# Patient Record
Sex: MALE | Race: WHITE | Employment: OTHER | URBAN - METROPOLITAN AREA
[De-identification: names, ages, dates, MRNs, and addresses within clinical notes are randomized per-mention and may not be internally consistent; named-entity substitution may affect disease eponyms.]

---

## 2019-09-20 ENCOUNTER — ED HISTORICAL/CONVERTED ENCOUNTER (OUTPATIENT)
Dept: OTHER | Age: 54
End: 2019-09-20

## 2019-10-18 ENCOUNTER — HOSPITAL ENCOUNTER (EMERGENCY)
Age: 54
Discharge: HOME | End: 2019-10-18
Attending: EMERGENCY MEDICINE | Admitting: EMERGENCY MEDICINE
Payer: SUBSIDIZED

## 2019-10-18 ENCOUNTER — APPOINTMENT (OUTPATIENT)
Dept: GENERAL RADIOLOGY | Age: 54
End: 2019-10-18
Attending: EMERGENCY MEDICINE
Payer: SUBSIDIZED

## 2019-10-18 VITALS
RESPIRATION RATE: 16 BRPM | SYSTOLIC BLOOD PRESSURE: 177 MMHG | DIASTOLIC BLOOD PRESSURE: 119 MMHG | HEART RATE: 86 BPM | OXYGEN SATURATION: 96 % | TEMPERATURE: 97.8 F

## 2019-10-18 DIAGNOSIS — I10 HYPERTENSION, UNSPECIFIED TYPE: ICD-10-CM

## 2019-10-18 DIAGNOSIS — R07.89 ATYPICAL CHEST PAIN: Primary | ICD-10-CM

## 2019-10-18 LAB
ALBUMIN SERPL-MCNC: 3.8 G/DL (ref 3.5–5)
ALBUMIN/GLOB SERPL: 1.2 {RATIO} (ref 1.1–2.2)
ALP SERPL-CCNC: 79 U/L (ref 45–117)
ALT SERPL-CCNC: 20 U/L (ref 12–78)
ANION GAP SERPL CALC-SCNC: 8 MMOL/L (ref 5–15)
AST SERPL-CCNC: 13 U/L (ref 15–37)
ATRIAL RATE: 85 BPM
BASOPHILS # BLD: 0 K/UL (ref 0–0.1)
BASOPHILS NFR BLD: 0 % (ref 0–1)
BILIRUB SERPL-MCNC: 0.4 MG/DL (ref 0.2–1)
BUN SERPL-MCNC: 18 MG/DL (ref 6–20)
BUN/CREAT SERPL: 13 (ref 12–20)
CALCIUM SERPL-MCNC: 8.6 MG/DL (ref 8.5–10.1)
CALCULATED P AXIS, ECG09: 72 DEGREES
CALCULATED R AXIS, ECG10: 24 DEGREES
CALCULATED T AXIS, ECG11: 63 DEGREES
CHLORIDE SERPL-SCNC: 110 MMOL/L (ref 97–108)
CO2 SERPL-SCNC: 24 MMOL/L (ref 21–32)
CREAT SERPL-MCNC: 1.41 MG/DL (ref 0.7–1.3)
DIAGNOSIS, 93000: NORMAL
DIFFERENTIAL METHOD BLD: ABNORMAL
EOSINOPHIL # BLD: 0.5 K/UL (ref 0–0.4)
EOSINOPHIL NFR BLD: 5 % (ref 0–7)
ERYTHROCYTE [DISTWIDTH] IN BLOOD BY AUTOMATED COUNT: 12.8 % (ref 11.5–14.5)
GLOBULIN SER CALC-MCNC: 3.3 G/DL (ref 2–4)
GLUCOSE SERPL-MCNC: 77 MG/DL (ref 65–100)
HCT VFR BLD AUTO: 44.5 % (ref 36.6–50.3)
HGB BLD-MCNC: 14.8 G/DL (ref 12.1–17)
IMM GRANULOCYTES # BLD AUTO: 0.1 K/UL (ref 0–0.04)
IMM GRANULOCYTES NFR BLD AUTO: 1 % (ref 0–0.5)
LYMPHOCYTES # BLD: 1.5 K/UL (ref 0.8–3.5)
LYMPHOCYTES NFR BLD: 15 % (ref 12–49)
MCH RBC QN AUTO: 31.9 PG (ref 26–34)
MCHC RBC AUTO-ENTMCNC: 33.3 G/DL (ref 30–36.5)
MCV RBC AUTO: 95.9 FL (ref 80–99)
MONOCYTES # BLD: 0.8 K/UL (ref 0–1)
MONOCYTES NFR BLD: 8 % (ref 5–13)
NEUTS SEG # BLD: 7.3 K/UL (ref 1.8–8)
NEUTS SEG NFR BLD: 71 % (ref 32–75)
NRBC # BLD: 0 K/UL (ref 0–0.01)
NRBC BLD-RTO: 0 PER 100 WBC
P-R INTERVAL, ECG05: 150 MS
PLATELET # BLD AUTO: 256 K/UL (ref 150–400)
PMV BLD AUTO: 10.2 FL (ref 8.9–12.9)
POTASSIUM SERPL-SCNC: 3.4 MMOL/L (ref 3.5–5.1)
PROT SERPL-MCNC: 7.1 G/DL (ref 6.4–8.2)
Q-T INTERVAL, ECG07: 402 MS
QRS DURATION, ECG06: 94 MS
QTC CALCULATION (BEZET), ECG08: 478 MS
RBC # BLD AUTO: 4.64 M/UL (ref 4.1–5.7)
SODIUM SERPL-SCNC: 142 MMOL/L (ref 136–145)
TROPONIN I SERPL-MCNC: <0.05 NG/ML
VENTRICULAR RATE, ECG03: 85 BPM
WBC # BLD AUTO: 10.2 K/UL (ref 4.1–11.1)

## 2019-10-18 PROCEDURE — 36415 COLL VENOUS BLD VENIPUNCTURE: CPT

## 2019-10-18 PROCEDURE — 74011250636 HC RX REV CODE- 250/636: Performed by: EMERGENCY MEDICINE

## 2019-10-18 PROCEDURE — 93005 ELECTROCARDIOGRAM TRACING: CPT

## 2019-10-18 PROCEDURE — 84484 ASSAY OF TROPONIN QUANT: CPT

## 2019-10-18 PROCEDURE — 80053 COMPREHEN METABOLIC PANEL: CPT

## 2019-10-18 PROCEDURE — 85025 COMPLETE CBC W/AUTO DIFF WBC: CPT

## 2019-10-18 PROCEDURE — 99285 EMERGENCY DEPT VISIT HI MDM: CPT

## 2019-10-18 PROCEDURE — 71046 X-RAY EXAM CHEST 2 VIEWS: CPT

## 2019-10-18 RX ORDER — HYDRALAZINE HYDROCHLORIDE 20 MG/ML
10 INJECTION INTRAMUSCULAR; INTRAVENOUS
Status: COMPLETED | OUTPATIENT
Start: 2019-10-18 | End: 2019-10-18

## 2019-10-18 RX ADMIN — HYDRALAZINE HYDROCHLORIDE 10 MG: 20 INJECTION INTRAMUSCULAR; INTRAVENOUS at 15:02

## 2019-10-18 NOTE — ED NOTES
Pt. Presents to ED today for complaints of an episode of intense CP that occurred while lifting heavy shingles at [address] this afternoon. Pt. Also reports that he is not taking his BP medications at home. PT. Alert and oriented x4. PT. Placed in position of comfort with call bell in reach.

## 2019-10-18 NOTE — ED PROVIDER NOTES
EMERGENCY DEPARTMENT HISTORY AND PHYSICAL EXAM          Date: 10/18/2019  Patient Name: Chandu Doll    History of Presenting Illness     Chief Complaint   Patient presents with    Chest Pain       History Provided By: Patient    HPI: Chandu Doll is a 48 y.o. male, pmhx hypertension and COPD, who presents via EMS to the ED c/o left lower chest and flank pain    Pt notes he was lifting shingles into a cart at [facility] when developed stabbing pain left lower chest which radiated into left flank followed by SOB. He notes he lifted up quite a few and there approximately 60 to 65 pounds each. Then as he was bending over to put a bundle of shingles onto the cart is when the pain started. He notes that he is been doing this all day and it finished half the roof before this started. The company called 911. The crew gave him aspirin and now he states the pain is almost all the way gone. He does note some moderate dizziness during the event which has almost gone away completely. Patient notes that he has not been taking his blood pressure medicine because it makes him dizzy. He has not followed up with his primary care physician for recheck appointment. Patient specifically denies any recent fevers, chills, coughing, nausea, vomiting, diarrhea, abd pain, CP, SOB, urinary sxs, changes in BM, or headache. PCP: No primary care provider on file. Allergies: None known  Social Hx: +tobacco, +EtOH, + (marijuana, no cocaine) illicit Drugs    There are no other complaints, changes, or physical findings at this time. Past History     Past Medical History:  Past Medical History:   Diagnosis Date    Chronic obstructive pulmonary disease ([address]     Hypertension        Past Surgical History:  History reviewed. No pertinent surgical history. Family History:  History reviewed. No pertinent family history.     Social History:  Social History     Tobacco Use    Smoking status: Current Every Day Smoker   Substance Use Topics    Alcohol use: Yes    Drug use: Yes     Types: Marijuana       Allergies:  No Known Allergies      Review of Systems   Review of Systems   Constitutional: Negative for activity change, appetite change, chills, fever and unexpected weight change. HENT: Negative for congestion. Eyes: Negative for pain and visual disturbance. Respiratory: Negative for cough and shortness of breath. Cardiovascular: Positive for chest pain (Now resolved). Gastrointestinal: Negative for abdominal pain, diarrhea, nausea and vomiting. Genitourinary: Negative for dysuria. Musculoskeletal: Positive for back pain (Now gone). Skin: Negative for rash. Neurological: Positive for dizziness. Negative for headaches. Physical Exam   Physical Exam   Constitutional: He is oriented to person, place, and time. He appears well-developed and well-nourished. Well appearing elderly male currently in minimal distress   HENT:   Head: Normocephalic and atraumatic. Mouth/Throat: Oropharynx is clear and moist.   Eyes: Pupils are equal, round, and reactive to light. Conjunctivae and EOM are normal. Right eye exhibits no discharge. Left eye exhibits no discharge. Neck: Normal range of motion. Neck supple. Cardiovascular: Normal rate, regular rhythm and normal heart sounds. No murmur heard. Pulmonary/Chest: Effort normal and breath sounds normal. No respiratory distress. He has no wheezes. He has no rales. He exhibits no tenderness. Abdominal: Soft. Bowel sounds are normal. He exhibits no distension. There is no tenderness. Musculoskeletal: Normal range of motion. He exhibits no edema. Neurological: He is alert and oriented to person, place, and time. No cranial nerve deficit. He exhibits normal muscle tone. Skin: Skin is warm and dry. No rash noted. He is not diaphoretic. Nursing note and vitals reviewed.     Diagnostic Study Results     Labs -     Recent Results (from the past 12 hour(s))   EKG, 12 LEAD, INITIAL    Collection Time: 10/18/19  2:17 PM   Result Value Ref Range    Ventricular Rate 85 BPM    Atrial Rate 85 BPM    P-R Interval 150 ms    QRS Duration 94 ms    Q-T Interval 402 ms    QTC Calculation (Bezet) 478 ms    Calculated P Axis 72 degrees    Calculated R Axis 24 degrees    Calculated T Axis 63 degrees    Diagnosis       Normal sinus rhythm  Normal ECG  No previous ECGs available     CBC WITH AUTOMATED DIFF    Collection Time: 10/18/19  2:24 PM   Result Value Ref Range    WBC 10.2 4.1 - 11.1 K/uL    RBC 4.64 4.10 - 5.70 M/uL    HGB 14.8 12.1 - 17.0 g/dL    HCT 44.5 36.6 - 50.3 %    MCV 95.9 80.0 - 99.0 FL    MCH 31.9 26.0 - 34.0 PG    MCHC 33.3 30.0 - 36.5 g/dL    RDW 12.8 11.5 - 14.5 %    PLATELET 853 162 - 274 K/uL    MPV 10.2 8.9 - 12.9 FL    NRBC 0.0 0  WBC    ABSOLUTE NRBC 0.00 0.00 - 0.01 K/uL    NEUTROPHILS 71 32 - 75 %    LYMPHOCYTES 15 12 - 49 %    MONOCYTES 8 5 - 13 %    EOSINOPHILS 5 0 - 7 %    BASOPHILS 0 0 - 1 %    IMMATURE GRANULOCYTES 1 (H) 0.0 - 0.5 %    ABS. NEUTROPHILS 7.3 1.8 - 8.0 K/UL    ABS. LYMPHOCYTES 1.5 0.8 - 3.5 K/UL    ABS. MONOCYTES 0.8 0.0 - 1.0 K/UL    ABS. EOSINOPHILS 0.5 (H) 0.0 - 0.4 K/UL    ABS. BASOPHILS 0.0 0.0 - 0.1 K/UL    ABS. IMM. GRANS. 0.1 (H) 0.00 - 0.04 K/UL    DF AUTOMATED         Radiologic Studies -   No orders to display     CT Results  (Last 48 hours)    None        CXR Results  (Last 48 hours)    None            Medical Decision Making   I am the first provider for this patient. I reviewed the vital signs, available nursing notes, past medical history, past surgical history, family history and social history. Vital Signs-Reviewed the patient's vital signs.   Patient Vitals for the past 12 hrs:   Temp Pulse Resp BP SpO2   10/18/19 1419 97.8 °F (36.6 °C) 82 13 (!) 178/130 97 %       Pulse Oximetry Analysis - 98% on RA    Cardiac Monitor:   Rate: 82bpm  Rhythm: Normal Sinus Rhythm      Records Reviewed: Nursing Notes    Provider Notes (Medical Decision Making):   MDM: Middle-aged male smoker with a history of hypertension and unknown cholesterol levels presenting with acute onset left-sided pain likely musculoskeletal.  However given risk factors will send cardiac enzymes and monitor his pressure and rhythm strips. ED Course:   Initial assessment performed. The patients presenting problems have been discussed, and they are in agreement with the care plan formulated and outlined with them. I have encouraged them to ask questions as they arise throughout their visit. EKG interpretation: (Preliminary)  Rhythm: Normal sinus rhythm with a regular rate at 85; normal axis; normal TX and QRS intervals; normal ST-T segments. 2:16 PM   Spent 3-5 minutes discussing the risks of smoking and the benefits of smoking cessation as well as the long term sequelae of smoking with the pt who verbalized his understanding. Reviewed strategies for success, including gradually decreasing the number of cigarettes smoked a day. PROGRESS NOTE:  3:50 PM  Pt states he is doing better and has not had recurrent episodes of symptoms. Discussed recommendations for smoking cessation again as well as follow-up with cardiology for stress testing. On score care card information is been given to patient and his spouse. Discharge note:  Pt re-evaluated and noted to be feeling better  ready for discharge. Updated pt and family on all final lab findings. Will follow up as instructed. All questions have been answered, pt voiced understanding and agreement with plan. Specific return precautions provided as well as instructions to return to the ED should sx worsen at any time. Vital signs stable for discharge. Critical Care Time:   0      Diagnosis     Clinical Impression:   1. Atypical chest pain    2. Hypertension, unspecified type        PLAN:  1. There are no discharge medications for this patient.     2.   Follow-up Information     Follow up With Specialties Details Why [facility]  Schedule an appointment as soon as possible for a visit [phone] for stress testing [address]  [place] [address]        Return to ED if worse     Disposition:  home      Please note, this dictation was completed with [employer], the computer voice recognition software. Quite often unanticipated grammatical, syntax, homophones, and other interpretive errors are inadvertently transcribed by the computer software. Please disregard these errors. Please excuse any errors that have escaped final proof reading.

## 2019-10-18 NOTE — ED NOTES
Patient discharged by Dr. Vicente Beltrán. Patient provided with discharge instructions Rx and instructions on follow up care. Patient out of ED ambulatory accompanied by family.

## 2019-10-18 NOTE — DISCHARGE INSTRUCTIONS
Please take your medications as prescribed. If your blood pressure medicine makes you too dizzy, you can cut it in half. Patient Education        Musculoskeletal Chest Pain: Care Instructions  Your Care Instructions    Chest pain is not always a sign that something is wrong with your heart or that you have another serious problem. The doctor thinks your chest pain is caused by strained muscles or ligaments, inflamed chest cartilage, or another problem in your chest, rather than by your heart. You may need more tests to find the cause of your chest pain. Follow-up care is a key part of your treatment and safety. Be sure to make and go to all appointments, and call your doctor if you are having problems. It's also a good idea to know your test results and keep a list of the medicines you take. How can you care for yourself at home? · Take pain medicines exactly as directed. ? If the doctor gave you a prescription medicine for pain, take it as prescribed. ? If you are not taking a prescription pain medicine, ask your doctor if you can take an over-the-counter medicine. · Rest and protect the sore area. · Stop, change, or take a break from any activity that may be causing your pain or soreness. · Put ice or a cold pack on the sore area for 10 to 20 minutes at a time. Try to do this every 1 to 2 hours for the next 3 days (when you are awake) or until the swelling goes down. Put a thin cloth between the ice and your skin. · After 2 or 3 days, apply a heating pad set on low or a warm cloth to the area that hurts. Some doctors suggest that you go back and forth between hot and cold. · Do not wrap or tape your ribs for support. This may cause you to take smaller breaths, which could increase your risk of lung problems. · Mentholated creams such as Bengay or Icy Hot may soothe sore muscles. Follow the instructions on the package. · Follow your doctor's instructions for exercising.   · Gentle stretching and massage may help you get better faster. Stretch slowly to the point just before pain begins, and hold the stretch for at least 15 to 30 seconds. Do this 3 or 4 times a day. Stretch just after you have applied heat. · As your pain gets better, slowly return to your normal activities. Any increased pain may be a sign that you need to rest a while longer. When should you call for help? Call 911 anytime you think you may need emergency care. For example, call if:    · You have chest pain or pressure. This may occur with:  ? Sweating. ? Shortness of breath. ? Nausea or vomiting. ? Pain that spreads from the chest to the neck, jaw, or one or both shoulders or arms. ? Dizziness or lightheadedness. ? A fast or uneven pulse. After calling 911, chew 1 adult-strength aspirin. Wait for an ambulance. Do not try to drive yourself.     · You have sudden chest pain and shortness of breath, or you cough up blood.    Call your doctor now or seek immediate medical care if:    · You have any trouble breathing.     · Your chest pain gets worse.     · Your chest pain occurs consistently with exercise and is relieved by rest.    Watch closely for changes in your health, and be sure to contact your doctor if:    · Your chest pain does not get better after 1 week. Where can you learn more? Go to http://dang-fidelia.info/. Enter V293 in the search box to learn more about \"Musculoskeletal Chest Pain: Care Instructions. \"  Current as of: June 26, 2019  Content Version: 12.2  © 9324-2122 [email or web address]. Care instructions adapted under license by [employer] (which disclaims liability or warranty for this information). If you have questions about a medical condition or this instruction, always ask your healthcare professional. [place] [address] any warranty or liability for your use of this information.        Patient Education        Learning About Benefits From Quitting Smoking  How does quitting smoking make you healthier? If you're thinking about quitting smoking, you may have a few reasons to be smoke-free. Your health may be one of them. · When you quit smoking, you lower your risks for cancer, lung disease, heart attack, stroke, blood vessel disease, and blindness from macular degeneration. · When you're smoke-free, you get sick less often, and you heal faster. You are less likely to get colds, flu, bronchitis, and pneumonia. · As a nonsmoker, you may find that your mood is better and you are less stressed. When and how will you feel healthier? Quitting has real health benefits that start from day 1 of being smoke-free. And the longer you stay smoke-free, the healthier you get and the better you feel. The first hours  · After just 20 minutes, your blood pressure and heart rate go down. That means there's less stress on your heart and blood vessels. · Within 12 hours, the level of carbon monoxide in your blood drops back to normal. That makes room for more oxygen. With more oxygen in your body, you may notice that you have more energy than when you smoked. After 2 weeks  · Your lungs start to work better. · Your risk of heart attack starts to drop. After 1 month  · When your lungs are clear, you cough less and breathe deeper, so it's easier to be active. · Your sense of taste and smell return. That means you can enjoy food more than you have since you started smoking. Over the years  · After 1 year, your risk of heart disease is half what it would be if you kept smoking. · After 5 years, your risk of stroke starts to shrink. Within a few years after that, it's about the same as if you'd never smoked. · After 10 years, your risk of dying from lung cancer is cut by about half. And your risk for many other types of cancer is lower too. How would quitting help others in your life?   When you quit smoking, you improve the health of everyone who now breathes in your smoke.  · Their heart, lung, and cancer risks drop, much like yours. · They are sick less. For babies and small children, living smoke-free means they're less likely to have ear infections, pneumonia, and bronchitis. · If you're a woman who is or will be pregnant someday, quitting smoking means a healthier . · Children who are close to you are less likely to become adult smokers. Where can you learn more? Go to http://dang-fidelia.info/. Enter 052 [id number] 72 11 in the search box to learn more about \"Learning About Benefits From Quitting Smoking. \"  Current as of: 2018  Content Version: 12.2  © 2768-3393 [employer]. Care instructions adapted under license by [employer] (which disclaims liability or warranty for this information). If you have questions about a medical condition or this instruction, always ask your healthcare professional. [place] [address] any warranty or liability for your use of this information. Patient Education        Elevated Blood Pressure: Care Instructions  Your Care Instructions  Blood pressure is a measure of how hard the blood pushes against the walls of your arteries. It's normal for blood pressure to go up and down throughout the day. But if it stays up over time, you have high blood pressure. Two numbers tell you your blood pressure. The first number is the systolic pressure. It shows how hard the blood pushes when your heart is pumping. The second number is the diastolic pressure. It shows how hard the blood pushes between heartbeats, when your heart is relaxed and filling with blood. An ideal blood pressure in adults is less than 120/80 (say \"120 over 80\"). High blood pressure is 140/90 or higher. You have high blood pressure if your top number is 140 or higher or your bottom number is 90 or higher, or both. The main test for high blood pressure is simple, fast, and painless.  To diagnose high blood pressure, your doctor will test your blood pressure at different times. After testing your blood pressure, your doctor may ask you to test it again when you are home. If you are diagnosed with high blood pressure, you can work with your doctor to make a long-term plan to manage it. Follow-up care is a key part of your treatment and safety. Be sure to make and go to all appointments, and call your doctor if you are having problems. It's also a good idea to know your test results and keep a list of the medicines you take. How can you care for yourself at home? · Do not smoke. Smoking increases your risk for heart attack and stroke. If you need help quitting, talk to your doctor about stop-smoking programs and medicines. These can increase your chances of quitting for good. · Stay at a healthy weight. · Try to limit how much sodium you eat to less than 2,300 milligrams (mg) a day. Your doctor may ask you to try to eat less than 1,500 mg a day. · Be physically active. Get at least 30 minutes of exercise on most days of the week. Walking is a good choice. You also may want to do other activities, such as running, swimming, cycling, or playing tennis or team sports. · Avoid or limit alcohol. Talk to your doctor about whether you can drink any alcohol. · Eat plenty of fruits, vegetables, and low-fat dairy products. Eat less saturated and total fats. · Learn how to check your blood pressure at home. When should you call for help? Call your doctor now or seek immediate medical care if:  ? · Your blood pressure is much higher than normal (such as 180/110 or higher). ? · You think high blood pressure is causing symptoms such as:  ¨ Severe headache. ¨ Blurry vision. ? Watch closely for changes in your health, and be sure to contact your doctor if:  ? · You do not get better as expected. Where can you learn more? Go to http://dang-fidelia.info/.   Enter B520 in the search box to learn more about \"Elevated Blood Pressure: Care Instructions. \"  Current as of: September 21, 2016  Content Version: 11.4  © 5863-1380 [employer]. Care instructions adapted under license by [employer] (which disclaims liability or warranty for this information). If you have questions about a medical condition or this instruction, always ask your healthcare professional. [address] any warranty or liability for your use of this information.

## 2022-10-05 ENCOUNTER — APPOINTMENT (OUTPATIENT)
Dept: GENERAL RADIOLOGY | Age: 57
End: 2022-10-05
Attending: EMERGENCY MEDICINE
Payer: MEDICAID

## 2022-10-05 ENCOUNTER — APPOINTMENT (OUTPATIENT)
Dept: CT IMAGING | Age: 57
End: 2022-10-05
Attending: EMERGENCY MEDICINE
Payer: MEDICAID

## 2022-10-05 ENCOUNTER — HOSPITAL ENCOUNTER (EMERGENCY)
Age: 57
Discharge: HOME | End: 2022-10-05
Attending: EMERGENCY MEDICINE
Payer: MEDICAID

## 2022-10-05 VITALS
HEART RATE: 91 BPM | DIASTOLIC BLOOD PRESSURE: 126 MMHG | RESPIRATION RATE: 18 BRPM | HEIGHT: 70 IN | WEIGHT: 150 LBS | OXYGEN SATURATION: 97 % | BODY MASS INDEX: 21.47 KG/M2 | SYSTOLIC BLOOD PRESSURE: 178 MMHG | TEMPERATURE: 97.8 F

## 2022-10-05 DIAGNOSIS — N17.9 AKI (ACUTE KIDNEY INJURY) (HCC): ICD-10-CM

## 2022-10-05 DIAGNOSIS — J18.9 PNEUMONIA OF RIGHT LUNG DUE TO INFECTIOUS ORGANISM, UNSPECIFIED PART OF LUNG: ICD-10-CM

## 2022-10-05 DIAGNOSIS — I16.1 HYPERTENSIVE EMERGENCY: Primary | ICD-10-CM

## 2022-10-05 DIAGNOSIS — R06.02 SOB (SHORTNESS OF BREATH): ICD-10-CM

## 2022-10-05 LAB
ALBUMIN SERPL-MCNC: 4.3 G/DL (ref 3.5–5.2)
ALBUMIN/GLOB SERPL: 1.7 {RATIO} (ref 1.1–2.2)
ALP SERPL-CCNC: 93 U/L (ref 40–129)
ALT SERPL-CCNC: 13 U/L (ref 10–50)
ANION GAP SERPL CALC-SCNC: 11 MMOL/L (ref 5–15)
AST SERPL-CCNC: 13 U/L (ref 10–50)
BASOPHILS # BLD: 0 K/UL (ref 0–0.1)
BASOPHILS NFR BLD: 0 % (ref 0–1)
BILIRUB SERPL-MCNC: 0.6 MG/DL (ref 0.2–1)
BUN SERPL-MCNC: 21 MG/DL (ref 6–20)
BUN/CREAT SERPL: 9 (ref 12–20)
CALCIUM SERPL-MCNC: 9.5 MG/DL (ref 8.6–10)
CHLORIDE SERPL-SCNC: 104 MMOL/L (ref 98–107)
CO2 SERPL-SCNC: 27 MMOL/L (ref 22–29)
COMMENT, HOLDF: NORMAL
CREAT SERPL-MCNC: 2.27 MG/DL (ref 0.7–1.2)
DIFFERENTIAL METHOD BLD: ABNORMAL
EOSINOPHIL # BLD: 0.4 K/UL (ref 0–0.4)
EOSINOPHIL NFR BLD: 4 % (ref 0–7)
ERYTHROCYTE [DISTWIDTH] IN BLOOD BY AUTOMATED COUNT: 13.2 % (ref 11.5–14.5)
GLOBULIN SER CALC-MCNC: 2.6 G/DL (ref 2–4)
GLUCOSE SERPL-MCNC: 99 MG/DL (ref 65–100)
HCT VFR BLD AUTO: 43.8 % (ref 36.6–50.3)
HGB BLD-MCNC: 14.3 G/DL (ref 12.1–17)
IMM GRANULOCYTES # BLD AUTO: 0 K/UL (ref 0–0.04)
IMM GRANULOCYTES NFR BLD AUTO: 0 % (ref 0–0.5)
LACTATE BLD-SCNC: 1.53 MMOL/L (ref 0.4–2)
LYMPHOCYTES # BLD: 0.9 K/UL (ref 0.8–3.5)
LYMPHOCYTES NFR BLD: 9 % (ref 12–49)
MCH RBC QN AUTO: 31.1 PG (ref 26–34)
MCHC RBC AUTO-ENTMCNC: 32.6 G/DL (ref 30–36.5)
MCV RBC AUTO: 95.2 FL (ref 80–99)
MONOCYTES # BLD: 0.7 K/UL (ref 0–1)
MONOCYTES NFR BLD: 6 % (ref 5–13)
NEUTS SEG # BLD: 8.4 K/UL (ref 1.8–8)
NEUTS SEG NFR BLD: 80 % (ref 32–75)
NRBC # BLD: 0 K/UL (ref 0–0.01)
NRBC BLD-RTO: 0 PER 100 WBC
PLATELET # BLD AUTO: 192 K/UL (ref 150–400)
PMV BLD AUTO: 10.7 FL (ref 8.9–12.9)
POTASSIUM SERPL-SCNC: 4.2 MMOL/L (ref 3.5–5.1)
PROT SERPL-MCNC: 6.9 G/DL (ref 6.4–8.3)
RBC # BLD AUTO: 4.6 M/UL (ref 4.1–5.7)
SAMPLES BEING HELD,HOLD: NORMAL
SODIUM SERPL-SCNC: 142 MMOL/L (ref 136–145)
TROPONIN I BLD-MCNC: 0.04 NG/ML (ref 0–0.08)
WBC # BLD AUTO: 10.5 K/UL (ref 4.1–11.1)

## 2022-10-05 PROCEDURE — 99285 EMERGENCY DEPT VISIT HI MDM: CPT

## 2022-10-05 PROCEDURE — 87040 BLOOD CULTURE FOR BACTERIA: CPT

## 2022-10-05 PROCEDURE — 74011000636 HC RX REV CODE- 636: Performed by: EMERGENCY MEDICINE

## 2022-10-05 PROCEDURE — 74011000250 HC RX REV CODE- 250: Performed by: EMERGENCY MEDICINE

## 2022-10-05 PROCEDURE — 71045 X-RAY EXAM CHEST 1 VIEW: CPT

## 2022-10-05 PROCEDURE — 80053 COMPREHEN METABOLIC PANEL: CPT

## 2022-10-05 PROCEDURE — 70450 CT HEAD/BRAIN W/O DYE: CPT

## 2022-10-05 PROCEDURE — 93005 ELECTROCARDIOGRAM TRACING: CPT

## 2022-10-05 PROCEDURE — 71275 CT ANGIOGRAPHY CHEST: CPT

## 2022-10-05 PROCEDURE — 96366 THER/PROPH/DIAG IV INF ADDON: CPT

## 2022-10-05 PROCEDURE — 36415 COLL VENOUS BLD VENIPUNCTURE: CPT

## 2022-10-05 PROCEDURE — 74011250637 HC RX REV CODE- 250/637: Performed by: EMERGENCY MEDICINE

## 2022-10-05 PROCEDURE — 96375 TX/PRO/DX INJ NEW DRUG ADDON: CPT

## 2022-10-05 PROCEDURE — 85025 COMPLETE CBC W/AUTO DIFF WBC: CPT

## 2022-10-05 PROCEDURE — 96365 THER/PROPH/DIAG IV INF INIT: CPT

## 2022-10-05 PROCEDURE — 74011250636 HC RX REV CODE- 250/636: Performed by: EMERGENCY MEDICINE

## 2022-10-05 RX ORDER — HYDRALAZINE HYDROCHLORIDE 25 MG/1
25 TABLET, FILM COATED ORAL ONCE
Status: COMPLETED | OUTPATIENT
Start: 2022-10-05 | End: 2022-10-05

## 2022-10-05 RX ORDER — ALBUTEROL SULFATE 90 UG/1
2 AEROSOL, METERED RESPIRATORY (INHALATION)
Qty: 18 G | Refills: 0 | Status: SHIPPED | OUTPATIENT
Start: 2022-10-05

## 2022-10-05 RX ORDER — DOXYCYCLINE 100 MG/1
100 TABLET ORAL 2 TIMES DAILY
Qty: 14 TABLET | Refills: 0 | Status: SHIPPED | OUTPATIENT
Start: 2022-10-05 | End: 2022-10-12

## 2022-10-05 RX ORDER — HYDRALAZINE HYDROCHLORIDE 25 MG/1
25 TABLET, FILM COATED ORAL 3 TIMES DAILY
Qty: 30 TABLET | Refills: 0 | Status: SHIPPED | OUTPATIENT
Start: 2022-10-05 | End: 2022-10-28

## 2022-10-05 RX ORDER — ALBUTEROL SULFATE 90 UG/1
2 AEROSOL, METERED RESPIRATORY (INHALATION)
COMMUNITY

## 2022-10-05 RX ADMIN — CEFTRIAXONE 2 G: 2 INJECTION, POWDER, FOR SOLUTION INTRAMUSCULAR; INTRAVENOUS at 11:40

## 2022-10-05 RX ADMIN — IOPAMIDOL 100 ML: 755 INJECTION, SOLUTION INTRAVENOUS at 11:32

## 2022-10-05 RX ADMIN — NICARDIPINE HYDROCHLORIDE 5 MG/HR: 2.5 INJECTION, SOLUTION INTRAVENOUS at 10:07

## 2022-10-05 RX ADMIN — AZITHROMYCIN MONOHYDRATE 500 MG: 500 INJECTION, POWDER, LYOPHILIZED, FOR SOLUTION INTRAVENOUS at 11:40

## 2022-10-05 RX ADMIN — HYDRALAZINE HYDROCHLORIDE 25 MG: 25 TABLET, FILM COATED ORAL at 12:55

## 2022-10-05 NOTE — ED PROVIDER NOTES
72-year-old male with history of COPD, hypertension, stroke presents to the emergency department with a chief complaint of 3 days of worsening shortness of breath. He is a current smoker. He does not use baseline controller medicines but uses albuterol as needed for COPD. Denies cough or fever. No chest pain. Denies history of cardiovascular disease. Reports previous stroke where he was seen at [facility] with left-sided deficits. He endorses headache along with a feeling of off balance for the past 3 days. Does not currently take medicines for blood pressure. The history is provided by the patient and medical records. Shortness of Breath  This is a new problem. The problem occurs continuously. The current episode started more than 2 days ago. The problem has been gradually worsening. Associated symptoms include headaches. Pertinent negatives include no fever, no sore throat, no cough, no chest pain, no vomiting, no abdominal pain, no rash and no leg swelling. Associated medical issues include COPD. Hypertension   Associated symptoms include headaches and shortness of breath. Pertinent negatives include no chest pain, no nausea and no vomiting. Headache   Associated symptoms include shortness of breath. Pertinent negatives include no fever, no weakness, no nausea and no vomiting. Past Medical History:   Diagnosis Date    Chronic obstructive pulmonary disease ([facility] [address]     Hypertension     Stroke ([facility] [address] 2022       History reviewed. No pertinent surgical history. History reviewed. No pertinent family history. Social History     Socioeconomic History    Marital status:      Spouse name: Not on file    Number of children: Not on file    Years of education: Not on file    Highest education level: Not on file   Occupational History    Not on file   Tobacco Use    Smoking status: Every Day    Smokeless tobacco: Never   Substance and Sexual Activity    Alcohol use: Yes    Drug use:  Yes Types: Marijuana    Sexual activity: Not on file   Other Topics Concern    Not on file   Social History Narrative    Not on file     Social Determinants of Health     Financial Resource Strain: Not on file   Food Insecurity: Not on file   Transportation Needs: Not on file   Physical Activity: Not on file   Stress: Not on file   Social Connections: Not on file   Intimate Partner Violence: Not on file   Housing Stability: Not on file         ALLERGIES: Patient has no known allergies. Review of Systems   Constitutional:  Positive for fatigue. Negative for fever. HENT:  Negative for sneezing and sore throat. Respiratory:  Positive for shortness of breath. Negative for cough. Cardiovascular:  Negative for chest pain and leg swelling. Gastrointestinal:  Negative for abdominal pain, diarrhea, nausea and vomiting. Genitourinary:  Negative for difficulty urinating and dysuria. Musculoskeletal:  Negative for arthralgias and myalgias. Skin:  Negative for color change and rash. Neurological:  Positive for headaches. Negative for weakness. Psychiatric/Behavioral:  Negative for agitation and behavioral problems. There were no vitals filed for this visit. Physical Exam  Vitals and nursing note reviewed. Constitutional:       General: He is not in acute distress. Appearance: Normal appearance. He is well-developed. He is not ill-appearing, toxic-appearing or diaphoretic. HENT:      Head: Normocephalic and atraumatic. Nose: Nose normal.      Mouth/Throat:      Mouth: Mucous membranes are moist.      Pharynx: Oropharynx is clear. Eyes:      Extraocular Movements: Extraocular movements intact. Conjunctiva/sclera: Conjunctivae normal.      Pupils: Pupils are equal, round, and reactive to light. Cardiovascular:      Rate and Rhythm: Normal rate and regular rhythm. Pulses: Normal pulses. Heart sounds: No murmur heard.   Pulmonary:      Effort: Pulmonary effort is normal. No respiratory distress. Breath sounds: Normal breath sounds. No wheezing. Chest:      Chest wall: No mass or tenderness. Abdominal:      General: There is no distension. Palpations: Abdomen is soft. Tenderness: There is no abdominal tenderness. There is no guarding or rebound. Musculoskeletal:         General: No swelling, tenderness, deformity or signs of injury. Normal range of motion. Cervical back: Normal range of motion and neck supple. No rigidity. No muscular tenderness. Right lower leg: No tenderness. No edema. Left lower leg: No tenderness. No edema. Skin:     General: Skin is warm and dry. Capillary Refill: Capillary refill takes less than 2 seconds. Neurological:      Mental Status: He is alert and oriented to person, place, and time. Coordination: Coordination abnormal (Slight difficulty with bilateral finger-to-nose). Psychiatric:         Mood and Affect: Mood normal.         Behavior: Behavior normal.        MDM  Number of Diagnoses or Management Options  ANCELMO (acute kidney injury) ([facility] [address]  Hypertensive emergency  Pneumonia of right lung due to infectious organism, unspecified part of lung  SOB (shortness of breath)  Diagnosis management comments: 28-year-old male presents as above with shortness of breath. He has evidence of pneumonia with hypertensive urgency with systolics approaching 613, ANCELMO. He declines recommendation for admission to the hospital and would like to leave AMA. We will transition to p.o. antibiotics, oral antihypertensive. Recommend that he follows up with soon as possible at the hospital for admission. He tells me he will go to the hospital in [place] in a couple of days.        Amount and/or Complexity of Data Reviewed  Clinical lab tests: reviewed  Tests in the radiology section of CPT®: reviewed  Decide to obtain previous medical records or to obtain history from someone other than the patient: yes      ED Course as of 10/06/22 1355   Wed Oct 05, 2022   0949   ED EKG interpretation:  Rhythm: normal sinus rhythm. Rate (approx.): 99. Axis: normal.  ST segment: There ST elevations V2 through V4 which appear to be related to LVH, depression in V6. . This EKG was interpreted by Yuliet Ramirez MD,ED Provider. [JM]   7789 Discussed with the patient my concerns regarding hypertension, kidney injury, pneumonia with recommendation for admission which he declines. At this point we will discontinue Cardene drip and transition to p.o.   Will provide prescription for p.o. hypertensive, antibiotics, with recommendation to return to the hospital soon as possible for anticipated admission [JM]      ED Course User Index  [JM] Narciso Martel MD       Procedures

## 2022-10-05 NOTE — ED TRIAGE NOTES
Patient arrives ambulatory to triage with c/o SOB x 3 days, and right sided headache and dizziness x 3 days. States intermittently feels sensation of \"about to fall over. \"    Patient's BP: 142/160. Patient has hx of stroke 6 months ago, which has resulted in left sided deficit, and left sided numbness on trunk. Patient is A&O x 4. Denies chest pain, cough, fever, N/V/D, vision change, change, difficulty with ambulation. Hx of COPD.

## 2022-10-05 NOTE — DISCHARGE INSTRUCTIONS
[address] Gu[address], [address] [place] [address]    Thank you for allowing us to provide you with medical care today. We realize that you have many choices for your emergency care needs. We thank you for choosing [facility]. Please choose us in the future for any continued health care needs. We hope we addressed all of your medical concerns. We strive to provide excellent quality care in the Emergency Department. Anything less than excellent does not meet our expectations. The exam and treatment you received in the Emergency Department were for an emergent problem and are not intended as complete care. It is important that you follow up with a doctor, nurse practitioner, or physician's assistant for ongoing care. If your symptoms worsen or you do not improve as expected and you are unable to reach your usual health care provider, you should return to the Emergency Department. We are available 24 hours a day. Take this sheet with you when you go to your follow-up visit. If you have any problem arranging the follow-up visit, contact the Emergency Department immediately. Make an appointment your family doctor for follow up of this visit. Return to the ER if you are unable to be seen in a timely manner.

## 2022-10-06 LAB
ATRIAL RATE: 99 BPM
CALCULATED P AXIS, ECG09: 63 DEGREES
CALCULATED R AXIS, ECG10: -20 DEGREES
CALCULATED T AXIS, ECG11: 108 DEGREES
DIAGNOSIS, 93000: NORMAL
P-R INTERVAL, ECG05: 166 MS
Q-T INTERVAL, ECG07: 368 MS
QRS DURATION, ECG06: 98 MS
QTC CALCULATION (BEZET), ECG08: 472 MS
VENTRICULAR RATE, ECG03: 99 BPM

## 2022-10-10 LAB
BACTERIA SPEC CULT: NORMAL
SERVICE CMNT-IMP: NORMAL

## 2022-10-18 ENCOUNTER — APPOINTMENT (OUTPATIENT)
Dept: CT IMAGING | Age: 57
DRG: 199 | End: 2022-10-18
Attending: STUDENT IN AN ORGANIZED HEALTH CARE EDUCATION/TRAINING PROGRAM
Payer: MEDICAID

## 2022-10-18 ENCOUNTER — APPOINTMENT (OUTPATIENT)
Dept: ULTRASOUND IMAGING | Age: 57
End: 2022-10-18
Attending: EMERGENCY MEDICINE
Payer: MEDICAID

## 2022-10-18 ENCOUNTER — HOSPITAL ENCOUNTER (EMERGENCY)
Age: 57
Discharge: ANOTHER HOSPITAL | End: 2022-10-18
Attending: EMERGENCY MEDICINE
Payer: MEDICAID

## 2022-10-18 ENCOUNTER — HOSPITAL ENCOUNTER (INPATIENT)
Age: 57
LOS: 1 days | Discharge: LEFT BEFORE DISCHARGE | DRG: 199 | End: 2022-10-20
Attending: STUDENT IN AN ORGANIZED HEALTH CARE EDUCATION/TRAINING PROGRAM | Admitting: INTERNAL MEDICINE
Payer: MEDICAID

## 2022-10-18 VITALS
SYSTOLIC BLOOD PRESSURE: 166 MMHG | WEIGHT: 150 LBS | DIASTOLIC BLOOD PRESSURE: 114 MMHG | BODY MASS INDEX: 21.47 KG/M2 | OXYGEN SATURATION: 97 % | TEMPERATURE: 97.9 F | HEIGHT: 70 IN | RESPIRATION RATE: 16 BRPM | HEART RATE: 81 BPM

## 2022-10-18 DIAGNOSIS — I16.9 HYPERTENSIVE CRISIS: Primary | ICD-10-CM

## 2022-10-18 DIAGNOSIS — D64.9 ACUTE ANEMIA: ICD-10-CM

## 2022-10-18 DIAGNOSIS — I10 HYPERTENSION, UNSPECIFIED TYPE: Primary | ICD-10-CM

## 2022-10-18 DIAGNOSIS — R10.11 ABDOMINAL PAIN, RIGHT UPPER QUADRANT: ICD-10-CM

## 2022-10-18 DIAGNOSIS — K92.2 GASTROINTESTINAL HEMORRHAGE, UNSPECIFIED GASTROINTESTINAL HEMORRHAGE TYPE: ICD-10-CM

## 2022-10-18 DIAGNOSIS — N18.9 ACUTE RENAL FAILURE SUPERIMPOSED ON CHRONIC KIDNEY DISEASE, UNSPECIFIED CKD STAGE, UNSPECIFIED ACUTE RENAL FAILURE TYPE (HCC): ICD-10-CM

## 2022-10-18 DIAGNOSIS — N17.9 ACUTE RENAL FAILURE SUPERIMPOSED ON CHRONIC KIDNEY DISEASE, UNSPECIFIED CKD STAGE, UNSPECIFIED ACUTE RENAL FAILURE TYPE (HCC): ICD-10-CM

## 2022-10-18 DIAGNOSIS — N17.9 AKI (ACUTE KIDNEY INJURY) (HCC): ICD-10-CM

## 2022-10-18 LAB
ALBUMIN SERPL-MCNC: 3.9 G/DL (ref 3.5–5.2)
ALBUMIN/GLOB SERPL: 1.6 {RATIO} (ref 1.1–2.2)
ALP SERPL-CCNC: 92 U/L (ref 40–129)
ALT SERPL-CCNC: 24 U/L (ref 10–50)
ANION GAP SERPL CALC-SCNC: 14 MMOL/L (ref 5–15)
AST SERPL-CCNC: 26 U/L (ref 10–50)
BASOPHILS # BLD: 0.1 K/UL (ref 0–0.1)
BASOPHILS NFR BLD: 1 % (ref 0–1)
BILIRUB SERPL-MCNC: 0.7 MG/DL (ref 0.2–1)
BUN SERPL-MCNC: 27 MG/DL (ref 6–20)
BUN/CREAT SERPL: 11 (ref 12–20)
CALCIUM SERPL-MCNC: 9 MG/DL (ref 8.6–10)
CHLORIDE SERPL-SCNC: 103 MMOL/L (ref 98–107)
CO2 SERPL-SCNC: 26 MMOL/L (ref 22–29)
COLLECT DATE STL: NORMAL
COMMENT, HOLDF: NORMAL
CREAT SERPL-MCNC: 2.52 MG/DL (ref 0.7–1.2)
DIFFERENTIAL METHOD BLD: ABNORMAL
EOSINOPHIL # BLD: 0.6 K/UL (ref 0–0.4)
EOSINOPHIL NFR BLD: 5 % (ref 0–7)
ERYTHROCYTE [DISTWIDTH] IN BLOOD BY AUTOMATED COUNT: 13.2 % (ref 11.5–14.5)
GLOBULIN SER CALC-MCNC: 2.5 G/DL (ref 2–4)
GLUCOSE SERPL-MCNC: 109 MG/DL (ref 65–100)
HCT VFR BLD AUTO: 35.6 % (ref 36.6–50.3)
HEMOCCULT SP1 STL QL: NEGATIVE
HGB BLD-MCNC: 11.8 G/DL (ref 12.1–17)
IMM GRANULOCYTES # BLD AUTO: 0 K/UL (ref 0–0.04)
IMM GRANULOCYTES NFR BLD AUTO: 0 % (ref 0–0.5)
LIPASE SERPL-CCNC: 41 U/L (ref 13–60)
LYMPHOCYTES # BLD: 1.4 K/UL (ref 0.8–3.5)
LYMPHOCYTES NFR BLD: 14 % (ref 12–49)
MCH RBC QN AUTO: 31.1 PG (ref 26–34)
MCHC RBC AUTO-ENTMCNC: 33.1 G/DL (ref 30–36.5)
MCV RBC AUTO: 93.7 FL (ref 80–99)
MONOCYTES # BLD: 0.9 K/UL (ref 0–1)
MONOCYTES NFR BLD: 8 % (ref 5–13)
NEUTS SEG # BLD: 7.7 K/UL (ref 1.8–8)
NEUTS SEG NFR BLD: 72 % (ref 32–75)
NRBC # BLD: 0 K/UL (ref 0–0.01)
NRBC BLD-RTO: 0 PER 100 WBC
PLATELET # BLD AUTO: 226 K/UL (ref 150–400)
PMV BLD AUTO: 10.9 FL (ref 8.9–12.9)
POTASSIUM SERPL-SCNC: 3.4 MMOL/L (ref 3.5–5.1)
PROT SERPL-MCNC: 6.4 G/DL (ref 6.4–8.3)
RBC # BLD AUTO: 3.8 M/UL (ref 4.1–5.7)
SAMPLES BEING HELD,HOLD: NORMAL
SODIUM SERPL-SCNC: 143 MMOL/L (ref 136–145)
WBC # BLD AUTO: 10.7 K/UL (ref 4.1–11.1)

## 2022-10-18 PROCEDURE — 96374 THER/PROPH/DIAG INJ IV PUSH: CPT

## 2022-10-18 PROCEDURE — 96376 TX/PRO/DX INJ SAME DRUG ADON: CPT

## 2022-10-18 PROCEDURE — 85025 COMPLETE CBC W/AUTO DIFF WBC: CPT

## 2022-10-18 PROCEDURE — 36415 COLL VENOUS BLD VENIPUNCTURE: CPT

## 2022-10-18 PROCEDURE — 96365 THER/PROPH/DIAG IV INF INIT: CPT

## 2022-10-18 PROCEDURE — 96375 TX/PRO/DX INJ NEW DRUG ADDON: CPT

## 2022-10-18 PROCEDURE — 93005 ELECTROCARDIOGRAM TRACING: CPT

## 2022-10-18 PROCEDURE — 80053 COMPREHEN METABOLIC PANEL: CPT

## 2022-10-18 PROCEDURE — 74176 CT ABD & PELVIS W/O CONTRAST: CPT

## 2022-10-18 PROCEDURE — 99285 EMERGENCY DEPT VISIT HI MDM: CPT

## 2022-10-18 PROCEDURE — 76705 ECHO EXAM OF ABDOMEN: CPT

## 2022-10-18 PROCEDURE — 83690 ASSAY OF LIPASE: CPT

## 2022-10-18 PROCEDURE — 82272 OCCULT BLD FECES 1-3 TESTS: CPT

## 2022-10-18 PROCEDURE — 74011250636 HC RX REV CODE- 250/636: Performed by: STUDENT IN AN ORGANIZED HEALTH CARE EDUCATION/TRAINING PROGRAM

## 2022-10-18 PROCEDURE — C9113 INJ PANTOPRAZOLE SODIUM, VIA: HCPCS | Performed by: STUDENT IN AN ORGANIZED HEALTH CARE EDUCATION/TRAINING PROGRAM

## 2022-10-18 PROCEDURE — 74011000250 HC RX REV CODE- 250: Performed by: STUDENT IN AN ORGANIZED HEALTH CARE EDUCATION/TRAINING PROGRAM

## 2022-10-18 PROCEDURE — 74011000250 HC RX REV CODE- 250: Performed by: EMERGENCY MEDICINE

## 2022-10-18 RX ORDER — METOPROLOL TARTRATE 5 MG/5ML
5 INJECTION INTRAVENOUS
Status: COMPLETED | OUTPATIENT
Start: 2022-10-18 | End: 2022-10-18

## 2022-10-18 RX ORDER — HYDRALAZINE HYDROCHLORIDE 20 MG/ML
20 INJECTION INTRAMUSCULAR; INTRAVENOUS ONCE
Status: COMPLETED | OUTPATIENT
Start: 2022-10-18 | End: 2022-10-18

## 2022-10-18 RX ADMIN — METOPROLOL TARTRATE 5 MG: 5 INJECTION, SOLUTION INTRAVENOUS at 18:41

## 2022-10-18 RX ADMIN — METOPROLOL TARTRATE 5 MG: 5 INJECTION, SOLUTION INTRAVENOUS at 18:34

## 2022-10-18 RX ADMIN — HYDRALAZINE HYDROCHLORIDE 20 MG: 20 INJECTION INTRAMUSCULAR; INTRAVENOUS at 19:34

## 2022-10-18 RX ADMIN — SODIUM CHLORIDE 80 MG: 9 INJECTION INTRAMUSCULAR; INTRAVENOUS; SUBCUTANEOUS at 19:33

## 2022-10-18 RX ADMIN — METOPROLOL TARTRATE 5 MG: 5 INJECTION, SOLUTION INTRAVENOUS at 18:48

## 2022-10-18 NOTE — Clinical Note
Patient Class[de-identified] OBSERVATION [104]   Type of Bed: Telemetry [19]   Cardiac Monitoring Required?: Yes   Reason for Observation: ancelmo   Admitting Diagnosis: ANCELMO (acute kidney injury) [facility]) [[id number]]   Admitting Physician: [facility] [[id number]]   Attending Physician: [facility] [[id number]]

## 2022-10-18 NOTE — ED NOTES
7:03 PM  Change of shift. Care of patient taken over from Dr. Marc Villagomez; H&P reviewed, handoff complete. Awaiting labs/imaging/consultant. ED Course as of 10/18/22 2350   Tue Oct 18, 2022   1749 EKG 1742: Rate 94, NSR, LVH with secondary repolarization abnormality and persistent lateral TWI noted on previous study from 10/5/22. [DK]   6452 Hx of hypertension. Right sided abdominal pain. Kidney function worsening. 3 IV doses of metop. Offering admit. [AL]   1913 Admit to [facility] for emergency, GI bleed. [AL]      ED Course User Index  [AL] Genaro Burdick MD  [DK] So Ferrer MD       MEDICATIONS GIVEN:  Medications   metoprolol (LOPRESSOR) injection 5 mg (5 mg IntraVENous Given 10/18/22 1848)   pantoprazole (PROTONIX) 80 mg in 0.9% sodium chloride 20 mL injection (80 mg IntraVENous Given 10/18/22 1933)   hydrALAZINE (APRESOLINE) 20 mg/mL injection 20 mg (20 mg IntraVENous Given 10/18/22 1934)       MDM: [facility] contacted, accepted patient for ED transfer. They have GI there and will treat his hypertensive crisis. Patient comfortable transfer at this time. Blood pressure improving, Protonix given as well as hydralazine. Lower blood pressure anymore as goals have been met here in the ED. IMPRESSION:  1. Hypertensive crisis    2. Acute renal failure superimposed on chronic kidney disease, unspecified CKD stage, unspecified acute renal failure type ([facility] [address]    3. Acute anemia    4. Abdominal pain, right upper quadrant    5. Gastrointestinal hemorrhage, unspecified gastrointestinal hemorrhage type        DISPOSITION: Transferred to Another [address] [place] [address]  Evelyn Richards MD

## 2022-10-18 NOTE — ED TRIAGE NOTES
Arrives to the ED ambulatory and stable with c/o of abdominal pain. Pt has hx of hernia. Pt reports pain in midsection above hernia. Per pt when he pushed hernia back in that's when pain starts. Pt also states feeling dizzy. Denies any new numbness or tingling. Pt has hx of stroke 8 months ago     Pt extremely hypertensive upon triage. Pt took blood pressure medication during triage because he had prescription with him and did not take his blood pressure medication today.

## 2022-10-18 NOTE — ED NOTES
Bedside and Verbal shift change report given to [employer] (oncoming nurse) by Basia Sifuentes RN (offgoing nurse). Report included the following information SBAR, ED Summary, and MAR.

## 2022-10-18 NOTE — ED PROVIDER NOTES
The history is provided by the patient. Abdominal Pain   This is a new problem. The current episode started 6 to 12 hours ago. The problem occurs constantly. The problem has not changed since onset. The pain is associated with an unknown factor. The pain is located in the RUQ and epigastric region. The quality of the pain is aching. Associated symptoms include hematochezia. Pertinent negatives include no fever, no dysuria, no frequency, no hematuria and no back pain. Exacerbated by: lifting sheet rock for work, hernia pushing out. The pain is relieved by Nothing. The patient's surgical history non-contributory. Past Medical History:   Diagnosis Date    Asthma     Chronic obstructive pulmonary disease ([address]     Emphysema lung ([facility] [address]     Hypertension     Stroke ([facility] [address] 2022       Past Surgical History:   Procedure Laterality Date    HX APPENDECTOMY      HX ORTHOPAEDIC      collar bone (right)         History reviewed. No pertinent family history. Social History     Socioeconomic History    Marital status:      Spouse name: Not on file    Number of children: Not on file    Years of education: Not on file    Highest education level: Not on file   Occupational History    Not on file   Tobacco Use    Smoking status: Every Day    Smokeless tobacco: Never   Substance and Sexual Activity    Alcohol use: Yes    Drug use: Yes     Types: Marijuana    Sexual activity: Not on file   Other Topics Concern    Not on file   Social History Narrative    Not on file     Social Determinants of Health     Financial Resource Strain: Not on file   Food Insecurity: Not on file   Transportation Needs: Not on file   Physical Activity: Not on file   Stress: Not on file   Social Connections: Not on file   Intimate Partner Violence: Not on file   Housing Stability: Not on file         ALLERGIES: Ace inhibitors    Review of Systems   Constitutional:  Negative for fever. Gastrointestinal:  Positive for abdominal pain and hematochezia. Genitourinary:  Negative for dysuria, frequency and hematuria. Musculoskeletal:  Negative for back pain. All other systems reviewed and are negative. Vitals:    10/18/22 1840 10/18/22 1844 10/18/22 1845 10/18/22 1856   BP: (!) 213/163  (!) 209/161 (!) 207/158   Pulse: (!) 101 74 75 73   Resp: 20 18 14 19   Temp:       SpO2: 98% 98%  99%   Weight:       Height:                Physical Exam  Vitals and nursing note reviewed. Constitutional:       General: He is not in acute distress. Appearance: He is well-developed. HENT:      Head: Normocephalic and atraumatic. Eyes:      Conjunctiva/sclera: Conjunctivae normal.   Neck:      Trachea: No tracheal deviation. Cardiovascular:      Rate and Rhythm: Normal rate and regular rhythm. Pulmonary:      Effort: Pulmonary effort is normal. No respiratory distress. Abdominal:      General: There is no distension. Tenderness: There is abdominal tenderness in the right upper quadrant. There is no guarding or rebound. Negative signs include Vazquez's sign. Hernia: A hernia is present. Hernia is present in the right inguinal area (soft, reducible). Musculoskeletal:         General: No deformity. Normal range of motion. Cervical back: Neck supple. Skin:     General: Skin is warm and dry. Neurological:      Mental Status: He is alert. Cranial Nerves: No cranial nerve deficit. Psychiatric:         Behavior: Behavior normal.        MDM     Amount and/or Complexity of Data Reviewed  Decide to obtain previous medical records or to obtain history from someone other than the patient: yes      ED Course as of 10/18/22 2318   Tue Oct 18, 2022   1749 EKG 1742: Rate 94, NSR, LVH with secondary repolarization abnormality and persistent lateral TWI noted on previous study from 10/5/22. [DK]   7875 Hx of hypertension. Right sided abdominal pain. Kidney function worsening. 3 IV doses of metop. Offering admit.   [AL]   [facility] to [facility] for emergency, GI bleed. [AL]      ED Course User Index  [AL] Sangeeta Iraheta MD  [DK] Mick Gilbert MD     56 y.o. male presents with abdominal pain starting earlier today. He previously refused admission for HTN and ANCELMO. This appears to be worsening. Will attempt control here but Hb also falling and Pt complaining of rectal bleeding. Discussed admission again and this time he is receptive, will transfer to [address] [place] [address] for definitive management.      Procedures

## 2022-10-18 NOTE — ED NOTES
Per verbal order from MD Lawson, administer 3 separate administrations of metoprolol 5mg if pts heart  rate tolerates it. Goal is to get pts systolic below 215'B into 190's. Per verbal order if heart rate drops into 50's, stop administration.

## 2022-10-18 NOTE — ED NOTES
Report from [employer]. The pt is awake and alert sitting upright in bed. The pt reports that he had abd pain and is slightly upset that he will have to be admitted to the hospital for his HTN.  The pt is reassured that this is in his best interest.

## 2022-10-19 ENCOUNTER — APPOINTMENT (OUTPATIENT)
Dept: GENERAL RADIOLOGY | Age: 57
DRG: 199 | End: 2022-10-19
Attending: INTERNAL MEDICINE
Payer: MEDICAID

## 2022-10-19 PROBLEM — N17.9 AKI (ACUTE KIDNEY INJURY) (HCC): Status: ACTIVE | Noted: 2022-10-19

## 2022-10-19 PROBLEM — I16.1 HYPERTENSIVE EMERGENCY: Status: ACTIVE | Noted: 2022-10-19

## 2022-10-19 LAB
ALBUMIN SERPL-MCNC: 3 G/DL (ref 3.5–5)
ANION GAP SERPL CALC-SCNC: 7 MMOL/L (ref 5–15)
APPEARANCE UR: CLEAR
ATRIAL RATE: 70 BPM
BACTERIA URNS QL MICRO: NEGATIVE /HPF
BILIRUB UR QL: NEGATIVE
BUN SERPL-MCNC: 25 MG/DL (ref 6–20)
BUN/CREAT SERPL: 11 (ref 12–20)
CA-I BLD-MCNC: 8.4 MG/DL (ref 8.5–10.1)
CALCULATED P AXIS, ECG09: 61 DEGREES
CALCULATED R AXIS, ECG10: -23 DEGREES
CALCULATED T AXIS, ECG11: -145 DEGREES
CHLORIDE SERPL-SCNC: 109 MMOL/L (ref 97–108)
CO2 SERPL-SCNC: 25 MMOL/L (ref 21–32)
COLOR UR: ABNORMAL
CREAT SERPL-MCNC: 2.38 MG/DL (ref 0.7–1.3)
DIAGNOSIS, 93000: NORMAL
GLUCOSE SERPL-MCNC: 109 MG/DL (ref 65–100)
GLUCOSE UR STRIP.AUTO-MCNC: NEGATIVE MG/DL
HGB UR QL STRIP: NEGATIVE
KETONES UR QL STRIP.AUTO: NEGATIVE MG/DL
LEUKOCYTE ESTERASE UR QL STRIP.AUTO: NEGATIVE
MAGNESIUM SERPL-MCNC: 2.4 MG/DL (ref 1.6–2.4)
MUCOUS THREADS URNS QL MICRO: ABNORMAL /LPF
NITRITE UR QL STRIP.AUTO: NEGATIVE
P-R INTERVAL, ECG05: 162 MS
PH UR STRIP: 6 [PH] (ref 5–8)
PHOSPHATE SERPL-MCNC: 3.2 MG/DL (ref 2.6–4.7)
POTASSIUM SERPL-SCNC: 3.2 MMOL/L (ref 3.5–5.1)
PROCALCITONIN SERPL-MCNC: 0.06 NG/ML
PROT UR STRIP-MCNC: 100 MG/DL
Q-T INTERVAL, ECG07: 530 MS
QRS DURATION, ECG06: 102 MS
QTC CALCULATION (BEZET), ECG08: 572 MS
RBC #/AREA URNS HPF: ABNORMAL /HPF (ref 0–5)
SODIUM SERPL-SCNC: 141 MMOL/L (ref 136–145)
SP GR UR REFRACTOMETRY: 1.01 (ref 1–1.03)
TROPONIN-HIGH SENSITIVITY: 100 NG/L (ref 0–76)
TROPONIN-HIGH SENSITIVITY: 107 NG/L (ref 0–76)
UROBILINOGEN UR QL STRIP.AUTO: 0.1 EU/DL (ref 0.1–1)
VENTRICULAR RATE, ECG03: 70 BPM
WBC URNS QL MICRO: ABNORMAL /HPF (ref 0–4)

## 2022-10-19 PROCEDURE — 83735 ASSAY OF MAGNESIUM: CPT

## 2022-10-19 PROCEDURE — 82043 UR ALBUMIN QUANTITATIVE: CPT

## 2022-10-19 PROCEDURE — 81001 URINALYSIS AUTO W/SCOPE: CPT

## 2022-10-19 PROCEDURE — 82040 ASSAY OF SERUM ALBUMIN: CPT

## 2022-10-19 PROCEDURE — 74011250637 HC RX REV CODE- 250/637: Performed by: INTERNAL MEDICINE

## 2022-10-19 PROCEDURE — 84145 PROCALCITONIN (PCT): CPT

## 2022-10-19 PROCEDURE — 65270000029 HC RM PRIVATE

## 2022-10-19 PROCEDURE — 84484 ASSAY OF TROPONIN QUANT: CPT

## 2022-10-19 PROCEDURE — 74011000258 HC RX REV CODE- 258: Performed by: INTERNAL MEDICINE

## 2022-10-19 PROCEDURE — 74011250636 HC RX REV CODE- 250/636: Performed by: INTERNAL MEDICINE

## 2022-10-19 PROCEDURE — 82164 ANGIOTENSIN I ENZYME TEST: CPT

## 2022-10-19 PROCEDURE — 71046 X-RAY EXAM CHEST 2 VIEWS: CPT

## 2022-10-19 PROCEDURE — 94640 AIRWAY INHALATION TREATMENT: CPT

## 2022-10-19 PROCEDURE — 74011000250 HC RX REV CODE- 250: Performed by: INTERNAL MEDICINE

## 2022-10-19 PROCEDURE — 80048 BASIC METABOLIC PNL TOTAL CA: CPT

## 2022-10-19 PROCEDURE — 93005 ELECTROCARDIOGRAM TRACING: CPT

## 2022-10-19 PROCEDURE — 74011636637 HC RX REV CODE- 636/637: Performed by: INTERNAL MEDICINE

## 2022-10-19 PROCEDURE — G0378 HOSPITAL OBSERVATION PER HR: HCPCS

## 2022-10-19 PROCEDURE — 84100 ASSAY OF PHOSPHORUS: CPT

## 2022-10-19 RX ORDER — SODIUM CHLORIDE 0.9 % (FLUSH) 0.9 %
5-40 SYRINGE (ML) INJECTION AS NEEDED
Status: DISCONTINUED | OUTPATIENT
Start: 2022-10-19 | End: 2022-10-20 | Stop reason: HOSPADM

## 2022-10-19 RX ORDER — HYDRALAZINE HYDROCHLORIDE 20 MG/ML
20 INJECTION INTRAMUSCULAR; INTRAVENOUS
Status: DISCONTINUED | OUTPATIENT
Start: 2022-10-19 | End: 2022-10-20 | Stop reason: HOSPADM

## 2022-10-19 RX ORDER — POTASSIUM CHLORIDE 750 MG/1
40 TABLET, FILM COATED, EXTENDED RELEASE ORAL
Status: COMPLETED | OUTPATIENT
Start: 2022-10-19 | End: 2022-10-19

## 2022-10-19 RX ORDER — BUDESONIDE AND FORMOTEROL FUMARATE DIHYDRATE 160; 4.5 UG/1; UG/1
2 AEROSOL RESPIRATORY (INHALATION)
Status: DISCONTINUED | OUTPATIENT
Start: 2022-10-19 | End: 2022-10-20 | Stop reason: HOSPADM

## 2022-10-19 RX ORDER — ALBUTEROL SULFATE 90 UG/1
2 AEROSOL, METERED RESPIRATORY (INHALATION)
Status: DISCONTINUED | OUTPATIENT
Start: 2022-10-19 | End: 2022-10-20 | Stop reason: HOSPADM

## 2022-10-19 RX ORDER — HYDRALAZINE HYDROCHLORIDE 25 MG/1
25 TABLET, FILM COATED ORAL 3 TIMES DAILY
Status: DISCONTINUED | OUTPATIENT
Start: 2022-10-19 | End: 2022-10-20 | Stop reason: HOSPADM

## 2022-10-19 RX ORDER — IBUPROFEN 200 MG
1 TABLET ORAL DAILY
Status: DISCONTINUED | OUTPATIENT
Start: 2022-10-19 | End: 2022-10-20 | Stop reason: HOSPADM

## 2022-10-19 RX ORDER — FLUDROCORTISONE ACETATE 0.1 MG/1
0.1 TABLET ORAL DAILY
Status: DISCONTINUED | OUTPATIENT
Start: 2022-10-19 | End: 2022-10-19

## 2022-10-19 RX ORDER — ACETAMINOPHEN 650 MG/1
650 SUPPOSITORY RECTAL
Status: DISCONTINUED | OUTPATIENT
Start: 2022-10-19 | End: 2022-10-20 | Stop reason: HOSPADM

## 2022-10-19 RX ORDER — BUTALBITAL, ACETAMINOPHEN AND CAFFEINE 50; 325; 40 MG/1; MG/1; MG/1
1 TABLET ORAL ONCE
Status: COMPLETED | OUTPATIENT
Start: 2022-10-19 | End: 2022-10-19

## 2022-10-19 RX ORDER — ALBUTEROL SULFATE 90 UG/1
2 AEROSOL, METERED RESPIRATORY (INHALATION)
Status: DISCONTINUED | OUTPATIENT
Start: 2022-10-19 | End: 2022-10-19 | Stop reason: SDUPTHER

## 2022-10-19 RX ORDER — ACETAMINOPHEN 325 MG/1
650 TABLET ORAL
Status: DISCONTINUED | OUTPATIENT
Start: 2022-10-19 | End: 2022-10-20 | Stop reason: HOSPADM

## 2022-10-19 RX ORDER — ONDANSETRON 2 MG/ML
4 INJECTION INTRAMUSCULAR; INTRAVENOUS
Status: DISCONTINUED | OUTPATIENT
Start: 2022-10-19 | End: 2022-10-20 | Stop reason: HOSPADM

## 2022-10-19 RX ORDER — SODIUM CHLORIDE 0.9 % (FLUSH) 0.9 %
5-40 SYRINGE (ML) INJECTION EVERY 8 HOURS
Status: DISCONTINUED | OUTPATIENT
Start: 2022-10-19 | End: 2022-10-20 | Stop reason: HOSPADM

## 2022-10-19 RX ORDER — HYDRALAZINE HYDROCHLORIDE 20 MG/ML
20 INJECTION INTRAMUSCULAR; INTRAVENOUS
Status: DISCONTINUED | OUTPATIENT
Start: 2022-10-19 | End: 2022-10-19

## 2022-10-19 RX ORDER — PREDNISONE 20 MG/1
20 TABLET ORAL 2 TIMES DAILY WITH MEALS
Status: DISCONTINUED | OUTPATIENT
Start: 2022-10-19 | End: 2022-10-20 | Stop reason: HOSPADM

## 2022-10-19 RX ORDER — DILTIAZEM HYDROCHLORIDE 60 MG/1
120 CAPSULE, EXTENDED RELEASE ORAL EVERY 12 HOURS
Status: DISCONTINUED | OUTPATIENT
Start: 2022-10-19 | End: 2022-10-19

## 2022-10-19 RX ORDER — POLYETHYLENE GLYCOL 3350 17 G/17G
17 POWDER, FOR SOLUTION ORAL DAILY PRN
Status: DISCONTINUED | OUTPATIENT
Start: 2022-10-19 | End: 2022-10-20 | Stop reason: HOSPADM

## 2022-10-19 RX ORDER — ONDANSETRON 4 MG/1
4 TABLET, ORALLY DISINTEGRATING ORAL
Status: DISCONTINUED | OUTPATIENT
Start: 2022-10-19 | End: 2022-10-20 | Stop reason: HOSPADM

## 2022-10-19 RX ORDER — LABETALOL 100 MG/1
100 TABLET, FILM COATED ORAL EVERY 12 HOURS
Status: DISCONTINUED | OUTPATIENT
Start: 2022-10-19 | End: 2022-10-20 | Stop reason: HOSPADM

## 2022-10-19 RX ADMIN — SODIUM CHLORIDE 5 MG/HR: 9 INJECTION, SOLUTION INTRAVENOUS at 03:50

## 2022-10-19 RX ADMIN — HYDRALAZINE HYDROCHLORIDE 25 MG: 25 TABLET, FILM COATED ORAL at 21:40

## 2022-10-19 RX ADMIN — SODIUM CHLORIDE, PRESERVATIVE FREE 10 ML: 5 INJECTION INTRAVENOUS at 02:24

## 2022-10-19 RX ADMIN — SODIUM CHLORIDE, PRESERVATIVE FREE 10 ML: 5 INJECTION INTRAVENOUS at 21:41

## 2022-10-19 RX ADMIN — SODIUM CHLORIDE, PRESERVATIVE FREE 10 ML: 5 INJECTION INTRAVENOUS at 06:00

## 2022-10-19 RX ADMIN — HYDRALAZINE HYDROCHLORIDE 20 MG: 20 INJECTION, SOLUTION INTRAMUSCULAR; INTRAVENOUS at 18:18

## 2022-10-19 RX ADMIN — HYDRALAZINE HYDROCHLORIDE 20 MG: 20 INJECTION, SOLUTION INTRAMUSCULAR; INTRAVENOUS at 01:18

## 2022-10-19 RX ADMIN — LABETALOL HYDROCHLORIDE 100 MG: 100 TABLET, FILM COATED ORAL at 11:51

## 2022-10-19 RX ADMIN — HYDRALAZINE HYDROCHLORIDE 25 MG: 25 TABLET, FILM COATED ORAL at 08:00

## 2022-10-19 RX ADMIN — ACETAMINOPHEN 650 MG: 325 TABLET, FILM COATED ORAL at 04:33

## 2022-10-19 RX ADMIN — ACETAMINOPHEN 650 MG: 325 TABLET, FILM COATED ORAL at 21:48

## 2022-10-19 RX ADMIN — SODIUM CHLORIDE, PRESERVATIVE FREE 10 ML: 5 INJECTION INTRAVENOUS at 15:23

## 2022-10-19 RX ADMIN — BUDESONIDE AND FORMOTEROL FUMARATE DIHYDRATE 2 PUFF: 160; 4.5 AEROSOL RESPIRATORY (INHALATION) at 19:53

## 2022-10-19 RX ADMIN — PREDNISONE 20 MG: 20 TABLET ORAL at 15:24

## 2022-10-19 RX ADMIN — LABETALOL HYDROCHLORIDE 100 MG: 100 TABLET, FILM COATED ORAL at 21:41

## 2022-10-19 RX ADMIN — HYDRALAZINE HYDROCHLORIDE 25 MG: 25 TABLET, FILM COATED ORAL at 15:23

## 2022-10-19 RX ADMIN — BUTALBITAL, ACETAMINOPHEN, AND CAFFEINE 1 TABLET: 50; 325; 40 TABLET ORAL at 01:19

## 2022-10-19 RX ADMIN — DILTIAZEM HYDROCHLORIDE 5 MG/HR: 5 INJECTION INTRAVENOUS at 02:22

## 2022-10-19 RX ADMIN — POTASSIUM CHLORIDE 40 MEQ: 750 TABLET, FILM COATED, EXTENDED RELEASE ORAL at 15:23

## 2022-10-19 RX ADMIN — BUDESONIDE AND FORMOTEROL FUMARATE DIHYDRATE 2 PUFF: 160; 4.5 AEROSOL RESPIRATORY (INHALATION) at 13:06

## 2022-10-19 RX ADMIN — DILTIAZEM HYDROCHLORIDE 5 MG/HR: 5 INJECTION INTRAVENOUS at 02:11

## 2022-10-19 RX ADMIN — DILTIAZEM HYDROCHLORIDE 120 MG: 60 CAPSULE, EXTENDED RELEASE ORAL at 05:49

## 2022-10-19 NOTE — CONSULTS
IMPRESSION:   COPD/asthma  Accelerated HTN  Bilateral hilar adenopathy rule out sarcoidosis  Acute on chronic kidney disease  Abdominal pain  Additional workup outlined below  Pt is at high risk of sudden decline and decompensation with life threatening consequenses and continued end organ dysfunction and failure  Pt is critically ill. Time spent with pt and staff actively rendering care, managing pt and coordinating care as stated below;  55 minutes, exclusive of any procedures      RECOMMENDATIONS/PLAN:   ICU monitoring  56year-old male history of smoking COPD asthma he had a CAT scan of the chest done which showed bilateral hilar adenopathy most likely sarcoidosis  Started him on high doses of steroids  Nebulized blood pressure  COPD on inhalers at home we will start patient on rescue inhaler and Symbicort  Transfuse prn to maintain Hgb > 7  Labs to follow electrolytes, renal function and and blood counts  Bronchial hygiene with respiratory therapy techniques, bronchodilators  Pt needs IV fluids with additives and Drug therapy requiring intensive monitoring for toxicity  Prescription drug management with home med reconciliation reviewed  DVT, SUP prophylaxis  Will be available to assist in medical management while in the CCU pending disposition     [x] High complexity decision making was performed  [x] See my orders for details  HPI    PMH:  has a past medical history of Asthma, Chronic obstructive pulmonary disease ([address] Emphysema lung ([address] Hypertension, and Stroke ([address] (2022). PSH:   has a past surgical history that includes hx appendectomy and hx orthopaedic. FHX: family history is not on file. SHX:  reports that he has been smoking. He has never used smokeless tobacco. He reports current alcohol use. He reports current drug use. Drug: Marijuana.     ALL:   Allergies   Allergen Reactions    Ace Inhibitors Swelling        MEDS:   [x] Reviewed - As Below   [] Not reviewed    Current Facility-Administered Medications   Medication    albuterol (PROVENTIL HFA, VENTOLIN HFA, PROAIR HFA) inhaler 2 Puff    hydrALAZINE (APRESOLINE) tablet 25 mg    sodium chloride (NS) flush 5-40 mL    sodium chloride (NS) flush 5-40 mL    acetaminophen (TYLENOL) tablet 650 mg    Or    acetaminophen (TYLENOL) suppository 650 mg    polyethylene glycol (MIRALAX) packet 17 g    ondansetron (ZOFRAN ODT) tablet 4 mg    Or    ondansetron (ZOFRAN) injection 4 mg    hydrALAZINE (APRESOLINE) 20 mg/mL injection 20 mg    niCARdipine (CARDENE) 25 mg in 0.9% sodium chloride 250 mL ([id number])    labetaloL (NORMODYNE) tablet 100 mg    nicotine (NICODERM CQ) 21 mg/24 hr patch 1 Patch      MAR reviewed and pertinent medications noted or modified as needed   Current Facility-Administered Medications   Medication    albuterol (PROVENTIL HFA, VENTOLIN HFA, PROAIR HFA) inhaler 2 Puff    hydrALAZINE (APRESOLINE) tablet 25 mg    sodium chloride (NS) flush 5-40 mL    sodium chloride (NS) flush 5-40 mL    acetaminophen (TYLENOL) tablet 650 mg    Or    acetaminophen (TYLENOL) suppository 650 mg    polyethylene glycol (MIRALAX) packet 17 g    ondansetron (ZOFRAN ODT) tablet 4 mg    Or    ondansetron (ZOFRAN) injection 4 mg    hydrALAZINE (APRESOLINE) 20 mg/mL injection 20 mg    niCARdipine (CARDENE) 25 mg in 0.9% sodium chloride 250 mL ([id number])    labetaloL (NORMODYNE) tablet 100 mg    nicotine (NICODERM CQ) 21 mg/24 hr patch 1 Patch      PMH:  has a past medical history of Asthma, Chronic obstructive pulmonary disease ([address] Emphysema lung ([address] Hypertension, and Stroke ([facility] (2022). PSH:   has a past surgical history that includes hx appendectomy and hx orthopaedic. FHX: family history is not on file. SHX:  reports that he has been smoking. He has never used smokeless tobacco. He reports current alcohol use. He reports current drug use. Drug: Marijuana.      ROS:A comprehensive review of systems was negative except for that written in the HPI. Hemodynamics:    CO:    CI:    CVP:    SVR:   PAP Systolic:    PAP Diastolic:    PVR:    QR58:        Ventilator Settings:      Mode Rate TV Press PEEP FiO2 PIP Min. Vent                              Vital Signs: Telemetry:    normal sinus rhythm Intake/Output:   Visit Vitals  BP (!) 129/94   Pulse 68   Temp 97.7 °F (36.5 °C)   Resp 12   Ht 5' 10\" (1.778 m)   Wt 68 kg (150 lb)   SpO2 99%   BMI 21.52 kg/m²       Temp (24hrs), Av.9 °F (36.6 °C), Min:97.6 °F (36.4 °C), Max:98.4 °F (36.9 °C)        O2 Device: None         Wt Readings from Last 4 Encounters:   10/18/22 68 kg (150 lb)   10/18/22 68 kg (150 lb)   10/05/22 68 kg (150 lb)          Intake/Output Summary (Last 24 hours) at 10/19/2022 1112  Last data filed at 10/19/2022 0940  Gross per 24 hour   Intake 461.33 ml   Output 550 ml   Net -88.67 ml       Last shift:      10/19 0701 - 10/19 1900  In: 450 [P.O.:450]  Out: 550 [Urine:550]  Last 3 shifts: 10/17 1901 - 10/19 0700  In: 11.3 [I.V.:11.3]  Out: -        Physical Exam:     General: Alert awake abdominal discomfort  HEENT: NCAT, poor dentition, lips and mucosa dry  Eyes: anicteric; conjunctiva clear  Neck: no nodes, wayne midline; no accessory MM use. Chest: no deformity,   Cardiac: R regular; no murmur;   Lungs: distant breath sounds; wheezes  Abd: soft, NT, hypoactive BS  Ext: no edema; no joint swelling;  No clubbing  : NO barfield, clear urine  Neuro: No focal deficit  Psych- no agitation, oriented to person;   Skin: warm, dry, no cyanosis;   Pulses: 1-2+ Bilateral pedal, radial  Capillary: brisk; pale      DATA:    MAR reviewed and pertinent medications noted or modified as needed  MEDS:   Current Facility-Administered Medications   Medication    albuterol (PROVENTIL HFA, VENTOLIN HFA, PROAIR HFA) inhaler 2 Puff    hydrALAZINE (APRESOLINE) tablet 25 mg    sodium chloride (NS) flush 5-40 mL    sodium chloride (NS) flush 5-40 mL    acetaminophen (TYLENOL) tablet 650 mg    Or    acetaminophen (TYLENOL) suppository 650 mg    polyethylene glycol (MIRALAX) packet 17 g    ondansetron (ZOFRAN ODT) tablet 4 mg    Or    ondansetron (ZOFRAN) injection 4 mg    hydrALAZINE (APRESOLINE) 20 mg/mL injection 20 mg    niCARdipine (CARDENE) 25 mg in 0.9% sodium chloride 250 mL ([id number])    labetaloL (NORMODYNE) tablet 100 mg    nicotine (NICODERM CQ) 21 mg/24 hr patch 1 Patch        Labs:    Recent Labs     10/18/22  1746   WBC 10.7   HGB 11.8*        Recent Labs     10/18/22  1746      K 3.4*      CO2 26   *   BUN 27*   CREA 2.52*   CA 9.0   ALB 3.9   ALT 24   LPSE 41     No results for input(s): PH, PCO2, PO2, HCO3, FIO2 in the last 72 hours. No results for input(s): CPK, CKNDX, TROIQ in the last 72 hours. No lab exists for component: CPKMB  No results found for: BNPP, BNP   Lab Results   Component Value Date/Time    Culture result: NO GROWTH 5 DAYS 10/05/2022 11:27 AM     No results found for: TSH, TSHEXT     Imaging:    Results from Hospital Encounter encounter on 10/05/22    XR CHEST PORT    Narrative  INDICATION: sob    EXAM:  AP CHEST RADIOGRAPH    COMPARISON: October 18, 2019    FINDINGS:    AP portable view of the chest demonstrates a normal cardiomediastinal  silhouette. Right basilar airspace disease. No pleural effusion or pneumothorax. The osseous structures are unremarkable. Impression  Right basilar airspace disease. Follow-up to complete resolution in 4-6 weeks. Results from [place] encounter on 10/18/22    CT ABD PELV WO CONT    Narrative  EXAM: CT ABD PELV WO CONT    INDICATION: RUQ PAin    COMPARISON: None    IV CONTRAST: None. ORAL CONTRAST: None    TECHNIQUE:  Thin axial images were obtained through the abdomen and pelvis. Coronal and  sagittal reformats were generated. CT dose reduction was achieved through use of  a standardized protocol tailored for this examination and automatic exposure  control for dose modulation.     The absence of intravenous contrast material reduces the sensitivity for  evaluation of the vasculature and solid organs. FINDINGS:  LOWER THORAX: Bilateral lower lobe interstitial infiltrates and trace right  pleural effusion. LIVER: No mass. BILIARY TREE: Gallbladder is within normal limits. CBD is not dilated. SPLEEN: within normal limits. PANCREAS: No focal abnormality. ADRENALS: Unremarkable. KIDNEYS/URETERS: No calculus or hydronephrosis. STOMACH: Unremarkable. SMALL BOWEL: No dilatation or wall thickening. COLON: No dilatation or wall thickening. APPENDIX: Surgically absent. PERITONEUM: No ascites or pneumoperitoneum. RETROPERITONEUM: No lymphadenopathy or aortic aneurysm. REPRODUCTIVE ORGANS: Unremarkable. URINARY BLADDER: No mass or calculus. BONES: No destructive bone lesion. ABDOMINAL WALL: Right inguinal hernia containing small bowel without evidence of  obstruction. ADDITIONAL COMMENTS: N/A    Impression  Right inguinal hernia containing small bowel without evidence of obstruction. No  renal or ureteral stone or evidence of hydronephrosis. This care involved high complexity decision making which includes independently reviewing the patient's past medical records, current laboratory results, medication profiles that were immediately available to me and actual Xray images at the bedside in order to assess, support vital system function, and to treat this degree of vital organ system failure, and to prevent further life threatening deterioration of the patients condition. I was in direct communication with the nursing staff throughout this time.     Medical Decision Making Today  Reviewed the flowsheet and previous days notes  Reviewed and summarized records or history from previous days note or discussions with staff, family  Parenteral controlled substances - Reviewed/ Adjusted / Ronald Rave / Started  High Risk Drug therapy requiring intensive monitoring for toxicity: eg steroids, pressors, antibiotics  Review and order of Clinical lab tests  Review and Order of Radiology tests  Review and Order of Medicine tests  Independent visualization of radiologic Images  Reviewed Ventilator / NiPPV  I have personally reviewed the patients ECG / Telemetry  Diagnostic endoscopies with identified risk factors      I have provided total of 55  minutes of critical care time rendering care exclusive of any procedures. During this entire length of time the patient's condition was unstable, unpredictable and critically ill in the CCU/ ICU. I was immediately available to the patient whose care required several interactions with nursing, multidisciplinary team members leading to multiple interventions with fluid resuscitation and medication adjustments to optimize respiratory support, hemodynamic treatment, medication changes based on repeat labs results, reviews, exams and assessments. The reason for providing this level of medical care was due to a critical illness that impaired one or more vital organ systems, such that there was a high probability of sudden or life threatening deterioration in the patient's condition.

## 2022-10-19 NOTE — CONSULTS
CONSULTATION    REASON FOR CONSULT:  Cardiac evaluation/ HTN emergency    REQUESTING PROVIDER:  Dr. Kain Watson:    Chief Complaint   Patient presents with    Abdominal Pain         HISTORY OF PRESENT ILLNESS:  Willy Slade is a 56y.o. year-old male with past medical history significant for hypertension ( non-compliant to meds), chronic obstructive pulmonary disease/emphysema,  stroke (2022), and polysubstance use(1/2 ppd smoker and daily beer drinker) who was transferred to the hospital from the freestanding ED [place] for hypertensive urgency. Upon arrival blood pressure as in the 200s, patient was started on IV labetalol transitioned to Cardene gtt. Blood pressure has since improved to 120/90s and Cardene gtt has since been discontinued. Patient was seen and examined in the ICU. He is awake and alert, in no acute distress, and answering questions appropriately. Patient had a hemorrhagic stroke and aneurysm approximately 8 months ago with left sided residual per patient family. Prior to admission patient has never had an ischemic evaluation and did not follow-up with a healthcare provider and issues with medication compliance. Significant labs include: HS troponin 106, EKG: NSR; HR: 70, LVH, cxr with improvement in airspace disease compared to previous images. CT chest with hilar adenopathy. Records from hospital admission course thus far reviewed. Telemetry Review: No acute events noted since admission. PAST MEDICAL HISTORY:    Past Medical History:   Diagnosis Date    Asthma     Chronic obstructive pulmonary disease ([address]     Emphysema lung ([address]     Hypertension     Stroke ([address] 2022       PAST SURGICAL HISTORY:   Past Surgical History:   Procedure Laterality Date    HX APPENDECTOMY      HX ORTHOPAEDIC      collar bone (right)       ALLERGIES:    Allergies   Allergen Reactions    Ace Inhibitors Swelling       FAMILY HISTORY:  No family history on file.     SOCIAL HISTORY:    Tobacco: 1/2 ppd daily  Drugs: not reviewed  ETOH: 12 pack beer daily, last drink Sunday    HOME MEDICATIONS:    Prior to Admission Medications   Prescriptions Last Dose Informant Patient Reported? Taking? albuterol (PROVENTIL HFA, VENTOLIN HFA, PROAIR HFA) 90 mcg/actuation inhaler 10/18/2022  Yes Yes   Sig: Take 2 Puffs by inhalation. Reports taking 6 puffs as needed   albuterol (PROVENTIL HFA, VENTOLIN HFA, PROAIR HFA) 90 mcg/actuation inhaler 10/18/2022  No Yes   Sig: Take 2 Puffs by inhalation every four (4) hours as needed for Wheezing or Cough. hydrALAZINE (APRESOLINE) 25 mg tablet 10/18/2022  No Yes   Sig: Take 1 Tablet by mouth three (3) times daily. inhalational spacing device   No No   Sig: For use with inhaler      Facility-Administered Medications: None       REVIEW OF SYSTEMS:  Complete review of systems performed, pertinents noted above, all other systems are negative.     Patient Vitals for the past 24 hrs:   Temp Pulse Resp BP SpO2   10/19/22 1400 -- 60 15 (!) 132/95 98 %   10/19/22 1330 -- 61 13 132/76 96 %   10/19/22 1307 -- -- -- -- 98 %   10/19/22 1300 -- 61 14 (!) 136/90 99 %   10/19/22 1230 -- 60 15 133/60 99 %   10/19/22 1200 -- 70 24 (!) 135/95 99 %   10/19/22 1150 -- 66 -- -- --   10/19/22 1130 -- 63 24 (!) 135/99 98 %   10/19/22 1100 97.7 °F (36.5 °C) 60 17 (!) 137/94 98 %   10/19/22 1000 -- 68 12 (!) 129/94 99 %   10/19/22 0945 -- -- -- (!) 145/95 --   10/19/22 0900 -- 69 14 (!) 149/101 98 %   10/19/22 0800 -- 69 10 (!) 144/100 98 %   10/19/22 0759 -- 70 -- -- --   10/19/22 0725 -- -- -- 117/81 --   10/19/22 0700 97.6 °F (36.4 °C) 71 11 (!) 134/101 95 %   10/19/22 0549 -- 81 -- (!) 145/93 --   10/19/22 0400 -- 75 15 -- 98 %   10/19/22 0358 -- 70 15 (!) 164/135 97 %   10/19/22 0353 -- 74 16 (!) 179/119 98 %   10/19/22 0350 -- 72 -- (!) 179/119 --   10/19/22 0343 -- 70 12 (!) 177/118 97 %   10/19/22 0333 -- 76 16 (!) 163/110 97 %   10/19/22 0322 -- 78 16 (!) 176/123 98 % 10/19/22 0313 -- -- -- (!) 174/125 --   10/19/22 0300 98.4 °F (36.9 °C) 82 22 (!) 176/116 --   10/19/22 0219 -- 80 -- (!) 173/125 --   10/19/22 0200 -- 79 16 (!) 189/136 97 %   10/19/22 0158 -- 81 15 (!) 188/132 98 %   10/19/22 0153 -- 79 14 (!) 195/130 99 %   10/19/22 0134 -- 79 20 (!) 180/138 98 %   10/19/22 0125 -- 80 20 (!) 198/137 96 %   10/19/22 0118 -- 75 -- (!) 199/139 --   10/19/22 0100 -- 82 -- (!) 199/139 --   10/19/22 0034 -- 81 19 (!) 179/128 98 %   10/19/22 0004 -- 77 16 (!) 174/123 --   10/19/22 0000 -- 81 21 -- --   10/18/22 2334 -- -- -- (!) 162/106 --   10/18/22 2319 -- 80 16 -- 98 %   10/18/22 2304 -- 82 24 -- 99 %   10/18/22 2249 -- 80 16 -- 98 %   10/18/22 2234 -- 79 19 (!) 177/128 98 %   10/18/22 2157 97.8 °F (36.6 °C) 80 13 (!) 179/119 98 %       PHYSICAL EXAMINATION:    General: Appears older than stated age, NAD, A&O  HEENT: Normocephalic, PERRL, no drainage  Neck: Supple, Trachea midline, No JVD  RESP: coarse breath sounds. O2 per protocol  PVS: No rubor, cyanosis, no edema  ABD: soft, NT, Normoactive BS  Derm: Warm/Dry/Intact with no lesions  Neuro: A&O PPTS. No focal deficits  PSYCH: No anxiety or agitation    Recent labs results and imaging reviewed. Recent Results (from the past 24 hour(s))   SAMPLES BEING HELD    Collection Time: 10/18/22  5:46 PM   Result Value Ref Range    SAMPLES BEING HELD 1LAV 1BLUE 1PST     COMMENT        Add-on orders for these samples will be processed based on acceptable specimen integrity and analyte stability, which may vary by analyte.    CBC WITH AUTOMATED DIFF    Collection Time: 10/18/22  5:46 PM   Result Value Ref Range    WBC 10.7 4.1 - 11.1 K/uL    RBC 3.80 (L) 4.10 - 5.70 M/uL    HGB 11.8 (L) 12.1 - 17.0 g/dL    HCT 35.6 (L) 36.6 - 50.3 %    MCV 93.7 80.0 - 99.0 FL    MCH 31.1 26.0 - 34.0 PG    MCHC 33.1 30.0 - 36.5 g/dL    RDW 13.2 11.5 - 14.5 %    PLATELET 531 571 - 814 K/uL    MPV 10.9 8.9 - 12.9 FL    NRBC 0.0 0  WBC    ABSOLUTE NRBC 0. 00 0.00 - 0.01 K/uL    NEUTROPHILS 72 32 - 75 %    LYMPHOCYTES 14 12 - 49 %    MONOCYTES 8 5 - 13 %    EOSINOPHILS 5 0 - 7 %    BASOPHILS 1 0 - 1 %    IMMATURE GRANULOCYTES 0 0.0 - 0.5 %    ABS. NEUTROPHILS 7.7 1.8 - 8.0 K/UL    ABS. LYMPHOCYTES 1.4 0.8 - 3.5 K/UL    ABS. MONOCYTES 0.9 0.0 - 1.0 K/UL    ABS. EOSINOPHILS 0.6 (H) 0.0 - 0.4 K/UL    ABS. BASOPHILS 0.1 0.0 - 0.1 K/UL    ABS. IMM. GRANS. 0.0 0.00 - 0.04 K/UL    DF AUTOMATED     METABOLIC PANEL, COMPREHENSIVE    Collection Time: 10/18/22  5:46 PM   Result Value Ref Range    Sodium 143 136 - 145 mmol/L    Potassium 3.4 (L) 3.5 - 5.1 mmol/L    Chloride 103 98 - 107 mmol/L    CO2 26 22 - 29 mmol/L    Anion gap 14 5 - 15 mmol/L    Glucose 109 (H) 65 - 100 mg/dL    BUN 27 (H) 6 - 20 MG/DL    Creatinine 2.52 (H) 0.70 - 1.20 MG/DL    BUN/Creatinine ratio 11 (L) 12 - 20      eGFR 29 (L) >60 ml/min/1.73m2    Calcium 9.0 8.6 - 10.0 MG/DL    Bilirubin, total 0.7 0.2 - 1.0 MG/DL    ALT (SGPT) 24 10 - 50 U/L    AST (SGOT) 26 10 - 50 U/L    Alk.  phosphatase 92 40 - 129 U/L    Protein, total 6.4 6.4 - 8.3 g/dL    Albumin 3.9 3.5 - 5.2 g/dL    Globulin 2.5 2.0 - 4.0 g/dL    A-G Ratio 1.6 1.1 - 2.2     LIPASE    Collection Time: 10/18/22  5:46 PM   Result Value Ref Range    Lipase 41 13 - 60 U/L   OCCULT BLOOD, STOOL    Collection Time: 10/18/22 11:37 PM   Result Value Ref Range    Occult Blood,day 1 Negative Negative      Day 1 date: 30,012,887     EKG, 12 LEAD, INITIAL    Collection Time: 10/19/22  8:39 AM   Result Value Ref Range    Ventricular Rate 70 BPM    Atrial Rate 70 BPM    P-R Interval 162 ms    QRS Duration 102 ms    Q-T Interval 530 ms    QTC Calculation (Bezet) 572 ms    Calculated P Axis 61 degrees    Calculated R Axis -23 degrees    Calculated T Axis -145 degrees    Diagnosis       Normal sinus rhythm  Possible Left atrial enlargement  Left ventricular hypertrophy with repolarization abnormality  Prolonged QT  Abnormal ECG  No previous ECGs available  Confirmed by EMILY MICHEL, [address] () on 10/19/2022 0:57:54 AM     METABOLIC PANEL, BASIC    Collection Time: 10/19/22 10:40 AM   Result Value Ref Range    Sodium 141 136 - 145 mmol/L    Potassium 3.2 (L) 3.5 - 5.1 mmol/L    Chloride 109 (H) 97 - 108 mmol/L    CO2 25 21 - 32 mmol/L    Anion gap 7 5 - 15 mmol/L    Glucose 109 (H) 65 - 100 mg/dL    BUN 25 (H) 6 - 20 mg/dL    Creatinine 2.38 (H) 0.70 - 1.30 mg/dL    BUN/Creatinine ratio 11 (L) 12 - 20      eGFR 31 (L) >60 ml/min/1.73m2    Calcium 8.4 (L) 8.5 - 10.1 mg/dL   MAGNESIUM    Collection Time: 10/19/22 10:40 AM   Result Value Ref Range    Magnesium 2.4 1.6 - 2.4 mg/dL   PROCALCITONIN    Collection Time: 10/19/22 10:40 AM   Result Value Ref Range    Procalcitonin 0.06 (H) 0 ng/mL   ALBUMIN    Collection Time: 10/19/22 10:40 AM   Result Value Ref Range    Albumin 3.0 (L) 3.5 - 5.0 g/dL   PHOSPHORUS    Collection Time: 10/19/22 10:40 AM   Result Value Ref Range    Phosphorus 3.2 2.6 - 4.7 mg/dL   TROPONIN-HIGH SENSITIVITY    Collection Time: 10/19/22 10:40 AM   Result Value Ref Range    Troponin-High Sensitivity 107 (H) 0 - 76 ng/L       XR Results (maximum last 3): Results from [place] encounter on 10/18/22    XR CHEST PA LAT    Impression  Previously seen right lower lobe airspace disease has almost completely  resolved. Mild bibasilar atelectasis. Results from [place] encounter on 10/05/22    XR CHEST PORT    Impression  Right basilar airspace disease. Follow-up to complete resolution in 4-6 weeks. Results from [place] encounter on 10/18/19    XR CHEST PA LAT    Impression  IMPRESSION:  No acute findings. CT Results (maximum last 3): Results from [place] encounter on 10/18/22    CT ABD PELV WO CONT    Impression  Right inguinal hernia containing small bowel without evidence of obstruction. No  renal or ureteral stone or evidence of hydronephrosis.       Results from [facility] encounter on 10/05/22    CTA CHEST W OR W WO CONT    Impression  1. No evidence of pulmonary embolus. 2.  Small bilateral pleural effusions. Possible small pulmonary edema. 3.  Mildly enlarged mediastinal and hilar lymph nodes. These may be reactive. Other possibilities include sarcoidosis. Correlate clinically. CT HEAD WO CONT    Impression  No acute intracranial hemorrhage, mass or infarct. US Results (maximum last 3): Results from [place] encounter on 10/18/22    US ABD LTD    Impression  1. Increased echogenicity of the right kidney suggesting medical renal  parenchymal disease. Correlate with renal function parameters. 2. Otherwise unremarkable right upper quadrant ultrasound for age. .      Current Facility-Administered Medications:     albuterol (PROVENTIL HFA, VENTOLIN HFA, PROAIR HFA) inhaler 2 Puff, 2 Puff, Inhalation, Q4H PRN, Abigail Carrillo MD    hydrALAZINE (APRESOLINE) tablet 25 mg, 25 mg, Oral, TID, Abigail Carrillo MD, 25 mg at 10/19/22 0800    sodium chloride (NS) flush 5-40 mL, 5-40 mL, IntraVENous, Q8H, Arnaldo Carrillo MD, 10 mL at 10/19/22 0600    sodium chloride (NS) flush 5-40 mL, 5-40 mL, IntraVENous, PRN, Fernanda Dalton MD    acetaminophen (TYLENOL) tablet 650 mg, 650 mg, Oral, Q6H PRN, 650 mg at 10/19/22 0433 **OR** acetaminophen (TYLENOL) suppository 650 mg, 650 mg, Rectal, Q6H PRN, Abigail Carrillo MD    polyethylene glycol (MIRALAX) packet 17 g, 17 g, Oral, DAILY PRN, Abigail Carrillo MD    ondansetron (ZOFRAN ODT) tablet 4 mg, 4 mg, Oral, Q8H PRN **OR** ondansetron (ZOFRAN) injection 4 mg, 4 mg, IntraVENous, Q6H PRN, Abigail Carrillo MD    hydrALAZINE (APRESOLINE) 20 mg/mL injection 20 mg, 20 mg, IntraVENous, Q4H PRN, Abigail Carrillo MD, 20 mg at 10/19/22 0118    niCARdipine (CARDENE) 25 mg in 0.9% sodium chloride 250 mL ([id number]), 5-15 mg/hr, IntraVENous, TITRATE, LorenaAbigail MD, Stopped at 10/19/22 0725    labetaloL (NORMODYNE) tablet 100 mg, 100 mg, Oral, Q12H, Naeem Shin MD, 100 mg at 10/19/22 1151    nicotine (NICODERM CQ) 21 mg/24 hr patch 1 Patch, 1 Patch, TransDERmal, DAILY, Edi Mensah MD, 1 Patch at 10/19/22 1151    predniSONE (DELTASONE) tablet 20 mg, 20 mg, Oral, BID WITH MEALS, Shahla Mcintyre MD    budesonide-formoteroL [facility]) 160-4.5 mcg/actuation HFA inhaler 2 Puff, 2 Puff, Inhalation, BID RT, Nikhil Mcintyre MD, 2 Puff at 10/19/22 1306    potassium chloride SR (KLOR-CON 10) tablet 40 mEq, 40 mEq, Oral, NOW, Edi Mensah MD    Case discussed with collaborating physician Dr. Jaswinder Spain and our impression and recommendations are as follows:     Hypertensive emergency:   - c/o headache and abdominal pain, history of recent stroke ? Hemorrhagic  - EKG non-ischemic, HS troponin 106, will repeat and trend. - initial pressure above 220, given IV labetalol, IV hydralazine without improvement. Started on Cardene gtt. Since discontinued. - continue PO meds at this time. - echo pending    2. Elevated troponin:   - no active CP  - HS troponin elevated likely related to demand ischemia in the setting of uncontrolled BP, Will continue to trend. - not currently on asa, will need neuro clearance prior to ischemic evaluation    3. COPD/Emphysema/Asthma:   - managed per pulmonary  - CT chest showing lymphadenopathy ? Sarcoidosis    4. ANCELMO:   - Cr 1.41 > 2.27 > 2.52 > 2.38  - nephrology on the case, appreciate recommendations  - avoid ACE   - avoid BC powder, patient reports heavy use at home    5. CVA/brain aneurysm:   - per family, no documentation  - recent stroke, ? Hemorrhagic with left-sided residual  - recommend neuro consult for clearance for possible ischemic workup      Thank you for involving us in the care of this patient. Please do not hesitate to call if additional questions arise. If after hours please call [phone]. Dr. Mello Martienz Cardiology  [address] [facility]    [address] [facility], [facility]  [phone]

## 2022-10-19 NOTE — ED NOTES
TRANSFER - OUT REPORT:    Verbal report given to Kami Schreiber RN(name) on [place]  being transferred to [facility](unit) for routine progression of care       Report consisted of patients Situation, Background, Assessment and   Recommendations(SBAR). Information from the following report(s) SBAR, Kardex, ED Summary, and Recent Results was reviewed with the receiving nurse. Lines:   Peripheral IV 10/18/22 Right Antecubital (Active)        Opportunity for questions and clarification was provided.       Patient transported with:   Monitor  Patient's medications from home

## 2022-10-19 NOTE — PROGRESS NOTES
PROGRESS NOTE FOLLOW-UP    FOLLOW-UP from earlier admission by Dr. Gretel Watson; see H&P below:    Patient feeling better. No active CP. Off of Cardene. Reviewed records. Discussed with and consulted Cardiology, Nephrology and Pulmonology. 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 He also was seen in [facility] earlier in the month and found to have chest/hilar lymphadenopathy and infiltrate for which he was prescribed abx but he never completed. He has not been compliant. + Tobacco use. Per discussion with Cardiology; prefer Neuro eval prior to cardiac w/up - patient will need stress test or other procedures given elevated troponin (most likely d/t hypertensive emergency)    Labs reviewed. Medications adjusted. Transfer to floor from ICU  Echo ordered  Neuro consulted also.  ------------------------------      Patient: French Heimlich MRN: [id number]  SSN: [id number]    YOB: 1965  Age: 56 y.o. Sex: male      Subjective: French Heimlich is a 56 y.o. male with PMH of COPD, hypertension, and stroke. Patient transferred from the freestanding ED [place] for hypertensive urgency/emergency. Patient went there in which she was found to have systolic blood pressure above 220 which prompted the ED provider to give him intravenous labetalol. Patient also report that has been having intermittent abdominal pain in the right abdomen, throbbing in nature, rated 7/10, nonradiating, nothing specific makes it better or worse, without nausea or vomiting, fever or chills. Reports having headache, however denies chest pain, shortness of breath, changes in vision. In the ED , also noted to have ANCELMO. CT abdomen showed no acute findings. BP remained elevated >170 despite multiple bouts of IV hydralazine, will start IV CCB.      Past Medical History:   Diagnosis Date    Asthma     Chronic obstructive pulmonary disease (HCC)     Emphysema lung ([address]     Hypertension     Stroke [facility]) 2022     Past Surgical History:   Procedure Laterality Date    HX APPENDECTOMY      HX ORTHOPAEDIC      collar bone (right)      No family history on file. Social History     Tobacco Use    Smoking status: Every Day    Smokeless tobacco: Never   Substance Use Topics    Alcohol use: Yes      Prior to Admission medications    Medication Sig Start Date End Date Taking? Authorizing Provider   albuterol (PROVENTIL HFA, VENTOLIN HFA, PROAIR HFA) 90 mcg/actuation inhaler Take 2 Puffs by inhalation. Reports taking 6 puffs as needed   Yes Other, MD Eugenio   albuterol (PROVENTIL HFA, VENTOLIN HFA, PROAIR HFA) 90 mcg/actuation inhaler Take 2 Puffs by inhalation every four (4) hours as needed for Wheezing or Cough. 10/5/22  Yes Nara Delgado MD   hydrALAZINE (APRESOLINE) 25 mg tablet Take 1 Tablet by mouth three (3) times daily.  10/5/22  Yes Nara Delgado MD   inhalational spacing device For use with inhaler 10/5/22   Nara Delgado MD        Allergies   Allergen Reactions    Ace Inhibitors Swelling       Review of Systems:   Constitutional: No fevers, No chills, No fatigue, No weakness  Eyes: No visual disturbance  Ears, Nose, Mouth, Throat, and Face: No nasal congestion, No sore throat  Respiratory: No cough, No sputum, No wheezing, No SOB  Cardiovascular: No chest pain, No lower extremity edema, No Palpitations   Gastrointestinal: See HPI  Genitourinary: No frequency, No dysuria, No hematuria  Integument/Breast: No rash, No skin lesion(s), No dryness  Musculoskeletal: No arthralgias, No neck pain, No back pain  Neurological: No headaches, No dizziness, No confusion,  No seizures  Behavioral/Psychiatric: No anxiety, No depression      Objective:     Vitals:    10/19/22 1330 10/19/22 1400 10/19/22 1500 10/19/22 1558   BP: 132/76 (!) 132/95 (!) 132/100    Pulse: 61 60 60 64   Resp: 13 15 14    Temp:   97.8 °F (36.6 °C)    SpO2: 96% 98% 97%    Weight:       Height: Physical Exam:   General: alert, cooperative, no distress  Eye: conjunctivae/corneas clear. PERRL, EOM's intact. Throat and Neck: normal and no erythema or exudates noted. No mass   Lung: clear to auscultation bilaterally  Heart: regular rate and rhythm,   Abdomen: soft, RUQ-tender. Bowel sounds normal. No masses,  Extremities:  able to move all extremities normal, atraumatic  Skin: Normal.  Neurologic: AOx3. Motor function and sensation grossly intact. Psychiatric: non focal    Recent Results (from the past 24 hour(s))   SAMPLES BEING HELD    Collection Time: 10/18/22  5:46 PM   Result Value Ref Range    SAMPLES BEING HELD 1LAV 1BLUE 1PST     COMMENT        Add-on orders for these samples will be processed based on acceptable specimen integrity and analyte stability, which may vary by analyte. CBC WITH AUTOMATED DIFF    Collection Time: 10/18/22  5:46 PM   Result Value Ref Range    WBC 10.7 4.1 - 11.1 K/uL    RBC 3.80 (L) 4.10 - 5.70 M/uL    HGB 11.8 (L) 12.1 - 17.0 g/dL    HCT 35.6 (L) 36.6 - 50.3 %    MCV 93.7 80.0 - 99.0 FL    MCH 31.1 26.0 - 34.0 PG    MCHC 33.1 30.0 - 36.5 g/dL    RDW 13.2 11.5 - 14.5 %    PLATELET 381 151 - 328 K/uL    MPV 10.9 8.9 - 12.9 FL    NRBC 0.0 0  WBC    ABSOLUTE NRBC 0.00 0.00 - 0.01 K/uL    NEUTROPHILS 72 32 - 75 %    LYMPHOCYTES 14 12 - 49 %    MONOCYTES 8 5 - 13 %    EOSINOPHILS 5 0 - 7 %    BASOPHILS 1 0 - 1 %    IMMATURE GRANULOCYTES 0 0.0 - 0.5 %    ABS. NEUTROPHILS 7.7 1.8 - 8.0 K/UL    ABS. LYMPHOCYTES 1.4 0.8 - 3.5 K/UL    ABS. MONOCYTES 0.9 0.0 - 1.0 K/UL    ABS. EOSINOPHILS 0.6 (H) 0.0 - 0.4 K/UL    ABS. BASOPHILS 0.1 0.0 - 0.1 K/UL    ABS. IMM.  GRANS. 0.0 0.00 - 0.04 K/UL    DF AUTOMATED     METABOLIC PANEL, COMPREHENSIVE    Collection Time: 10/18/22  5:46 PM   Result Value Ref Range    Sodium 143 136 - 145 mmol/L    Potassium 3.4 (L) 3.5 - 5.1 mmol/L    Chloride 103 98 - 107 mmol/L    CO2 26 22 - 29 mmol/L    Anion gap 14 5 - 15 mmol/L    Glucose 109 (H) 65 - 100 mg/dL    BUN 27 (H) 6 - 20 MG/DL    Creatinine 2.52 (H) 0.70 - 1.20 MG/DL    BUN/Creatinine ratio 11 (L) 12 - 20      eGFR 29 (L) >60 ml/min/1.73m2    Calcium 9.0 8.6 - 10.0 MG/DL    Bilirubin, total 0.7 0.2 - 1.0 MG/DL    ALT (SGPT) 24 10 - 50 U/L    AST (SGOT) 26 10 - 50 U/L    Alk.  phosphatase 92 40 - 129 U/L    Protein, total 6.4 6.4 - 8.3 g/dL    Albumin 3.9 3.5 - 5.2 g/dL    Globulin 2.5 2.0 - 4.0 g/dL    A-G Ratio 1.6 1.1 - 2.2     LIPASE    Collection Time: 10/18/22  5:46 PM   Result Value Ref Range    Lipase 41 13 - 60 U/L   OCCULT BLOOD, STOOL    Collection Time: 10/18/22 11:37 PM   Result Value Ref Range    Occult Blood,day 1 Negative Negative      Day 1 date: 88,126,744     EKG, 12 LEAD, INITIAL    Collection Time: 10/19/22  8:39 AM   Result Value Ref Range    Ventricular Rate 70 BPM    Atrial Rate 70 BPM    P-R Interval 162 ms    QRS Duration 102 ms    Q-T Interval 530 ms    QTC Calculation (Bezet) 572 ms    Calculated P Axis 61 degrees    Calculated R Axis -23 degrees    Calculated T Axis -145 degrees    Diagnosis       Normal sinus rhythm  Possible Left atrial enlargement  Left ventricular hypertrophy with repolarization abnormality  Prolonged QT  Abnormal ECG  No previous ECGs available  Confirmed by EMILY MICHEL, Ernesto Zapien ([id number]) on 10/19/2022 1:37:29 AM     METABOLIC PANEL, BASIC    Collection Time: 10/19/22 10:40 AM   Result Value Ref Range    Sodium 141 136 - 145 mmol/L    Potassium 3.2 (L) 3.5 - 5.1 mmol/L    Chloride 109 (H) 97 - 108 mmol/L    CO2 25 21 - 32 mmol/L    Anion gap 7 5 - 15 mmol/L    Glucose 109 (H) 65 - 100 mg/dL    BUN 25 (H) 6 - 20 mg/dL    Creatinine 2.38 (H) 0.70 - 1.30 mg/dL    BUN/Creatinine ratio 11 (L) 12 - 20      eGFR 31 (L) >60 ml/min/1.73m2    Calcium 8.4 (L) 8.5 - 10.1 mg/dL   MAGNESIUM    Collection Time: 10/19/22 10:40 AM   Result Value Ref Range    Magnesium 2.4 1.6 - 2.4 mg/dL   PROCALCITONIN    Collection Time: 10/19/22 10:40 AM   Result Value Ref Range Procalcitonin 0.06 (H) 0 ng/mL   ALBUMIN    Collection Time: 10/19/22 10:40 AM   Result Value Ref Range    Albumin 3.0 (L) 3.5 - 5.0 g/dL   PHOSPHORUS    Collection Time: 10/19/22 10:40 AM   Result Value Ref Range    Phosphorus 3.2 2.6 - 4.7 mg/dL   TROPONIN-HIGH SENSITIVITY    Collection Time: 10/19/22 10:40 AM   Result Value Ref Range    Troponin-High Sensitivity 107 (H) 0 - 76 ng/L   TROPONIN-HIGH SENSITIVITY    Collection Time: 10/19/22  3:34 PM   Result Value Ref Range    Troponin-High Sensitivity 100 (H) 0 - 76 ng/L       XR Results (maximum last 3): Results from [place] encounter on 10/18/22    XR CHEST PA LAT    Narrative  EXAM: XR CHEST PA LAT    INDICATION: Recent right lower lobe infiltrate    COMPARISON: 10/5/2022    TECHNIQUE: PA and lateral chest views    FINDINGS: The cardiomediastinal silhouette is within normal limits. Previously  seen right lower lobe consolidation has almost completely resolved. There is  mild bibasilar atelectasis. The visualized bones and upper abdomen are  unremarkable. Impression  Previously seen right lower lobe airspace disease has almost completely  resolved. Mild bibasilar atelectasis. Results from Hospital Encounter encounter on 10/05/22    XR CHEST PORT    Narrative  INDICATION: sob    EXAM:  AP CHEST RADIOGRAPH    COMPARISON: October 18, 2019    FINDINGS:    AP portable view of the chest demonstrates a normal cardiomediastinal  silhouette. Right basilar airspace disease. No pleural effusion or pneumothorax. The osseous structures are unremarkable. Impression  Right basilar airspace disease. Follow-up to complete resolution in 4-6 weeks. Results from Hospital Encounter encounter on 10/18/19    XR CHEST PA LAT    Narrative  History: Chest pain, history of COPD. Frontal and lateral views of the chest show clear lungs. There is  atherosclerosis of the aorta.  The heart, mediastinum and pulmonary vasculature  are normal.  The bony thorax is unremarkable. Impression  IMPRESSION:  No acute findings. CT Results (maximum last 3): Results from [place] encounter on 10/18/22    CT ABD PELV WO CONT    Narrative  EXAM: CT ABD PELV WO CONT    INDICATION: RUQ PAin    COMPARISON: None    IV CONTRAST: None. ORAL CONTRAST: None    TECHNIQUE:  Thin axial images were obtained through the abdomen and pelvis. Coronal and  sagittal reformats were generated. CT dose reduction was achieved through use of  a standardized protocol tailored for this examination and automatic exposure  control for dose modulation. The absence of intravenous contrast material reduces the sensitivity for  evaluation of the vasculature and solid organs. FINDINGS:  LOWER THORAX: Bilateral lower lobe interstitial infiltrates and trace right  pleural effusion. LIVER: No mass. BILIARY TREE: Gallbladder is within normal limits. CBD is not dilated. SPLEEN: within normal limits. PANCREAS: No focal abnormality. ADRENALS: Unremarkable. KIDNEYS/URETERS: No calculus or hydronephrosis. STOMACH: Unremarkable. SMALL BOWEL: No dilatation or wall thickening. COLON: No dilatation or wall thickening. APPENDIX: Surgically absent. PERITONEUM: No ascites or pneumoperitoneum. RETROPERITONEUM: No lymphadenopathy or aortic aneurysm. REPRODUCTIVE ORGANS: Unremarkable. URINARY BLADDER: No mass or calculus. BONES: No destructive bone lesion. ABDOMINAL WALL: Right inguinal hernia containing small bowel without evidence of  obstruction. ADDITIONAL COMMENTS: N/A    Impression  Right inguinal hernia containing small bowel without evidence of obstruction. No  renal or ureteral stone or evidence of hydronephrosis. Results from [place] encounter on 10/05/22    CTA CHEST W OR W WO CONT    Narrative  EXAM:  CTA CHEST W OR W WO CONT    INDICATION: Rule out PE    COMPARISON: None.     TECHNIQUE: Helical thin section chest CT following uneventful intravenous  administration of nonionic contrast 100 mL of isovue 370 according to  departmental PE protocol. Coronal and sagittal reformats were performed. 3D post  processing was performed. CT dose reduction was achieved through the use of a  standardized protocol tailored for this examination and automatic exposure  control for dose modulation. FINDINGS: This is a good quality study for the evaluation of pulmonary embolism  to the first subsegmental arterial level. There is no pulmonary embolism to this  level. THYROID: No nodule. MEDIASTINUM: Borderline size AP window lymph node measuring 10 mm. 11 mm right  paratracheal lymph node. NAVEEN: 14 mm right hilar lymph node. Prominent bilateral hilar lymph nodes are  noted  THORACIC AORTA: No aneurysm. HEART: Cardiomegaly. ESOPHAGUS: No wall thickening or dilatation. TRACHEA/BRONCHI: Patent. PLEURA: Small bilateral pleural effusions. LUNGS: Mild ground glass opacity which may represent mild pulmonary edema. UPPER ABDOMEN: Unremarkable  BONES: No aggressive bone lesion or fracture. Impression  1. No evidence of pulmonary embolus. 2.  Small bilateral pleural effusions. Possible small pulmonary edema. 3.  Mildly enlarged mediastinal and hilar lymph nodes. These may be reactive. Other possibilities include sarcoidosis. Correlate clinically. CT HEAD WO CONT    Narrative  INDICATION: headache, dizzy, shortness of breath, hypertension. Exam: Noncontrast CT of the brain is performed with 5 mm collimation. CT dose reduction was achieved with the use of the standardized protocol  tailored for this examination and automatic exposure control for dose  modulation. FINDINGS: There is no acute intracranial hemorrhage, mass, mass effect or  herniation. Ventricular system is normal. There are extensive bilateral  periventricular white matter hypodensities consistent with chronic microvascular  ischemic changes. There is no evidence of acute territorial infarct.  The mastoid  air cells are well pneumatized. The visualized paranasal sinuses are normal.    Impression  No acute intracranial hemorrhage, mass or infarct. MRI Results (maximum last 3): No results found for this or any previous visit. Nuclear Medicine Results (maximum last 3): No results found for this or any previous visit. US Results (maximum last 3): Results from [place] encounter on 10/18/22    US ABD LTD    Narrative  INDICATION:  RUQ abdominal pain    EXAM: Right upper quadrant Ultrasound. The right upper quadrant was scanned via  high resolution real-time linear array sonography. The spleen, left kidney and  pancreatic tail are not included in this study. COMPARISON: None. FINDINGS: The gallbladder is normal in size and configuration without  intraluminal calculi, wall thickening, pericholecystic fluid or focal  tenderness. The intra and extrahepatic biliary ductal systems are normal and  there is no focal hepatic parenchymal abnormality. The inferior vena cava is  patent and unremarkable. The pancreatic head is not well visualized due to  overlying bowel gas, but shows no obvious abnormality. The pancreatic tail is  not included in this study. The spleen and left kidney are not included in this  study. The right kidney measures 10.1 cm, and shows no dilatation, calculi or  parenchymal abnormality. The right kidney shows increased echogenicity, however. Impression  1. Increased echogenicity of the right kidney suggesting medical renal  parenchymal disease. Correlate with renal function parameters. 2. Otherwise unremarkable right upper quadrant ultrasound for age. .      Assessment and plan:   #Hypertensive emergency  - Doubt compliance to home medications.   - Nicardipine ggt. ICU for titration.   - Resume home medications of hydralazine. - start Diltiazem po. # ANCELMO on CKD stage IIIb (eGRR 30-45)   - Suspect secondary to above  - Check for microalbuminuria.  If renal function improves, consider ACE-I/ ARB instead of hydralazine for better compliance. - Monitor for now, consult nephrology of worsening. # Abdominal pain  - Possibly secondary to hypertension emergency  - No kidney stone, diverticulitis or acute abdominal pathology on abdominal CT.  - monitor for now. # History of stroke    # COPD  - not in exacerbation  - monitor for now. # Full code by default, need further clarification    # Medication list reviewed on Epic and/or outside documentation. Not reviewed with patient.         Signed By: Chapito Aragon MD     October 19, 2022

## 2022-10-19 NOTE — ROUTINE PROCESS
TRANSFER - OUT REPORT:    Verbal report given to Pierre Rosenthal RN(name) on [facility]  being transferred to ICU (unit) for routine progression of care       Report consisted of patients Situation, Background, Assessment and   Recommendations(SBAR). Information from the following report(s) SBAR, ED Summary, and MAR was reviewed with the receiving nurse. Opportunity for questions and clarification was provided.       Patient transported with:   Monitor  Registered Nurse  Tech

## 2022-10-19 NOTE — PROGRESS NOTES
Patient Name: Mariam Preciado  Procedure Date: 4/10/2018 11:16 AM  MRN: [id number]  Account Number: [id number]  YOB: 1964  Age: 53  Attending MD: Krysta Healy MD  Grafts or Implants: No  Procedure:            Colonoscopy with biopsy  Indications:          Screening for colorectal malignant neoplasm  Patient Profile:      Last Colonoscopy: date unknown.  Providers:            Krysta Healy MD  Referring MD:         Andres Arroyo Md  Sedation:             Monitored Anesthesia Care  Procedure:       Pre-Anesthesia Assessment:       - Prior to the procedure, a History and Physical was performed, and       patient medications and allergies were reviewed. The patient's tolerance       of previous anesthesia was also reviewed. The risks and benefits of the       procedure and the sedation options and risks were discussed with the       patient. All questions were answered, and informed consent was obtained.       Prior Anticoagulants: The patient has taken no previous anticoagulant or       antiplatelet agents. ASA Grade Assessment: II - A patient with mild       systemic disease. After reviewing the risks and benefits, the patient       was deemed in satisfactory condition to undergo the procedure.       The endoscope was passed under direct vision. The colonoscope was       introduced through the anus and advanced to the cecum, identified by       appendiceal orifice and ileocecal valve. The physical status of the       patient was re-assessed after the procedure. The colonoscopy was       performed with ease. The patient tolerated the procedure well. The       quality of the bowel preparation was good. The ileocecal valve and the       appendiceal orifice were photographed.  Findings:       A 5 mm polyp was found in the rectosigmoid colon. The polyp was sessile.       Biopsies were taken with a cold forceps for histology.       Internal hemorrhoids were found during retroflexion. The  Problem: Discharge Planning  Goal: *Discharge to safe environment  Outcome: Progressing Towards Goal  Goal: *Knowledge of medication management  Outcome: Progressing Towards Goal  Goal: *Knowledge of discharge instructions  Outcome: Progressing Towards Goal hemorrhoids       were mild.       The exam was otherwise without abnormality.  Impression:       - One 5 mm polyp in the rectosigmoid colon. Biopsied.       - Internal hemorrhoids.       - The examination was otherwise normal.  Recommendation:       - Await pathology results.       - Telephone GI office for pathology results in 1 week.       - Repeat colonoscopy in 5 years for surveillance.  Complications:        No immediate complications.  Estimated Blood Loss: Estimated blood loss: none.  MD Krysta Lozano MD  4/10/2018 12:05:20 PM  This report has been signed electronically by the above.  Number of Addenda: 0  Scope Withdrawal Time 0 hours 18 minutes 29 seconds

## 2022-10-19 NOTE — PROGRESS NOTES
Notified Saniya Rogers patient BP increasing this evening 150s-160s/100s.   Instructed to give PRN 20mg hydralazine for now

## 2022-10-19 NOTE — ED NOTES
While administering meds. The pt reports that 2-3 days ago the pt has a large bowel movement that was very black. The pt reports that the abd pain started around the same time.

## 2022-10-19 NOTE — ED PROVIDER NOTES
[id number]  EMERGENCY DEPARTMENT ENCOUNTER NOTE        Date: 10/18/2022  Patient Name: Yojana Henderson      History of Presenting Illness     Chief Complaint   Patient presents with    Abdominal Pain       History Provided By: Patient    HPI: Yojana Henderson, 56 y.o. male with PMH of COPD and hypertension who is currently not on antihypertensive medications comes transferred from the [facility] ED [facility] for hypertensive urgency/emergency. Patient went there in which she was found to have systolic blood pressure above 220 which prompted the ED provider to give him intravenous labetalol. Patient also report that has been having intermittent abdominal pain in the right abdomen, nonradiating, nothing specific makes it better or worse without nausea or vomiting. He is denying chest pain, shortness of breath, changes in vision, changes in hearing, numbness or weakness. Of note, patient reports while in the ambulance he started having a slow onset headache reaching maximum intensity over approximately an hour. He is reporting that headache is right-sided, throbbing, nonradiating, no associated additional symptoms. There are no other complaints, changes, or physical findings at this time. PCP: Unknown, Fab, MD    Current Facility-Administered Medications   Medication Dose Route Frequency Provider Last Rate Last Admin    hydrALAZINE (APRESOLINE) 20 mg/mL injection 20 mg  20 mg IntraVENous Q4H PRN Wilfrid Rodriguez MD        butalbital-acetaminophen-caffeine (FIORICET, ESGIC) -40 mg per tablet 1 Tablet  1 Tablet Oral ONCE Lorena, Sheril Carrel, MD         Current Outpatient Medications   Medication Sig Dispense Refill    albuterol (PROVENTIL HFA, VENTOLIN HFA, PROAIR HFA) 90 mcg/actuation inhaler Take 2 Puffs by inhalation.  Reports taking 6 puffs as needed      albuterol (PROVENTIL HFA, VENTOLIN HFA, PROAIR HFA) 90 mcg/actuation inhaler Take 2 Puffs by inhalation every four (4) hours as needed for Wheezing or Cough. 18 g 0    inhalational spacing device For use with inhaler 1 Each 0    hydrALAZINE (APRESOLINE) 25 mg tablet Take 1 Tablet by mouth three (3) times daily. 30 Tablet 0       Past History     Past Medical History:  Past Medical History:   Diagnosis Date    Asthma     Chronic obstructive pulmonary disease ([facility] [address]     Emphysema lung ([facility] [address]     Hypertension     Stroke ([facility] [address] 2022       Past Surgical History:  Past Surgical History:   Procedure Laterality Date    HX APPENDECTOMY      HX ORTHOPAEDIC      collar bone (right)       Family History:  No family history on file. Social History:  Social History     Tobacco Use    Smoking status: Every Day    Smokeless tobacco: Never   Substance Use Topics    Alcohol use: Yes    Drug use: Yes     Types: Marijuana       Allergies: Allergies   Allergen Reactions    Ace Inhibitors Swelling         Review of Systems     Review of Systems    A 10 point review of system was performed and was negative except as noted above in HPI    Physical Exam     Physical Exam  Vitals and nursing note reviewed. Constitutional:       General: He is not in acute distress. Appearance: He is not ill-appearing, toxic-appearing or diaphoretic. HENT:      Head: Normocephalic and atraumatic. Mouth/Throat:      Mouth: Mucous membranes are moist.      Pharynx: Oropharynx is clear. Eyes:      Extraocular Movements: Extraocular movements intact. Pupils: Pupils are equal, round, and reactive to light. Cardiovascular:      Rate and Rhythm: Normal rate and regular rhythm. Pulmonary:      Effort: Pulmonary effort is normal.      Breath sounds: Normal breath sounds. Abdominal:      Palpations: Abdomen is soft. Tenderness: There is abdominal tenderness in the right upper quadrant. Skin:     General: Skin is warm and dry. Neurological:      Mental Status: He is alert and oriented to person, place, and time.       Cranial Nerves: Cranial nerves 2-12 are intact. Sensory: Sensation is intact. Motor: Motor function is intact. Gait: Gait is intact. Lab and Diagnostic Study Results     Labs -     Recent Results (from the past 12 hour(s))   SAMPLES BEING HELD    Collection Time: 10/18/22  5:46 PM   Result Value Ref Range    SAMPLES BEING HELD 1LAV 1BLUE 1PST     COMMENT        Add-on orders for these samples will be processed based on acceptable specimen integrity and analyte stability, which may vary by analyte. CBC WITH AUTOMATED DIFF    Collection Time: 10/18/22  5:46 PM   Result Value Ref Range    WBC 10.7 4.1 - 11.1 K/uL    RBC 3.80 (L) 4.10 - 5.70 M/uL    HGB 11.8 (L) 12.1 - 17.0 g/dL    HCT 35.6 (L) 36.6 - 50.3 %    MCV 93.7 80.0 - 99.0 FL    MCH 31.1 26.0 - 34.0 PG    MCHC 33.1 30.0 - 36.5 g/dL    RDW 13.2 11.5 - 14.5 %    PLATELET 728 306 - 945 K/uL    MPV 10.9 8.9 - 12.9 FL    NRBC 0.0 0  WBC    ABSOLUTE NRBC 0.00 0.00 - 0.01 K/uL    NEUTROPHILS 72 32 - 75 %    LYMPHOCYTES 14 12 - 49 %    MONOCYTES 8 5 - 13 %    EOSINOPHILS 5 0 - 7 %    BASOPHILS 1 0 - 1 %    IMMATURE GRANULOCYTES 0 0.0 - 0.5 %    ABS. NEUTROPHILS 7.7 1.8 - 8.0 K/UL    ABS. LYMPHOCYTES 1.4 0.8 - 3.5 K/UL    ABS. MONOCYTES 0.9 0.0 - 1.0 K/UL    ABS. EOSINOPHILS 0.6 (H) 0.0 - 0.4 K/UL    ABS. BASOPHILS 0.1 0.0 - 0.1 K/UL    ABS. IMM. GRANS. 0.0 0.00 - 0.04 K/UL    DF AUTOMATED     METABOLIC PANEL, COMPREHENSIVE    Collection Time: 10/18/22  5:46 PM   Result Value Ref Range    Sodium 143 136 - 145 mmol/L    Potassium 3.4 (L) 3.5 - 5.1 mmol/L    Chloride 103 98 - 107 mmol/L    CO2 26 22 - 29 mmol/L    Anion gap 14 5 - 15 mmol/L    Glucose 109 (H) 65 - 100 mg/dL    BUN 27 (H) 6 - 20 MG/DL    Creatinine 2.52 (H) 0.70 - 1.20 MG/DL    BUN/Creatinine ratio 11 (L) 12 - 20      eGFR 29 (L) >60 ml/min/1.73m2    Calcium 9.0 8.6 - 10.0 MG/DL    Bilirubin, total 0.7 0.2 - 1.0 MG/DL    ALT (SGPT) 24 10 - 50 U/L    AST (SGOT) 26 10 - 50 U/L    Alk.  phosphatase 92 40 - 129 U/L    Protein, total 6.4 6.4 - 8.3 g/dL    Albumin 3.9 3.5 - 5.2 g/dL    Globulin 2.5 2.0 - 4.0 g/dL    A-G Ratio 1.6 1.1 - 2.2     LIPASE    Collection Time: 10/18/22  5:46 PM   Result Value Ref Range    Lipase 41 13 - 60 U/L   OCCULT BLOOD, STOOL    Collection Time: 10/18/22 11:37 PM   Result Value Ref Range    Occult Blood,day 1 Negative Negative      Day 1 date: [phone]         Radiologic Studies -   [unfilled]  CT Results  (Last 48 hours)                 10/18/22 2239  CT ABD PELV WO CONT Final result    Impression:  Right inguinal hernia containing small bowel without evidence of obstruction. No   renal or ureteral stone or evidence of hydronephrosis. Narrative:  EXAM: CT ABD PELV WO CONT       INDICATION: RUQ PAin       COMPARISON: None       IV CONTRAST: None. ORAL CONTRAST: None       TECHNIQUE:    Thin axial images were obtained through the abdomen and pelvis. Coronal and   sagittal reformats were generated. CT dose reduction was achieved through use of   a standardized protocol tailored for this examination and automatic exposure   control for dose modulation. The absence of intravenous contrast material reduces the sensitivity for   evaluation of the vasculature and solid organs. FINDINGS:    LOWER THORAX: Bilateral lower lobe interstitial infiltrates and trace right   pleural effusion. LIVER: No mass. BILIARY TREE: Gallbladder is within normal limits. CBD is not dilated. SPLEEN: within normal limits. PANCREAS: No focal abnormality. ADRENALS: Unremarkable. KIDNEYS/URETERS: No calculus or hydronephrosis. STOMACH: Unremarkable. SMALL BOWEL: No dilatation or wall thickening. COLON: No dilatation or wall thickening. APPENDIX: Surgically absent. PERITONEUM: No ascites or pneumoperitoneum. RETROPERITONEUM: No lymphadenopathy or aortic aneurysm. REPRODUCTIVE ORGANS: Unremarkable. URINARY BLADDER: No mass or calculus.    BONES: No destructive bone lesion. ABDOMINAL WALL: Right inguinal hernia containing small bowel without evidence of   obstruction. ADDITIONAL COMMENTS: N/A                 CXR Results  (Last 48 hours)      None            Medical Decision Making and ED Course   - I am the first and primary provider for this patient AND AM THE PRIMARY PROVIDER OF RECORD. - I reviewed the vital signs, available nursing notes, past medical history, past surgical history, family history and social history. - Initial assessment performed. The patients presenting problems have been discussed, and the staff are in agreement with the care plan formulated and outlined with them. I have encouraged them to ask questions as they arise throughout their visit. Vital Signs-Reviewed the patient's vital signs. Patient Vitals for the past 24 hrs:   Temp Pulse Resp BP SpO2   10/19/22 0034 -- 81 19 (!) 179/128 98 %   10/19/22 0004 -- 77 16 (!) 174/123 --   10/18/22 2334 -- -- -- (!) 162/106 --   10/18/22 2319 -- 80 16 -- 98 %   10/18/22 2304 -- 82 24 -- 99 %   10/18/22 2249 -- 80 16 -- 98 %   10/18/22 2234 -- 79 19 (!) 177/128 98 %   10/18/22 2157 97.8 °F (36.6 °C) 80 13 (!) 179/119 98 %       Records Reviewed: Nursing Notes and Old Medical Records    Provider Notes (Medical Decision Making):     51-year-old male presents transferred from the freestanding ED at [facility] for hypertensive urgency. Reportedly, patient systolic was above 892 requiring administration of intravenous antihypertensives. Currently is complaining of pain in the right upper quadrant but does not have any other symptoms. His right upper quadrant ultrasound was negative for acute finding. Reportedly, patient has dropped his hemoglobin. Plan is to obtain CT abdomen pelvis as well as fecal occult giving the drop in hemoglobin. ED course:  CT is negative for finding that explains the patient's pain. However, incidentally there was a right inguinal hernia without obstruction.   Patient has worsening CKD from baseline we will admit the patient to the hospital for possible endorgan damage secondary to hypertensive urgency/emergency. Diagnosis     Clinical Impression:   1. Hypertension, unspecified type    2. ANCELMO (acute kidney injury) ([facility] [address]    3. Abdominal pain, right upper quadrant          Disposition     Disposition: Condition stable and improved    Admitted    Attestations: Juarez Hernadez MD    Please note that this dictation was completed with [employer], the computer voice recognition software. Quite often unanticipated grammatical, syntax, homophones, and other interpretive errors are inadvertently transcribed by the computer software. Please disregard these errors. Please excuse any errors that have escaped final proofreading. Thank you.

## 2022-10-19 NOTE — CONSULTS
Consult Date: 10/19/2022    Consults  Seen as per Dr Candelaria AG on CKD     Subjective   HISTORY OF PRESENTING ILLNESS :  Darvin Schwab is a 56 y. o.,male ,WHITE/NON- with history of hypertension, COPD, stroke who was transferred from freestanding emergency room for hypertensive emergency and found to have elevated creatinine. Also recalls taking BC powder at home. No prior history of kidney disease. Symptoms appear to be improving. States that he consumed quite a bit of alcohol on Friday prior to this symptoms with blood pressure elevation. States that he can use BC powder approximately grams a week. Does not check blood pressure at home. Blood pressures controlled well in the normal range now. No other symptoms reported  Past Medical History:   Diagnosis Date    Asthma     Chronic obstructive pulmonary disease ([facility]     Emphysema lung ([facility]     Hypertension     Stroke ([facility] 2022      Past Surgical History:   Procedure Laterality Date    HX APPENDECTOMY      HX ORTHOPAEDIC      collar bone (right)     No family history on file.    Social History     Tobacco Use    Smoking status: Every Day    Smokeless tobacco: Never   Substance Use Topics    Alcohol use: Yes       Current Facility-Administered Medications   Medication Dose Route Frequency Provider Last Rate Last Admin    albuterol (PROVENTIL HFA, VENTOLIN HFA, PROAIR HFA) inhaler 2 Puff  2 Puff Inhalation Q4H PRN Lanny Carrillo MD        hydrALAZINE (APRESOLINE) tablet 25 mg  25 mg Oral TID Pascual Gonzalez MD   25 mg at 10/19/22 0800    sodium chloride (NS) flush 5-40 mL  5-40 mL IntraVENous Chris Jimenez MD   10 mL at 10/19/22 0600    sodium chloride (NS) flush 5-40 mL  5-40 mL IntraVENous PRN Pascual Gonzalez MD        acetaminophen (TYLENOL) tablet 650 mg  650 mg Oral Q6H PRN Pascual Gonzalez MD   650 mg at 10/19/22 0507    Or    acetaminophen (TYLENOL) suppository 650 mg  650 mg Rectal Q6H PRN Pascual Gonzalez MD        polyethylene glycol (MIRALAX) packet 17 g  17 g Oral DAILY PRN Shaq Carrillo MD        ondansetron (ZOFRAN ODT) tablet 4 mg  4 mg Oral Q8H PRN Shaq Carrillo MD        Or    ondansetron (ZOFRAN) injection 4 mg  4 mg IntraVENous Q6H PRN Giuseppe Parks MD        hydrALAZINE (APRESOLINE) 20 mg/mL injection 20 mg  20 mg IntraVENous Q4H PRN Giuseppe Parks MD   20 mg at 10/19/22 0118    niCARdipine (CARDENE) 25 mg in 0.9% sodium chloride 250 mL ([id number])  5-15 mg/hr IntraVENous TITRATE Giuseppe Parks MD   Stopped at 10/19/22 0725    labetaloL (NORMODYNE) tablet 100 mg  100 mg Oral Q12H Charo Dunbar MD   100 mg at 10/19/22 1151    nicotine (NICODERM CQ) 21 mg/24 hr patch 1 Patch  1 Patch TransDERmal DAILY Charo Dunbar MD   1 Patch at 10/19/22 1151    predniSONE (DELTASONE) tablet 20 mg  20 mg Oral BID WITH MEALS Nikhil Mcintyre MD        budesonide-formoteroL (SYMBICORT) 160-4.5 mcg/actuation HFA inhaler 2 Puff  2 Puff Inhalation BID RT Tone Mcintyre MD   2 Puff at 10/19/22 1306    potassium chloride SR (KLOR-CON 10) tablet 40 mEq  40 mEq Oral NOW Charo Dunbar MD            Review of Systems   Constitutional:  Positive for fatigue. Negative for activity change, appetite change, diaphoresis, fever and unexpected weight change. HENT:  Negative for dental problem, ear discharge, ear pain, hearing loss, mouth sores and nosebleeds. Eyes:  Negative for pain, discharge and redness. Respiratory:  Negative for cough, choking, chest tightness and shortness of breath. Cardiovascular:  Negative for chest pain, palpitations and leg swelling. Gastrointestinal:  Negative for abdominal distention, abdominal pain, blood in stool, constipation, diarrhea, nausea and vomiting. Endocrine: Negative for cold intolerance and heat intolerance. Genitourinary:  Positive for decreased urine volume. Negative for dysuria. Musculoskeletal:  Negative for arthralgias, back pain and joint swelling.    Skin:  Negative for color change and pallor. Allergic/Immunologic: Negative for environmental allergies, food allergies and immunocompromised state. Neurological:  Negative for dizziness, tremors, syncope and speech difficulty. Psychiatric/Behavioral:  Negative for agitation, confusion, decreased concentration and dysphoric mood. All other systems reviewed and are negative. Objective     Vital signs for last 24 hours:  Visit Vitals  BP (!) 132/95   Pulse 60   Temp 97.7 °F (36.5 °C)   Resp 15   Ht 5' 10\" (1.778 m)   Wt 68 kg (150 lb)   SpO2 98%   BMI 21.52 kg/m²           Recent Results (from the past 24 hour(s))   SAMPLES BEING HELD    Collection Time: 10/18/22  5:46 PM   Result Value Ref Range    SAMPLES BEING HELD 1LAV 1BLUE 1PST     COMMENT        Add-on orders for these samples will be processed based on acceptable specimen integrity and analyte stability, which may vary by analyte. CBC WITH AUTOMATED DIFF    Collection Time: 10/18/22  5:46 PM   Result Value Ref Range    WBC 10.7 4.1 - 11.1 K/uL    RBC 3.80 (L) 4.10 - 5.70 M/uL    HGB 11.8 (L) 12.1 - 17.0 g/dL    HCT 35.6 (L) 36.6 - 50.3 %    MCV 93.7 80.0 - 99.0 FL    MCH 31.1 26.0 - 34.0 PG    MCHC 33.1 30.0 - 36.5 g/dL    RDW 13.2 11.5 - 14.5 %    PLATELET 637 552 - 480 K/uL    MPV 10.9 8.9 - 12.9 FL    NRBC 0.0 0  WBC    ABSOLUTE NRBC 0.00 0.00 - 0.01 K/uL    NEUTROPHILS 72 32 - 75 %    LYMPHOCYTES 14 12 - 49 %    MONOCYTES 8 5 - 13 %    EOSINOPHILS 5 0 - 7 %    BASOPHILS 1 0 - 1 %    IMMATURE GRANULOCYTES 0 0.0 - 0.5 %    ABS. NEUTROPHILS 7.7 1.8 - 8.0 K/UL    ABS. LYMPHOCYTES 1.4 0.8 - 3.5 K/UL    ABS. MONOCYTES 0.9 0.0 - 1.0 K/UL    ABS. EOSINOPHILS 0.6 (H) 0.0 - 0.4 K/UL    ABS. BASOPHILS 0.1 0.0 - 0.1 K/UL    ABS. IMM.  GRANS. 0.0 0.00 - 0.04 K/UL    DF AUTOMATED     METABOLIC PANEL, COMPREHENSIVE    Collection Time: 10/18/22  5:46 PM   Result Value Ref Range    Sodium 143 136 - 145 mmol/L    Potassium 3.4 (L) 3.5 - 5.1 mmol/L    Chloride 103 98 - 107 mmol/L    CO2 26 22 - 29 mmol/L    Anion gap 14 5 - 15 mmol/L    Glucose 109 (H) 65 - 100 mg/dL    BUN 27 (H) 6 - 20 MG/DL    Creatinine 2.52 (H) 0.70 - 1.20 MG/DL    BUN/Creatinine ratio 11 (L) 12 - 20      eGFR 29 (L) >60 ml/min/1.73m2    Calcium 9.0 8.6 - 10.0 MG/DL    Bilirubin, total 0.7 0.2 - 1.0 MG/DL    ALT (SGPT) 24 10 - 50 U/L    AST (SGOT) 26 10 - 50 U/L    Alk.  phosphatase 92 40 - 129 U/L    Protein, total 6.4 6.4 - 8.3 g/dL    Albumin 3.9 3.5 - 5.2 g/dL    Globulin 2.5 2.0 - 4.0 g/dL    A-G Ratio 1.6 1.1 - 2.2     LIPASE    Collection Time: 10/18/22  5:46 PM   Result Value Ref Range    Lipase 41 13 - 60 U/L   OCCULT BLOOD, STOOL    Collection Time: 10/18/22 11:37 PM   Result Value Ref Range    Occult Blood,day 1 Negative Negative      Day 1 date: 98,796,179     EKG, 12 LEAD, INITIAL    Collection Time: 10/19/22  8:39 AM   Result Value Ref Range    Ventricular Rate 70 BPM    Atrial Rate 70 BPM    P-R Interval 162 ms    QRS Duration 102 ms    Q-T Interval 530 ms    QTC Calculation (Bezet) 572 ms    Calculated P Axis 61 degrees    Calculated R Axis -23 degrees    Calculated T Axis -145 degrees    Diagnosis       Normal sinus rhythm  Possible Left atrial enlargement  Left ventricular hypertrophy with repolarization abnormality  Prolonged QT  Abnormal ECG  No previous ECGs available  Confirmed by EMILY MICHEL, Zamzam Pendleton ([id number]) on 10/19/2022 4:06:86 AM     METABOLIC PANEL, BASIC    Collection Time: 10/19/22 10:40 AM   Result Value Ref Range    Sodium 141 136 - 145 mmol/L    Potassium 3.2 (L) 3.5 - 5.1 mmol/L    Chloride 109 (H) 97 - 108 mmol/L    CO2 25 21 - 32 mmol/L    Anion gap 7 5 - 15 mmol/L    Glucose 109 (H) 65 - 100 mg/dL    BUN 25 (H) 6 - 20 mg/dL    Creatinine 2.38 (H) 0.70 - 1.30 mg/dL    BUN/Creatinine ratio 11 (L) 12 - 20      eGFR 31 (L) >60 ml/min/1.73m2    Calcium 8.4 (L) 8.5 - 10.1 mg/dL   MAGNESIUM    Collection Time: 10/19/22 10:40 AM   Result Value Ref Range    Magnesium 2.4 1.6 - 2.4 mg/dL   PROCALCITONIN Collection Time: 10/19/22 10:40 AM   Result Value Ref Range    Procalcitonin 0.06 (H) 0 ng/mL   ALBUMIN    Collection Time: 10/19/22 10:40 AM   Result Value Ref Range    Albumin 3.0 (L) 3.5 - 5.0 g/dL   PHOSPHORUS    Collection Time: 10/19/22 10:40 AM   Result Value Ref Range    Phosphorus 3.2 2.6 - 4.7 mg/dL   TROPONIN-HIGH SENSITIVITY    Collection Time: 10/19/22 10:40 AM   Result Value Ref Range    Troponin-High Sensitivity 107 (H) 0 - 76 ng/L          Intake/Output Summary (Last 24 hours) at 10/19/2022 1436  Last data filed at 10/19/2022 0940  Gross per 24 hour   Intake 461.33 ml   Output 550 ml   Net -88.67 ml      Current Shift: 10/19 0701 - 10/19 1900  In: 450 [P.O.:450]  Out: 550 [Urine:550]  Last 3 Shifts: 10/17 1901 - 10/19 0700  In: 11.3 [I.V.:11.3]  Out: -   Physical Exam  Vitals reviewed. Constitutional:       General: He is not in acute distress. Appearance: Normal appearance. He is normal weight. He is not ill-appearing. HENT:      Mouth/Throat:      Mouth: Mucous membranes are moist.   Cardiovascular:      Rate and Rhythm: Normal rate and regular rhythm. Heart sounds: No murmur heard. No gallop. Pulmonary:      Effort: Pulmonary effort is normal.      Breath sounds: Normal breath sounds. Abdominal:      General: Bowel sounds are normal.      Palpations: Abdomen is soft. Musculoskeletal:      Right lower leg: No edema. Left lower leg: No edema. Skin:     General: Skin is warm and dry. Coloration: Skin is not jaundiced or pale. Findings: No bruising or erythema. Neurological:      General: No focal deficit present. Mental Status: He is alert. Mental status is at baseline. Cranial Nerves: No cranial nerve deficit. Psychiatric:         Mood and Affect: Mood normal.         Behavior: Behavior normal.         Thought Content:  Thought content normal.        Data Review:   Recent Results (from the past 24 hour(s))   SAMPLES BEING HELD    Collection Time: 10/18/22  5:46 PM   Result Value Ref Range    SAMPLES BEING HELD 1LAV 1BLUE 1PST     COMMENT        Add-on orders for these samples will be processed based on acceptable specimen integrity and analyte stability, which may vary by analyte. CBC WITH AUTOMATED DIFF    Collection Time: 10/18/22  5:46 PM   Result Value Ref Range    WBC 10.7 4.1 - 11.1 K/uL    RBC 3.80 (L) 4.10 - 5.70 M/uL    HGB 11.8 (L) 12.1 - 17.0 g/dL    HCT 35.6 (L) 36.6 - 50.3 %    MCV 93.7 80.0 - 99.0 FL    MCH 31.1 26.0 - 34.0 PG    MCHC 33.1 30.0 - 36.5 g/dL    RDW 13.2 11.5 - 14.5 %    PLATELET 167 995 - 551 K/uL    MPV 10.9 8.9 - 12.9 FL    NRBC 0.0 0  WBC    ABSOLUTE NRBC 0.00 0.00 - 0.01 K/uL    NEUTROPHILS 72 32 - 75 %    LYMPHOCYTES 14 12 - 49 %    MONOCYTES 8 5 - 13 %    EOSINOPHILS 5 0 - 7 %    BASOPHILS 1 0 - 1 %    IMMATURE GRANULOCYTES 0 0.0 - 0.5 %    ABS. NEUTROPHILS 7.7 1.8 - 8.0 K/UL    ABS. LYMPHOCYTES 1.4 0.8 - 3.5 K/UL    ABS. MONOCYTES 0.9 0.0 - 1.0 K/UL    ABS. EOSINOPHILS 0.6 (H) 0.0 - 0.4 K/UL    ABS. BASOPHILS 0.1 0.0 - 0.1 K/UL    ABS. IMM. GRANS. 0.0 0.00 - 0.04 K/UL    DF AUTOMATED     METABOLIC PANEL, COMPREHENSIVE    Collection Time: 10/18/22  5:46 PM   Result Value Ref Range    Sodium 143 136 - 145 mmol/L    Potassium 3.4 (L) 3.5 - 5.1 mmol/L    Chloride 103 98 - 107 mmol/L    CO2 26 22 - 29 mmol/L    Anion gap 14 5 - 15 mmol/L    Glucose 109 (H) 65 - 100 mg/dL    BUN 27 (H) 6 - 20 MG/DL    Creatinine 2.52 (H) 0.70 - 1.20 MG/DL    BUN/Creatinine ratio 11 (L) 12 - 20      eGFR 29 (L) >60 ml/min/1.73m2    Calcium 9.0 8.6 - 10.0 MG/DL    Bilirubin, total 0.7 0.2 - 1.0 MG/DL    ALT (SGPT) 24 10 - 50 U/L    AST (SGOT) 26 10 - 50 U/L    Alk.  phosphatase 92 40 - 129 U/L    Protein, total 6.4 6.4 - 8.3 g/dL    Albumin 3.9 3.5 - 5.2 g/dL    Globulin 2.5 2.0 - 4.0 g/dL    A-G Ratio 1.6 1.1 - 2.2     LIPASE    Collection Time: 10/18/22  5:46 PM   Result Value Ref Range    Lipase 41 13 - 60 U/L   OCCULT BLOOD, STOOL Collection Time: 10/18/22 11:37 PM   Result Value Ref Range    Occult Blood,day 1 Negative Negative      Day 1 date: 67,455,595     EKG, 12 LEAD, INITIAL    Collection Time: 10/19/22  8:39 AM   Result Value Ref Range    Ventricular Rate 70 BPM    Atrial Rate 70 BPM    P-R Interval 162 ms    QRS Duration 102 ms    Q-T Interval 530 ms    QTC Calculation (Bezet) 572 ms    Calculated P Axis 61 degrees    Calculated R Axis -23 degrees    Calculated T Axis -145 degrees    Diagnosis       Normal sinus rhythm  Possible Left atrial enlargement  Left ventricular hypertrophy with repolarization abnormality  Prolonged QT  Abnormal ECG  No previous ECGs available  Confirmed by EMILY MICHEL, Jana Kauffman ([id number]) on 10/19/2022 0:73:62 AM     METABOLIC PANEL, BASIC    Collection Time: 10/19/22 10:40 AM   Result Value Ref Range    Sodium 141 136 - 145 mmol/L    Potassium 3.2 (L) 3.5 - 5.1 mmol/L    Chloride 109 (H) 97 - 108 mmol/L    CO2 25 21 - 32 mmol/L    Anion gap 7 5 - 15 mmol/L    Glucose 109 (H) 65 - 100 mg/dL    BUN 25 (H) 6 - 20 mg/dL    Creatinine 2.38 (H) 0.70 - 1.30 mg/dL    BUN/Creatinine ratio 11 (L) 12 - 20      eGFR 31 (L) >60 ml/min/1.73m2    Calcium 8.4 (L) 8.5 - 10.1 mg/dL   MAGNESIUM    Collection Time: 10/19/22 10:40 AM   Result Value Ref Range    Magnesium 2.4 1.6 - 2.4 mg/dL   PROCALCITONIN    Collection Time: 10/19/22 10:40 AM   Result Value Ref Range    Procalcitonin 0.06 (H) 0 ng/mL   ALBUMIN    Collection Time: 10/19/22 10:40 AM   Result Value Ref Range    Albumin 3.0 (L) 3.5 - 5.0 g/dL   PHOSPHORUS    Collection Time: 10/19/22 10:40 AM   Result Value Ref Range    Phosphorus 3.2 2.6 - 4.7 mg/dL   TROPONIN-HIGH SENSITIVITY    Collection Time: 10/19/22 10:40 AM   Result Value Ref Range    Troponin-High Sensitivity 107 (H) 0 - 76 ng/L         CT ABD PELV WO CONT   Final Result   Right inguinal hernia containing small bowel without evidence of obstruction. No   renal or ureteral stone or evidence of hydronephrosis. XR CHEST PA LAT    (Results Pending)        Patient Active Problem List   Diagnosis Code    ANCELMO (acute kidney injury) ([facility] N17.9    Hypertensive emergency I16.1        DIAGNOSES:  Hypertensive emergency-now resolved  Acute kidney injury on CKD  ANCELMO grade 2  CKD stage III  Right inguinal hernia  Alcohol misuse   mild hypokalemia  DISCUSSION:  Blood pressure is near normalized from yesterday. Continue to monitor. Hold off on ACE inhibitor. Was also exposed to contrast approximately 2 weeks ago  It would be important to follow outpatient. Educated him against use of NSAIDs and BC powderEducated him about avoiding alcohol binging    PLAN:  Track renal function again tomorrow morning. Avoid ACE inhibitor's for now. Outpatient nephrology follow-up  Avoid BC powder in the future  Use Tylenol as necessary  Outpatient nephrology follow-up        Thanks for consulting me. Please don't hesitate to contact me if any questions arise of if I can assist in any manner. This dictation was done by dragon, computer voice recognition software. Often unanticipated grammatical, syntax, phones and other interpretive errors are inadvertently transcribed. Please excuse errors that have escaped final proofreading. Please contact me if you suspect dictation or transcription errors.   Dr Keke Javier  [address], [address] [place], [address] [facility]  Cell Phone: [phone]  Office phone: [phone]  Fax: [phone]

## 2022-10-19 NOTE — H&P
History and Physical    Patient: Ramona Harding MRN: [id number]  SSN: [id number]    YOB: 1965  Age: 56 y.o. Sex: male      Subjective: Ramona Harding is a 56 y.o. male with PMH of COPD, hypertension, and stroke. Patient transferred from the [facility] ED [place] for hypertensive urgency/emergency. Patient went there in which she was found to have systolic blood pressure above 220 which prompted the ED provider to give him intravenous labetalol. Patient also report that has been having intermittent abdominal pain in the right abdomen, throbbing in nature, rated 7/10, nonradiating, nothing specific makes it better or worse, without nausea or vomiting, fever or chills. Reports having headache, however denies chest pain, shortness of breath, changes in vision. In the ED , also noted to have ANCELMO. CT abdomen showed no acute findings. BP remained elevated >170 despite multiple bouts of IV hydralazine, will start IV CCB. Past Medical History:   Diagnosis Date    Asthma     Chronic obstructive pulmonary disease ([facility] [address]     Emphysema lung ([facility] [address]     Hypertension     Stroke ([facility] [address] 2022     Past Surgical History:   Procedure Laterality Date    HX APPENDECTOMY      HX ORTHOPAEDIC      collar bone (right)      No family history on file. Social History     Tobacco Use    Smoking status: Every Day    Smokeless tobacco: Never   Substance Use Topics    Alcohol use: Yes      Prior to Admission medications    Medication Sig Start Date End Date Taking? Authorizing Provider   albuterol (PROVENTIL HFA, VENTOLIN HFA, PROAIR HFA) 90 mcg/actuation inhaler Take 2 Puffs by inhalation. Reports taking 6 puffs as needed    Other, MD Eugenio   albuterol (PROVENTIL HFA, VENTOLIN HFA, PROAIR HFA) 90 mcg/actuation inhaler Take 2 Puffs by inhalation every four (4) hours as needed for Wheezing or Cough.  10/5/22   Koki Hood MD   inhalational spacing device For use with inhaler 10/5/22   Koki Hood MD hydrALAZINE (APRESOLINE) 25 mg tablet Take 1 Tablet by mouth three (3) times daily. 10/5/22   Flip Olivera MD        Allergies   Allergen Reactions    Ace Inhibitors Swelling       Review of Systems:   Constitutional: No fevers, No chills, No fatigue, No weakness  Eyes: No visual disturbance  Ears, Nose, Mouth, Throat, and Face: No nasal congestion, No sore throat  Respiratory: No cough, No sputum, No wheezing, No SOB  Cardiovascular: No chest pain, No lower extremity edema, No Palpitations   Gastrointestinal: See HPI  Genitourinary: No frequency, No dysuria, No hematuria  Integument/Breast: No rash, No skin lesion(s), No dryness  Musculoskeletal: No arthralgias, No neck pain, No back pain  Neurological: No headaches, No dizziness, No confusion,  No seizures  Behavioral/Psychiatric: No anxiety, No depression      Objective:     Vitals:    10/19/22 0343 10/19/22 0350 10/19/22 0353 10/19/22 0358   BP: (!) 177/118 (!) 179/119 (!) 179/119 (!) 164/135   Pulse: 70 72 74 70   Resp: 12  16 15   Temp:       SpO2: 97%  98% 97%   Weight:       Height:            Physical Exam:   General: alert, cooperative, no distress  Eye: conjunctivae/corneas clear. PERRL, EOM's intact. Throat and Neck: normal and no erythema or exudates noted. No mass   Lung: clear to auscultation bilaterally  Heart: regular rate and rhythm,   Abdomen: soft, RUQ-tender. Bowel sounds normal. No masses,  Extremities:  able to move all extremities normal, atraumatic  Skin: Normal.  Neurologic: AOx3. Motor function and sensation grossly intact. Psychiatric: non focal    Recent Results (from the past 24 hour(s))   SAMPLES BEING HELD    Collection Time: 10/18/22  5:46 PM   Result Value Ref Range    SAMPLES BEING HELD 1LAV 1BLUE 1PST     COMMENT        Add-on orders for these samples will be processed based on acceptable specimen integrity and analyte stability, which may vary by analyte.    CBC WITH AUTOMATED DIFF    Collection Time: 10/18/22  5:46 PM Result Value Ref Range    WBC 10.7 4.1 - 11.1 K/uL    RBC 3.80 (L) 4.10 - 5.70 M/uL    HGB 11.8 (L) 12.1 - 17.0 g/dL    HCT 35.6 (L) 36.6 - 50.3 %    MCV 93.7 80.0 - 99.0 FL    MCH 31.1 26.0 - 34.0 PG    MCHC 33.1 30.0 - 36.5 g/dL    RDW 13.2 11.5 - 14.5 %    PLATELET 299 978 - 718 K/uL    MPV 10.9 8.9 - 12.9 FL    NRBC 0.0 0  WBC    ABSOLUTE NRBC 0.00 0.00 - 0.01 K/uL    NEUTROPHILS 72 32 - 75 %    LYMPHOCYTES 14 12 - 49 %    MONOCYTES 8 5 - 13 %    EOSINOPHILS 5 0 - 7 %    BASOPHILS 1 0 - 1 %    IMMATURE GRANULOCYTES 0 0.0 - 0.5 %    ABS. NEUTROPHILS 7.7 1.8 - 8.0 K/UL    ABS. LYMPHOCYTES 1.4 0.8 - 3.5 K/UL    ABS. MONOCYTES 0.9 0.0 - 1.0 K/UL    ABS. EOSINOPHILS 0.6 (H) 0.0 - 0.4 K/UL    ABS. BASOPHILS 0.1 0.0 - 0.1 K/UL    ABS. IMM. GRANS. 0.0 0.00 - 0.04 K/UL    DF AUTOMATED     METABOLIC PANEL, COMPREHENSIVE    Collection Time: 10/18/22  5:46 PM   Result Value Ref Range    Sodium 143 136 - 145 mmol/L    Potassium 3.4 (L) 3.5 - 5.1 mmol/L    Chloride 103 98 - 107 mmol/L    CO2 26 22 - 29 mmol/L    Anion gap 14 5 - 15 mmol/L    Glucose 109 (H) 65 - 100 mg/dL    BUN 27 (H) 6 - 20 MG/DL    Creatinine 2.52 (H) 0.70 - 1.20 MG/DL    BUN/Creatinine ratio 11 (L) 12 - 20      eGFR 29 (L) >60 ml/min/1.73m2    Calcium 9.0 8.6 - 10.0 MG/DL    Bilirubin, total 0.7 0.2 - 1.0 MG/DL    ALT (SGPT) 24 10 - 50 U/L    AST (SGOT) 26 10 - 50 U/L    Alk. phosphatase 92 40 - 129 U/L    Protein, total 6.4 6.4 - 8.3 g/dL    Albumin 3.9 3.5 - 5.2 g/dL    Globulin 2.5 2.0 - 4.0 g/dL    A-G Ratio 1.6 1.1 - 2.2     LIPASE    Collection Time: 10/18/22  5:46 PM   Result Value Ref Range    Lipase 41 13 - 60 U/L   OCCULT BLOOD, STOOL    Collection Time: 10/18/22 11:37 PM   Result Value Ref Range    Occult Blood,day 1 Negative Negative      Day 1 date: 26,925,[phone]         XR Results (maximum last 3):   Results from [place] encounter on 10/05/22    XR CHEST PORT    Narrative  INDICATION: sob    EXAM:  AP CHEST RADIOGRAPH    COMPARISON: October 18, 2019    FINDINGS:    AP portable view of the chest demonstrates a normal cardiomediastinal  silhouette. Right basilar airspace disease. No pleural effusion or pneumothorax. The osseous structures are unremarkable. Impression  Right basilar airspace disease. Follow-up to complete resolution in 4-6 weeks. Results from Hospital Encounter encounter on 10/18/19    XR CHEST PA LAT    Narrative  History: Chest pain, history of COPD. Frontal and lateral views of the chest show clear lungs. There is  atherosclerosis of the aorta. The heart, mediastinum and pulmonary vasculature  are normal.  The bony thorax is unremarkable. Impression  IMPRESSION:  No acute findings. CT Results (maximum last 3): Results from [place] encounter on 10/18/22    CT ABD PELV WO CONT    Narrative  EXAM: CT ABD PELV WO CONT    INDICATION: RUQ PAin    COMPARISON: None    IV CONTRAST: None. ORAL CONTRAST: None    TECHNIQUE:  Thin axial images were obtained through the abdomen and pelvis. Coronal and  sagittal reformats were generated. CT dose reduction was achieved through use of  a standardized protocol tailored for this examination and automatic exposure  control for dose modulation. The absence of intravenous contrast material reduces the sensitivity for  evaluation of the vasculature and solid organs. FINDINGS:  LOWER THORAX: Bilateral lower lobe interstitial infiltrates and trace right  pleural effusion. LIVER: No mass. BILIARY TREE: Gallbladder is within normal limits. CBD is not dilated. SPLEEN: within normal limits. PANCREAS: No focal abnormality. ADRENALS: Unremarkable. KIDNEYS/URETERS: No calculus or hydronephrosis. STOMACH: Unremarkable. SMALL BOWEL: No dilatation or wall thickening. COLON: No dilatation or wall thickening. APPENDIX: Surgically absent. PERITONEUM: No ascites or pneumoperitoneum.   RETROPERITONEUM: No lymphadenopathy or aortic aneurysm. REPRODUCTIVE ORGANS: Unremarkable. URINARY BLADDER: No mass or calculus. BONES: No destructive bone lesion. ABDOMINAL WALL: Right inguinal hernia containing small bowel without evidence of  obstruction. ADDITIONAL COMMENTS: N/A    Impression  Right inguinal hernia containing small bowel without evidence of obstruction. No  renal or ureteral stone or evidence of hydronephrosis. Results from [place] encounter on 10/05/22    CTA CHEST W OR W WO CONT    Narrative  EXAM:  CTA CHEST W OR W WO CONT    INDICATION: Rule out PE    COMPARISON: None. TECHNIQUE: Helical thin section chest CT following uneventful intravenous  administration of nonionic contrast 100 mL of isovue 370 according to  departmental PE protocol. Coronal and sagittal reformats were performed. 3D post  processing was performed. CT dose reduction was achieved through the use of a  standardized protocol tailored for this examination and automatic exposure  control for dose modulation. FINDINGS: This is a good quality study for the evaluation of pulmonary embolism  to the first subsegmental arterial level. There is no pulmonary embolism to this  level. THYROID: No nodule. MEDIASTINUM: Borderline size AP window lymph node measuring 10 mm. 11 mm right  paratracheal lymph node. NAVEEN: 14 mm right hilar lymph node. Prominent bilateral hilar lymph nodes are  noted  THORACIC AORTA: No aneurysm. HEART: Cardiomegaly. ESOPHAGUS: No wall thickening or dilatation. TRACHEA/BRONCHI: Patent. PLEURA: Small bilateral pleural effusions. LUNGS: Mild ground glass opacity which may represent mild pulmonary edema. UPPER ABDOMEN: Unremarkable  BONES: No aggressive bone lesion or fracture. Impression  1. No evidence of pulmonary embolus. 2.  Small bilateral pleural effusions. Possible small pulmonary edema. 3.  Mildly enlarged mediastinal and hilar lymph nodes. These may be reactive.   Other possibilities include sarcoidosis. Correlate clinically. CT HEAD WO CONT    Narrative  INDICATION: headache, dizzy, shortness of breath, hypertension. Exam: Noncontrast CT of the brain is performed with 5 mm collimation. CT dose reduction was achieved with the use of the standardized protocol  tailored for this examination and automatic exposure control for dose  modulation. FINDINGS: There is no acute intracranial hemorrhage, mass, mass effect or  herniation. Ventricular system is normal. There are extensive bilateral  periventricular white matter hypodensities consistent with chronic microvascular  ischemic changes. There is no evidence of acute territorial infarct. The mastoid  air cells are well pneumatized. The visualized paranasal sinuses are normal.    Impression  No acute intracranial hemorrhage, mass or infarct. MRI Results (maximum last 3): No results found for this or any previous visit. Nuclear Medicine Results (maximum last 3): No results found for this or any previous visit. US Results (maximum last 3): Results from [place] encounter on 10/18/22    US ABD LTD    Narrative  INDICATION:  RUQ abdominal pain    EXAM: Right upper quadrant Ultrasound. The right upper quadrant was scanned via  high resolution real-time linear array sonography. The spleen, left kidney and  pancreatic tail are not included in this study. COMPARISON: None. FINDINGS: The gallbladder is normal in size and configuration without  intraluminal calculi, wall thickening, pericholecystic fluid or focal  tenderness. The intra and extrahepatic biliary ductal systems are normal and  there is no focal hepatic parenchymal abnormality. The inferior vena cava is  patent and unremarkable. The pancreatic head is not well visualized due to  overlying bowel gas, but shows no obvious abnormality. The pancreatic tail is  not included in this study. The spleen and left kidney are not included in this  study.     The right kidney measures 10.1 cm, and shows no dilatation, calculi or  parenchymal abnormality. The right kidney shows increased echogenicity, however. Impression  1. Increased echogenicity of the right kidney suggesting medical renal  parenchymal disease. Correlate with renal function parameters. 2. Otherwise unremarkable right upper quadrant ultrasound for age. .      Assessment and plan:   #Hypertensive emergency  - Doubt compliance to home medications.   - Nicardipine ggt. ICU for titration.   - Resume home medications of hydralazine. - start Diltiazem po. # ANCELMO on CKD stage IIIb (eGRR 30-45)   - Suspect secondary to above  - Check for microalbuminuria. If renal function improves, consider ACE-I/ ARB instead of hydralazine for better compliance. - Monitor for now, consult nephrology of worsening. # Abdominal pain  - Possibly secondary to hypertension emergency  - No kidney stone, diverticulitis or acute abdominal pathology on abdominal CT.  - monitor for now. # History of stroke    # COPD  - not in exacerbation  - monitor for now. # Full code by default, need further clarification    # Medication list reviewed on Epic and/or outside documentation. Not reviewed with patient.         Signed By: Cheyanne Paiz MD     October 19, 2022

## 2022-10-19 NOTE — PROGRESS NOTES
Reason for Admission: ANCELMO                     RUR Score:    11%                 Plan for utilizing home health:  Not at this time        PCP: First and Last name:  Unknown, Provider, MD     Name of Practice:    Are you a current patient: Yes/No:    Approximate date of last visit: Will see doctor on October 31   Can you participate in a virtual visit with your PCP:                     Current Advanced Directive/Advance Care Plan: Full Code      Healthcare Decision Maker:   Click here to complete [address] including selection of the [address] Relationship (ie \"Primary\")       Chelsi Gao, wife, [phone]                      Transition of Care Plan:      Met f/f with Pt, he stated that he [facility] [place]. Pt stated that he use to live at a hotel but that it was too expensive. Pt stated that [facility]. Pt stated that he can go into his [place] home at any time. Pt stated that when it gets cold he will go in side his [place] home. 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 Pt stated no HH, no DME and independent with ADL    Pt stated that he uses [address] on [place]    Pt stated that family will give him a ride when he is D/C. CM dispo: home with no needs at this time.

## 2022-10-20 VITALS
WEIGHT: 150 LBS | BODY MASS INDEX: 21.47 KG/M2 | OXYGEN SATURATION: 99 % | SYSTOLIC BLOOD PRESSURE: 191 MMHG | TEMPERATURE: 97.6 F | HEIGHT: 70 IN | HEART RATE: 83 BPM | RESPIRATION RATE: 17 BRPM | DIASTOLIC BLOOD PRESSURE: 130 MMHG

## 2022-10-20 LAB
ANION GAP SERPL CALC-SCNC: 5 MMOL/L (ref 5–15)
ATRIAL RATE: 94 BPM
BASOPHILS # BLD: 0 K/UL (ref 0–0.1)
BASOPHILS NFR BLD: 0 % (ref 0–1)
BUN SERPL-MCNC: 18 MG/DL (ref 6–20)
BUN/CREAT SERPL: 7 (ref 12–20)
CA-I BLD-MCNC: 8.6 MG/DL (ref 8.5–10.1)
CALCULATED P AXIS, ECG09: 56 DEGREES
CALCULATED R AXIS, ECG10: -24 DEGREES
CALCULATED T AXIS, ECG11: 128 DEGREES
CHLORIDE SERPL-SCNC: 112 MMOL/L (ref 97–108)
CO2 SERPL-SCNC: 23 MMOL/L (ref 21–32)
CREAT SERPL-MCNC: 2.63 MG/DL (ref 0.7–1.3)
CREAT UR-MCNC: 128 MG/DL
DIAGNOSIS, 93000: NORMAL
DIFFERENTIAL METHOD BLD: ABNORMAL
EOSINOPHIL # BLD: 0 K/UL (ref 0–0.4)
EOSINOPHIL NFR BLD: 0 % (ref 0–7)
ERYTHROCYTE [DISTWIDTH] IN BLOOD BY AUTOMATED COUNT: 13.2 % (ref 11.5–14.5)
GLUCOSE SERPL-MCNC: 136 MG/DL (ref 65–100)
HCT VFR BLD AUTO: 32.6 % (ref 36.6–50.3)
HGB BLD-MCNC: 10.7 G/DL (ref 12.1–17)
IMM GRANULOCYTES # BLD AUTO: 0 K/UL (ref 0–0.04)
IMM GRANULOCYTES NFR BLD AUTO: 0 % (ref 0–0.5)
LYMPHOCYTES # BLD: 0.6 K/UL (ref 0.8–3.5)
LYMPHOCYTES NFR BLD: 9 % (ref 12–49)
MAGNESIUM SERPL-MCNC: 2.4 MG/DL (ref 1.6–2.4)
MCH RBC QN AUTO: 31.4 PG (ref 26–34)
MCHC RBC AUTO-ENTMCNC: 32.8 G/DL (ref 30–36.5)
MCV RBC AUTO: 95.6 FL (ref 80–99)
MICROALBUMIN UR-MCNC: 119 MG/DL
MICROALBUMIN/CREAT UR-RTO: 930 MGMALB/GCRE (ref 0–30)
MONOCYTES # BLD: 0.4 K/UL (ref 0–1)
MONOCYTES NFR BLD: 6 % (ref 5–13)
MRSA DNA SPEC QL NAA+PROBE: NOT DETECTED
NEUTS SEG # BLD: 5.8 K/UL (ref 1.8–8)
NEUTS SEG NFR BLD: 85 % (ref 32–75)
NRBC # BLD: 0 K/UL (ref 0–0.01)
NRBC BLD-RTO: 0 PER 100 WBC
P-R INTERVAL, ECG05: 200 MS
PLATELET # BLD AUTO: 225 K/UL (ref 150–400)
PMV BLD AUTO: 10.8 FL (ref 8.9–12.9)
POTASSIUM SERPL-SCNC: 3.9 MMOL/L (ref 3.5–5.1)
Q-T INTERVAL, ECG07: 388 MS
QRS DURATION, ECG06: 96 MS
QTC CALCULATION (BEZET), ECG08: 485 MS
RBC # BLD AUTO: 3.41 M/UL (ref 4.1–5.7)
SODIUM SERPL-SCNC: 140 MMOL/L (ref 136–145)
TROPONIN-HIGH SENSITIVITY: 65 NG/L (ref 0–76)
TSH SERPL DL<=0.05 MIU/L-ACNC: 0.34 UIU/ML (ref 0.36–3.74)
VENTRICULAR RATE, ECG03: 94 BPM
WBC # BLD AUTO: 6.9 K/UL (ref 4.1–11.1)

## 2022-10-20 PROCEDURE — 87641 MR-STAPH DNA AMP PROBE: CPT

## 2022-10-20 PROCEDURE — 83735 ASSAY OF MAGNESIUM: CPT

## 2022-10-20 PROCEDURE — 74011250636 HC RX REV CODE- 250/636: Performed by: INTERNAL MEDICINE

## 2022-10-20 PROCEDURE — 74011250637 HC RX REV CODE- 250/637: Performed by: INTERNAL MEDICINE

## 2022-10-20 PROCEDURE — 36415 COLL VENOUS BLD VENIPUNCTURE: CPT

## 2022-10-20 PROCEDURE — 80048 BASIC METABOLIC PNL TOTAL CA: CPT

## 2022-10-20 PROCEDURE — 84443 ASSAY THYROID STIM HORMONE: CPT

## 2022-10-20 PROCEDURE — 85025 COMPLETE CBC W/AUTO DIFF WBC: CPT

## 2022-10-20 PROCEDURE — 74011000250 HC RX REV CODE- 250: Performed by: INTERNAL MEDICINE

## 2022-10-20 PROCEDURE — 84484 ASSAY OF TROPONIN QUANT: CPT

## 2022-10-20 RX ADMIN — HYDRALAZINE HYDROCHLORIDE 25 MG: 25 TABLET, FILM COATED ORAL at 08:20

## 2022-10-20 RX ADMIN — HYDRALAZINE HYDROCHLORIDE 20 MG: 20 INJECTION, SOLUTION INTRAMUSCULAR; INTRAVENOUS at 05:11

## 2022-10-20 RX ADMIN — LABETALOL HYDROCHLORIDE 100 MG: 100 TABLET, FILM COATED ORAL at 08:20

## 2022-10-20 RX ADMIN — SODIUM CHLORIDE, PRESERVATIVE FREE 10 ML: 5 INJECTION INTRAVENOUS at 05:12

## 2022-10-20 NOTE — CONSULTS
NEUROLOGY CONSULT    Name Maksim Goyal Age 56 y.o. MRN [id number]  1965     Referring Physician: Unknown, Provider, MD    Chief Complaint: History of intracranial hemorrhage     This is a 56 y.o. right handed very pleasant  male with PMH of COPD, hypertension, and stroke transferred from the freestanding ED [facility] for hypertensive urgency/emergency. His systolic blood pressure was above 220 which prompted the ED provider to give him intravenous labetalol. Patient also report having intermittent abdominal pain in the right abdomen, throbbing in nature, rated 7/10, nonradiating, nothing specific makes it better or worse, without nausea or vomiting, fever or chills. Reports having headache, however denies chest pain, shortness of breath, changes in vision. In the ED , also noted to have ANCELMO. CT abdomen showed no acute findings. BP remained elevated >170 despite multiple bouts of IV hydralazine, will start IV CCB. The patient also reported history of intracranial bleed in the past and neurology was consulted to see if there are any restrictions for using the blood thinners/anticoagulation. At the time of my evaluation this morning the patient was feeling much better with his abdominal pain. On questioning about the intracranial bleed he stated that he had more than a year ago and did not even remember which hospital he went [facility].  He had the numbness and tingling of the left side and thought that the bleed was on the left side. He denied any neurological symptoms recently or while he is having the symptoms since yesterday. Assessment and Plan:  1. History of intracranial hemorrhage: He had it more than a year ago and has been doing fine since he denied any neurological symptoms or headache at this time. He had a CT scan of the head on 10/5/2022 which was negative for any bleed, but did show bilateral ischemic changes.   No neurological work-up is indicated at this time. There is no contraindication for using any blood thinners including antiplatelets or anticoagulation if needed from neurology standpoint. His risk of having another intracranial hemorrhage is higher as compared to general population the cause of 1 hemorrhage in the past which I think could be related to the poorly controlled hypertension. As such if you are planning to start him on any anticoagulation, I would recommend that we should try to control his blood pressure and keep in good control. 2.  Hypertension, poorly controlled: Admitted with hypertensive urgency  3. Chronic obstructive pulmonary disease/emphysema/asthma  4. Tobacco dependence syndrome: Says he is tapering it off and currently smoking 3 cigarettes a day  5. Drinks alcohol over the weekends. I will sign off the case for now, however, please do not hesitate to call if needed again.     Thank you for the consult,    Allergies   Allergen Reactions    Ace Inhibitors Swelling     Current Facility-Administered Medications   Medication Dose Route Frequency    albuterol (PROVENTIL HFA, VENTOLIN HFA, PROAIR HFA) inhaler 2 Puff  2 Puff Inhalation Q4H PRN    hydrALAZINE (APRESOLINE) tablet 25 mg  25 mg Oral TID    sodium chloride (NS) flush 5-40 mL  5-40 mL IntraVENous Q8H    sodium chloride (NS) flush 5-40 mL  5-40 mL IntraVENous PRN    acetaminophen (TYLENOL) tablet 650 mg  650 mg Oral Q6H PRN    Or    acetaminophen (TYLENOL) suppository 650 mg  650 mg Rectal Q6H PRN    polyethylene glycol (MIRALAX) packet 17 g  17 g Oral DAILY PRN    ondansetron (ZOFRAN ODT) tablet 4 mg  4 mg Oral Q8H PRN    Or    ondansetron (ZOFRAN) injection 4 mg  4 mg IntraVENous Q6H PRN    hydrALAZINE (APRESOLINE) 20 mg/mL injection 20 mg  20 mg IntraVENous Q4H PRN    niCARdipine (CARDENE) 25 mg in 0.9% sodium chloride 250 mL ([id number])  5-15 mg/hr IntraVENous TITRATE    labetaloL (NORMODYNE) tablet 100 mg  100 mg Oral Q12H    nicotine (NICODERM CQ) 21 mg/24 hr patch 1 Patch  1 Patch TransDERmal DAILY    predniSONE (DELTASONE) tablet 20 mg  20 mg Oral BID WITH MEALS    budesonide-formoteroL (SYMBICORT) 160-4.5 mcg/actuation HFA inhaler 2 Puff  2 Puff Inhalation BID RT     Past Medical History:   Diagnosis Date    Asthma     Chronic obstructive pulmonary disease (HCC)     Emphysema lung (HCC)     Hypertension     Stroke ([facility] [address] 2022     Social History     Tobacco Use    Smoking status: Every Day    Smokeless tobacco: Never   Substance Use Topics    Alcohol use: Yes    Drug use: Yes     Types: Marijuana     Exam  Visit Vitals  BP (!) 191/130   Pulse 83   Temp 97.6 °F (36.4 °C)   Resp 17   Ht 5' 10\" (1.778 m)   Wt 68 kg (150 lb)   SpO2 99%   BMI 21.52 kg/m²     General: Well developed, well nourished. Patient in no distress   Head: Normocephalic, atraumatic, anicteric sclera   Neck Normal ROM, No thyromegally   Lungs:  Clear to auscultation    Cardiac: Regular rate and rhythm with no murmurs. Abd: Bowel sounds were audible   Ext: No pedal edema   Skin: Supple no rash     NeurologicExam:  Mental Status: Alert and oriented to person place and time   Speech: Fluent no aphasia or dysarthria. Cranial Nerves:   Pupils are equal round and reactive to light and accommodation, extraocular movements are intact and full, visual fields are intact by confrontation, no nystagmus noted, face is symmetric, sensation on face is intact and symmetric, hearing is intact and symmetric, tongue and uvula are in midline with normal movements, palate is elevating equally, shoulder shrug is intact and symmetric. Motor:  Full and symmetric strength of upper and lower ext. Normal bulk and tone. Reflexes:   Deep tendon reflexes 2+/4 and symmetric. Sensory:   Symmetric and intact    Gait:  Gait is deferred   Tremor:   No tremor noted. Cerebellar:  Coordination intact for finger-nose-finger. Neurovascular: No carotid bruits.  No JVD      Lab Review  Lab Results   Component Value Date/Time WBC 6.9 10/20/2022 04:00 AM    HCT 32.6 (L) 10/20/2022 04:00 AM    HGB 10.7 (L) 10/20/2022 04:00 AM    PLATELET 502 35/32/8921 04:00 AM     Lab Results   Component Value Date/Time    Sodium 140 10/20/2022 04:00 AM    Potassium 3.9 10/20/2022 04:00 AM    Chloride 112 (H) 10/20/2022 04:00 AM    CO2 23 10/20/2022 04:00 AM    Glucose 136 (H) 10/20/2022 04:00 AM    BUN 18 10/20/2022 04:00 AM    Creatinine 2.63 (H) 10/20/2022 04:00 AM    Calcium 8.6 10/20/2022 04:00 AM     No results found for: B12LT, FOL, RBCF  No results found for: LDL, LDLC, DLDLP  No results found for: HBA1C, [id number], FMX6YCHO  No components found for: TROPQUANT  No results found for: PRISCILA

## 2022-10-20 NOTE — PROGRESS NOTES
0805: Chart reviewed. Per  note, patient lives with his spouse out of their [place]. Current Dispo: Discharge in the same capacity above. PT/OT recs may be indicated pending patient status. Remote telemetry orders noted. : Rebel Monroe [phone].     CM will continue to follow patient and recs of medical team.

## 2022-10-20 NOTE — PROGRESS NOTES
Patient requests to leave AMA. Patient signed the AMA form. I discussed risks of leaving with blood pressure high including heart attack, stroke, and death. Patient aware he will not receive prescriptions leaving AMA. Patient alert and oriented times 4. Answers and converses appropriately. He is thankful for the care but unwilling to stay any longer. He verbalizes understanding and accepts responsibility and risks. Dr. Sukhwinder Hernandez notified and Charge nurse aware. Wife at bedside unable to convince him to stay and helped him dress. IV in right forearm removed. Tip intact. Pt walked out of unit along with wife.

## 2022-10-20 NOTE — PROGRESS NOTES
IMPRESSION:   COPD/asthma  Accelerated HTN  Bilateral hilar adenopathy rule out sarcoidosis  Acute on chronic kidney disease  Abdominal pain  Additional workup outlined below  Pt is at high risk of sudden decline and decompensation with life threatening consequenses and continued end organ dysfunction and failure  Pt is critically ill. Time spent with pt and staff actively rendering care, managing pt and coordinating care as stated below; 30 minutes, exclusive of any procedures      RECOMMENDATIONS/PLAN:   ICU monitoring  56year-old male history of smoking COPD asthma he had a CAT scan of the chest done which showed bilateral hilar adenopathy most likely sarcoidosis  Started him on high doses of steroids  Accelerated hypertension off nicardipine getting hydralazine as needed  Patient is agitated and very upset he wants to sign out AMA  COPD on inhalers at home we will start patient on rescue inhaler and Symbicort  Transfuse prn to maintain Hgb > 7  Labs to follow electrolytes, renal function and and blood counts  Bronchial hygiene with respiratory therapy techniques, bronchodilators  Pt needs IV fluids with additives and Drug therapy requiring intensive monitoring for toxicity  Prescription drug management with home med reconciliation reviewed  DVT, SUP prophylaxis  Will be available to assist in medical management while in the CCU pending disposition     [x] High complexity decision making was performed  [x] See my orders for details  HPI    PMH:  has a past medical history of Asthma, Chronic obstructive pulmonary disease ([address] Emphysema lung ([address] Hypertension, and Stroke ([address] (2022). PSH:   has a past surgical history that includes hx appendectomy and hx orthopaedic. FHX: family history is not on file. SHX:  reports that he has been smoking. He has never used smokeless tobacco. He reports current alcohol use. He reports current drug use. Drug: Marijuana.     ALL:   Allergies   Allergen Reactions Ace Inhibitors Swelling        MEDS:   [x] Reviewed - As Below   [] Not reviewed    Current Facility-Administered Medications   Medication    albuterol (PROVENTIL HFA, VENTOLIN HFA, PROAIR HFA) inhaler 2 Puff    hydrALAZINE (APRESOLINE) tablet 25 mg    sodium chloride (NS) flush 5-40 mL    sodium chloride (NS) flush 5-40 mL    acetaminophen (TYLENOL) tablet 650 mg    Or    acetaminophen (TYLENOL) suppository 650 mg    polyethylene glycol (MIRALAX) packet 17 g    ondansetron (ZOFRAN ODT) tablet 4 mg    Or    ondansetron (ZOFRAN) injection 4 mg    hydrALAZINE (APRESOLINE) 20 mg/mL injection 20 mg    niCARdipine (CARDENE) 25 mg in 0.9% sodium chloride 250 mL ([id number])    labetaloL (NORMODYNE) tablet 100 mg    nicotine (NICODERM CQ) 21 mg/24 hr patch 1 Patch    predniSONE (DELTASONE) tablet 20 mg    budesonide-formoteroL (SYMBICORT) 160-4.5 mcg/actuation HFA inhaler 2 Puff      MAR reviewed and pertinent medications noted or modified as needed   Current Facility-Administered Medications   Medication    albuterol (PROVENTIL HFA, VENTOLIN HFA, PROAIR HFA) inhaler 2 Puff    hydrALAZINE (APRESOLINE) tablet 25 mg    sodium chloride (NS) flush 5-40 mL    sodium chloride (NS) flush 5-40 mL    acetaminophen (TYLENOL) tablet 650 mg    Or    acetaminophen (TYLENOL) suppository 650 mg    polyethylene glycol (MIRALAX) packet 17 g    ondansetron (ZOFRAN ODT) tablet 4 mg    Or    ondansetron (ZOFRAN) injection 4 mg    hydrALAZINE (APRESOLINE) 20 mg/mL injection 20 mg    niCARdipine (CARDENE) 25 mg in 0.9% sodium chloride 250 mL ([id number])    labetaloL (NORMODYNE) tablet 100 mg    nicotine (NICODERM CQ) 21 mg/24 hr patch 1 Patch    predniSONE (DELTASONE) tablet 20 mg    budesonide-formoteroL (SYMBICORT) 160-4.5 mcg/actuation HFA inhaler 2 Puff      PMH:  has a past medical history of Asthma, Chronic obstructive pulmonary disease ([facility] Emphysema lung ([facility] Hypertension, and Stroke ([facility] (2022).   PSH:   has a past surgical history that includes hx appendectomy and hx orthopaedic. FHX: family history is not on file. SHX:  reports that he has been smoking. He has never used smokeless tobacco. He reports current alcohol use. He reports current drug use. Drug: Marijuana. ROS:A comprehensive review of systems was negative except for that written in the HPI. Hemodynamics:    CO:    CI:    CVP:    SVR:   PAP Systolic:    PAP Diastolic:    PVR:    AD91:        Ventilator Settings:      Mode Rate TV Press PEEP FiO2 PIP Min. Vent                              Vital Signs: Telemetry:    normal sinus rhythm Intake/Output:   Visit Vitals  BP (!) 191/130   Pulse 83   Temp 97.6 °F (36.4 °C)   Resp 17   Ht 5' 10\" (1.778 m)   Wt 68 kg (150 lb)   SpO2 99%   BMI 21.52 kg/m²       Temp (24hrs), Av.9 °F (36.6 °C), Min:97.6 °F (36.4 °C), Max:98.4 °F (36.9 °C)        O2 Device: None (Room air)         Wt Readings from Last 4 Encounters:   10/18/22 68 kg (150 lb)   10/18/22 68 kg (150 lb)   10/05/22 68 kg (150 lb)          Intake/Output Summary (Last 24 hours) at 10/20/2022 0823  Last data filed at 10/19/2022 2000  Gross per 24 hour   Intake 1050 ml   Output 1400 ml   Net -350 ml         Last shift:      No intake/output data recorded. Last 3 shifts: 10/18 1901 - 10/20 0700  In: 1061.3 [P.O.:1050; I.V.:11.3]  Out: 1400 [Urine:1400]       Physical Exam:     General: Alert awake abdominal discomfort  HEENT: NCAT, poor dentition, lips and mucosa dry  Eyes: anicteric; conjunctiva clear  Neck: no nodes, wayne midline; no accessory MM use. Chest: no deformity,   Cardiac: R regular; no murmur;   Lungs: distant breath sounds; wheezes  Abd: soft, NT, hypoactive BS  Ext: no edema; no joint swelling;  No clubbing  : NO barfield, clear urine  Neuro: No focal deficit  Psych- no agitation, oriented to person;   Skin: warm, dry, no cyanosis;   Pulses: 1-2+ Bilateral pedal, radial  Capillary: brisk; pale      DATA:    MAR reviewed and pertinent medications noted or modified as needed  MEDS:   Current Facility-Administered Medications   Medication    albuterol (PROVENTIL HFA, VENTOLIN HFA, PROAIR HFA) inhaler 2 Puff    hydrALAZINE (APRESOLINE) tablet 25 mg    sodium chloride (NS) flush 5-40 mL    sodium chloride (NS) flush 5-40 mL    acetaminophen (TYLENOL) tablet 650 mg    Or    acetaminophen (TYLENOL) suppository 650 mg    polyethylene glycol (MIRALAX) packet 17 g    ondansetron (ZOFRAN ODT) tablet 4 mg    Or    ondansetron (ZOFRAN) injection 4 mg    hydrALAZINE (APRESOLINE) 20 mg/mL injection 20 mg    niCARdipine (CARDENE) 25 mg in 0.9% sodium chloride 250 mL ([id number])    labetaloL (NORMODYNE) tablet 100 mg    nicotine (NICODERM CQ) 21 mg/24 hr patch 1 Patch    predniSONE (DELTASONE) tablet 20 mg    budesonide-formoteroL (SYMBICORT) 160-4.5 mcg/actuation HFA inhaler 2 Puff        Labs:    Recent Labs     10/20/22  0400 10/18/22  1746   WBC 6.9 10.7   HGB 10.7* 11.8*    226       Recent Labs     10/20/22  0400 10/19/22  1040 10/18/22  1746    141 143   K 3.9 3.2* 3.4*   * 109* 103   CO2 23 25 26   * 109* 109*   BUN 18 25* 27*   CREA 2.63* 2.38* 2.52*   CA 8.6 8.4* 9.0   MG 2.4 2.4  --    PHOS  --  3.2  --    ALB  --  3.0* 3.9   ALT  --   --  24   LPSE  --   --  41       No results for input(s): PH, PCO2, PO2, HCO3, FIO2 in the last 72 hours. No results for input(s): CPK, CKNDX, TROIQ in the last 72 hours.     No lab exists for component: CPKMB  No results found for: BNPP, BNP   Lab Results   Component Value Date/Time    Culture result: NO GROWTH 5 DAYS 10/05/2022 11:27 AM     Lab Results   Component Value Date/Time    TSH 0.34 (L) 10/20/2022 04:00 AM        Imaging:    Results from Hospital Encounter encounter on 10/18/22    XR CHEST PA LAT    Narrative  EXAM: XR CHEST PA LAT    INDICATION: Recent right lower lobe infiltrate    COMPARISON: 10/5/2022    TECHNIQUE: PA and lateral chest views    FINDINGS: The cardiomediastinal silhouette is within normal limits. Previously  seen right lower lobe consolidation has almost completely resolved. There is  mild bibasilar atelectasis. The visualized bones and upper abdomen are  unremarkable. Impression  Previously seen right lower lobe airspace disease has almost completely  resolved. Mild bibasilar atelectasis. Results from [place] encounter on 10/18/22    CT ABD PELV WO CONT    Narrative  EXAM: CT ABD PELV WO CONT    INDICATION: RUQ PAin    COMPARISON: None    IV CONTRAST: None. ORAL CONTRAST: None    TECHNIQUE:  Thin axial images were obtained through the abdomen and pelvis. Coronal and  sagittal reformats were generated. CT dose reduction was achieved through use of  a standardized protocol tailored for this examination and automatic exposure  control for dose modulation. The absence of intravenous contrast material reduces the sensitivity for  evaluation of the vasculature and solid organs. FINDINGS:  LOWER THORAX: Bilateral lower lobe interstitial infiltrates and trace right  pleural effusion. LIVER: No mass. BILIARY TREE: Gallbladder is within normal limits. CBD is not dilated. SPLEEN: within normal limits. PANCREAS: No focal abnormality. ADRENALS: Unremarkable. KIDNEYS/URETERS: No calculus or hydronephrosis. STOMACH: Unremarkable. SMALL BOWEL: No dilatation or wall thickening. COLON: No dilatation or wall thickening. APPENDIX: Surgically absent. PERITONEUM: No ascites or pneumoperitoneum. RETROPERITONEUM: No lymphadenopathy or aortic aneurysm. REPRODUCTIVE ORGANS: Unremarkable. URINARY BLADDER: No mass or calculus. BONES: No destructive bone lesion. ABDOMINAL WALL: Right inguinal hernia containing small bowel without evidence of  obstruction. ADDITIONAL COMMENTS: N/A    Impression  Right inguinal hernia containing small bowel without evidence of obstruction. No  renal or ureteral stone or evidence of hydronephrosis.       10/20 patient is agitated does not want to stay want to sign out AMA, previous CAT scan shows bilateral hilar adenopathy rule out sarcoidosis possible work-up as an outpatient ACE level pending patient on high-dose prednisone

## 2022-10-20 NOTE — DISCHARGE SUMMARY
Hospitalist Discharge Summary     Patient ID:  Analy Matias  [id number]  56 y.o.  1965  10/18/2022    PCP on record: Unknown, Provider, MD    Admit date: 10/18/2022  Discharge date and time: 10/20/2022    DISCHARGE DIAGNOSIS:  Hypertensive emergency  ANCELMO on CKD stage IIIb  Hx of stroke  COPD      CONSULTATIONS:  IP CONSULT TO PULMONOLOGY  IP CONSULT TO CARDIOLOGY  IP CONSULT TO NEUROLOGY    Excerpted HPI from H&P of Lauren Mason MD:    Analy Matias is a 56 y.o. male with PMH of COPD, hypertension, and stroke. Patient transferred from the [facility] ED [place] for hypertensive urgency/emergency. Patient went there in which she was found to have systolic blood pressure above 220 which prompted the ED provider to give him intravenous labetalol. Patient also report that has been having intermittent abdominal pain in the right abdomen, throbbing in nature, rated 7/10, nonradiating, nothing specific makes it better or worse, without nausea or vomiting, fever or chills. Reports having headache, however denies chest pain, shortness of breath, changes in vision. ______________________________________________________________________  DISCHARGE SUMMARY/HOSPITAL COURSE:  for full details see H&P, daily progress notes, labs, consult notes. In the ED , also noted to have ANCELMO. CT abdomen showed no acute findings. BP remained elevated >170 despite multiple bouts of IV hydralazine. Patient was started on nicardipine drip and also started on po anti hypertensives. Cardiology was consulted. Cardiology recommended neuro clearance prior to ischemic evaluation as patient had hx of intracranial hemorrhage. Nephrology consulted for ANCELMO and CKD- recommends to avoid [facility] KROGNI powder as patient reported heavy use at home. Patient's blood pressure was still elevated. ECHO and further cardiac work up was still pending. Patient was adamant about going home. Patient left AMA before I could go and talk to him.  RN informed about risk of  leaving against medical advice with high blood pressure including heart attack, stroke and death. Patient verbalized understanding and left AMA. _______________________________________________________________________  I was not able to see patient on the day of discharge. Patient left AMA before I could see him.   _______________________________________________________________________  DISCHARGE MEDICATIONS:   Discharge Medication List as of 10/20/2022  9:21 AM            Patient Follow Up Instructions: Activity: Activity as tolerated  Diet: Cardiac Diet  Wound Care: None needed    Follow-up with PCP in 1-2 days.   Follow-up tests/labs blood pressure  Follow-up Information    None       ________________________________________________________________    Risk of deterioration: Moderate    Condition at Discharge:  Stable  __________________________________________________________________    Disposition  Home with family, no needs  Left AMA    ____________________________________________________________________    Code Status: Full Code  ___________________________________________________________________      Total time in minutes spent coordinating this discharge (includes going over instructions, follow-up, prescriptions, and preparing report for sign off to her PCP) :  35 minutes    Signed:  Donnie Florez MD

## 2022-10-21 LAB — ACE SERPL-CCNC: 31 U/L (ref 14–82)

## 2022-10-26 ENCOUNTER — APPOINTMENT (OUTPATIENT)
Dept: GENERAL RADIOLOGY | Age: 57
DRG: 199 | End: 2022-10-26
Attending: EMERGENCY MEDICINE
Payer: MEDICAID

## 2022-10-26 ENCOUNTER — HOSPITAL ENCOUNTER (INPATIENT)
Age: 57
LOS: 2 days | Discharge: HOME | DRG: 199 | End: 2022-10-28
Attending: EMERGENCY MEDICINE | Admitting: INTERNAL MEDICINE
Payer: MEDICAID

## 2022-10-26 ENCOUNTER — APPOINTMENT (OUTPATIENT)
Dept: NON INVASIVE DIAGNOSTICS | Age: 57
DRG: 199 | End: 2022-10-26
Attending: INTERNAL MEDICINE
Payer: MEDICAID

## 2022-10-26 ENCOUNTER — APPOINTMENT (OUTPATIENT)
Dept: ULTRASOUND IMAGING | Age: 57
DRG: 199 | End: 2022-10-26
Attending: INTERNAL MEDICINE
Payer: MEDICAID

## 2022-10-26 DIAGNOSIS — R06.02 SOB (SHORTNESS OF BREATH): Primary | ICD-10-CM

## 2022-10-26 DIAGNOSIS — I50.9 ACUTE CONGESTIVE HEART FAILURE, UNSPECIFIED HEART FAILURE TYPE (HCC): ICD-10-CM

## 2022-10-26 DIAGNOSIS — I16.1 HYPERTENSIVE EMERGENCY: ICD-10-CM

## 2022-10-26 DIAGNOSIS — I11.9 HYPERTENSIVE ARTERIOSCLEROTIC CARDIOVASCULAR DISEASE: ICD-10-CM

## 2022-10-26 PROBLEM — I50.31 ACUTE DIASTOLIC CHF (CONGESTIVE HEART FAILURE) (HCC): Status: ACTIVE | Noted: 2022-10-26

## 2022-10-26 LAB
ALBUMIN SERPL-MCNC: 3.8 G/DL (ref 3.5–5.2)
ALBUMIN/GLOB SERPL: 1.7 {RATIO} (ref 1.1–2.2)
ALP SERPL-CCNC: 86 U/L (ref 40–129)
ALT SERPL-CCNC: 20 U/L (ref 10–50)
ANION GAP SERPL CALC-SCNC: 14 MMOL/L (ref 5–15)
AST SERPL-CCNC: 20 U/L (ref 10–50)
ATRIAL RATE: 96 BPM
BASOPHILS # BLD: 0 K/UL (ref 0–0.1)
BASOPHILS NFR BLD: 0 % (ref 0–1)
BILIRUB SERPL-MCNC: 0.5 MG/DL (ref 0.2–1)
BNP SERPL-MCNC: ABNORMAL PG/ML
BUN SERPL-MCNC: 36 MG/DL (ref 6–20)
BUN/CREAT SERPL: 14 (ref 12–20)
CALCIUM SERPL-MCNC: 8.6 MG/DL (ref 8.5–10.1)
CALCIUM SERPL-MCNC: 8.7 MG/DL (ref 8.6–10)
CALCULATED P AXIS, ECG09: 64 DEGREES
CALCULATED R AXIS, ECG10: -13 DEGREES
CALCULATED T AXIS, ECG11: 153 DEGREES
CHLORIDE SERPL-SCNC: 104 MMOL/L (ref 98–107)
CO2 SERPL-SCNC: 23 MMOL/L (ref 22–29)
CREAT SERPL-MCNC: 2.53 MG/DL (ref 0.7–1.2)
CREAT UR-MCNC: 40 MG/DL
CREAT UR-MCNC: 41.6 MG/DL
DIAGNOSIS, 93000: NORMAL
DIFFERENTIAL METHOD BLD: ABNORMAL
ECHO AO ASC DIAM: 4.3 CM
ECHO AO ASCENDING AORTA INDEX: 2.32 CM/M2
ECHO AV AREA PEAK VELOCITY: 2.4 CM2
ECHO AV AREA VTI: 2.7 CM2
ECHO AV AREA/BSA PEAK VELOCITY: 1.3 CM2/M2
ECHO AV AREA/BSA VTI: 1.5 CM2/M2
ECHO AV MEAN GRADIENT: 4 MMHG
ECHO AV MEAN VELOCITY: 0.9 M/S
ECHO AV PEAK GRADIENT: 7 MMHG
ECHO AV PEAK VELOCITY: 1.3 M/S
ECHO AV VELOCITY RATIO: 0.77
ECHO AV VTI: 21.7 CM
ECHO LA DIAMETER INDEX: 2.65 CM/M2
ECHO LA DIAMETER: 4.9 CM
ECHO LA VOL 2C: 79 ML (ref 18–58)
ECHO LA VOL 4C: 123 ML (ref 18–58)
ECHO LA VOL BP: 104 ML (ref 18–58)
ECHO LA VOL/BSA BIPLANE: 56 ML/M2 (ref 16–34)
ECHO LA VOLUME AREA LENGTH: 113 ML
ECHO LA VOLUME INDEX A2C: 43 ML/M2 (ref 16–34)
ECHO LA VOLUME INDEX A4C: 66 ML/M2 (ref 16–34)
ECHO LA VOLUME INDEX AREA LENGTH: 61 ML/M2 (ref 16–34)
ECHO LV E' LATERAL VELOCITY: 4 CM/S
ECHO LV E' SEPTAL VELOCITY: 4 CM/S
ECHO LV EDV A2C: 86 ML
ECHO LV EDV A4C: 61 ML
ECHO LV EDV BP: 73 ML (ref 67–155)
ECHO LV EDV INDEX A4C: 33 ML/M2
ECHO LV EDV INDEX BP: 39 ML/M2
ECHO LV EDV NDEX A2C: 46 ML/M2
ECHO LV EJECTION FRACTION A2C: 62 %
ECHO LV EJECTION FRACTION A4C: 56 %
ECHO LV EJECTION FRACTION BIPLANE: 58 % (ref 55–100)
ECHO LV ESV A2C: 33 ML
ECHO LV ESV A4C: 27 ML
ECHO LV ESV BP: 30 ML (ref 22–58)
ECHO LV ESV INDEX A2C: 18 ML/M2
ECHO LV ESV INDEX A4C: 15 ML/M2
ECHO LV ESV INDEX BP: 16 ML/M2
ECHO LV FRACTIONAL SHORTENING: 24 % (ref 28–44)
ECHO LV INTERNAL DIMENSION DIASTOLE INDEX: 2.7 CM/M2
ECHO LV INTERNAL DIMENSION DIASTOLIC: 5 CM (ref 4.2–5.9)
ECHO LV INTERNAL DIMENSION SYSTOLIC INDEX: 2.05 CM/M2
ECHO LV INTERNAL DIMENSION SYSTOLIC: 3.8 CM
ECHO LV IVSD: 1.2 CM (ref 0.6–1)
ECHO LV MASS 2D: 233.7 G (ref 88–224)
ECHO LV MASS INDEX 2D: 126.3 G/M2 (ref 49–115)
ECHO LV POSTERIOR WALL DIASTOLIC: 1.2 CM (ref 0.6–1)
ECHO LV RELATIVE WALL THICKNESS RATIO: 0.48
ECHO LVOT AREA: 3.1 CM2
ECHO LVOT AV VTI INDEX: 0.84
ECHO LVOT CARDIAC OUTPUT: 0 LITER/MINUTE
ECHO LVOT CARDIAC OUTPUT: 0 LITER/MINUTE
ECHO LVOT DIAM: 2 CM
ECHO LVOT MEAN GRADIENT: 2 MMHG
ECHO LVOT PEAK GRADIENT: 4 MMHG
ECHO LVOT PEAK VELOCITY: 1 M/S
ECHO LVOT STROKE VOLUME INDEX: 31.1 ML/M2
ECHO LVOT SV: 57.5 ML
ECHO LVOT VTI: 18.3 CM
ECHO MV A VELOCITY: 0.46 M/S
ECHO MV E DECELERATION TIME (DT): 240 MS
ECHO MV E VELOCITY: 0.87 M/S
ECHO MV E/A RATIO: 1.89
ECHO MV E/E' LATERAL: 21.75
ECHO MV E/E' RATIO (AVERAGED): 21.75
ECHO MV E/E' SEPTAL: 21.75
ECHO MV REGURGITANT PEAK GRADIENT: 36 MMHG
ECHO MV REGURGITANT PEAK VELOCITY: 3 M/S
ECHO PULMONARY ARTERY END DIASTOLIC PRESSURE: 7 MMHG
ECHO PV MAX VELOCITY: 1 M/S
ECHO PV PEAK GRADIENT: 4 MMHG
ECHO PV REGURGITANT MAX VELOCITY: 1.3 M/S
ECHO RV FREE WALL PEAK S': 13 CM/S
ECHO RV INTERNAL DIMENSION: 3.7 CM
ECHO RV TAPSE: 2.3 CM (ref 1.7–?)
ECHO RVOT PEAK GRADIENT: 2 MMHG
ECHO RVOT PEAK VELOCITY: 0.7 M/S
ECHO TV REGURGITANT MAX VELOCITY: 2.3 M/S
ECHO TV REGURGITANT PEAK GRADIENT: 21 MMHG
EOSINOPHIL # BLD: 0.9 K/UL (ref 0–0.4)
EOSINOPHIL NFR BLD: 8 % (ref 0–7)
ERYTHROCYTE [DISTWIDTH] IN BLOOD BY AUTOMATED COUNT: 13.3 % (ref 11.5–14.5)
GLOBULIN SER CALC-MCNC: 2.3 G/DL (ref 2–4)
GLUCOSE SERPL-MCNC: 111 MG/DL (ref 65–100)
HCT VFR BLD AUTO: 35.1 % (ref 36.6–50.3)
HGB BLD-MCNC: 11.4 G/DL (ref 12.1–17)
IMM GRANULOCYTES # BLD AUTO: 0.1 K/UL (ref 0–0.04)
IMM GRANULOCYTES NFR BLD AUTO: 1 % (ref 0–0.5)
LYMPHOCYTES # BLD: 1 K/UL (ref 0.8–3.5)
LYMPHOCYTES NFR BLD: 8 % (ref 12–49)
MCH RBC QN AUTO: 31 PG (ref 26–34)
MCHC RBC AUTO-ENTMCNC: 32.5 G/DL (ref 30–36.5)
MCV RBC AUTO: 95.4 FL (ref 80–99)
MICROALBUMIN UR-MCNC: 45.3 MG/DL
MICROALBUMIN/CREAT UR-RTO: 1089 MG/G (ref 0–30)
MONOCYTES # BLD: 1.1 K/UL (ref 0–1)
MONOCYTES NFR BLD: 9 % (ref 5–13)
NEUTS SEG # BLD: 8.8 K/UL (ref 1.8–8)
NEUTS SEG NFR BLD: 74 % (ref 32–75)
NRBC # BLD: 0 K/UL (ref 0–0.01)
NRBC BLD-RTO: 0 PER 100 WBC
P-R INTERVAL, ECG05: 164 MS
PLATELET # BLD AUTO: 192 K/UL (ref 150–400)
PMV BLD AUTO: 10.4 FL (ref 8.9–12.9)
POTASSIUM SERPL-SCNC: 3.6 MMOL/L (ref 3.5–5.1)
PROT SERPL-MCNC: 6.1 G/DL (ref 6.4–8.3)
PROT UR-MCNC: 71 MG/DL (ref 0–11.9)
PROT/CREAT UR-RTO: 1.8
PTH-INTACT SERPL-MCNC: 138.8 PG/ML (ref 18.4–88)
Q-T INTERVAL, ECG07: 402 MS
QRS DURATION, ECG06: 96 MS
QTC CALCULATION (BEZET), ECG08: 507 MS
RBC # BLD AUTO: 3.68 M/UL (ref 4.1–5.7)
SODIUM SERPL-SCNC: 141 MMOL/L (ref 136–145)
TROPONIN I BLD-MCNC: 0.07 NG/ML (ref 0–0.08)
VENTRICULAR RATE, ECG03: 96 BPM
WBC # BLD AUTO: 11.9 K/UL (ref 4.1–11.1)

## 2022-10-26 PROCEDURE — 36415 COLL VENOUS BLD VENIPUNCTURE: CPT

## 2022-10-26 PROCEDURE — 99223 1ST HOSP IP/OBS HIGH 75: CPT | Performed by: STUDENT IN AN ORGANIZED HEALTH CARE EDUCATION/TRAINING PROGRAM

## 2022-10-26 PROCEDURE — APPSS30 APP SPLIT SHARED TIME 16-30 MINUTES: Performed by: NURSE PRACTITIONER

## 2022-10-26 PROCEDURE — 74011000250 HC RX REV CODE- 250: Performed by: INTERNAL MEDICINE

## 2022-10-26 PROCEDURE — 85025 COMPLETE CBC W/AUTO DIFF WBC: CPT

## 2022-10-26 PROCEDURE — 74011250637 HC RX REV CODE- 250/637: Performed by: EMERGENCY MEDICINE

## 2022-10-26 PROCEDURE — 84156 ASSAY OF PROTEIN URINE: CPT

## 2022-10-26 PROCEDURE — 76770 US EXAM ABDO BACK WALL COMP: CPT

## 2022-10-26 PROCEDURE — 83970 ASSAY OF PARATHORMONE: CPT

## 2022-10-26 PROCEDURE — 74011250637 HC RX REV CODE- 250/637: Performed by: STUDENT IN AN ORGANIZED HEALTH CARE EDUCATION/TRAINING PROGRAM

## 2022-10-26 PROCEDURE — 99285 EMERGENCY DEPT VISIT HI MDM: CPT

## 2022-10-26 PROCEDURE — 93005 ELECTROCARDIOGRAM TRACING: CPT

## 2022-10-26 PROCEDURE — 71045 X-RAY EXAM CHEST 1 VIEW: CPT

## 2022-10-26 PROCEDURE — 74011250636 HC RX REV CODE- 250/636: Performed by: INTERNAL MEDICINE

## 2022-10-26 PROCEDURE — 65270000046 HC RM TELEMETRY

## 2022-10-26 PROCEDURE — 82043 UR ALBUMIN QUANTITATIVE: CPT

## 2022-10-26 PROCEDURE — 74011250637 HC RX REV CODE- 250/637

## 2022-10-26 PROCEDURE — 96375 TX/PRO/DX INJ NEW DRUG ADDON: CPT

## 2022-10-26 PROCEDURE — 83880 ASSAY OF NATRIURETIC PEPTIDE: CPT

## 2022-10-26 PROCEDURE — 74011000250 HC RX REV CODE- 250

## 2022-10-26 PROCEDURE — 94640 AIRWAY INHALATION TREATMENT: CPT

## 2022-10-26 PROCEDURE — 74011250637 HC RX REV CODE- 250/637: Performed by: INTERNAL MEDICINE

## 2022-10-26 PROCEDURE — 93306 TTE W/DOPPLER COMPLETE: CPT

## 2022-10-26 PROCEDURE — 74011250636 HC RX REV CODE- 250/636: Performed by: EMERGENCY MEDICINE

## 2022-10-26 PROCEDURE — 74011000250 HC RX REV CODE- 250: Performed by: EMERGENCY MEDICINE

## 2022-10-26 PROCEDURE — 80053 COMPREHEN METABOLIC PANEL: CPT

## 2022-10-26 PROCEDURE — 96374 THER/PROPH/DIAG INJ IV PUSH: CPT

## 2022-10-26 PROCEDURE — 93306 TTE W/DOPPLER COMPLETE: CPT | Performed by: STUDENT IN AN ORGANIZED HEALTH CARE EDUCATION/TRAINING PROGRAM

## 2022-10-26 RX ORDER — ACETAMINOPHEN 500 MG
TABLET ORAL
Status: COMPLETED
Start: 2022-10-26 | End: 2022-10-26

## 2022-10-26 RX ORDER — LABETALOL HYDROCHLORIDE 5 MG/ML
INJECTION, SOLUTION INTRAVENOUS
Status: COMPLETED
Start: 2022-10-26 | End: 2022-10-26

## 2022-10-26 RX ORDER — FUROSEMIDE 10 MG/ML
40 INJECTION INTRAMUSCULAR; INTRAVENOUS
Status: COMPLETED | OUTPATIENT
Start: 2022-10-26 | End: 2022-10-26

## 2022-10-26 RX ORDER — BUMETANIDE 0.25 MG/ML
1 INJECTION INTRAMUSCULAR; INTRAVENOUS EVERY 12 HOURS
Status: DISCONTINUED | OUTPATIENT
Start: 2022-10-26 | End: 2022-10-27

## 2022-10-26 RX ORDER — HYDRALAZINE HYDROCHLORIDE 20 MG/ML
20 INJECTION INTRAMUSCULAR; INTRAVENOUS ONCE
Status: DISPENSED | OUTPATIENT
Start: 2022-10-26 | End: 2022-10-26

## 2022-10-26 RX ORDER — HYDROMORPHONE HYDROCHLORIDE 1 MG/ML
0.5 INJECTION, SOLUTION INTRAMUSCULAR; INTRAVENOUS; SUBCUTANEOUS
Status: DISCONTINUED | OUTPATIENT
Start: 2022-10-26 | End: 2022-10-28 | Stop reason: HOSPADM

## 2022-10-26 RX ORDER — NITROGLYCERIN 20 MG/100ML
0-200 INJECTION INTRAVENOUS
Status: DISCONTINUED | OUTPATIENT
Start: 2022-10-26 | End: 2022-10-26

## 2022-10-26 RX ORDER — AMLODIPINE BESYLATE 5 MG/1
5 TABLET ORAL DAILY
Status: DISCONTINUED | OUTPATIENT
Start: 2022-10-26 | End: 2022-10-27

## 2022-10-26 RX ORDER — ASPIRIN 325 MG
325 TABLET ORAL
Status: COMPLETED | OUTPATIENT
Start: 2022-10-26 | End: 2022-10-26

## 2022-10-26 RX ORDER — HYDRALAZINE HYDROCHLORIDE 20 MG/ML
20 INJECTION INTRAMUSCULAR; INTRAVENOUS
Status: DISCONTINUED | OUTPATIENT
Start: 2022-10-26 | End: 2022-10-28 | Stop reason: HOSPADM

## 2022-10-26 RX ORDER — PROCHLORPERAZINE EDISYLATE 5 MG/ML
10 INJECTION INTRAMUSCULAR; INTRAVENOUS
Status: DISCONTINUED | OUTPATIENT
Start: 2022-10-26 | End: 2022-10-28 | Stop reason: HOSPADM

## 2022-10-26 RX ORDER — LABETALOL HYDROCHLORIDE 5 MG/ML
20 INJECTION, SOLUTION INTRAVENOUS ONCE
Status: COMPLETED | OUTPATIENT
Start: 2022-10-26 | End: 2022-10-26

## 2022-10-26 RX ORDER — DIPHENHYDRAMINE HYDROCHLORIDE 50 MG/ML
25 INJECTION, SOLUTION INTRAMUSCULAR; INTRAVENOUS
Status: COMPLETED | OUTPATIENT
Start: 2022-10-26 | End: 2022-10-26

## 2022-10-26 RX ORDER — LABETALOL 200 MG/1
200 TABLET, FILM COATED ORAL EVERY 12 HOURS
Status: DISCONTINUED | OUTPATIENT
Start: 2022-10-26 | End: 2022-10-27

## 2022-10-26 RX ORDER — ACETAMINOPHEN 500 MG
1000 TABLET ORAL
Status: DISCONTINUED | OUTPATIENT
Start: 2022-10-26 | End: 2022-10-26

## 2022-10-26 RX ORDER — ACETAMINOPHEN 325 MG/1
650 TABLET ORAL
Status: DISCONTINUED | OUTPATIENT
Start: 2022-10-26 | End: 2022-10-28 | Stop reason: HOSPADM

## 2022-10-26 RX ORDER — IPRATROPIUM BROMIDE AND ALBUTEROL SULFATE 2.5; .5 MG/3ML; MG/3ML
3 SOLUTION RESPIRATORY (INHALATION)
Status: DISCONTINUED | OUTPATIENT
Start: 2022-10-26 | End: 2022-10-28 | Stop reason: HOSPADM

## 2022-10-26 RX ORDER — IBUPROFEN 200 MG
1 TABLET ORAL DAILY
Status: DISCONTINUED | OUTPATIENT
Start: 2022-10-27 | End: 2022-10-28 | Stop reason: HOSPADM

## 2022-10-26 RX ADMIN — LABETALOL HYDROCHLORIDE 20 MG: 5 INJECTION INTRAVENOUS at 07:05

## 2022-10-26 RX ADMIN — LABETALOL HYDROCHLORIDE 200 MG: 200 TABLET, FILM COATED ORAL at 11:17

## 2022-10-26 RX ADMIN — BUMETANIDE 1 MG: 0.25 INJECTION INTRAMUSCULAR; INTRAVENOUS at 11:18

## 2022-10-26 RX ADMIN — ACETAMINOPHEN 1000 MG: 500 TABLET, FILM COATED ORAL at 02:27

## 2022-10-26 RX ADMIN — LABETALOL HYDROCHLORIDE 20 MG: 5 INJECTION, SOLUTION INTRAVENOUS at 02:27

## 2022-10-26 RX ADMIN — AMLODIPINE BESYLATE 5 MG: 5 TABLET ORAL at 14:08

## 2022-10-26 RX ADMIN — DIPHENHYDRAMINE HYDROCHLORIDE 25 MG: 50 INJECTION, SOLUTION INTRAMUSCULAR; INTRAVENOUS at 05:35

## 2022-10-26 RX ADMIN — BUMETANIDE 1 MG: 0.25 INJECTION INTRAMUSCULAR; INTRAVENOUS at 20:18

## 2022-10-26 RX ADMIN — LABETALOL HYDROCHLORIDE 200 MG: 200 TABLET, FILM COATED ORAL at 20:19

## 2022-10-26 RX ADMIN — ACETAMINOPHEN 1000 MG: 500 TABLET ORAL at 08:12

## 2022-10-26 RX ADMIN — ALBUTEROL SULFATE 1 DOSE: 2.5 SOLUTION RESPIRATORY (INHALATION) at 00:36

## 2022-10-26 RX ADMIN — NITROGLYCERIN 200 MCG/MIN: 20 INJECTION INTRAVENOUS at 00:36

## 2022-10-26 RX ADMIN — FUROSEMIDE 40 MG: 10 INJECTION, SOLUTION INTRAMUSCULAR; INTRAVENOUS at 00:36

## 2022-10-26 RX ADMIN — SODIUM CHLORIDE 5 MG/HR: 9 INJECTION, SOLUTION INTRAVENOUS at 07:08

## 2022-10-26 RX ADMIN — HYDRALAZINE HYDROCHLORIDE 20 MG: 20 INJECTION INTRAMUSCULAR; INTRAVENOUS at 20:20

## 2022-10-26 RX ADMIN — ASPIRIN 325 MG: 325 TABLET ORAL at 00:36

## 2022-10-26 NOTE — PROGRESS NOTES
[facility]  Cardiology Care Note                  [x]Initial visit     []Established visit     Patient Name: Michael Jefferson - VJN:[id number] - U:[id number]  Primary Cardiologist: none  Consulting Cardiologist: Sulema Dimas DO     Reason for consult: CHF     HPI:   SOB    Michael Jefferson is a 56 y.o. male with PMH significant for poorly controlled HTN, COPD, intracranial hemorrhage in 2021, CKD 3, who presented to the ED with dyspnea, CHF, HTN . Chest xray shows mild interstitial edema [place] [address]. Has been SOB for a month. No chest pain or edema. He said a MD gave him lin blood pressure pills a while ago but he doesn't follow with MD regularly. He was at OSH  few days ago but left ama. SUBJECTIVE:      Michael Jefferson reports dyspnea. Assessment and Plan     1 Untreated HTN with LVH on EKG: on IV cardene with marked improvement . Resume labetalol. 2 Interstitial edema on chest xray: agree with diuresis, check ECHO. Will need ischemic work up this admission. Would like to avoid cath given renal dysfunction. 3 ANCELMO/CKD: nephrology consult   4 smoker : advised cessation   5 hx.  intracranial hemorrhage 2021: per neurology recs from last week: He had it more than a year ago and has been doing fine since he denied any neurological symptoms or headache at this time. He had a CT scan of the head on 10/5/2022 which was negative for any bleed, but did show bilateral ischemic changes. No neurological work-up is indicated at this time. There is no contraindication for using any blood thinners including antiplatelets or anticoagulation if needed from neurology standpoint. His risk of having another intracranial hemorrhage is higher as compared to general population the cause of 1 hemorrhage in the past which I think could be related to the poorly controlled hypertension.   As such if you are planning to start him on any anticoagulation, I would recommend that we should try to control his blood pressure and keep in good contro         ____________________________________________________________    Cardiac testing  Per pt : none     Most recent HS troponins:  No results for input(s): TROPHS in the last 72 hours. No lab exists for component:  CKMB    ECG:   EKG Results       Procedure 720 Value Units Date/Time    EKG, 12 LEAD, INITIAL [[id number]] Collected: 10/26/22 0022    Order Status: Completed Updated: 10/26/22 0022     Ventricular Rate 96 BPM      Atrial Rate 96 BPM      P-R Interval 164 ms      QRS Duration 96 ms      Q-T Interval 402 ms      QTC Calculation (Bezet) 507 ms      Calculated P Axis 64 degrees      Calculated R Axis -13 degrees      Calculated T Axis 153 degrees      Diagnosis --     Normal sinus rhythm  Possible Left atrial enlargement  Left ventricular hypertrophy with repolarization abnormality ( Sokolow-Herring ,   Brett product , Romhilt-Shaw )  Prolonged QT  Abnormal ECG  When compared with ECG of 18-OCT-2022 17:42,  No significant change was found                Review of Systems:    [x]All other systems reviewed and all negative except as written in HPI    [] Patient unable to provide secondary to condition       Past Medical History:   Diagnosis Date    Asthma     Chronic obstructive pulmonary disease ([facility]     Emphysema lung ([facility] [address]     Hypertension     Stroke ([facility] [address] 2022     Past Surgical History:   Procedure Laterality Date    HX APPENDECTOMY      HX ORTHOPAEDIC      collar bone (right)     Social Hx:  reports that he has been smoking. He has never used smokeless tobacco. He reports current alcohol use. He reports current drug use. Drug: Marijuana. Family Hx: family history includes Hypertension in his father.   Allergies   Allergen Reactions    Ace Inhibitors Swelling          OBJECTIVE:  Wt Readings from Last 3 Encounters:   10/26/22 68 kg (150 lb)   10/18/22 68 kg (150 lb)   10/18/22 68 kg (150 lb)       Intake/Output Summary (Last 24 hours) at 10/26/2022 1108  Last data filed at 10/26/2022 0655  Gross per 24 hour   Intake --   Output 800 ml   Net -800 ml       Physical Exam:    Vitals:   Vitals:    10/26/22 0900 10/26/22 0915 10/26/22 0930 10/26/22 1043   BP: (!) 146/99 (!) 145/102 (!) 148/106 (!) 141/90   Pulse: 80 80 77 72   Resp: 20 13 18 18   Temp:   97.9 °F (36.6 °C) 97.7 °F (36.5 °C)   SpO2: 94% 92% 93% 97%   Weight:       Height:         Telemetry: NSR     Gen: Well-developed, well-nourished, in no acute distress  Neck: Supple, No JVD, No Carotid Bruit  Resp: No accessory muscle use, Clear breath sounds, No rales or rhonchi  Card: Regular Rate, Rhythm, Normal S1, S2, No murmurs, rubs or gallop. Abd:   Soft, non-tender, non-distended, BS+   MSK: No cyanosis  Skin: No rashes    Neuro: Moving all four extremities, follows commands appropriately  Psych: Poor insight, oriented to person, place, alert, Nml Affect  LE: No edema    Data Review:     Radiology:   XR Results (most recent):  Results from Hospital Encounter encounter on 10/26/22    XR CHEST PORT    Narrative  EXAM: XR CHEST PORT    INDICATION: Shortness of breath    COMPARISON: 10/19/2022    FINDINGS: A portable AP radiograph of the chest was obtained at 0013 hours. The  patient is on a cardiac monitor. There is mild interstitial edema. .  The bones  and soft tissues are grossly within normal limits. Impression  Mild interstitial edema      Recent Labs     10/26/22  0020      K 3.6      CO2 23   BUN 36*   CREA 2.53*   *   CA 8.7     Recent Labs     10/26/22  0020   WBC 11.9*   HGB 11.4*   HCT 35.1*        Recent Labs     10/26/22  0020   AP 86     No results for input(s): CHOL, LDLC in the last 72 hours.     No lab exists for component: TGL, HDLC,  HBA1C      Current meds:    Current Facility-Administered Medications:     hydrALAZINE (APRESOLINE) 20 mg/mL injection 20 mg, 20 mg, IntraVENous, ONCE, Alexander, Herlinda Faria MD    niCARdipine (CARDENE) 25 mg in 0.9% sodium chloride 250 mL infusion, 0-15 mg/hr, IntraVENous, TITRATE, Home Valles MD, Last Rate: 50 mL/hr at 10/26/22 0946, 5 mg/hr at 10/26/22 0946    hydrALAZINE (APRESOLINE) 20 mg/mL injection 20 mg, 20 mg, IntraVENous, Q6H PRN, Do, Remberto SOLIMAN MD    labetaloL (NORMODYNE) tablet 200 mg, 200 mg, Oral, Q12H, Do, Chuyita Larry MD    bumetanide (BUMEX) injection 1 mg, 1 mg, IntraVENous, Q12H, Do, Remberto SOLIMAN MD    acetaminophen (TYLENOL) tablet 650 mg, 650 mg, Oral, Q6H PRN, , Remberto SOLIMAN MD    HYDROmorphone (DILAUDID) syringe 0.5 mg, 0.5 mg, IntraVENous, Q4H PRN, Remberto Nance MD    prochlorperazine (COMPAZINE) injection 10 mg, 10 mg, IntraVENous, Q6H PRN, Do, Chuyita Larry MD    albuterol-ipratropium (DUO-NEB) 2.5 MG-0.5 MG/3 ML, 3 mL, Nebulization, Q4H PRN, , Remberto SOLIMAN MD    Current Outpatient Medications:     albuterol (PROVENTIL HFA, VENTOLIN HFA, PROAIR HFA) 90 mcg/actuation inhaler, Take 2 Puffs by inhalation. Reports taking 6 puffs as needed, Disp: , Rfl:     albuterol (PROVENTIL HFA, VENTOLIN HFA, PROAIR HFA) 90 mcg/actuation inhaler, Take 2 Puffs by inhalation every four (4) hours as needed for Wheezing or Cough. , Disp: 18 g, Rfl: 0    inhalational spacing device, For use with inhaler, Disp: 1 Each, Rfl: 0    hydrALAZINE (APRESOLINE) 25 mg tablet, Take 1 Tablet by mouth three (3) times daily. , Disp: 30 Tablet, Rfl: 0    Farhad Sanchez NP    [facility] [address] [place] [address], [address] [place] [address] N Anirudh López  [phone]      CC:Unknown, Provider, MD

## 2022-10-26 NOTE — ED TRIAGE NOTES
Patient has been having issues with his blood pressure going up and down for the past month. States today he called 911 for feeling short of breath. Recently treated for pneumonia.

## 2022-10-26 NOTE — H&P
Scar Morales [facility] 79  380 [facility], [address] [place] [address]  [phone]    Hospitalist Admission History and Physical      NAME:  Jaxson Montague   :   1965   MRN:  [id number]     PCP:  Unknown, Provider, MD     Date/Time of service:  10/26/2022  10:57 AM        Subjective:     CHIEF COMPLAINT: dyspnea     HISTORY OF PRESENT ILLNESS:     The patient is a 57 yo hx of HTN, COPD, CKD 3, intracranial hemorrhage, presented w/ dyspnea, hypoxia, acute CHF, HTN urgency, ANCELMO. The patent is a poor historian. He c/o worsening dyspnea on exertion for 1 month, associated with labile blood pressure, orthopnea, and PND. He denied cough, chest pain, fevers, chills, or LE edema. In the ED, SBP ~220s, pro BNP was 30,752. CXR with pulm edema. Allergies   Allergen Reactions    Ace Inhibitors Swelling       Prior to Admission medications    Medication Sig Start Date End Date Taking? Authorizing Provider   albuterol (PROVENTIL HFA, VENTOLIN HFA, PROAIR HFA) 90 mcg/actuation inhaler Take 2 Puffs by inhalation. Reports taking 6 puffs as needed    Other, MD Eugenio   albuterol (PROVENTIL HFA, VENTOLIN HFA, PROAIR HFA) 90 mcg/actuation inhaler Take 2 Puffs by inhalation every four (4) hours as needed for Wheezing or Cough. 10/5/22   Rayshawn Pineda MD   inhalational spacing device For use with inhaler 10/5/22   Rayshawn Pineda MD   hydrALAZINE (APRESOLINE) 25 mg tablet Take 1 Tablet by mouth three (3) times daily.  10/5/22   Rayshawn Pineda MD       Past Medical History:   Diagnosis Date    Asthma     Chronic obstructive pulmonary disease ([facility] [address]     Emphysema lung ([facility] [address]     Hypertension     Stroke [facility])         Past Surgical History:   Procedure Laterality Date    HX APPENDECTOMY      HX ORTHOPAEDIC      collar bone (right)       Social History     Tobacco Use    Smoking status: Every Day    Smokeless tobacco: Never   Substance Use Topics    Alcohol use: Yes        Family History Problem Relation Age of Onset    Hypertension Father         Review of Systems:  (bold if positive, if negative)    Gen:  Eyes:  ENT:  CVS:   PND  Pulm:  dyspnea,GI:  :  MS:  Skin:  Psych:  Endo:  Hem:  Renal:  Neuro:          Objective:      VITALS:    Vital signs reviewed; most recent are:    Visit Vitals  BP (!) 141/90   Pulse 72   Temp 97.7 °F (36.5 °C)   Resp 18   Ht 5' 10\" (1.778 m)   Wt 68 kg (150 lb)   SpO2 97%   BMI 21.52 kg/m²     SpO2 Readings from Last 6 Encounters:   10/26/22 97%   10/20/22 99%   10/18/22 97%   10/05/22 97%   10/18/19 96%          Intake/Output Summary (Last 24 hours) at 10/26/2022 1057  Last data filed at 10/26/2022 0655  Gross per 24 hour   Intake --   Output 800 ml   Net -800 ml        Exam:     Physical Exam:    Gen:  Well-developed, well-nourished, in no acute distress  HEENT:  Pink conjunctivae, PERRL, hearing intact to voice, moist mucous membranes  Neck:  Supple, without masses, thyroid non-tender  Resp:  No accessory muscle use, bilateral rales at bases  Card:  No murmurs, normal S1, S2 without thrills, bruits or peripheral edema  Abd:  Soft, non-tender, non-distended, normoactive bowel sounds are present  Lymph:  No cervical adenopathy  Musc:  No cyanosis or clubbing  Skin:  No rashes   Neuro:  Cranial nerves 3-12 are grossly intact,  follows commands appropriately  Psych:  Alert with poor insight. Oriented to person, place, and time    Labs:    Recent Labs     10/26/22  0020   WBC 11.9*   HGB 11.4*   HCT 35.1*        Recent Labs     10/26/22  0020      K 3.6      CO2 23   *   BUN 36*   CREA 2.53*   CA 8.7   ALB 3.8   TBILI 0.5   ALT 20     No results found for: GLUCPOC  No results for input(s): PH, PCO2, PO2, HCO3, FIO2 in the last 72 hours. No results for input(s): INR, INREXT in the last 72 hours.     Radiology and EKG reviewed:   CXR reviewed    **Old Records reviewed in [facility]**       Assessment/Plan:       Principal Problem:    55 yo hx of HTN, COPD, CKD 3, intracranial hemorrhage, presented w/ dyspnea, hypoxia, acute CHF, HTN urgency, ANCELMO    1) Acute CHF/pulm edema: likely diastolic CHF from uncontrolled HTN. Will monitor on Tele. Check Echo. Start IV Bumex. Monitor  I/O. Consult Cards    2) Malignant HTN urgency: BP improving. Will wean off nicardipine gtt. Start oral labetalol, use IV hydralazine prn    3) ANCELMO/CKD 3: likely progression of CKD from uncontrolled HTN. Will check renal U/S. Consult Renal    4) COPD: use nebs prn    5) Hx of intracranial hemorrhage: likely from uncontrolled HTN.   Not on ASA or anticoagulants    Risk of deterioration: high      Total time spent with patient care: 70 Minutes (35 min on critical care time)  **I personally saw and examined the patient during this time period**                 Care Plan discussed with: Patient, nursing     Discussed:  Care Plan    Prophylaxis:  SCD's    Probable Disposition:  Home w/Family           ___________________________________________________    Attending Physician: Lata Cardenas MD

## 2022-10-26 NOTE — ED NOTES
Verbal shift change report given to Diego Stapleton RN  (oncoming nurse) by Arianne Chávez RN  (offgoing nurse). Report included the following information SBAR, Kardex, ED Summary, [facility] and Recent Results.

## 2022-10-26 NOTE — ED PROVIDER NOTES
28-year-old male presenting ER with report of shortness of breath. Patient was just hospitalized for hypertensive urgency and was having cardiac evaluation when patient signed out of the hospital AMA. Patient presenting ER with report of worsening shortness of breath over the last 3 to 4 days which he is contributing to his COPD/emphysema. Patient has not been using breathing treatments at home without improvement reports worsening shortness of breath with laying supine. Reports some chest tightness. Denies any lower leg swelling or weight gain. Patient initially reported to me that I am not worried about my blood pressure I just want my COPD treated.        Past Medical History:   Diagnosis Date    Asthma     Chronic obstructive pulmonary disease ([facility] [address]     Emphysema lung ([facility] [address]     Hypertension     Stroke ([facility] [address] 2022       Past Surgical History:   Procedure Laterality Date    HX APPENDECTOMY      HX ORTHOPAEDIC      collar bone (right)         Family History:   Problem Relation Age of Onset    Hypertension Father        Social History     Socioeconomic History    Marital status:      Spouse name: Not on file    Number of children: Not on file    Years of education: Not on file    Highest education level: Not on file   Occupational History    Not on file   Tobacco Use    Smoking status: Every Day    Smokeless tobacco: Never   Substance and Sexual Activity    Alcohol use: Yes    Drug use: Yes     Types: Marijuana    Sexual activity: Not on file   Other Topics Concern    Not on file   Social History Narrative    Not on file     Social Determinants of Health     Financial Resource Strain: Not on file   Food Insecurity: Not on file   Transportation Needs: Not on file   Physical Activity: Not on file   Stress: Not on file   Social Connections: Not on file   Intimate Partner Violence: Not on file   Housing Stability: Not on file         ALLERGIES: Ace inhibitors    Review of Systems   Constitutional:  Negative for chills and fever. HENT:  Negative for congestion and sore throat. Eyes:  Negative for pain. Respiratory:  Positive for chest tightness and shortness of breath. Cardiovascular:  Negative for chest pain. Gastrointestinal:  Negative for abdominal pain, diarrhea, nausea and vomiting. Genitourinary:  Negative for dysuria and flank pain. Musculoskeletal:  Negative for back pain and neck pain. Skin:  Negative for rash. Neurological:  Negative for dizziness, weakness, numbness and headaches. All other systems reviewed and are negative. Vitals:    10/26/22 1122 10/26/22 1217 10/26/22 1318 10/26/22 1408   BP: 134/89 112/78 (!) 136/99 (!) 152/107   Pulse:  (!) 58 66 70   Resp:   15    Temp:   98.4 °F (36.9 °C)    SpO2:   93%    Weight: 68 kg (149 lb 14.6 oz)      Height: 5' 10\" (1.778 m)               Physical Exam  Constitutional:       Appearance: He is well-developed. HENT:      Head: Normocephalic. Eyes:      Conjunctiva/sclera: Conjunctivae normal.   Cardiovascular:      Rate and Rhythm: Normal rate and regular rhythm. Pulmonary:      Effort: Pulmonary effort is normal. Tachypnea present. No respiratory distress. Breath sounds: Rales present. Abdominal:      General: Bowel sounds are normal.      Palpations: Abdomen is soft. Tenderness: There is no abdominal tenderness. Musculoskeletal:         General: Normal range of motion. Cervical back: Normal range of motion and neck supple. Skin:     General: Skin is warm. Capillary Refill: Capillary refill takes less than 2 seconds. Findings: No rash. Neurological:      Mental Status: He is alert and oriented to person, place, and time.       Comments: No gross motor or sensory deficits        MDM  Number of Diagnoses or Management Options  Acute congestive heart failure, unspecified heart failure type ([address]  Hypertensive emergency  SOB (shortness of breath)  Diagnosis management comments: Patient presenting ER with shortness of breath with extremely elevated blood pressure and rales on exam bedside ultrasound showing diffuse B-lines throughout. Started patient on nitro drip and given Lasix immediately. Given aspirin. Do not believe that patient is having COPD exacerbation however due to patient insistence given albuterol nebulized treatment. Patient started on 200 mcg/min of nitro blood pressure still remain elevated given hydralazine and labetalol. Finally with labetalol blood pressure improving. However patient remain on extremely high dose of nitro to keep patient's blood pressure more appropriate range despite repeat doses of labetalol. Patient's point-of-care troponin mildly elevated 0.07. Patient's shortness of breath improving and patient is diuresing throughout hospitalization. Will admit for further evaluation and treatment. We will transition patient from nitro drip to Cardene drip for continued blood pressure management and control. Patient did start having headache after nitro was started however had no preceding headache prior to this and had no report of any focal neurologic deficits. I continue to manage patient's blood pressure for extended period of time in our freestanding ER as transport times were extremely delayed. Patient rate required repeat evaluation of his blood pressure and repeat management. Total critical care time (not including time spent performing separately reportable procedures): 80min         Amount and/or Complexity of Data Reviewed  Clinical lab tests: reviewed  Tests in the radiology section of CPT®: reviewed  Tests in the medicine section of CPT®: reviewed  Decide to obtain previous medical records or to obtain history from someone other than the patient: yes      ED Course as of 10/26/22 1433   Wed Oct 26, 2022   0010 BP(!): 234/160 [ZD]   0031 EKG: Normal sinus rhythm rate of 96 bpm with LVH criteria. QTc 507 ms. No ST elevations no ST elevations.   Some ST depressions. EKG interpreted by Favian Grissom MD   [ZD]   0100 Troponin-I (POC): 0.07 [ZD]   4532 CHF  [AL]      ED Course User Index  [AL] Lakeshia Rodrigues MD  [ZD] Wilma Oconnor MD       Procedures        Perfect Serve Consult for Admission  3:07 AM    ED Room Number: QF49/01  Patient Name and age: Nataliya John 56 y.o.  male  Working Diagnosis:   1. SOB (shortness of breath)    2. Acute congestive heart failure, unspecified heart failure type ([address]    3.  Hypertensive emergency        COVID-19 Suspicion:  no  Sepsis present:  no  Reassessment needed: no  Code Status:  Full Code  Readmission: yes  Isolation Requirements:  no  Recommended Level of Care:  step down  Department:[facility]  Other:  Nitro ggt

## 2022-10-26 NOTE — CONSULTS
[place] [facility]   [address] [place] [address], [address] [place] [address]  Phone: [phone]  [phone]   [email or web address]    NEPHROLOGY CONSULT NOTE     Patient: Paulo Lopez MRN: [id number]  PCP: Unknown, Provider, MD   :     1965  Age:   56 y.o. Sex:  male      Referring physician: Lauren Marshall MD  Reason for consultation: 56 y.o. male with Hypertensive emergency [O71.2] complicated by   Admission Date: 10/26/2022 12:01 AM  LOS: 1 day        ASSESSMENT and PLAN :   CKD stage IV likely d/t HTN  Hypertensive emergency  Fluid overload    -agree with loop diuretics  -check PTH, UPC, ACR   -USG kidney and bladder  -on nicardipine drip   -start amlodipine and ARB if able to take PO               Subjective:   HPI: Paulo Lopez is a 56 y.o. CM with PMHx of HTN, COPD, CKD stage IIIB with last cr 1.4 from ,presented w/ dyspnea, hypoxia, acute CHF, HTN urgency with /140. Lab show cr 2.53 and GFR 29 ml/min. BNP 30 K and CXR c/w pulmonary edema. Patient is started on nicardipine gtt and received Bumex IV. Patient is feeling better and told me swelling of the leg is better. He denies taking NSAID's. Past Medical Hx:   Past Medical History:   Diagnosis Date    Asthma     Chronic obstructive pulmonary disease ([facility] [address]     Emphysema lung ([facility] [address]     Hypertension     Stroke ([facility] [address]         Past Surgical Hx:     Past Surgical History:   Procedure Laterality Date    HX APPENDECTOMY      HX ORTHOPAEDIC      collar bone (right)       Medications:  Prior to Admission medications    Medication Sig Start Date End Date Taking? Authorizing Provider   albuterol (PROVENTIL HFA, VENTOLIN HFA, PROAIR HFA) 90 mcg/actuation inhaler Take 2 Puffs by inhalation. Reports taking 6 puffs as needed   Yes Other, MD Eugenio   albuterol (PROVENTIL HFA, VENTOLIN HFA, PROAIR HFA) 90 mcg/actuation inhaler Take 2 Puffs by inhalation every four (4) hours as needed for Wheezing or Cough.  10/5/22  Yes Corby Dugan MD   inhalational spacing device For use with inhaler 10/5/22  Yes Corby Dugan MD   hydrALAZINE (APRESOLINE) 25 mg tablet Take 1 Tablet by mouth three (3) times daily. 10/5/22  Yes Corby Dugan MD       Allergies   Allergen Reactions    Ace Inhibitors Swelling       Social Hx:  reports that he has been smoking. He has never used smokeless tobacco. He reports current alcohol use. He reports current drug use. Drug: Marijuana. Family History   Problem Relation Age of Onset    Hypertension Father        Review of Systems:  A twelve point review of system was performed today. Pertinent positives and negatives are mentioned in the HPI. The reminder of the ROS is negative and noncontributory. Objective:    Vitals:    Vitals:    10/26/22 1043 10/26/22 1117 10/26/22 1122 10/26/22 1217   BP: (!) 141/90 134/89 134/89 112/78   Pulse: 72 73  (!) 58   Resp: 18      Temp: 97.7 °F (36.5 °C)      SpO2: 97%      Weight:   68 kg (149 lb 14.6 oz)    Height:   5' 10\" (1.778 m)      I&O's:  10/25 0701 - 10/26 0700  In: -   Out: 800 [Urine:800]  Visit Vitals  /78   Pulse (!) 58   Temp 97.7 °F (36.5 °C)   Resp 18   Ht 5' 10\" (1.778 m)   Wt 68 kg (149 lb 14.6 oz)   SpO2 97%   BMI 21.51 kg/m²       Physical Exam:      GENERAL : Lying down in bed with no acute distress  HEENT: AT [facility]   CVS: S1 S2 RRR, no murmur or gallops heard  RS: diminished BS +  ABDOMEN: soft NT ND positive BS  EXTREMITY: +edema no clubbing or cyanosis, pedal pulse +  NEUROLOGY: AAA X3, no focal deficit or asterixis      Laboratory Results:    BMP:   Lab Results   Component Value Date/Time     10/26/2022 12:20 AM    K 3.6 10/26/2022 12:20 AM     10/26/2022 12:20 AM    CO2 23 10/26/2022 12:20 AM    AGAP 14 10/26/2022 12:20 AM     (H) 10/26/2022 12:20 AM    BUN 36 (H) 10/26/2022 12:20 AM    CREA 2.53 (H) 10/26/2022 12:20 AM         No results found for this or any previous visit.     No results found for this visit on 10/26/22 (from the past 12 hour(s)). Thank you for consulting [facility] in the care of your patient.

## 2022-10-26 NOTE — ED NOTES
TRANSFER - OUT REPORT:    Verbal report given to Justin Wheat RN (name) on Arthur Canada  being transferred to [facility] (unit) for routine progression of care       Report consisted of patients Situation, Background, Assessment and   Recommendations(SBAR). Information from the following report(s) SBAR, Kardex, ED Summary, [facility] and Recent Results was reviewed with the receiving nurse. Lines:   Peripheral IV 10/26/22 Left Forearm (Active)   Site Assessment Clean, dry, & intact 10/26/22 0027   Phlebitis Assessment 0 10/26/22 0027   Infiltration Assessment 0 10/26/22 0027   Dressing Status Clean, dry, & intact 10/26/22 0027       Peripheral IV 10/26/22 Right Antecubital (Active)        Opportunity for questions and clarification was provided.       Patient transported with:   Monitor   Critical Care Transport

## 2022-10-26 NOTE — PROGRESS NOTES
Reason for Admission:   Hypertensive emergency                  RUR Score:  16%                PCP: First and Last name:   Unknown, Provider, MD   Name of Practice:    Are you a current patient: Yes/No:    Approximate date of last visit:    Can you participate in a virtual visit if needed:     Do you (patient/family) have any concerns for transition/discharge? No concerns were noted by the patient. Plan for utilizing home health:   Not indicated    Current Advanced Directive/Advance Care Plan:  Prior    Healthcare Decision Maker:               Primary Decision Maker: [facility] - Spouse - [phone]    Transition of Care Plan:         CM met with patient in the ED to complete initial assessment and begin discharge planning. Patient and his wife currently sleep in their [place] on an air mattress which is parked at his brother-in-law's property. [place] [address] in [place]. Patient states that he and his wife have access to his brother-in-law's home where they can use the bathroom and cook meals. Patient is iADLs at baseline. 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 His wife works at [employer]. Patient 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 and relies on his wife for transportation. He states he does not have a PCP but has an appointment with his wife's PCP on 10/31/22. Patient was not able to recall the provider's name, and his wife is currently at work. Patient has no prior home health history. He has a blood pressure monitor. Patient has Medicaid and obtains his medications from [facility] on [address]       Patient admitted for medical management  CM to follow  Cardiology, nephrology consulted  Establish with PCP - patient has appointment on 10/31  Wife to transport home at discharge  Outpatient follow-up    Care Management Interventions  Transition of Care Consult (CM Consult): Discharge Planning  Support Systems: Spouse/Significant Other, Other Family Member(s)  Confirm Follow Up Transport: Family  The Plan for Transition of Care is Related to the Following Treatment Goals : Hypertensive emergency  Discharge Location  Patient Expects to be Discharged to[de-identified] Home

## 2022-10-26 NOTE — ED NOTES
When RN in room to medicate for sleep, pt.sleeping soundly with unlabored RR. Did not wake.  Will hold benadryl for now

## 2022-10-26 NOTE — ED NOTES
Verbal shift change report given to [facility]-GLORIA PADILLA (oncoming nurse) by Barry Headley RN (offgoing nurse). Report included the following information SBAR, Kardex, ED Summary, [facility] and Recent Results.

## 2022-10-26 NOTE — PROGRESS NOTES
[facility]: TRANSFER MED RECONCILIATION    Comments/Recommendations:     Appreciate med rec completed by nursing  PTA med list is consistent with Rx query    Information obtained from: nursing med rec, Rx query    Significant PMH/Disease States:   Past Medical History:   Diagnosis Date    Asthma     Chronic obstructive pulmonary disease ([facility] [address]     Emphysema lung ([facility] [address]     Hypertension     Stroke [facility]) 2022     Chief Complaint for this Admission:   Chief Complaint   Patient presents with    Shortness of Breath    Hypertension     Allergies: Ace inhibitors    Prior to Admission Medications:     Medication Documentation Review Audit       Reviewed by DANETTE TompkinsD (Pharmacist) on 10/26/22 at 1058      Medication Sig Documenting Provider Last Dose Status Taking? albuterol (PROVENTIL HFA, VENTOLIN HFA, PROAIR HFA) 90 mcg/actuation inhaler Take 2 Puffs by inhalation. Reports taking 6 puffs as needed Other, MD Eugenio  Active Yes   albuterol (PROVENTIL HFA, VENTOLIN HFA, PROAIR HFA) 90 mcg/actuation inhaler Take 2 Puffs by inhalation every four (4) hours as needed for Wheezing or Cough. Narciso Martel MD  Active Yes   hydrALAZINE (APRESOLINE) 25 mg tablet Take 1 Tablet by mouth three (3) times daily. Narciso Martel MD  Active Yes   inhalational spacing device For use with inhaler Narciso Martel MD  Active Yes                Thank you,  [facility]

## 2022-10-26 NOTE — ED NOTES
Pt.nitro drip decreased to 100 mcg/min (30 cc/hr) per  verbal order. Current blood pressure 158/105. Requesting something to help sleep.  Family x 2 at bedside

## 2022-10-27 LAB
ALBUMIN SERPL-MCNC: 3 G/DL (ref 3.5–5)
ALBUMIN/GLOB SERPL: 1 {RATIO} (ref 1.1–2.2)
ALP SERPL-CCNC: 76 U/L (ref 45–117)
ALT SERPL-CCNC: 23 U/L (ref 12–78)
ANION GAP SERPL CALC-SCNC: 8 MMOL/L (ref 5–15)
AST SERPL-CCNC: 13 U/L (ref 15–37)
BILIRUB SERPL-MCNC: 0.4 MG/DL (ref 0.2–1)
BNP SERPL-MCNC: ABNORMAL PG/ML
BUN SERPL-MCNC: 37 MG/DL (ref 6–20)
BUN/CREAT SERPL: 13 (ref 12–20)
CALCIUM SERPL-MCNC: 8.7 MG/DL (ref 8.5–10.1)
CHLORIDE SERPL-SCNC: 110 MMOL/L (ref 97–108)
CHOLEST SERPL-MCNC: 225 MG/DL
CO2 SERPL-SCNC: 24 MMOL/L (ref 21–32)
CORTIS SERPL-MCNC: 23.2 UG/DL
CREAT SERPL-MCNC: 2.84 MG/DL (ref 0.7–1.3)
ERYTHROCYTE [DISTWIDTH] IN BLOOD BY AUTOMATED COUNT: 13.2 % (ref 11.5–14.5)
EST. AVERAGE GLUCOSE BLD GHB EST-MCNC: 100 MG/DL
GLOBULIN SER CALC-MCNC: 2.9 G/DL (ref 2–4)
GLUCOSE SERPL-MCNC: 106 MG/DL (ref 65–100)
HBA1C MFR BLD: 5.1 % (ref 4–5.6)
HCT VFR BLD AUTO: 34.2 % (ref 36.6–50.3)
HDLC SERPL-MCNC: 97 MG/DL
HDLC SERPL: 2.3 {RATIO} (ref 0–5)
HGB BLD-MCNC: 11.1 G/DL (ref 12.1–17)
LDLC SERPL CALC-MCNC: 112.8 MG/DL (ref 0–100)
MAGNESIUM SERPL-MCNC: 2.5 MG/DL (ref 1.6–2.4)
MCH RBC QN AUTO: 30.9 PG (ref 26–34)
MCHC RBC AUTO-ENTMCNC: 32.5 G/DL (ref 30–36.5)
MCV RBC AUTO: 95.3 FL (ref 80–99)
NRBC # BLD: 0 K/UL (ref 0–0.01)
NRBC BLD-RTO: 0 PER 100 WBC
PHOSPHATE SERPL-MCNC: 4.6 MG/DL (ref 2.6–4.7)
PLATELET # BLD AUTO: 206 K/UL (ref 150–400)
PMV BLD AUTO: 10.1 FL (ref 8.9–12.9)
POTASSIUM SERPL-SCNC: 3.5 MMOL/L (ref 3.5–5.1)
PROT SERPL-MCNC: 5.9 G/DL (ref 6.4–8.2)
RBC # BLD AUTO: 3.59 M/UL (ref 4.1–5.7)
SODIUM SERPL-SCNC: 142 MMOL/L (ref 136–145)
TRIGL SERPL-MCNC: 76 MG/DL (ref ?–150)
TSH SERPL DL<=0.05 MIU/L-ACNC: 0.87 UIU/ML (ref 0.36–3.74)
VLDLC SERPL CALC-MCNC: 15.2 MG/DL
WBC # BLD AUTO: 8.3 K/UL (ref 4.1–11.1)

## 2022-10-27 PROCEDURE — 74011250637 HC RX REV CODE- 250/637: Performed by: INTERNAL MEDICINE

## 2022-10-27 PROCEDURE — 80061 LIPID PANEL: CPT

## 2022-10-27 PROCEDURE — 74011000250 HC RX REV CODE- 250: Performed by: INTERNAL MEDICINE

## 2022-10-27 PROCEDURE — 36415 COLL VENOUS BLD VENIPUNCTURE: CPT

## 2022-10-27 PROCEDURE — 82088 ASSAY OF ALDOSTERONE: CPT

## 2022-10-27 PROCEDURE — 83036 HEMOGLOBIN GLYCOSYLATED A1C: CPT

## 2022-10-27 PROCEDURE — 74011250637 HC RX REV CODE- 250/637: Performed by: STUDENT IN AN ORGANIZED HEALTH CARE EDUCATION/TRAINING PROGRAM

## 2022-10-27 PROCEDURE — 99232 SBSQ HOSP IP/OBS MODERATE 35: CPT | Performed by: STUDENT IN AN ORGANIZED HEALTH CARE EDUCATION/TRAINING PROGRAM

## 2022-10-27 PROCEDURE — APPSS30 APP SPLIT SHARED TIME 16-30 MINUTES: Performed by: NURSE PRACTITIONER

## 2022-10-27 PROCEDURE — 80053 COMPREHEN METABOLIC PANEL: CPT

## 2022-10-27 PROCEDURE — 85027 COMPLETE CBC AUTOMATED: CPT

## 2022-10-27 PROCEDURE — 84244 ASSAY OF RENIN: CPT

## 2022-10-27 PROCEDURE — 84100 ASSAY OF PHOSPHORUS: CPT

## 2022-10-27 PROCEDURE — 84443 ASSAY THYROID STIM HORMONE: CPT

## 2022-10-27 PROCEDURE — 83735 ASSAY OF MAGNESIUM: CPT

## 2022-10-27 PROCEDURE — 82533 TOTAL CORTISOL: CPT

## 2022-10-27 PROCEDURE — 83880 ASSAY OF NATRIURETIC PEPTIDE: CPT

## 2022-10-27 PROCEDURE — 65270000046 HC RM TELEMETRY

## 2022-10-27 RX ORDER — AMLODIPINE BESYLATE 5 MG/1
10 TABLET ORAL DAILY
Status: DISCONTINUED | OUTPATIENT
Start: 2022-10-27 | End: 2022-10-28 | Stop reason: HOSPADM

## 2022-10-27 RX ORDER — BUMETANIDE 1 MG/1
1 TABLET ORAL 2 TIMES DAILY
Status: DISCONTINUED | OUTPATIENT
Start: 2022-10-27 | End: 2022-10-28 | Stop reason: HOSPADM

## 2022-10-27 RX ORDER — LABETALOL 200 MG/1
400 TABLET, FILM COATED ORAL EVERY 12 HOURS
Status: DISCONTINUED | OUTPATIENT
Start: 2022-10-27 | End: 2022-10-28 | Stop reason: HOSPADM

## 2022-10-27 RX ORDER — HYDRALAZINE HYDROCHLORIDE 25 MG/1
50 TABLET, FILM COATED ORAL 3 TIMES DAILY
Status: DISCONTINUED | OUTPATIENT
Start: 2022-10-27 | End: 2022-10-28 | Stop reason: HOSPADM

## 2022-10-27 RX ORDER — POLYETHYLENE GLYCOL 3350 17 G/17G
17 POWDER, FOR SOLUTION ORAL 2 TIMES DAILY
Status: DISCONTINUED | OUTPATIENT
Start: 2022-10-27 | End: 2022-10-28 | Stop reason: HOSPADM

## 2022-10-27 RX ORDER — AMOXICILLIN 250 MG
1 CAPSULE ORAL DAILY
Status: DISCONTINUED | OUTPATIENT
Start: 2022-10-27 | End: 2022-10-28 | Stop reason: HOSPADM

## 2022-10-27 RX ORDER — BUMETANIDE 1 MG/1
1 TABLET ORAL 2 TIMES DAILY
Status: DISCONTINUED | OUTPATIENT
Start: 2022-10-27 | End: 2022-10-27

## 2022-10-27 RX ADMIN — LABETALOL HYDROCHLORIDE 400 MG: 200 TABLET, FILM COATED ORAL at 22:00

## 2022-10-27 RX ADMIN — POLYETHYLENE GLYCOL 3350 17 G: 17 POWDER, FOR SOLUTION ORAL at 16:24

## 2022-10-27 RX ADMIN — LABETALOL HYDROCHLORIDE 400 MG: 200 TABLET, FILM COATED ORAL at 08:08

## 2022-10-27 RX ADMIN — HYDRALAZINE HYDROCHLORIDE 50 MG: 25 TABLET, FILM COATED ORAL at 16:22

## 2022-10-27 RX ADMIN — BUMETANIDE 1 MG: 1 TABLET ORAL at 17:17

## 2022-10-27 RX ADMIN — BUMETANIDE 1 MG: 0.25 INJECTION INTRAMUSCULAR; INTRAVENOUS at 08:08

## 2022-10-27 RX ADMIN — ACETAMINOPHEN 650 MG: 325 TABLET, FILM COATED ORAL at 09:56

## 2022-10-27 RX ADMIN — HYDRALAZINE HYDROCHLORIDE 50 MG: 25 TABLET, FILM COATED ORAL at 22:00

## 2022-10-27 RX ADMIN — POLYETHYLENE GLYCOL 3350 17 G: 17 POWDER, FOR SOLUTION ORAL at 09:56

## 2022-10-27 RX ADMIN — SENNOSIDES AND DOCUSATE SODIUM 1 TABLET: 50; 8.6 TABLET ORAL at 09:55

## 2022-10-27 RX ADMIN — HYDRALAZINE HYDROCHLORIDE 50 MG: 25 TABLET, FILM COATED ORAL at 09:55

## 2022-10-27 RX ADMIN — AMLODIPINE BESYLATE 10 MG: 5 TABLET ORAL at 08:08

## 2022-10-27 NOTE — PROGRESS NOTES
Problem: Falls - Risk of  Goal: *Absence of Falls  Description: Document Sai Garcia Fall Risk and appropriate interventions in the flowsheet.   Outcome: Progressing Towards Goal  Note: Fall Risk Interventions:  Mobility Interventions: Communicate number of staff needed for ambulation/transfer         Medication Interventions: Bed/chair exit alarm                   Problem: Patient Education: Go to Patient Education Activity  Goal: Patient/Family Education  Outcome: Progressing Towards Goal     Problem: Chronic Obstructive Pulmonary Disease (COPD)  Goal: *Oxygen saturation during activity within specified parameters  Outcome: Progressing Towards Goal  Goal: *Able to remain out of bed as prescribed  Outcome: Progressing Towards Goal  Goal: *Absence of hypoxia  Outcome: Progressing Towards Goal  Goal: *Optimize nutritional status  Outcome: Progressing Towards Goal     Problem: Patient Education: Go to Patient Education Activity  Goal: Patient/Family Education  Outcome: Progressing Towards Goal

## 2022-10-27 NOTE — NURSE NAVIGATOR
Chart reviewed by Heart Failure Nurse Navigator. Heart Failure database completed. Patient came to ED for shortness of breath with ongoing uncontrolled HTN at home. He is admitted with malignant hypertensive urgency, ANCELMO on CKD3, and acute HFpEF. He has PMH of HTN, COPD, CKD3, and intracranial hemorrhage. Cardiology and Nephrology are consulted. EF:  55 to 60% with mildly increased wall thickness and diastolic dysfunction. Hydralazine 50 mg TID    ACEi/ARB/ARNi:  Not indicated    BB: Labetolol 400 mg every 12 hrs    Aldosterone Antagonist:  Not indicated    Obstructive Sleep Apnea Screening:   Referred to Sleep Medicine:     CRT Not indicated    NYHA Functional Class **. Heart Failure Teach Back in Patient Education. Heart Failure Avoiding Triggers on Discharge Instructions. Cardiologist: Dr Carmen Corea consulted    Post discharge follow up phone call to be made within 48-72 hours of discharge. Met with patient at bedside then spoke to wife on phone. She read appointment information from his appointment card and confirmed he has appointment for Monday, Oct 31st at 3:30 pm with Dr Pelon Taylor. She states that is her PCP and she has been working on getting him an appointment with her for a few months. He previously saw a different provider in the same practice that he did not like. She states she discussed with Dr Pelon Taylor taking him on as a patient.   Appointment information added to AVS.

## 2022-10-27 NOTE — PROGRESS NOTES
[facility]  Cardiology Care Note                  []Initial visit     [x]Established visit     Patient Name: Tyler Zelaya - [facility]:[birth year] - JMW:[id number]  Primary Cardiologist: none  Consulting Cardiologist: Mikie Patterson DO     Reason for consult: CHF     HPI:   VIC Zelaya is a 56 y.o. male with PMH significant for poorly controlled HTN, COPD, intracranial hemorrhage in 2021, CKD 3, who presented to the ED with dyspnea, CHF, HTN . Chest xray shows mild interstitial [place] [address]. Has been SOB for a month. No chest pain or edema. He said a MD gave him lin blood pressure pills a while ago but he doesn't follow with MD regularly. He was at OSH  few days ago but left ama. SUBJECTIVE:      Tyler Zelaya reports he has a HA this morning but breathing much better. Assessment and Plan     1. Untreated HTN with LVH on EKG: cont norvasc, hydralazine, labetalol - increased this morning, gradually titrate. On IV bumex  2. Interstitial edema on chest xray: cont IV diuretics if ok w/ renal, EF 55-60%, LVH. pBNP trending down. Control BP first, ischemic eval as OP  3. ANCELMO/CKD: nephrology following - Cr up to 2.84  4. Smoker : advised cessation   5. Hx intracranial hemorrhage 2021: per neurology recs from last week: There is no contraindication for using any blood thinners including antiplatelets or anticoagulation if needed from neurology standpoint. 6. Ascending aortic enlargement: 4.3 cm, follow OP          ____________________________________________________________    Cardiac testing    ECHO: 10/26/22    ECHO ADULT COMPLETE 10/26/2022 10/26/2022    Interpretation Summary    Left Ventricle: Normal left ventricular systolic function with a visually estimated EF of 55 - 60%. Left ventricle size is normal. Mildly increased wall thickness. Findings consistent with concentric hypertrophy. Normal wall motion. Diastolic dysfunction present with increased LAP with normal LV EF. Right Ventricle: Right ventricle size is normal. Mildly increased wall thickness. Right Atrium: Right atrium is mildly dilated. Aorta: Normal sized sinus of Valsalva. Moderately dilated ascending aorta. Ao Ascending diameter is 4.3 cm. Signed by: Flavio Ham DO on 10/26/2022 12:47 PM    Most recent HS troponins:  No results for input(s): TROPHS in the last 72 hours. No lab exists for component:  CKMB    ECG:   EKG Results       Procedure 720 Value Units Date/Time    EKG, 12 LEAD, INITIAL [[id number]] Collected: 10/26/22 0022    Order Status: Completed Updated: 10/26/22 1404     Ventricular Rate 96 BPM      Atrial Rate 96 BPM      P-R Interval 164 ms      QRS Duration 96 ms      Q-T Interval 402 ms      QTC Calculation (Bezet) 507 ms      Calculated P Axis 64 degrees      Calculated R Axis -13 degrees      Calculated T Axis 153 degrees      Diagnosis --     Normal sinus rhythm  Possible Left atrial enlargement  Left ventricular hypertrophy with repolarization abnormality ( Sokolow-Herring ,   Remington product , Romhilt-Shaw )  Prolonged QT  Abnormal ECG  When compared with ECG of 18-OCT-2022 17:42,  No significant change was found  Confirmed by Agnieszka Mckay M.D., [facility] ([id number]) on 10/26/2022 2:04:33 PM                Review of Systems:    [x]All other systems reviewed and all negative except as written in HPI    [] Patient unable to provide secondary to condition       Past Medical History:   Diagnosis Date    Asthma     Chronic obstructive pulmonary disease ([address]     Emphysema lung ([address]     Hypertension     Stroke ([address] 2022     Past Surgical History:   Procedure Laterality Date    HX APPENDECTOMY      HX ORTHOPAEDIC      collar bone (right)     Social Hx:  reports that he has been smoking. He has never used smokeless tobacco. He reports current alcohol use. He reports current drug use. Drug: Marijuana.   Family Hx: family history includes Hypertension in his father. Allergies   Allergen Reactions    Ace Inhibitors Swelling          OBJECTIVE:  Wt Readings from Last 3 Encounters:   10/26/22 67.2 kg (148 lb 2.4 oz)   10/18/22 68 kg (150 lb)   10/18/22 68 kg (150 lb)       Intake/Output Summary (Last 24 hours) at 10/27/2022 0844  Last data filed at 10/27/2022 0840  Gross per 24 hour   Intake 460 ml   Output 1690 ml   Net -1230 ml       Physical Exam:    Vitals:   Vitals:    10/27/22 0315 10/27/22 0700 10/27/22 0738 10/27/22 0739   BP: (!) 162/95  (!) 183/120 (!) 184/115   Pulse: 77 70 76    Resp: 18  16    Temp: 97.3 °F (36.3 °C)  98 °F (36.7 °C)    SpO2: 97%  98%    Weight:       Height:         Telemetry: NSR     Gen: Well-developed, well-nourished, in no acute distress  Neck: Supple, No JVD, No Carotid Bruit  Resp: No accessory muscle use, scattered rhonchi  Card: Regular Rate, Rhythm, Normal S1, S2, No murmurs, rubs or gallop. Abd:   Soft, non-tender, non-distended, BS+   MSK: No cyanosis  Skin: No rashes    Neuro: Moving all four extremities, follows commands appropriately  Psych: Poor insight, oriented to person, place, alert, Nml Affect  LE: No edema    Data Review:     Radiology:   XR Results (most recent):  Results from Hospital Encounter encounter on 10/26/22    XR CHEST PORT    Narrative  EXAM: XR CHEST PORT    INDICATION: Shortness of breath    COMPARISON: 10/19/2022    FINDINGS: A portable AP radiograph of the chest was obtained at 0013 hours. The  patient is on a cardiac monitor. There is mild interstitial edema. .  The bones  and soft tissues are grossly within normal limits.     Impression  Mild interstitial edema      Recent Labs     10/27/22  0321 10/26/22  1347 10/26/22  0020     --  141   K 3.5  --  3.6   *  --  104   CO2 24  --  23   BUN 37*  --  36*   CREA 2.84*  --  2.53*   *  --  111*   PHOS 4.6  --   --    CA 8.7 8.6 8.7     Recent Labs     10/27/22  0321 10/26/22  0020   WBC 8.3 11.9*   HGB 11.1* 11.4*   HCT 34.2* 35.1*    192     Recent Labs     10/27/22  0321 10/26/22  0020   AP 76 86     No results for input(s): CHOL, LDLC in the last 72 hours.     No lab exists for component: TGL, HDLC,  HBA1C      Current meds:    Current Facility-Administered Medications:     amLODIPine (NORVASC) tablet 10 mg, 10 mg, Oral, DAILY, Brinton Cheadle, John T, DO, 10 mg at 10/27/22 4416    labetaloL (NORMODYNE) tablet 400 mg, 400 mg, Oral, Q12H, Mickiel Hummer T, DO, 400 mg at 10/27/22 3294    polyethylene glycol (MIRALAX) packet 17 g, 17 g, Oral, BID, Mello Marshall MD    senna-docusate (PERICOLACE) 8.6-50 mg per tablet 1 Tablet, 1 Tablet, Oral, DAILY, Mello Marshall MD    hydrALAZINE (APRESOLINE) tablet 50 mg, 50 mg, Oral, TID, Robe Jacobo MD    hydrALAZINE (APRESOLINE) 20 mg/mL injection 20 mg, 20 mg, IntraVENous, Q6H PRN, Remberto Nance MD, 20 mg at 10/26/22 2020    bumetanide (BUMEX) injection 1 mg, 1 mg, IntraVENous, Q12H, Remberto Nance MD, 1 mg at 10/27/22 6051    acetaminophen (TYLENOL) tablet 650 mg, 650 mg, Oral, Q6H PRN, Remberto Nance MD    HYDROmorphone (DILAUDID) syringe 0.5 mg, 0.5 mg, IntraVENous, Q4H PRN, Remberto Nance MD    prochlorperazine (COMPAZINE) injection 10 mg, 10 mg, IntraVENous, Q6H PRN, Do, Claudene Ram, MD    albuterol-ipratropium (DUO-NEB) 2.5 MG-0.5 MG/3 ML, 3 mL, Nebulization, Q4H PRN, Do, Claudene Ram, MD    nicotine (NICODERM CQ) 21 mg/24 hr patch 1 Patch, 1 Patch, TransDERmal, DAILY, Lalla Osgood, MD, 1 Patch at 10/27/22 224 [address], NP    [facility] [id number] [address] [place] [address] Dr Liu Zeng, Provider, MD

## 2022-10-27 NOTE — PROGRESS NOTES
2000 Bedside and Verbal shift change report given to Peace (oncoming nurse) by Austen Zapien (offgoing nurse). Report included the following information SBAR, Kardex, ED Summary, Procedure Summary, Intake/Output, MAR, and Cardiac Rhythm SR . Primary Nurse Kayla Stafford RN and GLORIA mazariegos performed a dual skin assessment on this patient No impairment noted  Pt a/o x4. On room air. Urinal at bedside. 2026 pt given PRN hydralazine for sbp >160.   2120 Wife Theora Lesch, called in to unit requesting  get nicotine patch for morning change. Dr. Aileen Yung called for patch request. Pt on stage 2. Current patch on left shoulder. Waldo Perkins RN given transfer report. Answered all questions.

## 2022-10-27 NOTE — PROGRESS NOTES
TRANSFER - IN REPORT:    Verbal report received from Delores Ortega Bony on Pedro Fair  being received from ED  for change in patient condition( )      Report consisted of patients Situation, Background, Assessment and   Recommendations(SBAR). Information from the following report(s) SBAR, Kardex, ED Summary, Intake/Output, MAR, Recent Results, Cardiac Rhythm NS, Procedure Verification, and Quality Measures was reviewed with the receiving nurse. Opportunity for questions and clarification was provided. Assessment completed upon patients arrival to unit and care assumed.

## 2022-10-27 NOTE — PROGRESS NOTES
Transition of Care Plan: RUR-17%  Patient admitted for medical management  CM following  Cardiology, nephrology following  Establish with PCP - patient has appointment on 10/31  Wife to transport home at discharge  Outpatient follow-up  DAVID Gray

## 2022-10-27 NOTE — PROGRESS NOTES
Alvarez Moe  YOB: 1965      Assessment & Plan:     CKD stage IV d/t HTN  Uncontrolled HTN  Fluid overload  Sub nephrotic range proteinuria 1.8 gm    -cr/GFR worse d/t reversal of hyper filtration injury  -USG c/w MRD  -check renin/khushbu and cortisol  -change to Bumex 1 mg po bid  -continue rest of the BP med's         Subjective:   CC: F/U CKD   HPI: Patient seen, sob better   ROS:  Current Facility-Administered Medications   Medication Dose Route Frequency    amLODIPine (NORVASC) tablet 10 mg  10 mg Oral DAILY    labetaloL (NORMODYNE) tablet 400 mg  400 mg Oral Q12H    polyethylene glycol (MIRALAX) packet 17 g  17 g Oral BID    senna-docusate (PERICOLACE) 8.6-50 mg per tablet 1 Tablet  1 Tablet Oral DAILY    hydrALAZINE (APRESOLINE) tablet 50 mg  50 mg Oral TID    bumetanide (BUMEX) tablet 1 mg  1 mg Oral BID    hydrALAZINE (APRESOLINE) 20 mg/mL injection 20 mg  20 mg IntraVENous Q6H PRN    acetaminophen (TYLENOL) tablet 650 mg  650 mg Oral Q6H PRN    HYDROmorphone (DILAUDID) syringe 0.5 mg  0.5 mg IntraVENous Q4H PRN    prochlorperazine (COMPAZINE) injection 10 mg  10 mg IntraVENous Q6H PRN    albuterol-ipratropium (DUO-NEB) 2.5 MG-0.5 MG/3 ML  3 mL Nebulization Q4H PRN    nicotine (NICODERM CQ) 21 mg/24 hr patch 1 Patch  1 Patch TransDERmal DAILY          Objective:     Vitals:  Blood pressure 128/85, pulse 66, temperature 98.3 °F (36.8 °C), resp. rate 16, height 5' 10\" (1.778 m), weight 67.2 kg (148 lb 2.4 oz), SpO2 97 %. Temp (24hrs), Av.9 °F (36.6 °C), Min:97.3 °F (36.3 °C), Max:98.4 °F (36.9 °C)      Intake and Output:  10/27 0701 - 10/27 1900  In: 360 [P.O.:360]  Out: 7245 [LERFI:9518]  10/25 1901 - 10/27 0700  In: 340 [P.O.:320;  I.V.:20]  Out: 2240 [Urine:2240]    Physical Exam:                   Physical Examination:   GENERAL ASSESSMENT: NAD  HEENT:Nontraumatic   CHEST: diminished bs +  HEART: S1S2  ABDOMEN: Soft,NT,  :Duran:   EXTREMITY: no EDEMA  NEURO:Grossly non focal ECG/rhythm:    Data Review      Recent Labs     10/26/22  0035   TNIPOC 0.07      No results for input(s): CPK, CKMB, TROIQ in the last 72 hours. Recent Labs     10/27/22  0321 10/26/22  1347 10/26/22  0020     --  141   K 3.5  --  3.6   *  --  104   CO2 24  --  23   BUN 37*  --  36*   CREA 2.84*  --  2.53*   *  --  111*   PHOS 4.6  --   --    MG 2.5*  --   --    CA 8.7 8.6 8.7   ALB 3.0*  --  3.8   WBC 8.3  --  11.9*   HGB 11.1*  --  11.4*   HCT 34.2*  --  35.1*     --  192      No results for input(s): INR, PTP, APTT, INREXT in the last 72 hours. Needs: urine analysis, urine sodium, protein and creatinine  Lab Results   Component Value Date/Time    Creatinine, urine random 41.60 10/26/2022 02:02 PM               Discussed with:   patient     : Klarissa Mei MD  10/27/2022        [facility]:  [email or web address]  Jeremy Soto office:  [address] [place] [address],[address]  [address]  Phone: [phone]  Fax :     [phone]    Vikas office:  [address] [facility]  [facility]  Phone - [phone]  Fax - [phone]

## 2022-10-27 NOTE — PROGRESS NOTES
0730 Bedside and Verbal shift change report given to April RN (oncoming nurse) by Myrl Boxer RN (offgoing nurse). Report included the following information SBAR and Kardex. This patient was assisted with Intentional Toileting every 2 hours during this shift as appropriate. Documentation of ambulation and output reflected on Flowsheet as appropriate. Purposeful hourly rounding was completed using AIDET and 5Ps. Outcomes of PHR documented as they occurred. Bed alarm in use as appropriate. Dual Suction and ambubag in place. 0800 Joanna aware of am BP, wants to try PO medications first, to see if will lower BP.    1930 Bedside and Verbal shift change report given to [address] (oncoming nurse) by April RN (offgoing nurse). Report included the following information SBAR and Kardex.

## 2022-10-27 NOTE — PROGRESS NOTES
Scar Morales [facility]  [address] [facility], [address] [place] [address]  [phone]      Hospitalist Progress Note      NAME: Asad Leo   :  1965  MRM:  [id number]    Date of service: 10/27/2022  7:52 AM       Assessment and Plan:   Acute diastolic CHF/pulm edema: due to uncontrolled HTN. Echo: normal EF, ventricular hypertrophy, diastolic dysfunction. Cont IV Bumex. Monitor  I/O.  evaluate by Cardiology     2. HTN urgency: Off nicardipine gtt. Started on oral labetalol, amlodipine. IV hydralazine prn. Monitor closely      3. CKD 4:  from uncontrolled HTN. Renal U/S: medical renal disease. Renal following      4. COPD, stable. Not wheezing. Cont nebs PRN     5. Hx of intracranial hemorrhage: likely from uncontrolled HTN. No ASA or anticoagulants         Subjective:     Chief Complaint[de-identified] Patient was seen and examined as a follow up for CHF. Chart was reviewed. c/o mild headache. Denies SOB     ROS:  (bold if positive, if negative)    Tolerating PT  Tolerating Diet        Objective:     Last 24hrs VS reviewed since prior progress note.  Most recent are:    Visit Vitals  BP (!) 184/115   Pulse 76   Temp 98 °F (36.7 °C)   Resp 16   Ht 5' 10\" (1.778 m)   Wt 67.2 kg (148 lb 2.4 oz)   SpO2 98%   BMI 21.26 kg/m²     SpO2 Readings from Last 6 Encounters:   10/27/22 98%   10/20/22 99%   10/18/22 97%   10/05/22 97%   10/18/19 96%          Intake/Output Summary (Last 24 hours) at 10/27/2022 0752  Last data filed at 10/27/2022 0316  Gross per 24 hour   Intake 340 ml   Output 1440 ml   Net -1100 ml        Physical Exam:    Gen:  Well-developed, well-nourished, in no acute distress  HEENT:  Pink conjunctivae, PERRL, hearing intact to voice, moist mucous membranes  Neck:  Supple, without masses, thyroid non-tender  Resp:  No accessory muscle use, clear breath sounds without wheezes rales or rhonchi  Card:  No murmurs, normal S1, S2 without thrills, bruits or peripheral edema  Abd:  Soft, non-tender, non-distended, normoactive bowel sounds are present, no palpable organomegaly and no detectable hernias  Lymph:  No cervical or inguinal adenopathy  Musc:  No cyanosis or clubbing  Skin:  No rashes or ulcers, skin turgor is good  Neuro:  Cranial nerves are grossly intact, no focal motor weakness, follows commands appropriately  Psych:  Good insight, oriented to person, place and time, alert  __________________________________________________________________  Medications Reviewed: (see below)  Medications:     Current Facility-Administered Medications   Medication Dose Route Frequency    amLODIPine (NORVASC) tablet 10 mg  10 mg Oral DAILY    labetaloL (NORMODYNE) tablet 400 mg  400 mg Oral Q12H    hydrALAZINE (APRESOLINE) 20 mg/mL injection 20 mg  20 mg IntraVENous Q6H PRN    bumetanide (BUMEX) injection 1 mg  1 mg IntraVENous Q12H    acetaminophen (TYLENOL) tablet 650 mg  650 mg Oral Q6H PRN    HYDROmorphone (DILAUDID) syringe 0.5 mg  0.5 mg IntraVENous Q4H PRN    prochlorperazine (COMPAZINE) injection 10 mg  10 mg IntraVENous Q6H PRN    albuterol-ipratropium (DUO-NEB) 2.5 MG-0.5 MG/3 ML  3 mL Nebulization Q4H PRN    nicotine (NICODERM CQ) 21 mg/24 hr patch 1 Patch  1 Patch TransDERmal DAILY        Lab Data Reviewed: (see below)  Lab Review:     Recent Labs     10/27/22  0321 10/26/22  0020   WBC 8.3 11.9*   HGB 11.1* 11.4*   HCT 34.2* 35.1*    192     Recent Labs     10/27/22  0321 10/26/22  1347 10/26/22  0020     --  141   K 3.5  --  3.6   *  --  104   CO2 24  --  23   *  --  111*   BUN 37*  --  36*   CREA 2.84*  --  2.53*   CA 8.7 8.6 8.7   MG 2.5*  --   --    PHOS 4.6  --   --    ALB 3.0*  --  3.8   TBILI 0.4  --  0.5   ALT 23  --  20     No results found for: GLUCPOC  No results for input(s): PH, PCO2, PO2, HCO3, FIO2 in the last 72 hours. No results for input(s): INR, INREXT in the last 72 hours.   All Micro Results       None            I have reviewed notes of prior 24hr.    Other pertinent lab: Total time spent with patient: 35 minutes I personally reviewed chart, notes, data and current medications in the medical record. I have personally examined and treated the patient at bedside during this period.                  Care Plan discussed with: Patient, Nursing Staff, and >50% of time spent in counseling and coordination of care    Discussed:  Care Plan    Prophylaxis:  SCD's    Disposition:  Home w/Family           ___________________________________________________    Attending Physician: Simon Humphries MD No

## 2022-10-28 VITALS
BODY MASS INDEX: 21.62 KG/M2 | HEIGHT: 70 IN | SYSTOLIC BLOOD PRESSURE: 110 MMHG | RESPIRATION RATE: 16 BRPM | OXYGEN SATURATION: 94 % | TEMPERATURE: 97.5 F | WEIGHT: 151.01 LBS | DIASTOLIC BLOOD PRESSURE: 73 MMHG | HEART RATE: 68 BPM

## 2022-10-28 LAB
ANION GAP SERPL CALC-SCNC: 8 MMOL/L (ref 5–15)
BUN SERPL-MCNC: 39 MG/DL (ref 6–20)
BUN/CREAT SERPL: 13 (ref 12–20)
CALCIUM SERPL-MCNC: 8.9 MG/DL (ref 8.5–10.1)
CHLORIDE SERPL-SCNC: 111 MMOL/L (ref 97–108)
CO2 SERPL-SCNC: 24 MMOL/L (ref 21–32)
COMMENT, HOLDF: NORMAL
CREAT SERPL-MCNC: 3.01 MG/DL (ref 0.7–1.3)
GLUCOSE SERPL-MCNC: 100 MG/DL (ref 65–100)
POTASSIUM SERPL-SCNC: 4.1 MMOL/L (ref 3.5–5.1)
SAMPLES BEING HELD,HOLD: NORMAL
SODIUM SERPL-SCNC: 143 MMOL/L (ref 136–145)

## 2022-10-28 PROCEDURE — 80048 BASIC METABOLIC PNL TOTAL CA: CPT

## 2022-10-28 PROCEDURE — 74011250637 HC RX REV CODE- 250/637: Performed by: STUDENT IN AN ORGANIZED HEALTH CARE EDUCATION/TRAINING PROGRAM

## 2022-10-28 PROCEDURE — 74011250637 HC RX REV CODE- 250/637: Performed by: INTERNAL MEDICINE

## 2022-10-28 PROCEDURE — 36415 COLL VENOUS BLD VENIPUNCTURE: CPT

## 2022-10-28 RX ORDER — LABETALOL 200 MG/1
400 TABLET, FILM COATED ORAL EVERY 12 HOURS
Qty: 60 TABLET | Refills: 0 | Status: SHIPPED | OUTPATIENT
Start: 2022-10-28 | End: 2022-11-15

## 2022-10-28 RX ORDER — BUMETANIDE 1 MG/1
1 TABLET ORAL 2 TIMES DAILY
Qty: 60 TABLET | Refills: 0 | Status: SHIPPED | OUTPATIENT
Start: 2022-10-28

## 2022-10-28 RX ORDER — HYDRALAZINE HYDROCHLORIDE 50 MG/1
50 TABLET, FILM COATED ORAL 3 TIMES DAILY
Qty: 90 TABLET | Refills: 1 | Status: SHIPPED | OUTPATIENT
Start: 2022-10-28 | End: 2022-11-15

## 2022-10-28 RX ORDER — AMLODIPINE BESYLATE 10 MG/1
10 TABLET ORAL DAILY
Qty: 30 TABLET | Refills: 1 | Status: SHIPPED | OUTPATIENT
Start: 2022-10-29

## 2022-10-28 RX ADMIN — LABETALOL HYDROCHLORIDE 400 MG: 200 TABLET, FILM COATED ORAL at 08:26

## 2022-10-28 RX ADMIN — AMLODIPINE BESYLATE 10 MG: 5 TABLET ORAL at 08:26

## 2022-10-28 RX ADMIN — BUMETANIDE 1 MG: 1 TABLET ORAL at 08:26

## 2022-10-28 RX ADMIN — HYDRALAZINE HYDROCHLORIDE 50 MG: 25 TABLET, FILM COATED ORAL at 08:26

## 2022-10-28 NOTE — PROGRESS NOTES
0700 Bedside shift change report given to [address] (oncoming nurse) by [address] RN (offgoing nurse). Report included the following information SBAR, Kardex, ED Summary, Intake/Output, MAR, and Recent Results. This patient was assisted with Intentional Toileting every 2 hours during this shift as appropriate. Documentation of ambulation and output reflected on Flowsheet as appropriate. Purposeful hourly rounding was completed using AIDET and 5Ps. Outcomes of PHR documented as they occurred. Bed alarm in use as appropriate. Dual Suction and ambubag in place. 1249 Discharge papers completed and reviewed by patient and family.

## 2022-10-28 NOTE — PROGRESS NOTES
Scar Morales danielle [facility] [address]  [address] [facility], [place], [address] [place] [address]  [phone]      Hospitalist Progress Note      NAME: Yojana Henderson   :  1965  MRM:  [id number]    Date of service: 10/28/2022  7:52 AM       Assessment and Plan:   Acute diastolic CHF/pulm edema: due to uncontrolled HTN. Echo: normal EF, ventricular hypertrophy, diastolic dysfunction. Cont po Bumex. Monitor  I/O.  evaluated by Cardiology and needs outpatient FU      2. HTN urgency: Off nicardipine gtt. On oral labetalol, amlodipine. IV hydralazine prn. BP better controlled. 3.  CKD 4:  from uncontrolled HTN. Renal U/S: medical renal disease. Renal following. Checking:  renin/khushbu and cortisol. Outpatient FU     4. COPD, stable. Not wheezing. Cont nebs PRN     5. Hx of intracranial hemorrhage: likely from uncontrolled HTN. No ASA or anticoagulants         Subjective:     Chief Complaint[de-identified] Patient was seen and examined as a follow up for CHF. Chart was reviewed. feels better     ROS:  (bold if positive, if negative)    Tolerating PT  Tolerating Diet        Objective:     Last 24hrs VS reviewed since prior progress note.  Most recent are:    Visit Vitals  BP (!) 149/92 (BP 1 Location: Right upper arm, BP Patient Position: At rest)   Pulse 79   Temp 98.2 °F (36.8 °C)   Resp 14   Ht 5' 10\" (1.778 m)   Wt 68.5 kg (151 lb 0.2 oz)   SpO2 96%   BMI 21.67 kg/m²     SpO2 Readings from Last 6 Encounters:   10/28/22 96%   10/20/22 99%   10/18/22 97%   10/05/22 97%   10/18/19 96%          Intake/Output Summary (Last 24 hours) at 10/28/2022 0703  Last data filed at 10/28/2022 0603  Gross per 24 hour   Intake 1440 ml   Output 1850 ml   Net -410 ml          Physical Exam:    Gen:  Well-developed, well-nourished, in no acute distress  HEENT:  Pink conjunctivae, PERRL, hearing intact to voice, moist mucous membranes  Neck:  Supple, without masses, thyroid non-tender  Resp:  No accessory muscle use, clear breath sounds without wheezes rales or rhonchi  Card:  No murmurs, normal S1, S2 without thrills, bruits or peripheral edema  Abd:  Soft, non-tender, non-distended, normoactive bowel sounds are present, no palpable organomegaly and no detectable hernias  Lymph:  No cervical or inguinal adenopathy  Musc:  No cyanosis or clubbing  Skin:  No rashes or ulcers, skin turgor is good  Neuro:  Cranial nerves are grossly intact, no focal motor weakness, follows commands appropriately  Psych:  Good insight, oriented to person, place and time, alert  __________________________________________________________________  Medications Reviewed: (see below)  Medications:     Current Facility-Administered Medications   Medication Dose Route Frequency    amLODIPine (NORVASC) tablet 10 mg  10 mg Oral DAILY    labetaloL (NORMODYNE) tablet 400 mg  400 mg Oral Q12H    polyethylene glycol (MIRALAX) packet 17 g  17 g Oral BID    senna-docusate (PERICOLACE) 8.6-50 mg per tablet 1 Tablet  1 Tablet Oral DAILY    hydrALAZINE (APRESOLINE) tablet 50 mg  50 mg Oral TID    bumetanide (BUMEX) tablet 1 mg  1 mg Oral BID    hydrALAZINE (APRESOLINE) 20 mg/mL injection 20 mg  20 mg IntraVENous Q6H PRN    acetaminophen (TYLENOL) tablet 650 mg  650 mg Oral Q6H PRN    HYDROmorphone (DILAUDID) syringe 0.5 mg  0.5 mg IntraVENous Q4H PRN    prochlorperazine (COMPAZINE) injection 10 mg  10 mg IntraVENous Q6H PRN    albuterol-ipratropium (DUO-NEB) 2.5 MG-0.5 MG/3 ML  3 mL Nebulization Q4H PRN    nicotine (NICODERM CQ) 21 mg/24 hr patch 1 Patch  1 Patch TransDERmal DAILY        Lab Data Reviewed: (see below)  Lab Review:     Recent Labs     10/27/22  0321 10/26/22  0020   WBC 8.3 11.9*   HGB 11.1* 11.4*   HCT 34.2* 35.1*    192       Recent Labs     10/28/22  0516 10/27/22  0321 10/26/22  1347 10/26/22  0020    142  --  141   K 4.1 3.5  --  3.6   * 110*  --  104   CO2 24 24  --  23    106*  --  111*   BUN 39* 37*  --  36*   CREA 3.01* 2.84*  --  2.53*   CA 8.9 8.7 8.6 8.7   MG  --  2.5*  --   --    PHOS  --  4.6  --   --    ALB  --  3.0*  --  3.8   TBILI  --  0.4  --  0.5   ALT  --  23  --  20       No results found for: GLUCPOC  No results for input(s): PH, PCO2, PO2, HCO3, FIO2 in the last 72 hours. No results for input(s): INR, INREXT, INREXT in the last 72 hours. All Micro Results       None            I have reviewed notes of prior 24hr. Other pertinent lab: Total time spent with patient: 35 minutes I personally reviewed chart, notes, data and current medications in the medical record. I have personally examined and treated the patient at bedside during this period.                  Care Plan discussed with: Patient, Nursing Staff, and >50% of time spent in counseling and coordination of care    Discussed:  Care Plan    Prophylaxis:  SCD's    Disposition:  Home w/Family           ___________________________________________________    Attending Physician: Christianne Shahid MD

## 2022-10-28 NOTE — PROGRESS NOTES
Michael Jefferson  YOB: 1965      Assessment & Plan:     CKD stage IV d/t HTN  Uncontrolled HTN  Fluid overload  Sub nephrotic range proteinuria 1.8 gm    -cr/GFR worse d/t reversal of hyper filtration injury  -USG c/w MRD  -F/U renin/khushbu and cortisol  -continue Bumex 1 mg BID  -continue rest of the BP med's  -we will see him in the office in 2-3 weeks          Subjective:   CC: F/U CKD   HPI: Patient seen, sob better cr 3.0  ROS:  Current Facility-Administered Medications   Medication Dose Route Frequency    amLODIPine (NORVASC) tablet 10 mg  10 mg Oral DAILY    labetaloL (NORMODYNE) tablet 400 mg  400 mg Oral Q12H    polyethylene glycol (MIRALAX) packet 17 g  17 g Oral BID    senna-docusate (PERICOLACE) 8.6-50 mg per tablet 1 Tablet  1 Tablet Oral DAILY    hydrALAZINE (APRESOLINE) tablet 50 mg  50 mg Oral TID    bumetanide (BUMEX) tablet 1 mg  1 mg Oral BID    hydrALAZINE (APRESOLINE) 20 mg/mL injection 20 mg  20 mg IntraVENous Q6H PRN    acetaminophen (TYLENOL) tablet 650 mg  650 mg Oral Q6H PRN    HYDROmorphone (DILAUDID) syringe 0.5 mg  0.5 mg IntraVENous Q4H PRN    prochlorperazine (COMPAZINE) injection 10 mg  10 mg IntraVENous Q6H PRN    albuterol-ipratropium (DUO-NEB) 2.5 MG-0.5 MG/3 ML  3 mL Nebulization Q4H PRN    nicotine (NICODERM CQ) 21 mg/24 hr patch 1 Patch  1 Patch TransDERmal DAILY          Objective:     Vitals:  Blood pressure (!) 145/94, pulse 76, temperature 98.1 °F (36.7 °C), resp. rate 15, height 5' 10\" (1.778 m), weight 68.5 kg (151 lb 0.2 oz), SpO2 96 %. Temp (24hrs), Av °F (36.7 °C), Min:97.9 °F (36.6 °C), Max:98.2 °F (36.8 °C)      Intake and Output:  10/28 0701 - 10/28 1900  In: 120 [P.O.:120]  Out: 400 [Urine:400]  10/26 1901 - 10/28 0700  In: 7879 [P.O.:1760;  I.V.:20]  Out: 2790 [Urine:2790]    Physical Exam:                   Physical Examination:   GENERAL ASSESSMENT: NAD  HEENT:Nontraumatic   CHEST: diminished bs +  HEART: S1S2  ABDOMEN: Soft,NT,  :Duran: EXTREMITY: no EDEMA  NEURO:Grossly non focal          ECG/rhythm:    Data Review      Recent Labs     10/26/22  0035   TNIPOC 0.07      No results for input(s): CPK, CKMB, TROIQ in the last 72 hours. Recent Labs     10/28/22  0516 10/27/22  0321 10/26/22  1347 10/26/22  0020    142  --  141   K 4.1 3.5  --  3.6   * 110*  --  104   CO2 24 24  --  23   BUN 39* 37*  --  36*   CREA 3.01* 2.84*  --  2.53*    106*  --  111*   PHOS  --  4.6  --   --    MG  --  2.5*  --   --    CA 8.9 8.7 8.6 8.7   ALB  --  3.0*  --  3.8   WBC  --  8.3  --  11.9*   HGB  --  11.1*  --  11.4*   HCT  --  34.2*  --  35.1*   PLT  --  206  --  192      No results for input(s): INR, PTP, APTT, INREXT, INREXT in the last 72 hours. Needs: urine analysis, urine sodium, protein and creatinine  Lab Results   Component Value Date/Time    Creatinine, urine random 41.60 10/26/2022 02:02 PM               Discussed with:   patient     : Alonzo Painting MD  10/28/2022        [facility]:  [email or web address]  Krzysztof Mace office:  [address] [place] [address], [address] [place] [address],[address]  [place], [id number] [place] [address]  Phone: [phone]  Fax :     [phone]    [place] office:  [address] [facility], [address]  Phone - [phone]  Fax - [phone]

## 2022-10-28 NOTE — PROGRESS NOTES
Scar Morales [facility]  [address] [facility], [place], [address] [place] [address]  [phone]      Hospitalist Progress Note      NAME: Pedro Islas   :  1965  MRM:  [id number]    Date of service: 10/28/2022  7:52 AM       Assessment and Plan:   Acute diastolic CHF/pulm edema: due to uncontrolled HTN. Echo: normal EF, ventricular hypertrophy, diastolic dysfunction. Cont IV Bumex. Monitor  I/O.  evaluate by Cardiology     2. HTN urgency: Off nicardipine gtt. Started on oral labetalol, amlodipine. IV hydralazine prn. Monitor closely      3. CKD 4:  from uncontrolled HTN. Renal U/S: medical renal disease. Renal following      4. COPD, stable. Not wheezing. Cont nebs PRN     5. Hx of intracranial hemorrhage: likely from uncontrolled HTN. No ASA or anticoagulants         Subjective:     Chief Complaint[de-identified] Patient was seen and examined as a follow up for CHF. Chart was reviewed. c/o mild headache. Denies SOB     ROS:  (bold if positive, if negative)    Tolerating PT  Tolerating Diet        Objective:     Last 24hrs VS reviewed since prior progress note.  Most recent are:    Visit Vitals  BP (!) 145/94 (BP 1 Location: Right arm, BP Patient Position: At rest)   Pulse 76   Temp 98.1 °F (36.7 °C)   Resp 15   Ht 5' 10\" (1.778 m)   Wt 68.5 kg (151 lb 0.2 oz)   SpO2 96%   BMI 21.67 kg/m²     SpO2 Readings from Last 6 Encounters:   10/28/22 96%   10/20/22 99%   10/18/22 97%   10/05/22 97%   10/18/19 96%          Intake/Output Summary (Last 24 hours) at 10/28/2022 0946  Last data filed at 10/28/2022 0724  Gross per 24 hour   Intake 1320 ml   Output 1700 ml   Net -380 ml          Physical Exam:    Gen:  Well-developed, well-nourished, in no acute distress  HEENT:  Pink conjunctivae, PERRL, hearing intact to voice, moist mucous membranes  Neck:  Supple, without masses, thyroid non-tender  Resp:  No accessory muscle use, clear breath sounds without wheezes rales or rhonchi  Card:  No murmurs, normal S1, S2 without thrills, bruits or peripheral edema  Abd:  Soft, non-tender, non-distended, normoactive bowel sounds are present, no palpable organomegaly and no detectable hernias  Lymph:  No cervical or inguinal adenopathy  Musc:  No cyanosis or clubbing  Skin:  No rashes or ulcers, skin turgor is good  Neuro:  Cranial nerves are grossly intact, no focal motor weakness, follows commands appropriately  Psych:  Good insight, oriented to person, place and time, alert  __________________________________________________________________  Medications Reviewed: (see below)  Medications:     Current Facility-Administered Medications   Medication Dose Route Frequency    amLODIPine (NORVASC) tablet 10 mg  10 mg Oral DAILY    labetaloL (NORMODYNE) tablet 400 mg  400 mg Oral Q12H    polyethylene glycol (MIRALAX) packet 17 g  17 g Oral BID    senna-docusate (PERICOLACE) 8.6-50 mg per tablet 1 Tablet  1 Tablet Oral DAILY    hydrALAZINE (APRESOLINE) tablet 50 mg  50 mg Oral TID    bumetanide (BUMEX) tablet 1 mg  1 mg Oral BID    hydrALAZINE (APRESOLINE) 20 mg/mL injection 20 mg  20 mg IntraVENous Q6H PRN    acetaminophen (TYLENOL) tablet 650 mg  650 mg Oral Q6H PRN    HYDROmorphone (DILAUDID) syringe 0.5 mg  0.5 mg IntraVENous Q4H PRN    prochlorperazine (COMPAZINE) injection 10 mg  10 mg IntraVENous Q6H PRN    albuterol-ipratropium (DUO-NEB) 2.5 MG-0.5 MG/3 ML  3 mL Nebulization Q4H PRN    nicotine (NICODERM CQ) 21 mg/24 hr patch 1 Patch  1 Patch TransDERmal DAILY        Lab Data Reviewed: (see below)  Lab Review:     Recent Labs     10/27/22  0321 10/26/22  0020   WBC 8.3 11.9*   HGB 11.1* 11.4*   HCT 34.2* 35.1*    192       Recent Labs     10/28/22  0516 10/27/22  0321 10/26/22  1347 10/26/22  0020    142  --  141   K 4.1 3.5  --  3.6   * 110*  --  104   CO2 24 24  --  23    106*  --  111*   BUN 39* 37*  --  36*   CREA 3.01* 2.84*  --  2.53*   CA 8.9 8.7 8.6 8.7   MG  --  2.5*  --   --    PHOS  --  4.6  -- --    ALB  --  3.0*  --  3.8   TBILI  --  0.4  --  0.5   ALT  --  23  --  20       No results found for: GLUCPOC  No results for input(s): PH, PCO2, PO2, HCO3, FIO2 in the last 72 hours. No results for input(s): INR, INREXT, INREXT in the last 72 hours. All Micro Results       None            I have reviewed notes of prior 24hr. Other pertinent lab: Total time spent with patient: 35 minutes I personally reviewed chart, notes, data and current medications in the medical record. I have personally examined and treated the patient at bedside during this period.                  Care Plan discussed with: Patient, Nursing Staff, and >50% of time spent in counseling and coordination of care    Discussed:  Care Plan    Prophylaxis:  SCD's    Disposition:  Home w/Family           ___________________________________________________    Attending Physician: Kim Beltrán MD

## 2022-10-28 NOTE — NURSE NAVIGATOR
Met with patient for follow up on HF education. Reviewed diastolic HF and CKD disease process in setting of uncontrolled HTN. Reviewed symptoms of HF, HF zones, rationale for daily weight, and early recognition of symptoms and notification of provider. Reviewed all new medications, action, purpose, frequency, and encouraged accuracy with medications around his work schedule. Patient states he does have a scale in the bathroom they use and he can weigh daily. He denies any trouble taking scheduled prescription medications. Reviewed relationship of sodium to fluid congestion and symptoms and gave patient Reducing Sodium handout with how to read a label for sodium. Discussed hidden sources of dietary sodium. Reviewed recommended and scheduled follow up with Cardiology, PCP, and Nephrology. Patient given written HF information and HF calendar. Given opportunity for questions and my contact information. 1600: Call received from wife after discharge. Above information reviewed with her as well.   She has called and schedule Nephrology follow up appointment with Isidoro Islas NP for 11/7/22 at 1000 am.

## 2022-10-28 NOTE — PROGRESS NOTES
CM Note:  Pt to d/c home today transported by his wife. No CM needs. He is to follow up OP with providers as scheduled.   GLORIA Cabrera

## 2022-10-28 NOTE — DISCHARGE SUMMARY
Hospitalist Discharge Summary     Patient ID:    Karis Alonzo  [id number]  56 y.o.  1965    Admit date of service: 10/26/2022    Discharge date of service: 10/28/2022    Admission Diagnoses: Hypertensive emergency [I16.1]    Chronic Diagnoses:    Problem List as of 10/28/2022 Date Reviewed: 10/26/2022            Codes Class Noted - Resolved    * (Principal) Acute diastolic CHF (congestive heart failure) ([facility] ICD-10-CM: I50.31  ICD-9-CM: 428.31, 428.0  10/26/2022 - Present        ANCELMO (acute kidney injury) ([facility] ICD-10-CM: N17.9  ICD-9-CM: 584.9  10/19/2022 - Present        Hypertensive emergency ICD-10-CM: I16.1  ICD-9-CM: 401.9  10/19/2022 - Present           Discharge Medications:   Current Discharge Medication List        START taking these medications    Details   amLODIPine (NORVASC) 10 mg tablet Take 1 Tablet by mouth daily. Qty: 30 Tablet, Refills: 1      bumetanide (BUMEX) 1 mg tablet Take 1 Tablet by mouth two (2) times a day. Qty: 60 Tablet, Refills: 0      labetaloL (NORMODYNE) 200 mg tablet Take 2 Tablets by mouth every twelve (12) hours. Qty: 60 Tablet, Refills: 0           CONTINUE these medications which have CHANGED    Details   hydrALAZINE (APRESOLINE) 50 mg tablet Take 1 Tablet by mouth three (3) times daily. Qty: 90 Tablet, Refills: 1           CONTINUE these medications which have NOT CHANGED    Details   !! albuterol (PROVENTIL HFA, VENTOLIN HFA, PROAIR HFA) 90 mcg/actuation inhaler Take 2 Puffs by inhalation. Reports taking 6 puffs as needed      !! albuterol (PROVENTIL HFA, VENTOLIN HFA, PROAIR HFA) 90 mcg/actuation inhaler Take 2 Puffs by inhalation every four (4) hours as needed for Wheezing or Cough. Qty: 18 g, Refills: 0      inhalational spacing device For use with inhaler  Qty: 1 Each, Refills: 0       !! - Potential duplicate medications found. Please discuss with provider. Follow up Care:    1.  Unknown, Provider, MD in 1-2 weeks  2. nephrology    Diet:  Cardiac Diet    Disposition:  Home. Advanced Directive:    Discharge Exam:  See today's note. CONSULTATIONS: Nephrology    Significant Diagnostic Studies:   Recent Labs     10/27/22  0321 10/26/22  0020   WBC 8.3 11.9*   HGB 11.1* 11.4*   HCT 34.2* 35.1*    192     Recent Labs     10/28/22  0516 10/27/22  0321 10/26/22  1347 10/26/22  0020    142  --  141   K 4.1 3.5  --  3.6   * 110*  --  104   CO2 24 24  --  23   BUN 39* 37*  --  36*   CREA 3.01* 2.84*  --  2.53*    106*  --  111*   CA 8.9 8.7 8.6 8.7   MG  --  2.5*  --   --    PHOS  --  4.6  --   --      Recent Labs     10/27/22  0321 10/26/22  0020   ALT 23 20   AP 76 86   TBILI 0.4 0.5   TP 5.9* 6.1*   ALB 3.0* 3.8   GLOB 2.9 2.3     No results for input(s): INR, PTP, APTT, INREXT in the last 72 hours. No results for input(s): FE, TIBC, PSAT, FERR in the last 72 hours. No results for input(s): PH, PCO2, PO2 in the last 72 hours. No results for input(s): CPK, CKMB in the last 72 hours. No lab exists for component: TROPONINI  No results found for: Madhuri 57:   Acute diastolic CHF/pulm edema: due to uncontrolled HTN. Echo: normal EF, ventricular hypertrophy, diastolic dysfunction. Cont Bumex. evaluate by Cardiology     2. HTN urgency: Off nicardipine gtt. Started on oral labetalol, amlodipine. Cont hydralazine. FU with nephrology      3. CKD 4:  from uncontrolled HTN. Renal U/S: medical renal disease. Renal FU      4. COPD, stable. Not wheezing. Cont nebs PRN     5. Hx of intracranial hemorrhage: likely from uncontrolled HTN. No ASA or anticoagulants        Discharged in improved condition.     Spent 35 minutes    Signed:  Peter De La Torre MD  10/28/2022  9:46 AM

## 2022-10-28 NOTE — PROGRESS NOTES
Bedside and Verbal shift change report given to [address] (oncoming nurse) by April (offgoing nurse). Report included the following information SBAR, Kardex, Procedure Summary, Intake/Output, MAR, Procedure Verification, and Quality Measures.

## 2022-10-28 NOTE — DISCHARGE INSTRUCTIONS
ACUTE DIAGNOSES:  Hypertensive emergency [I16.1]    CHRONIC MEDICAL DIAGNOSES:  [unfilled]    DISCHARGE MEDICATIONS:          It is important that you take the medication exactly as they are prescribed. Keep your medication in the bottles provided by the pharmacist and keep a list of the medication names, dosages, and times to be taken in your wallet. Do not take other medications without consulting your doctor. DIET:  Cardiac Diet    ACTIVITY: Activity as tolerated    ADDITIONAL INFORMATION: If you experience any of the following symptoms then please call your primary care physician or return to the emergency room if you cannot get hold of your doctor: Fever, chills, nausea, vomiting, diarrhea, change in mentation, falling, bleeding, shortness of breath. FOLLOW UP CARE:  Primary care physician. you are to call and set up an appointment to see them in 5 days. Follow-up with with nephrology    Follow up with cardiology. [id number] obtained by :  I understand that if any problems occur once I am at home I am to contact my physician. I understand and acknowledge receipt of the instructions indicated above.                                                                                                                                            Physician's or R.N.'s Signature                                                                  Date/Time                                                                                                                                              Patient or Representative Signature                                                          Date/Time

## 2022-11-01 LAB — ALDOST SERPL-MCNC: 6.9 NG/DL (ref 0–30)

## 2022-11-02 LAB — RENIN PLAS-CCNC: 1.1 NG/ML/HR (ref 0.17–5.38)

## 2022-11-10 ENCOUNTER — OFFICE VISIT (OUTPATIENT)
Dept: SURGERY | Age: 57
End: 2022-11-10
Payer: MEDICAID

## 2022-11-10 VITALS
HEIGHT: 70 IN | DIASTOLIC BLOOD PRESSURE: 89 MMHG | WEIGHT: 157 LBS | OXYGEN SATURATION: 97 % | SYSTOLIC BLOOD PRESSURE: 148 MMHG | RESPIRATION RATE: 20 BRPM | HEART RATE: 72 BPM | BODY MASS INDEX: 22.48 KG/M2

## 2022-11-10 DIAGNOSIS — K40.90 RIGHT INGUINAL HERNIA: Primary | ICD-10-CM

## 2022-11-10 PROCEDURE — 99203 OFFICE O/P NEW LOW 30 MIN: CPT | Performed by: SURGERY

## 2022-11-10 NOTE — PROGRESS NOTES
Identified pt with two pt identifiers (name and ). Reviewed chart in preparation for visit and have obtained necessary documentation. Nataliya John is a 56 y.o. male  Chief Complaint   Patient presents with    New Patient    Inguinal Hernia     Right side     Visit Vitals  BP (!) 148/89 (BP 1 Location: Left upper arm, BP Patient Position: Sitting, BP Cuff Size: Large adult)   Pulse 72   Resp 20   Ht 5' 10\" (1.778 m)   Wt 157 lb (71.2 kg)   SpO2 97%   BMI 22.53 kg/m²       1. Have you been to the ER, urgent care clinic since your last visit? Hospitalized since your last visit? new patient    2. Have you seen or consulted any other health care providers outside of the [address] [place] [address] since your last visit? Include any pap smears or colon screening.  New patient

## 2022-11-14 NOTE — PROGRESS NOTES
Surgery Consult    Subjective: Analy Matias is a 56 y.o.  male who was referred for evaluation of right groin lump. Symptoms were first noted several years ago. Symptoms did not start at work. Pain is sharp, intermittent. Lump is reducible. Patient does not have symptoms of chronic constipation, chronic cough, difficulty urinating. Patient does not have a history of previous hernia surgery. He denies nausea, vomiting, fever, chills, or dysuria. He was recently admitted at [facility] for diastolic heart failure, renal insufficiency. Patient Active Problem List    Diagnosis Date Noted    Right inguinal hernia 72/10/7207    Acute diastolic CHF (congestive heart failure) ([facility] 10/26/2022    ANCELMO (acute kidney injury) ([facility] 10/19/2022    Hypertensive emergency 10/19/2022     Past Medical History:   Diagnosis Date    Asthma     Chronic obstructive pulmonary disease ([facility]     Emphysema lung ([facility]     Hypertension     Stroke ([facility] 2022     Past Surgical History:   Procedure Laterality Date    HX APPENDECTOMY      HX ORTHOPAEDIC      collar bone (right)      Family History   Problem Relation Age of Onset    Hypertension Father       Social History     Tobacco Use    Smoking status: Every Day    Smokeless tobacco: Never   Substance Use Topics    Alcohol use: Yes     Comment: 2-3 Beers per week      Current Outpatient Medications   Medication Sig    hydrALAZINE (APRESOLINE) 50 mg tablet Take 1 Tablet by mouth three (3) times daily. amLODIPine (NORVASC) 10 mg tablet Take 1 Tablet by mouth daily. bumetanide (BUMEX) 1 mg tablet Take 1 Tablet by mouth two (2) times a day. labetaloL (NORMODYNE) 200 mg tablet Take 2 Tablets by mouth every twelve (12) hours. albuterol (PROVENTIL HFA, VENTOLIN HFA, PROAIR HFA) 90 mcg/actuation inhaler Take 2 Puffs by inhalation.  Reports taking 6 puffs as needed (Patient not taking: Reported on 11/10/2022)    albuterol (PROVENTIL HFA, VENTOLIN HFA, PROAIR HFA) 90 mcg/actuation inhaler Take 2 Puffs by inhalation every four (4) hours as needed for Wheezing or Cough. (Patient not taking: Reported on 11/10/2022)    inhalational spacing device For use with inhaler (Patient not taking: Reported on 11/10/2022)     No current facility-administered medications for this visit. Allergies   Allergen Reactions    Ace Inhibitors Swelling        Review of Systems:  A complete review of systems was negative except as noted in the HPI. Objective:     Visit Vitals  BP (!) 148/89 (BP 1 Location: Left upper arm, BP Patient Position: Sitting, BP Cuff Size: Large adult)   Pulse 72   Resp 20   Ht 5' 10\" (1.778 m)   Wt 157 lb (71.2 kg)   SpO2 97%   BMI 22.53 kg/m²       Physical Exam:  GENERAL: alert, cooperative, no distress, appears older than stated age, EYE: negative, LYMPHATIC: No cervical or supraclavicular adenopathy. THROAT & NECK: normal, LUNG: clear to auscultation bilaterally, HEART: regular rate and rhythm, S1, S2 normal, no murmur. ABDOMEN: NABS, nondistended, soft. No ventral hernia. Tender, reducible right inguinal hernia. EXTREMITIES:  extremities normal, atraumatic, no cyanosis or edema, SKIN: Normal., NEUROLOGIC: negative. Assessment:     Right inguinal hernia, tender to palpation. He has recently experienced hospitalization for diastolic heart failure, renal insufficiency. Plan:     Recommended robotic assisted laparoscopic right inguinal hernia repair with mesh following nephrology, cardiology clearance. Technical aspects of procedure reviewed along with risks (include but not limited to bleeding, wound infection, recurrence, conversion open procedure, visceral injury, worsening renal function). Also reviewed anticipated outpatient nature of procedure, postoperative activity restrictions, and expected results. He understands and desires to proceed. All questions answered.

## 2022-11-15 ENCOUNTER — OFFICE VISIT (OUTPATIENT)
Dept: CARDIOLOGY CLINIC | Age: 57
End: 2022-11-15
Payer: MEDICAID

## 2022-11-15 VITALS
DIASTOLIC BLOOD PRESSURE: 108 MMHG | WEIGHT: 160 LBS | OXYGEN SATURATION: 98 % | BODY MASS INDEX: 22.9 KG/M2 | SYSTOLIC BLOOD PRESSURE: 150 MMHG | HEIGHT: 70 IN | HEART RATE: 68 BPM

## 2022-11-15 DIAGNOSIS — N18.4 CKD (CHRONIC KIDNEY DISEASE) STAGE 4, GFR 15-29 ML/MIN (HCC): ICD-10-CM

## 2022-11-15 DIAGNOSIS — I10 PRIMARY HYPERTENSION: Primary | ICD-10-CM

## 2022-11-15 DIAGNOSIS — Z01.810 PREOPERATIVE CARDIOVASCULAR EXAMINATION: ICD-10-CM

## 2022-11-15 PROCEDURE — 3078F DIAST BP <80 MM HG: CPT | Performed by: STUDENT IN AN ORGANIZED HEALTH CARE EDUCATION/TRAINING PROGRAM

## 2022-11-15 PROCEDURE — 99214 OFFICE O/P EST MOD 30 MIN: CPT | Performed by: STUDENT IN AN ORGANIZED HEALTH CARE EDUCATION/TRAINING PROGRAM

## 2022-11-15 PROCEDURE — 3074F SYST BP LT 130 MM HG: CPT | Performed by: STUDENT IN AN ORGANIZED HEALTH CARE EDUCATION/TRAINING PROGRAM

## 2022-11-15 RX ORDER — TRAMADOL HYDROCHLORIDE 50 MG/1
50 TABLET ORAL
COMMUNITY

## 2022-11-15 RX ORDER — HYDRALAZINE HYDROCHLORIDE 50 MG/1
75 TABLET, FILM COATED ORAL 3 TIMES DAILY
Qty: 360 TABLET | Refills: 1 | Status: SHIPPED | OUTPATIENT
Start: 2022-11-15

## 2022-11-15 RX ORDER — DOCUSATE CALCIUM 240 MG
240 CAPSULE ORAL 2 TIMES DAILY
COMMUNITY
End: 2022-11-29

## 2022-11-15 RX ORDER — LABETALOL 200 MG/1
400 TABLET, FILM COATED ORAL EVERY 8 HOURS
Qty: 270 TABLET | Refills: 1 | Status: SHIPPED | OUTPATIENT
Start: 2022-11-15

## 2022-11-15 NOTE — PROGRESS NOTES
Chief Complaint   Patient presents with    Follow-up     ER for CHF 10/26     Surgical Clearance     Hernia        Vitals:    11/15/22 1355 11/15/22 1405   BP: (!) 150/100 (!) 150/108   BP 1 Location: Left upper arm Right upper arm   BP Patient Position: Sitting Sitting   Pulse: 68    Height: 5' 10\" (1.778 m)    Weight: 160 lb (72.6 kg)    SpO2: 98%        Chest pain denied     SOB denied     Palpitations denied     Swelling in hands/feet - yes for 2 weeks. Getting better. Dizziness denied     Recent hospital stays denied     Refills denied     Need to fax clearance for hernia surgery to [facility]. Dr. Lorenzo Johnson MD. Dec 5,2022.    Fax no: [phone]

## 2022-11-15 NOTE — PROGRESS NOTES
[facility] [address], [address] [facility], [address] [place] [address]    Office [phone],[facility] [phone]           Ramona Harding is a 56 y.o. male presents to the office for f/up evaluation      Assessment/Recommendations:      ICD-10-CM ICD-9-CM    1. Primary hypertension  I10 401.9       2. CKD (chronic kidney disease) stage 4, GFR 15-29 ml/min (HCC)  N18.4 585.4       3. Preoperative cardiovascular examination  Z01.810 V72.81           Hypertension, resistant. Titrate hydralazine to 75mg tid (previously 50mg tid). Titrate labetolol to 400mg tid (previously bid). Cont amlodipine 10mg/d. Dilated ascending aorta. Echo 10/26/22 4.3cm    Recent admission with dyspnea and pulmonary edema. Dyspnea resolved. Cont bumex 1mg bid    Ckd stage 4      Preoperative cardiovascular examination  Pt will be at mildly increased risk for adverse cardiovascular event with moderate risk hernia surgery mainly driven by his hypertensive heart disease. Recent echo with normal lv function. Pt currently w/o symptoms of angina nor heart failure symptoms. Recommend to proceed with surgery without any further cardiovascular evaluation. Cont bp therapy in perioperative period      Primary Care Physician- Delaney Virk, Franklyn Heard MD    Follow-up 2 months        Subjective:  56 y.o. recent seen in hospital with dyspnea. Cxr with mild edema. Bp significantly elevated on admission. Wife is diligent in monitoring blood pressure and administering medications. Bp still elevated at home but improving. No chest pain, dyspnea on exertion, shortness of breath, orthopnea, PND. Needs hernia surgery. Significant life altering pain due to hernia.       Past Medical History:   Diagnosis Date    Asthma     Chronic obstructive pulmonary disease ([address]     Emphysema lung ([address]     Hypertension     Stroke ([address] 2022        Past Surgical History:   Procedure Laterality Date    HX APPENDECTOMY      HX ORTHOPAEDIC      collar bone (right)         Current Outpatient Medications:     docusate calcium (SURFAK) 240 mg capsule, Take 240 mg by mouth two (2) times a day., Disp: , Rfl:     traMADoL (ULTRAM) 50 mg tablet, Take 50 mg by mouth every six (6) hours as needed for Pain., Disp: , Rfl:     hydrALAZINE (APRESOLINE) 50 mg tablet, Take 1.5 Tablets by mouth three (3) times daily. , Disp: 360 Tablet, Rfl: 1    labetaloL (NORMODYNE) 200 mg tablet, Take 2 Tablets by mouth every eight (8) hours. , Disp: 270 Tablet, Rfl: 1    amLODIPine (NORVASC) 10 mg tablet, Take 1 Tablet by mouth daily. , Disp: 30 Tablet, Rfl: 1    bumetanide (BUMEX) 1 mg tablet, Take 1 Tablet by mouth two (2) times a day., Disp: 60 Tablet, Rfl: 0    albuterol (PROVENTIL HFA, VENTOLIN HFA, PROAIR HFA) 90 mcg/actuation inhaler, Take 2 Puffs by inhalation. Reports taking 6 puffs as needed (Patient not taking: No sig reported), Disp: , Rfl:     albuterol (PROVENTIL HFA, VENTOLIN HFA, PROAIR HFA) 90 mcg/actuation inhaler, Take 2 Puffs by inhalation every four (4) hours as needed for Wheezing or Cough.  (Patient not taking: No sig reported), Disp: 18 g, Rfl: 0    inhalational spacing device, For use with inhaler (Patient not taking: No sig reported), Disp: 1 Each, Rfl: 0    Allergies   Allergen Reactions    Ace Inhibitors Swelling        Family History   Problem Relation Age of Onset    Hypertension Father        Social History     Tobacco Use    Smoking status: Every Day    Smokeless tobacco: Never   Substance Use Topics    Alcohol use: Yes     Comment: 2-3 Beers per week    Drug use: Yes     Types: Marijuana       Review of Symptoms:  Pertinent Positive:neg  Pertinent Negative:No chest pain, dyspnea on exertion, shortness of breath, orthopnea, PND    All Other systems reviewed and are negative for a Comprehensive ROS (10+)    Physical Exam    Blood pressure (!) 150/108, pulse 68, height 5' 10\" (1.778 m), weight 160 lb (72.6 kg), SpO2 98 %.  Constitutional:  well-developed and well-nourished. No distress. HENT: Normocephalic. Eyes: No scleral icterus. Neck:  Neck supple. No JVD present. Pulmonary/Chest: Effort normal and breath sounds normal. No respiratory distress, wheezes or rales. Cardiovascular: Normal rate, regular rhythm, S1 S2 . Exam reveals no gallop and no friction rub. No murmur heard. No edema. Extremities:  Normal muscle tone  Abdominal:   No abnormal distension. Neurological:  Moving all extremities, cranial nerves appear grossly intact. Skin: Skin is not cold. Not diaphoretic. No erythema. Psychiatric:  Grossly normal mood and affect. Intact insight. Objective Data: Investigations personally reviewed and interpreted              Investigations reviewed     10/26/22    ECHO ADULT COMPLETE 10/26/2022 10/26/2022    Interpretation Summary    Left Ventricle: Normal left ventricular systolic function with a visually estimated EF of 55 - 60%. Left ventricle size is normal. Mildly increased wall thickness. Findings consistent with concentric hypertrophy. Normal wall motion. Diastolic dysfunction present with increased LAP with normal LV EF. Right Ventricle: Right ventricle size is normal. Mildly increased wall thickness. Right Atrium: Right atrium is mildly dilated. Aorta: Normal sized sinus of Valsalva. Moderately dilated ascending aorta. Ao Ascending diameter is 4.3 cm. Signed by: Carolyn Yoo DO on 10/26/2022 12:47 PM            [address] DO          ATTENTION:   This medical record was transcribed using an electronic medical records/speech recognition system. Although proofread, it may and can contain electronic, spelling and other errors. Corrections may be executed at a later time. Please feel free to contact us for any clarifications as needed.

## 2022-11-18 ENCOUNTER — TELEPHONE (OUTPATIENT)
Dept: CARDIOLOGY CLINIC | Age: 57
End: 2022-11-18

## 2022-11-18 NOTE — TELEPHONE ENCOUNTER
Pt is getting Hernia surgery(12/05/2022 BC: ROBOTIC ASSISTED LAP RIGHT INGUINAL HERNIA REPAIR WITH MESH, POST OP)  12/5 by Michelle Rios. Pt Needs need cardiac clearance from .     Fax :   [phone]    Draw Attention to Talia      PT wife #   [phone]

## 2022-11-29 ENCOUNTER — HOSPITAL ENCOUNTER (OUTPATIENT)
Dept: PREADMISSION TESTING | Age: 57
Discharge: HOME | End: 2022-11-29
Payer: MEDICAID

## 2022-11-29 ENCOUNTER — TELEPHONE (OUTPATIENT)
Dept: SURGERY | Age: 57
End: 2022-11-29

## 2022-11-29 VITALS
HEIGHT: 70 IN | TEMPERATURE: 97.5 F | SYSTOLIC BLOOD PRESSURE: 153 MMHG | HEART RATE: 75 BPM | BODY MASS INDEX: 23.39 KG/M2 | DIASTOLIC BLOOD PRESSURE: 87 MMHG | WEIGHT: 163.36 LBS

## 2022-11-29 LAB
ALBUMIN SERPL-MCNC: 3.7 G/DL (ref 3.5–5)
ALBUMIN/GLOB SERPL: 1.3 {RATIO} (ref 1.1–2.2)
ALP SERPL-CCNC: 74 U/L (ref 45–117)
ALT SERPL-CCNC: 20 U/L (ref 12–78)
ANION GAP SERPL CALC-SCNC: 6 MMOL/L (ref 5–15)
APPEARANCE UR: CLEAR
AST SERPL-CCNC: 8 U/L (ref 15–37)
BACTERIA URNS QL MICRO: NEGATIVE /HPF
BASOPHILS # BLD: 0 K/UL (ref 0–0.1)
BASOPHILS NFR BLD: 1 % (ref 0–1)
BILIRUB SERPL-MCNC: 0.3 MG/DL (ref 0.2–1)
BILIRUB UR QL: NEGATIVE
BUN SERPL-MCNC: 29 MG/DL (ref 6–20)
BUN/CREAT SERPL: 11 (ref 12–20)
CALCIUM SERPL-MCNC: 8.4 MG/DL (ref 8.5–10.1)
CHLORIDE SERPL-SCNC: 110 MMOL/L (ref 97–108)
CO2 SERPL-SCNC: 26 MMOL/L (ref 21–32)
COLOR UR: ABNORMAL
CREAT SERPL-MCNC: 2.7 MG/DL (ref 0.7–1.3)
DIFFERENTIAL METHOD BLD: ABNORMAL
EOSINOPHIL # BLD: 0.5 K/UL (ref 0–0.4)
EOSINOPHIL NFR BLD: 8 % (ref 0–7)
EPITH CASTS URNS QL MICRO: ABNORMAL /LPF
ERYTHROCYTE [DISTWIDTH] IN BLOOD BY AUTOMATED COUNT: 12.5 % (ref 11.5–14.5)
GLOBULIN SER CALC-MCNC: 2.9 G/DL (ref 2–4)
GLUCOSE SERPL-MCNC: 103 MG/DL (ref 65–100)
GLUCOSE UR STRIP.AUTO-MCNC: NEGATIVE MG/DL
HCT VFR BLD AUTO: 37.8 % (ref 36.6–50.3)
HGB BLD-MCNC: 12.3 G/DL (ref 12.1–17)
HGB UR QL STRIP: NEGATIVE
HYALINE CASTS URNS QL MICRO: ABNORMAL /LPF (ref 0–5)
IMM GRANULOCYTES # BLD AUTO: 0 K/UL (ref 0–0.04)
IMM GRANULOCYTES NFR BLD AUTO: 1 % (ref 0–0.5)
KETONES UR QL STRIP.AUTO: NEGATIVE MG/DL
LEUKOCYTE ESTERASE UR QL STRIP.AUTO: NEGATIVE
LYMPHOCYTES # BLD: 1 K/UL (ref 0.8–3.5)
LYMPHOCYTES NFR BLD: 17 % (ref 12–49)
MAGNESIUM SERPL-MCNC: 2.2 MG/DL (ref 1.6–2.4)
MCH RBC QN AUTO: 31.1 PG (ref 26–34)
MCHC RBC AUTO-ENTMCNC: 32.5 G/DL (ref 30–36.5)
MCV RBC AUTO: 95.5 FL (ref 80–99)
MONOCYTES # BLD: 0.7 K/UL (ref 0–1)
MONOCYTES NFR BLD: 11 % (ref 5–13)
NEUTS SEG # BLD: 3.8 K/UL (ref 1.8–8)
NEUTS SEG NFR BLD: 62 % (ref 32–75)
NITRITE UR QL STRIP.AUTO: NEGATIVE
NRBC # BLD: 0 K/UL (ref 0–0.01)
NRBC BLD-RTO: 0 PER 100 WBC
PH UR STRIP: 6.5 [PH] (ref 5–8)
PLATELET # BLD AUTO: 231 K/UL (ref 150–400)
PMV BLD AUTO: 10.4 FL (ref 8.9–12.9)
POTASSIUM SERPL-SCNC: 4 MMOL/L (ref 3.5–5.1)
PROT SERPL-MCNC: 6.6 G/DL (ref 6.4–8.2)
PROT UR STRIP-MCNC: 30 MG/DL
RBC # BLD AUTO: 3.96 M/UL (ref 4.1–5.7)
RBC #/AREA URNS HPF: ABNORMAL /HPF (ref 0–5)
SODIUM SERPL-SCNC: 142 MMOL/L (ref 136–145)
SP GR UR REFRACTOMETRY: 1.01 (ref 1–1.03)
UA: UC IF INDICATED,UAUC: ABNORMAL
UROBILINOGEN UR QL STRIP.AUTO: 0.2 EU/DL (ref 0.2–1)
WBC # BLD AUTO: 6.1 K/UL (ref 4.1–11.1)
WBC URNS QL MICRO: ABNORMAL /HPF (ref 0–4)

## 2022-11-29 PROCEDURE — 36415 COLL VENOUS BLD VENIPUNCTURE: CPT

## 2022-11-29 PROCEDURE — 85025 COMPLETE CBC W/AUTO DIFF WBC: CPT

## 2022-11-29 PROCEDURE — 81001 URINALYSIS AUTO W/SCOPE: CPT

## 2022-11-29 PROCEDURE — 83735 ASSAY OF MAGNESIUM: CPT

## 2022-11-29 PROCEDURE — 80053 COMPREHEN METABOLIC PANEL: CPT

## 2022-11-29 NOTE — TELEPHONE ENCOUNTER
Nat from [facility] called and stated that the patient is present at [facility]. She stated that he informed her that he has severe constipation even after taking OTC products. Isabella Sanchez requested Dr. Javier Burden nurse call the patient's spouse Berny Weiner.

## 2022-11-29 NOTE — PERIOP NOTES
[facility]  PREOPERATIVE INSTRUCTIONS    Surgery Date:   12/5/22    Your surgeon's office or [facility] staff will call you between 4 PM- 8 PM the day before surgery with your arrival time. If your surgery is on a Monday, you will receive a call the preceding Friday. Please report to [facility] Patient Access/Admitting on the 1st floor. Bring your insurance card, photo identification, and any copayment ( if applicable). If you are going home the same day of your surgery, you must have a responsible adult to drive you home. You need to have a responsible adult to stay with you the first 24 hours after surgery and you should not drive a car for 24 hours following your surgery. Do NOT eat any solid foods after midnight the night before surgery including candy, mint or gum. You may drink clear liquids from midnight until 1 hour prior to your arrival. You may drink up to 12 ounces at one time every 4 hours. Please note special instructions, if applicable, below for medications. Do NOT drink alcohol or smoke 24 hours before surgery. STOP smoking for 14 days prior as it helps with breathing and healing after surgery. If you are being admitted to the hospital, please leave personal belongings/luggage in your car until you have an assigned hospital room number. Please wear comfortable clothes. Wear your glasses instead of contacts. We ask that all money, jewelry and valuables be left at home. Wear no make up, particularly mascara, the day of surgery. All body piercings, rings, and jewelry need to be removed and left at home. Please remove any nail polish or artifical nails from your fingernails. Please wear your hair loose or down. Please no pony-tails, buns, or any metal hair accessories. If you shower the morning of surgery, please do not apply any lotions or powders afterwards. You may wear deodorant, unless having breast surgery. Do not shave any body area within 24 hours of your surgery.   Please follow all instructions to avoid any potential surgical cancellation. Should your physical condition change, (i.e. fever, cold, flu, etc.) please notify your surgeon as soon as possible. It is important to be on time. If a situation occurs where you may be delayed, please call:  [phone] / [phone] on the day of surgery. The Preadmission Testing staff can be reached at [phone]. Special instructions: NONE      Current Outpatient Medications   Medication Sig    traMADoL (ULTRAM) 50 mg tablet Take 50 mg by mouth every six (6) hours as needed for Pain.    hydrALAZINE (APRESOLINE) 50 mg tablet Take 1.5 Tablets by mouth three (3) times daily. labetaloL (NORMODYNE) 200 mg tablet Take 2 Tablets by mouth every eight (8) hours. (Patient taking differently: Take 400 mg by mouth three (3) times daily.)    amLODIPine (NORVASC) 10 mg tablet Take 1 Tablet by mouth daily. bumetanide (BUMEX) 1 mg tablet Take 1 Tablet by mouth two (2) times a day. (Patient taking differently: Take 1 mg by mouth two (2) times a day. PT IS NOT TAKING THIS FOR HEART FAILURE, BUT FOR FLUID IN LEGS)    albuterol (PROVENTIL HFA, VENTOLIN HFA, PROAIR HFA) 90 mcg/actuation inhaler Take 2 Puffs by inhalation every four (4) hours as needed. Reports taking 6 puffs as needed    inhalational spacing device For use with inhaler     No current facility-administered medications for this encounter. YOU MUST ONLY TAKE THESE MEDICATIONS THE MORNING OF SURGERY WITH A SIP OF WATER: LABETALOL, HYDRALAZINE, AMLODIPINE  MEDICATIONS TO TAKE THE MORNING OF SURGERY ONLY IF NEEDED: TRAMADOL (4HRS PRIOR TO ARRIVAL), ALBUTEROLL INHALER  HOLD these prescription medications BEFORE Surgery: NONE  Ask your surgeon/prescribing physician about when/if to STOP taking these medications: NONE  Stop all vitamins, herbal medicines and Aspirin containing products 7 days prior to surgery.  Stop any non-steroidal anti-inflammatory drugs (i.e. Ibuprofen, Naproxen, Advil, Aleve) 3 days before surgery. You may take Tylenol. If you are currently taking Plavix, Coumadin,or any other blood-thinning/anticoagulant medication contact your prescribing physician for instructions. Eating and Drinking Before Surgery    You may eat a regular dinner at the usual time on the day before your surgery. Do NOT eat any solid foods after midnight unless your arrival time at the hospital is 3pm or later. You may drink clear liquids only from 12 midnight until 1 hours prior to your arrival time at the hospital on the day of your surgery. Do NOT drink alcohol. Clear liquids include:  Water  Fruit juices without pulp( i.e. apple juice)  Carbonated beverages  Black coffee (no cream/milk)  Tea (no cream/milk)  Gatorade  You may drink up to 12-16 ounces at one time every 4 hours between the hours of midnight and 1 hour before your arrival time at the hospital. Example- if your arrival time at the hospital is 6am, you may drink 12-16 ounces of clear liquids no later than 5am.  If your arrival time at the hospital is 3pm or later, you may eat a light breakfast before 8am.  A light breakfast includes: Toast or bagel (no butter)  Black coffee (no cream/milk)  Tea (no cream/milk)  Fruit juices without pulp ( i.e. apple juice)  Do NOT eat meat, eggs, vegetables or fruit  If you have any questions, please contact your surgeon's office. Preventing Infections Before and After - Your Surgery    IMPORTANT INSTRUCTIONS    You play an important role in your health and preparation for surgery. To reduce the germs on your skin you will need to shower with CHG soap (Chorhexidine gluconate 4%) two times before surgery. CHG soap (Hibiclens, Hex-A-Clens or store brand)  CHG soap will be provided at your Preadmission Testing (PAT) appointment.   If you do not have a PAT appointment before surgery, you may arrange to  CHG soap from our office or purchase CHG soap at a pharmacy, grocery or department store. You need to purchase TWO 4 ounce bottles to use for your 2 showers. Steps to follow:  Bhavin Hardy your hair with your normal shampoo and your body with regular soap and rinse well to remove shampoo and soap from your skin. Wet a clean washcloth and turn off the shower. Put CHG soap on washcloth and apply to your entire body from the neck down. Do not use on your head, face or private parts(genitals). Do not use CHG soap on open sores, wounds or areas of skin irritation. Wash you body gently for 5 minutes. Do not wash your skin too hard. This soap does not create lather. Pay special attention to your underarms and from your belly button to your feet. Turn the shower back on and rinse well to get CHG soap off your body. Pat your skin dry with a clean, dry towel. Do not apply lotions or moisturizer. Put on clean clothes and sleep on fresh bed sheets and do not allow pets to sleep with you. Shower with CHG soap 2 times before your surgery  The evening before your surgery  The morning of your surgery      Tips to help prevent infections after your surgery:  Protect your surgical wound from germs:  Hand washing is the most important thing you and your caregivers can do to prevent infections. Keep your bandage clean and dry! Do not touch your surgical wound. Use clean, freshly washed towels and washcloths every time you shower; do not share bath linens with others. Until your surgical wound is healed, wear clothing and sleep on bed linens each day that are clean and freshly washed. Do not allow pets to sleep in your bed with you or touch your surgical wound. Do not smoke - smoking delays wound healing. This may be a good time to stop smoking. If you have diabetes, it is important for you to manage your blood sugar levels properly before your surgery as well as after your surgery.  Poorly managed blood sugar levels slow down wound healing and prevent you from healing completely. Patient Information Regarding COVID Restrictions      Day of Procedure    Please park in the parking deck or any designated visitor parking lot. Enter the facility through the Main Entrance of the hospital.  On the day of surgery, please provide the cell phone number of the person who will be waiting for you to the Patient Access representative at the time of registration. Masks are highly recommended in the hospital, but not required. Once your procedure and the immediate recovery period is completed, a nurse in the recovery area will contact your designated visitor to inform them of your room number or to otherwise review other pertinent information regarding your care. Social distancing practices are strongly encouraged in waiting areas and the cafeteria. The patient and spouse was contacted  in person. They verbalized understanding of all instructions does not  need reinforcement.

## 2022-11-29 NOTE — PERIOP NOTES
PT HAS NOT BEEN VACCINATED FOR COVID. CARDIOLOGY NOTES FROM DR. Elliot Kearns NOTED IN [facility].

## 2022-11-29 NOTE — TELEPHONE ENCOUNTER
Returned call to patients wife Gabriela(PHI)and patient verified patient using 2 patient identifiers. They stated that he has not had a BM in 3 days. He has tried taking stool softeners with laxative and that did not help. She wanted to know what can be called in for him like the miralax. Informed them that he can try the OTC miralax as directed on the bottle, dulcolax or senna tablets which is a natural laxative daily until he as a BM and recommended to stay hydrated  Due to his history of chronic kidney disease he would want to check with his PCP first before taking any milk of magnesia. His wife states that they have tried calling them to see what he could take but couldn't get them. Pt's wife states that they will try the other laxatives first and will call the PCP to ask about the milk of magnesia if the others do not work. No other concerns were voiced.

## 2022-11-30 ENCOUNTER — DOCUMENTATION ONLY (OUTPATIENT)
Dept: SURGERY | Age: 57
End: 2022-11-30

## 2022-11-30 NOTE — PROGRESS NOTES
RAY LEVY,     PLEASE NOTE THE FOLLOWING ABNORMAL LAB VALUE:CREATININE = 2.70     Zaire Ware RN   [facility]     Dr Jordon Genao made aware of labs

## 2022-11-30 NOTE — PERIOP NOTES
[facility] SENT TO RAY BURNETTE, NURSE FOR RAY MORGAN,     PLEASE NOTE THE FOLLOWING ABNORMAL LAB VALUE:CREATININE = 2.70    ALL  LABS ROUTED  THROUGH CC TO PCP

## 2022-12-02 ENCOUNTER — ANESTHESIA EVENT (OUTPATIENT)
Dept: SURGERY | Age: 57
End: 2022-12-02
Payer: MEDICAID

## 2022-12-02 NOTE — ANESTHESIA PREPROCEDURE EVALUATION
Relevant Problems   CARDIOVASCULAR   (+) Hypertensive emergency      RENAL FAILURE   (+) ANCELMO (acute kidney injury) ([facility]       Anesthetic History               Review of Systems / Medical History  Patient summary reviewed, nursing notes reviewed and pertinent labs reviewed    Pulmonary    COPD               Neuro/Psych              Cardiovascular    Hypertension                   GI/Hepatic/Renal     GERD           Endo/Other             Other Findings              Physical Exam    Airway  Mallampati: II  TM Distance: > 6 cm  Neck ROM: normal range of motion   Mouth opening: Normal     Cardiovascular  Regular rate and rhythm,  S1 and S2 normal,  no murmur, click, rub, or gallop             Dental  No notable dental hx       Pulmonary  Breath sounds clear to auscultation               Abdominal  GI exam deferred       Other Findings            Anesthetic Plan    ASA: 2  Anesthesia type: general          Induction: Intravenous  Anesthetic plan and risks discussed with: Patient

## 2022-12-05 ENCOUNTER — ANESTHESIA (OUTPATIENT)
Dept: SURGERY | Age: 57
End: 2022-12-05
Payer: MEDICAID

## 2022-12-05 ENCOUNTER — HOSPITAL ENCOUNTER (EMERGENCY)
Age: 57
Discharge: HOME | End: 2022-12-05
Attending: EMERGENCY MEDICINE
Payer: MEDICAID

## 2022-12-05 ENCOUNTER — HOSPITAL ENCOUNTER (OUTPATIENT)
Age: 57
Setting detail: OUTPATIENT SURGERY
Discharge: HOME | End: 2022-12-05
Attending: SURGERY | Admitting: SURGERY
Payer: MEDICAID

## 2022-12-05 ENCOUNTER — TELEPHONE (OUTPATIENT)
Dept: SURGERY | Age: 57
End: 2022-12-05

## 2022-12-05 VITALS
HEART RATE: 66 BPM | SYSTOLIC BLOOD PRESSURE: 106 MMHG | BODY MASS INDEX: 23.39 KG/M2 | OXYGEN SATURATION: 95 % | RESPIRATION RATE: 10 BRPM | WEIGHT: 163.36 LBS | HEIGHT: 70 IN | DIASTOLIC BLOOD PRESSURE: 71 MMHG | TEMPERATURE: 97.4 F

## 2022-12-05 VITALS
WEIGHT: 163 LBS | DIASTOLIC BLOOD PRESSURE: 86 MMHG | TEMPERATURE: 98 F | RESPIRATION RATE: 16 BRPM | BODY MASS INDEX: 23.34 KG/M2 | HEART RATE: 78 BPM | HEIGHT: 70 IN | SYSTOLIC BLOOD PRESSURE: 143 MMHG | OXYGEN SATURATION: 96 %

## 2022-12-05 DIAGNOSIS — Z98.890 S/P RIGHT INGUINAL HERNIA REPAIR: Primary | ICD-10-CM

## 2022-12-05 DIAGNOSIS — R33.9 URINARY RETENTION: ICD-10-CM

## 2022-12-05 DIAGNOSIS — Z87.19 S/P RIGHT INGUINAL HERNIA REPAIR: Primary | ICD-10-CM

## 2022-12-05 DIAGNOSIS — K40.90 RIGHT INGUINAL HERNIA: ICD-10-CM

## 2022-12-05 DIAGNOSIS — R10.2 SUPRAPUBIC ABDOMINAL PAIN: Primary | ICD-10-CM

## 2022-12-05 PROCEDURE — S2900 ROBOTIC SURGICAL SYSTEM: HCPCS | Performed by: SURGERY

## 2022-12-05 PROCEDURE — 77030008684 HC TU ET CUF COVD -B: Performed by: ANESTHESIOLOGY

## 2022-12-05 PROCEDURE — 74011000250 HC RX REV CODE- 250: Performed by: SURGERY

## 2022-12-05 PROCEDURE — 77030013079 HC BLNKT BAIR HGGR 3M -A: Performed by: ANESTHESIOLOGY

## 2022-12-05 PROCEDURE — 2709999900 HC NON-CHARGEABLE SUPPLY: Performed by: SURGERY

## 2022-12-05 PROCEDURE — 77030012770 HC TRCR OPT FX AMR -B: Performed by: SURGERY

## 2022-12-05 PROCEDURE — 74011250636 HC RX REV CODE- 250/636: Performed by: ANESTHESIOLOGY

## 2022-12-05 PROCEDURE — 74011250636 HC RX REV CODE- 250/636: Performed by: SURGERY

## 2022-12-05 PROCEDURE — 77030035029 HC NDL INSUF VERES DISP COVD -B: Performed by: SURGERY

## 2022-12-05 PROCEDURE — 76210000016 HC OR PH I REC 1 TO 1.5 HR: Performed by: SURGERY

## 2022-12-05 PROCEDURE — 49650 LAP ING HERNIA REPAIR INIT: CPT | Performed by: SURGERY

## 2022-12-05 PROCEDURE — 76210000020 HC REC RM PH II FIRST 0.5 HR: Performed by: SURGERY

## 2022-12-05 PROCEDURE — 77030035277 HC OBTRTR BLDELSS DISP INTU -B: Performed by: SURGERY

## 2022-12-05 PROCEDURE — 77030022704 HC SUT VLOC COVD -B: Performed by: SURGERY

## 2022-12-05 PROCEDURE — 77030002933 HC SUT MCRYL J&J -A: Performed by: SURGERY

## 2022-12-05 PROCEDURE — 74011000250 HC RX REV CODE- 250: Performed by: ANESTHESIOLOGY

## 2022-12-05 PROCEDURE — 76010000875 HC OR TIME 1.5 TO 2HR INTENSV - TIER 2: Performed by: SURGERY

## 2022-12-05 PROCEDURE — 77030040361 HC SLV COMPR DVT MDII -B: Performed by: SURGERY

## 2022-12-05 PROCEDURE — 77030016151 HC PROTCTR LNS DFOG COVD -B: Performed by: SURGERY

## 2022-12-05 PROCEDURE — 99283 EMERGENCY DEPT VISIT LOW MDM: CPT

## 2022-12-05 PROCEDURE — 51798 US URINE CAPACITY MEASURE: CPT

## 2022-12-05 PROCEDURE — 76060000034 HC ANESTHESIA 1.5 TO 2 HR: Performed by: SURGERY

## 2022-12-05 PROCEDURE — 77030020703 HC SEAL CANN DISP INTU -B: Performed by: SURGERY

## 2022-12-05 PROCEDURE — 74011250637 HC RX REV CODE- 250/637: Performed by: ANESTHESIOLOGY

## 2022-12-05 PROCEDURE — 51702 INSERT TEMP BLADDER CATH: CPT

## 2022-12-05 PROCEDURE — 77030026438 HC STYL ET INTUB CARD -A: Performed by: ANESTHESIOLOGY

## 2022-12-05 PROCEDURE — C1781 MESH (IMPLANTABLE): HCPCS | Performed by: SURGERY

## 2022-12-05 PROCEDURE — 77030010507 HC ADH SKN DERMBND J&J -B: Performed by: SURGERY

## 2022-12-05 PROCEDURE — 77030031139 HC SUT VCRL2 J&J -A: Performed by: SURGERY

## 2022-12-05 DEVICE — MESH CS RIGHT LARGE 10CM X 16CM: Type: IMPLANTABLE DEVICE | Site: GROIN | Status: FUNCTIONAL

## 2022-12-05 RX ORDER — SODIUM CHLORIDE, SODIUM LACTATE, POTASSIUM CHLORIDE, CALCIUM CHLORIDE 600; 310; 30; 20 MG/100ML; MG/100ML; MG/100ML; MG/100ML
1000 INJECTION, SOLUTION INTRAVENOUS CONTINUOUS
Status: DISCONTINUED | OUTPATIENT
Start: 2022-12-05 | End: 2022-12-05 | Stop reason: HOSPADM

## 2022-12-05 RX ORDER — ACETAMINOPHEN 325 MG/1
650 TABLET ORAL ONCE
Status: COMPLETED | OUTPATIENT
Start: 2022-12-05 | End: 2022-12-05

## 2022-12-05 RX ORDER — PROPOFOL 10 MG/ML
INJECTION, EMULSION INTRAVENOUS AS NEEDED
Status: DISCONTINUED | OUTPATIENT
Start: 2022-12-05 | End: 2022-12-05 | Stop reason: HOSPADM

## 2022-12-05 RX ORDER — POLYETHYLENE GLYCOL 3350 17 G/17G
17 POWDER, FOR SOLUTION ORAL DAILY
Qty: 20 PACKET | Refills: 0 | Status: SHIPPED | OUTPATIENT
Start: 2022-12-05 | End: 2022-12-25

## 2022-12-05 RX ORDER — DIPHENHYDRAMINE HYDROCHLORIDE 50 MG/ML
12.5 INJECTION, SOLUTION INTRAMUSCULAR; INTRAVENOUS AS NEEDED
Status: DISCONTINUED | OUTPATIENT
Start: 2022-12-05 | End: 2022-12-05 | Stop reason: HOSPADM

## 2022-12-05 RX ORDER — HYDROMORPHONE HYDROCHLORIDE 2 MG/ML
INJECTION, SOLUTION INTRAMUSCULAR; INTRAVENOUS; SUBCUTANEOUS AS NEEDED
Status: DISCONTINUED | OUTPATIENT
Start: 2022-12-05 | End: 2022-12-05 | Stop reason: HOSPADM

## 2022-12-05 RX ORDER — ALBUTEROL SULFATE 0.83 MG/ML
2.5 SOLUTION RESPIRATORY (INHALATION) AS NEEDED
Status: DISCONTINUED | OUTPATIENT
Start: 2022-12-05 | End: 2022-12-05 | Stop reason: HOSPADM

## 2022-12-05 RX ORDER — GLYCOPYRROLATE 0.2 MG/ML
0.2 INJECTION INTRAMUSCULAR; INTRAVENOUS
Status: DISCONTINUED | OUTPATIENT
Start: 2022-12-05 | End: 2022-12-05 | Stop reason: HOSPADM

## 2022-12-05 RX ORDER — BUPIVACAINE HYDROCHLORIDE 5 MG/ML
INJECTION, SOLUTION EPIDURAL; INTRACAUDAL AS NEEDED
Status: DISCONTINUED | OUTPATIENT
Start: 2022-12-05 | End: 2022-12-05 | Stop reason: HOSPADM

## 2022-12-05 RX ORDER — ONDANSETRON 2 MG/ML
4 INJECTION INTRAMUSCULAR; INTRAVENOUS AS NEEDED
Status: DISCONTINUED | OUTPATIENT
Start: 2022-12-05 | End: 2022-12-05 | Stop reason: HOSPADM

## 2022-12-05 RX ORDER — ROPIVACAINE HYDROCHLORIDE 5 MG/ML
30 INJECTION, SOLUTION EPIDURAL; INFILTRATION; PERINEURAL AS NEEDED
Status: DISCONTINUED | OUTPATIENT
Start: 2022-12-05 | End: 2022-12-05 | Stop reason: HOSPADM

## 2022-12-05 RX ORDER — SODIUM CHLORIDE 9 MG/ML
25 INJECTION, SOLUTION INTRAVENOUS CONTINUOUS
Status: DISCONTINUED | OUTPATIENT
Start: 2022-12-05 | End: 2022-12-05 | Stop reason: HOSPADM

## 2022-12-05 RX ORDER — GLYCOPYRROLATE 0.2 MG/ML
INJECTION INTRAMUSCULAR; INTRAVENOUS AS NEEDED
Status: DISCONTINUED | OUTPATIENT
Start: 2022-12-05 | End: 2022-12-05 | Stop reason: HOSPADM

## 2022-12-05 RX ORDER — MIDAZOLAM HYDROCHLORIDE 1 MG/ML
0.5 INJECTION, SOLUTION INTRAMUSCULAR; INTRAVENOUS
Status: DISCONTINUED | OUTPATIENT
Start: 2022-12-05 | End: 2022-12-05 | Stop reason: HOSPADM

## 2022-12-05 RX ORDER — NEOSTIGMINE METHYLSULFATE 1 MG/ML
INJECTION, SOLUTION INTRAVENOUS AS NEEDED
Status: DISCONTINUED | OUTPATIENT
Start: 2022-12-05 | End: 2022-12-05 | Stop reason: HOSPADM

## 2022-12-05 RX ORDER — FENTANYL CITRATE 50 UG/ML
INJECTION, SOLUTION INTRAMUSCULAR; INTRAVENOUS AS NEEDED
Status: DISCONTINUED | OUTPATIENT
Start: 2022-12-05 | End: 2022-12-05 | Stop reason: HOSPADM

## 2022-12-05 RX ORDER — OXYCODONE AND ACETAMINOPHEN 5; 325 MG/1; MG/1
1 TABLET ORAL
Qty: 20 TABLET | Refills: 0 | Status: SHIPPED | OUTPATIENT
Start: 2022-12-05 | End: 2022-12-10

## 2022-12-05 RX ORDER — FENTANYL CITRATE 50 UG/ML
25 INJECTION, SOLUTION INTRAMUSCULAR; INTRAVENOUS
Status: DISCONTINUED | OUTPATIENT
Start: 2022-12-05 | End: 2022-12-05 | Stop reason: HOSPADM

## 2022-12-05 RX ORDER — MIDAZOLAM HYDROCHLORIDE 1 MG/ML
1 INJECTION, SOLUTION INTRAMUSCULAR; INTRAVENOUS AS NEEDED
Status: DISCONTINUED | OUTPATIENT
Start: 2022-12-05 | End: 2022-12-05 | Stop reason: HOSPADM

## 2022-12-05 RX ORDER — LIDOCAINE HYDROCHLORIDE 20 MG/ML
INJECTION, SOLUTION EPIDURAL; INFILTRATION; INTRACAUDAL; PERINEURAL AS NEEDED
Status: DISCONTINUED | OUTPATIENT
Start: 2022-12-05 | End: 2022-12-05 | Stop reason: HOSPADM

## 2022-12-05 RX ORDER — LIDOCAINE HYDROCHLORIDE 10 MG/ML
0.1 INJECTION, SOLUTION EPIDURAL; INFILTRATION; INTRACAUDAL; PERINEURAL AS NEEDED
Status: DISCONTINUED | OUTPATIENT
Start: 2022-12-05 | End: 2022-12-05 | Stop reason: HOSPADM

## 2022-12-05 RX ORDER — BUPIVACAINE HYDROCHLORIDE 5 MG/ML
50 INJECTION, SOLUTION EPIDURAL; INTRACAUDAL ONCE
Status: CANCELLED | OUTPATIENT
Start: 2022-12-05 | End: 2022-12-05

## 2022-12-05 RX ORDER — PHENYLEPHRINE HCL IN 0.9% NACL 0.4MG/10ML
SYRINGE (ML) INTRAVENOUS AS NEEDED
Status: DISCONTINUED | OUTPATIENT
Start: 2022-12-05 | End: 2022-12-05 | Stop reason: HOSPADM

## 2022-12-05 RX ORDER — ONDANSETRON 2 MG/ML
INJECTION INTRAMUSCULAR; INTRAVENOUS AS NEEDED
Status: DISCONTINUED | OUTPATIENT
Start: 2022-12-05 | End: 2022-12-05 | Stop reason: HOSPADM

## 2022-12-05 RX ORDER — HYDROMORPHONE HYDROCHLORIDE 1 MG/ML
0.2 INJECTION, SOLUTION INTRAMUSCULAR; INTRAVENOUS; SUBCUTANEOUS
Status: DISCONTINUED | OUTPATIENT
Start: 2022-12-05 | End: 2022-12-05 | Stop reason: HOSPADM

## 2022-12-05 RX ORDER — SODIUM CHLORIDE, SODIUM LACTATE, POTASSIUM CHLORIDE, CALCIUM CHLORIDE 600; 310; 30; 20 MG/100ML; MG/100ML; MG/100ML; MG/100ML
INJECTION, SOLUTION INTRAVENOUS
Status: DISCONTINUED | OUTPATIENT
Start: 2022-12-05 | End: 2022-12-05 | Stop reason: HOSPADM

## 2022-12-05 RX ORDER — SUCCINYLCHOLINE CHLORIDE 20 MG/ML
INJECTION INTRAMUSCULAR; INTRAVENOUS AS NEEDED
Status: DISCONTINUED | OUTPATIENT
Start: 2022-12-05 | End: 2022-12-05 | Stop reason: HOSPADM

## 2022-12-05 RX ORDER — MIDAZOLAM HYDROCHLORIDE 1 MG/ML
INJECTION, SOLUTION INTRAMUSCULAR; INTRAVENOUS AS NEEDED
Status: DISCONTINUED | OUTPATIENT
Start: 2022-12-05 | End: 2022-12-05 | Stop reason: HOSPADM

## 2022-12-05 RX ORDER — HYDROCODONE BITARTRATE AND ACETAMINOPHEN 5; 325 MG/1; MG/1
1 TABLET ORAL AS NEEDED
Status: DISCONTINUED | OUTPATIENT
Start: 2022-12-05 | End: 2022-12-05 | Stop reason: HOSPADM

## 2022-12-05 RX ORDER — DEXAMETHASONE SODIUM PHOSPHATE 4 MG/ML
INJECTION, SOLUTION INTRA-ARTICULAR; INTRALESIONAL; INTRAMUSCULAR; INTRAVENOUS; SOFT TISSUE AS NEEDED
Status: DISCONTINUED | OUTPATIENT
Start: 2022-12-05 | End: 2022-12-05 | Stop reason: HOSPADM

## 2022-12-05 RX ORDER — ROCURONIUM BROMIDE 10 MG/ML
INJECTION, SOLUTION INTRAVENOUS AS NEEDED
Status: DISCONTINUED | OUTPATIENT
Start: 2022-12-05 | End: 2022-12-05 | Stop reason: HOSPADM

## 2022-12-05 RX ORDER — FENTANYL CITRATE 50 UG/ML
50 INJECTION, SOLUTION INTRAMUSCULAR; INTRAVENOUS AS NEEDED
Status: DISCONTINUED | OUTPATIENT
Start: 2022-12-05 | End: 2022-12-05 | Stop reason: HOSPADM

## 2022-12-05 RX ORDER — EPHEDRINE SULFATE/0.9% NACL/PF 50 MG/5 ML
SYRINGE (ML) INTRAVENOUS AS NEEDED
Status: DISCONTINUED | OUTPATIENT
Start: 2022-12-05 | End: 2022-12-05 | Stop reason: HOSPADM

## 2022-12-05 RX ORDER — MORPHINE SULFATE 2 MG/ML
2 INJECTION, SOLUTION INTRAMUSCULAR; INTRAVENOUS
Status: DISCONTINUED | OUTPATIENT
Start: 2022-12-05 | End: 2022-12-05 | Stop reason: HOSPADM

## 2022-12-05 RX ADMIN — SUCCINYLCHOLINE CHLORIDE 150 MG: 20 INJECTION, SOLUTION INTRAMUSCULAR; INTRAVENOUS at 07:35

## 2022-12-05 RX ADMIN — Medication 80 MCG: at 07:43

## 2022-12-05 RX ADMIN — NEOSTIGMINE METHYLSULFATE 2 MG: 1 INJECTION, SOLUTION INTRAVENOUS at 08:48

## 2022-12-05 RX ADMIN — WATER 2 G: 1 INJECTION INTRAMUSCULAR; INTRAVENOUS; SUBCUTANEOUS at 07:45

## 2022-12-05 RX ADMIN — GLYCOPYRROLATE 0.2 MG: 0.2 INJECTION INTRAMUSCULAR; INTRAVENOUS at 07:48

## 2022-12-05 RX ADMIN — DEXAMETHASONE SODIUM PHOSPHATE 4 MG: 4 INJECTION, SOLUTION INTRAMUSCULAR; INTRAVENOUS at 07:43

## 2022-12-05 RX ADMIN — PROPOFOL 175 MG: 10 INJECTION, EMULSION INTRAVENOUS at 07:35

## 2022-12-05 RX ADMIN — Medication 5 MG: at 08:40

## 2022-12-05 RX ADMIN — ROCURONIUM BROMIDE 10 MG: 10 SOLUTION INTRAVENOUS at 07:35

## 2022-12-05 RX ADMIN — Medication 80 MCG: at 07:51

## 2022-12-05 RX ADMIN — ONDANSETRON HYDROCHLORIDE 4 MG: 2 INJECTION, SOLUTION INTRAMUSCULAR; INTRAVENOUS at 08:40

## 2022-12-05 RX ADMIN — SODIUM CHLORIDE, POTASSIUM CHLORIDE, SODIUM LACTATE AND CALCIUM CHLORIDE 1000 ML: 600; 310; 30; 20 INJECTION, SOLUTION INTRAVENOUS at 06:24

## 2022-12-05 RX ADMIN — FENTANYL CITRATE 25 MCG: 0.05 INJECTION, SOLUTION INTRAMUSCULAR; INTRAVENOUS at 09:50

## 2022-12-05 RX ADMIN — GLYCOPYRROLATE 0.4 MG: 0.2 INJECTION INTRAMUSCULAR; INTRAVENOUS at 08:48

## 2022-12-05 RX ADMIN — PHENYLEPHRINE HYDROCHLORIDE 20 MCG/MIN: 10 INJECTION INTRAVENOUS at 07:43

## 2022-12-05 RX ADMIN — GLYCOPYRROLATE 0.2 MG: 0.2 INJECTION INTRAMUSCULAR; INTRAVENOUS at 07:43

## 2022-12-05 RX ADMIN — Medication 80 MCG: at 07:48

## 2022-12-05 RX ADMIN — FENTANYL CITRATE 50 MCG: 50 INJECTION INTRAMUSCULAR; INTRAVENOUS at 07:35

## 2022-12-05 RX ADMIN — FENTANYL CITRATE 25 MCG: 0.05 INJECTION, SOLUTION INTRAMUSCULAR; INTRAVENOUS at 09:44

## 2022-12-05 RX ADMIN — SODIUM CHLORIDE, POTASSIUM CHLORIDE, SODIUM LACTATE AND CALCIUM CHLORIDE: 600; 310; 30; 20 INJECTION, SOLUTION INTRAVENOUS at 07:25

## 2022-12-05 RX ADMIN — Medication 120 MCG: at 07:45

## 2022-12-05 RX ADMIN — FENTANYL CITRATE 25 MCG: 50 INJECTION INTRAMUSCULAR; INTRAVENOUS at 08:00

## 2022-12-05 RX ADMIN — Medication 10 MG: at 07:45

## 2022-12-05 RX ADMIN — HYDROMORPHONE HYDROCHLORIDE 0.4 MG: 2 INJECTION, SOLUTION INTRAMUSCULAR; INTRAVENOUS; SUBCUTANEOUS at 08:46

## 2022-12-05 RX ADMIN — FENTANYL CITRATE 25 MCG: 50 INJECTION INTRAMUSCULAR; INTRAVENOUS at 08:22

## 2022-12-05 RX ADMIN — ROCURONIUM BROMIDE 40 MG: 10 SOLUTION INTRAVENOUS at 07:45

## 2022-12-05 RX ADMIN — Medication 80 MCG: at 08:49

## 2022-12-05 RX ADMIN — LIDOCAINE HYDROCHLORIDE 60 MG: 20 INJECTION, SOLUTION EPIDURAL; INFILTRATION; INTRACAUDAL; PERINEURAL at 07:35

## 2022-12-05 RX ADMIN — PROPOFOL 50 MG: 10 INJECTION, EMULSION INTRAVENOUS at 08:37

## 2022-12-05 RX ADMIN — ACETAMINOPHEN 650 MG: 325 TABLET ORAL at 06:18

## 2022-12-05 RX ADMIN — MIDAZOLAM 2 MG: 1 INJECTION INTRAMUSCULAR; INTRAVENOUS at 07:25

## 2022-12-05 NOTE — H&P
Subjective: Pedro Islas is a 56 y.o.  male who was referred for evaluation of right groin lump. Symptoms were first noted several years ago. Symptoms did not start at work. Pain is sharp, intermittent. Lump is reducible. Patient does not have symptoms of chronic constipation, chronic cough, difficulty urinating. Patient does not have a history of previous hernia surgery. He denies nausea, vomiting, fever, chills, or dysuria. He was recently admitted at [facility] for diastolic heart failure, renal insufficiency. Patient Active Problem List     Diagnosis Date Noted    Right inguinal hernia 32/89/1880    Acute diastolic CHF (congestive heart failure) ([facility] 10/26/2022    ANCELMO (acute kidney injury) ([facility] 10/19/2022    Hypertensive emergency 10/19/2022           Past Medical History:   Diagnosis Date    Asthma      Chronic obstructive pulmonary disease ([address]      Emphysema lung ([facility]      Hypertension      Stroke ([facility] 2022            Past Surgical History:   Procedure Laterality Date    HX APPENDECTOMY        HX ORTHOPAEDIC         collar bone (right)            Family History   Problem Relation Age of Onset    Hypertension Father        Social History            Tobacco Use    Smoking status: Every Day    Smokeless tobacco: Never   Substance Use Topics    Alcohol use: Yes       Comment: 2-3 Beers per week           Current Outpatient Medications   Medication Sig    hydrALAZINE (APRESOLINE) 50 mg tablet Take 1 Tablet by mouth three (3) times daily. amLODIPine (NORVASC) 10 mg tablet Take 1 Tablet by mouth daily. bumetanide (BUMEX) 1 mg tablet Take 1 Tablet by mouth two (2) times a day. labetaloL (NORMODYNE) 200 mg tablet Take 2 Tablets by mouth every twelve (12) hours. albuterol (PROVENTIL HFA, VENTOLIN HFA, PROAIR HFA) 90 mcg/actuation inhaler Take 2 Puffs by inhalation.  Reports taking 6 puffs as needed (Patient not taking: Reported on 11/10/2022)    albuterol (PROVENTIL HFA, VENTOLIN HFA, PROAIR HFA) 90 mcg/actuation inhaler Take 2 Puffs by inhalation every four (4) hours as needed for Wheezing or Cough. (Patient not taking: Reported on 11/10/2022)    inhalational spacing device For use with inhaler (Patient not taking: Reported on 11/10/2022)      No current facility-administered medications for this visit. Allergies   Allergen Reactions    Ace Inhibitors Swelling         Review of Systems:  A complete review of systems was negative except as noted in the HPI. Objective:      Visit Vitals  /84 (BP 1 Location: Left upper arm, BP Patient Position: At rest)   Pulse 60   Temp 97.5 °F (36.4 °C)   Resp 12   Ht 5' 10\" (1.778 m)   Wt 163 lb 5.8 oz (74.1 kg)   SpO2 97%   BMI 23.44 kg/m²        Physical Exam:  GENERAL: alert, cooperative, no distress, appears older than stated age, EYE: negative, LYMPHATIC: No cervical or supraclavicular adenopathy. THROAT & NECK: normal, LUNG: clear to auscultation bilaterally, HEART: regular rate and rhythm, S1, S2 normal, no murmur. ABDOMEN: NABS, nondistended, soft. No ventral hernia. Tender, reducible right inguinal hernia. EXTREMITIES:  extremities normal, atraumatic, no cyanosis or edema, SKIN: Normal., NEUROLOGIC: negative. Assessment:      Right inguinal hernia, tender to palpation. He has recently experienced hospitalization for diastolic heart failure, renal insufficiency. Plan:      Recommended robotic assisted laparoscopic right inguinal hernia repair with mesh. Technical aspects of procedure reviewed along with risks (include but not limited to bleeding, wound infection, recurrence, conversion open procedure, visceral injury, worsening renal function). Also reviewed anticipated outpatient nature of procedure, postoperative activity restrictions, and expected results. He understands and desires to proceed.   All questions answered

## 2022-12-05 NOTE — PERIOP NOTES
Patient: Willy Slade MRN: [id number]  SSN: [id number]   YOB: 1965  Age: 56 y.o. Sex: male     Patient is status post Procedure(s):  ROBOTIC ASSISTED LAPAROSCOPIC RIGHT INGUINAL HERNIA REPAIR WITH MESH. Surgeon(s) and Role:     Nicholas Lowry MD - Primary    Local/Dose/Irrigation:  30 ml 0.5 % marcaine plain                  Peripheral IV 12/05/22 Right Hand (Active)   Site Assessment Clean, dry, & intact 12/05/22 0624   Phlebitis Assessment 0 12/05/22 0624   Infiltration Assessment 0 12/05/22 0624   Dressing Status Clean, dry, & intact; New 12/05/22 0624   Dressing Type Tape;Transparent 12/05/22 0624   Hub Color/Line Status Green 12/05/22 0624            Airway - Endotracheal Tube 12/05/22 Oral (Active)                   Dressing/Packing:  Incision 12/05/22 Abdomen-Dressing/Treatment: Skin glue (12/05/22 0847)    Splint/Cast:  ]    Other:

## 2022-12-05 NOTE — ANESTHESIA POSTPROCEDURE EVALUATION
Post-Anesthesia Evaluation and Assessment    Patient: French Heimlich MRN: [id number]  SSN: [id number]    YOB: 1965  Age: 56 y.o. Sex: male      I have evaluated the patient and they are stable and ready for discharge from the PACU. Cardiovascular Function/Vital Signs  Visit Vitals  /71   Pulse 66   Temp 36.3 °C (97.4 °F)   Resp 10   Ht 5' 10\" (1.778 m)   Wt 74.1 kg (163 lb 5.8 oz)   SpO2 95%   BMI 23.44 kg/m²       Patient is status post General anesthesia for Procedure(s):  ROBOTIC ASSISTED LAPAROSCOPIC RIGHT INGUINAL HERNIA REPAIR WITH MESH. Nausea/Vomiting: None    Postoperative hydration reviewed and adequate. Pain:  Pain Scale 1: Numeric (0 - 10) (12/05/22 1004)  Pain Intensity 1: 3 (12/05/22 1004)   Managed    Neurological Status:   Neuro (WDL): Within Defined Limits (12/05/22 1004)  Neuro  Neurologic State: Alert (12/05/22 1004)  LUE Motor Response: Purposeful (12/05/22 1004)  LLE Motor Response: Purposeful (12/05/22 1004)  RUE Motor Response: Purposeful (12/05/22 1004)  RLE Motor Response: Purposeful (12/05/22 1004)   At baseline    Mental Status, Level of Consciousness: Alert and  oriented to person, place, and time    Pulmonary Status:   O2 Device: None (Room air) (12/05/22 1004)   Adequate oxygenation and airway patent    Complications related to anesthesia: None    Post-anesthesia assessment completed. No concerns    Signed By: Raquel Monreal MD     December 5, 2022              Procedure(s):  ROBOTIC ASSISTED LAPAROSCOPIC RIGHT INGUINAL HERNIA REPAIR WITH MESH. general    <BSHSIANPOST>    INITIAL Post-op Vital signs:   Vitals Value Taken Time   /71 12/05/22 1000   Temp 36.3 °C (97.4 °F) 12/05/22 0910   Pulse 67 12/05/22 1012   Resp 13 12/05/22 1012   SpO2 95 % 12/05/22 1012   Vitals shown include unvalidated device data.

## 2022-12-05 NOTE — PERIOP NOTES
I have reviewed discharge instructions in person with the patient and wife using teach back method. The patient and wife verbalized understanding. Medications, signs, symptoms, and side effects reviewed. Opportunity for questions and clarification allowed. Patient discharging home via private vehicle. Hard-copy of discharge instructions given to patient. Copy of discharge instructions signed and placed on patient's chart.

## 2022-12-05 NOTE — BRIEF OP NOTE
Brief Postoperative Note    Patient: Ramona Harding  YOB: 1965  MRN: [id number]    Date of Procedure: 12/5/2022     Pre-Op Diagnosis: RIGHT INGUINAL HERNIA    Post-Op Diagnosis: Same as preoperative diagnosis. Procedure(s):  ROBOTIC ASSISTED LAPAROSCOPIC RIGHT INGUINAL HERNIA REPAIR WITH MESH    Surgeon(s):  Chacho Armando MD    Surgical Assistant: Surg Asst-1: Lilibeth Potts    Anesthesia: General     Estimated Blood Loss (mL): Minimal    Complications: None    Specimens: * No specimens in log *     Implants:   Implant Name Type Inv. Item Serial No.  Lot No. LRB No. Used Action   MESH CS RIGHT LARGE 10CM X 16CM - SNA  MESH CS RIGHT LARGE 10CM X 16CM NA [employer]WD [id number] 1 Implanted       Drains: * No LDAs found *    Findings: Indirect right inguinal hernia.     Electronically Signed by Allyssa Willett MD on 12/5/2022 at 8:51 AM

## 2022-12-05 NOTE — TELEPHONE ENCOUNTER
Returned call to patient spoke with Gabriela([facility]) verified using 2 patient identifiers. Pt's wife states that he had surgery today for a hernia repair and he has not gone to the bathroom since before the surgery. They have been home since 11 0r 11:30 and she still has not gone much only trickles. She has given him his fluid pill and still nothing. She denies that his abdomen is distended. Informed her that he needs to go to the ER/URGENT to r/o urinary retention post surgery. She voiced understandings and will take him to the [address] [place] [address] urgent care.

## 2022-12-05 NOTE — TELEPHONE ENCOUNTER
Patient's spouse called, stating that she wants to find out if her  is able to \"put weight to be able to urinate\".

## 2022-12-06 ENCOUNTER — TELEPHONE (OUTPATIENT)
Dept: SURGERY | Age: 57
End: 2022-12-06

## 2022-12-06 NOTE — OP NOTES
[address]  OPERATIVE REPORT    Name:  Alfredo Cowan  MR#:  [id number]  :  1965  ACCOUNT #:  [de-identified]  DATE OF SERVICE:  2022    PREOPERATIVE DIAGNOSIS:  Right inguinal hernia. POSTOPERATIVE DIAGNOSIS:  Indirect right inguinal hernia. PROCEDURE PERFORMED:  Robotic-assisted laparoscopic right inguinal hernia repair with mesh (CPT 76312). SURGEON:  Jennifer Gustafson MD    ASSISTANTHiDanny Brown    ANESTHESIA:  General endotracheal.    COMPLICATIONS:  None. SPECIMENS REMOVED:  None. IMPLANTS:  Large medium weight Bard 3DMax mesh. ESTIMATED BLOOD LOSS:  20 mL. DRAINS:  None. COUNTS:  Sponge count correct. Needle count correct. INDICATIONS:   The patient is a 56-year-old white male with multiple medical problems to include hypertensive crisis, recent diastolic heart failure, chronic kidney disease stage IV, with a longstanding, progressively enlarging right groin bulge. On exam, he has a tender right inguinal hernia. Recent imaging reveals loops of bowel within the hernia sac without signs of obstruction. He is taken to the operating room today for robotic-assisted laparoscopic repair. FINDINGS:  1. Indirect right inguinal hernia, reduced. 2.  Satisfactory mesh repair using large size medium weight Bard 3DMax mesh. PROCEDURE:  The patient was identified as the correct patient in the preoperative holding area and informed consent was confirmed. After answering the patient's remaining questions and performing site verification, he was taken to the operating room and placed on the operating room table in the supine position. Sequential compression devices were placed on both lower extremities. Following the uneventful initiation of general anesthesia, he was carefully secured to the operating room table with safety strap in place. All potential pressure points were padded with eggcrate. His arms were tucked to his sides.   His abdomen was prepped and draped in usual sterile fashion. Final time-out was performed, and it was confirmed he had received intravenous antibiotics. Pneumoperitoneum was achieved using a closed Veress needle technique. Once adequate working sites had been developed, a 5 mm trocar was inserted through a small right upper quadrant skin incision using an Optiview technique. After confirming intraperitoneal location of the trocar tip, insufflation was switched to the trocar and the Veress needle was removed. A 5-mm, 30-degree laparoscope was inserted. No signs of trocar injury or Veress needle injury were present. No significant adhesions from prior open appendectomy were present. An 8 mm robotic trocar was inserted through a small supraumbilical incision using visual guidance with the laparoscope. An 8 mm robotic trocar was inserted through a small left upper quadrant incision using identical technique. The initial 5 mm trocar was upsized to an 8 mm robotic trocar using visual guidance with the laparoscope. The patient was placed in a steep Trendelenburg position, and the [address] [place] [address] was docked using standard technique. Alice with cautery used to incise the peritoneum at the level of the right medial umbilical ligament, and this opening was extended laterally towards the right anterior superior iliac spine. This allowed entry into the space of Retzius. This plane was developed caudally, towards the pelvis with identification and preservation of the inferior epigastric vessels. Conner's ligament was identified medially, and the lateral space was bluntly developed. A large indirect hernia was identified and was carefully freed from cord structures along with a cord lipoma using careful blunt dissection. Once freed from cord structures, the peritoneum was mobilized for a distance of 7 cm from the internal inguinal ring.   A large medium weight segment of 3DMax mesh was positioned over the right myopectineal orifice. It was secured in position with multiple 3-0 Vicryl sutures placed medially at the Conner's ligament area and along the anterior abdominal wall with care to avoid injury to the epigastric vessels or sensory nerves. After confirming satisfactory mesh fixation and hemostasis, the peritoneal flap was closed with a running 2-0 barbed absorbable suture. The viscera was inspected and no signs of injury were present. The [address] was then undocked, and pneumoperitoneum was released. Trocars were then removed. All wounds were infiltrated with 0.5% Marcaine without epinephrine. All skin edges were reapproximated with a combination of subcuticular 4-0 Monocryl suture and Dermabond. The patient tolerated the procedure well. He was extubated in the operating room and transported to the recovery area in stable condition. The attending surgeon, Dr. Franco Khoury, was scrubbed and present for the entire procedure.       Herson Kat MD      [id number]  D:  12/05/2022 11:51  T:  12/05/2022 19:43  JOB #:  [id number]

## 2022-12-06 NOTE — ED PROVIDER NOTES
61-year-old male presenting ER with report of difficulty urinating. Patient had hernia repair surgery at [facility] this morning. Patient reports that he has been unable to urinate since that time. Denies any previous history of urinary retention. Patient received sedation as well as been taking pain medications since that episode. Denies any pain with urination prior to the surgery denies any fevers or chills. Reporting worsening suprapubic abdominal discomfort with distention. Trying to urinate without any success the last couple hours. Past Medical History:   Diagnosis Date    Asthma     Chronic kidney disease     Chronic obstructive pulmonary disease (HCC)     Emphysema lung (HCC)     GERD (gastroesophageal reflux disease)     Heart failure ([facility] 10/2021    1 EPISODE DUE TO HIGH BP, NOW CLEARED UP PER PT AND WIFE    Hypertension     Stroke ([facility] [address] 2022       Past Surgical History:   Procedure Laterality Date    HX APPENDECTOMY      HX ORTHOPAEDIC      collar bone (right)    HX OTHER SURGICAL      HEMORRHOIDECTOMY         Family History:   Problem Relation Age of Onset    Lung Disease Mother     Hypertension Father     Liver Disease Father     Anesth Problems Neg Hx        Social History     Socioeconomic History    Marital status:      Spouse name: Not on file    Number of children: Not on file    Years of education: Not on file    Highest education level: Not on file   Occupational History    Not on file   Tobacco Use    Smoking status: Every Day     Packs/day: 0.50     Years: 40.00     Pack years: 20.00     Types: Cigarettes    Smokeless tobacco: Never   Vaping Use    Vaping Use: Never used   Substance and Sexual Activity    Alcohol use:  Yes     Alcohol/week: 2.0 standard drinks     Types: 2 Cans of beer per week     Comment: 2-3 Beers per week    Drug use: Yes     Types: Marijuana     Comment: 1 JOINT PER WEEK    Sexual activity: Not on file   Other Topics Concern    Not on file   Social History Narrative    Not on file     Social Determinants of Health     Financial Resource Strain: Not on file   Food Insecurity: Not on file   Transportation Needs: Not on file   Physical Activity: Not on file   Stress: Not on file   Social Connections: Not on file   Intimate Partner Violence: Not on file   Housing Stability: Not on file         ALLERGIES: Ace inhibitors    Review of Systems   Constitutional:  Negative for chills and fever. HENT:  Negative for congestion and sore throat. Eyes:  Negative for pain. Respiratory:  Negative for shortness of breath. Cardiovascular:  Negative for chest pain. Gastrointestinal:  Positive for abdominal pain. Negative for diarrhea, nausea and vomiting. Genitourinary:  Positive for difficulty urinating. Negative for dysuria and flank pain. Musculoskeletal:  Negative for back pain and neck pain. Skin:  Negative for rash. Neurological:  Negative for dizziness and headaches. All other systems reviewed and are negative. Vitals:    12/05/22 2035   BP: (!) 143/86   Pulse: 78   Resp: 16   Temp: 98 °F (36.7 °C)   SpO2: 96%   Weight: 73.9 kg (163 lb)   Height: 5' 10\" (1.778 m)            Physical Exam  Vitals and nursing note reviewed. Constitutional:       Appearance: He is well-developed. HENT:      Head: Normocephalic. Eyes:      Conjunctiva/sclera: Conjunctivae normal.   Cardiovascular:      Rate and Rhythm: Normal rate and regular rhythm. Pulmonary:      Effort: Pulmonary effort is normal. No respiratory distress. Breath sounds: Normal breath sounds. Abdominal:      General: Bowel sounds are normal. There is distension (suprapubic). Palpations: Abdomen is soft. Tenderness: There is abdominal tenderness in the suprapubic area. Musculoskeletal:         General: Normal range of motion. Cervical back: Normal range of motion and neck supple. Skin:     General: Skin is warm.       Capillary Refill: Capillary refill takes less than 2 seconds. Findings: No rash. Neurological:      Mental Status: He is alert and oriented to person, place, and time. Comments: No gross motor or sensory deficits        MDM  Number of Diagnoses or Management Options  Suprapubic abdominal pain  Urinary retention  Diagnosis management comments: Patient presenting ER with urinary retention status post surgery this morning with anesthesia/sedation and been taking pain medication since that time. No known history of enlarged prostate or history of urinary retention. Patient bladder scan greater than 600 cc of urine. After Duran was placed patient's symptoms resolved.   Given Duran leg bag and instructions to follow-up with urology/his surgeon for Duran removal.           Procedures

## 2022-12-06 NOTE — TELEPHONE ENCOUNTER
Patients spouse called stating that when he was discharged from surgery he had not urinated for several hours. Patient went to the ED where they stated he was holding 1500 liters of urine and they discharged him with a catheter. Spouse would like to know what to do next.

## 2022-12-06 NOTE — ED TRIAGE NOTES
Patient here with family member for concern of not being able to urinate, had hernia surgery at [facility] this morning and has not been able to urinate for the past 10 hours.

## 2022-12-06 NOTE — TELEPHONE ENCOUNTER
I called the patients wife back after verifying she was on the patient PHI and she said he went to the ED and a Duran catheter was placed and she wanted to know what she was supposed to do. I looked at the discharge instruction sheet from the ED and they said for him to follow up with [facility]. Number to [facility] provided. Pts wife in agreement.

## 2022-12-20 ENCOUNTER — APPOINTMENT (OUTPATIENT)
Dept: MRI IMAGING | Age: 57
DRG: 045 | End: 2022-12-20
Attending: HOSPITALIST
Payer: MEDICAID

## 2022-12-20 ENCOUNTER — APPOINTMENT (OUTPATIENT)
Dept: CT IMAGING | Age: 57
DRG: 045 | End: 2022-12-20
Attending: STUDENT IN AN ORGANIZED HEALTH CARE EDUCATION/TRAINING PROGRAM
Payer: MEDICAID

## 2022-12-20 ENCOUNTER — HOSPITAL ENCOUNTER (INPATIENT)
Age: 57
LOS: 1 days | Discharge: HOME | DRG: 045 | End: 2022-12-22
Attending: STUDENT IN AN ORGANIZED HEALTH CARE EDUCATION/TRAINING PROGRAM | Admitting: HOSPITALIST
Payer: MEDICAID

## 2022-12-20 DIAGNOSIS — I63.9 CEREBROVASCULAR ACCIDENT (CVA), UNSPECIFIED MECHANISM (HCC): ICD-10-CM

## 2022-12-20 DIAGNOSIS — H53.8 BLURRED VISION: ICD-10-CM

## 2022-12-20 DIAGNOSIS — R47.1 DYSARTHRIA: Primary | ICD-10-CM

## 2022-12-20 LAB
ALBUMIN SERPL-MCNC: 4.1 G/DL (ref 3.5–5)
ALBUMIN/GLOB SERPL: 1.2 {RATIO} (ref 1.1–2.2)
ALP SERPL-CCNC: 80 U/L (ref 45–117)
ALT SERPL-CCNC: 24 U/L (ref 12–78)
ANION GAP SERPL CALC-SCNC: 10 MMOL/L (ref 5–15)
AST SERPL-CCNC: 16 U/L (ref 15–37)
BASOPHILS # BLD: 0 K/UL (ref 0–0.1)
BASOPHILS NFR BLD: 1 % (ref 0–1)
BILIRUB SERPL-MCNC: 0.5 MG/DL (ref 0.2–1)
BUN SERPL-MCNC: 23 MG/DL (ref 6–20)
BUN/CREAT SERPL: 8 (ref 12–20)
CALCIUM SERPL-MCNC: 9.1 MG/DL (ref 8.5–10.1)
CHLORIDE SERPL-SCNC: 108 MMOL/L (ref 97–108)
CO2 SERPL-SCNC: 23 MMOL/L (ref 21–32)
COMMENT, HOLDF: NORMAL
CREAT SERPL-MCNC: 3.05 MG/DL (ref 0.7–1.3)
DIFFERENTIAL METHOD BLD: ABNORMAL
EOSINOPHIL # BLD: 0.6 K/UL (ref 0–0.4)
EOSINOPHIL NFR BLD: 7 % (ref 0–7)
ERYTHROCYTE [DISTWIDTH] IN BLOOD BY AUTOMATED COUNT: 13 % (ref 11.5–14.5)
GLOBULIN SER CALC-MCNC: 3.3 G/DL (ref 2–4)
GLUCOSE BLD STRIP.AUTO-MCNC: 262 MG/DL (ref 65–117)
GLUCOSE SERPL-MCNC: 122 MG/DL (ref 65–100)
HCT VFR BLD AUTO: 36.8 % (ref 36.6–50.3)
HGB BLD-MCNC: 12.1 G/DL (ref 12.1–17)
IMM GRANULOCYTES # BLD AUTO: 0 K/UL (ref 0–0.04)
IMM GRANULOCYTES NFR BLD AUTO: 0 % (ref 0–0.5)
INR PPP: 1 (ref 0.9–1.1)
LYMPHOCYTES # BLD: 1 K/UL (ref 0.8–3.5)
LYMPHOCYTES NFR BLD: 13 % (ref 12–49)
MCH RBC QN AUTO: 30.8 PG (ref 26–34)
MCHC RBC AUTO-ENTMCNC: 32.9 G/DL (ref 30–36.5)
MCV RBC AUTO: 93.6 FL (ref 80–99)
MONOCYTES # BLD: 0.5 K/UL (ref 0–1)
MONOCYTES NFR BLD: 6 % (ref 5–13)
NEUTS SEG # BLD: 5.4 K/UL (ref 1.8–8)
NEUTS SEG NFR BLD: 73 % (ref 32–75)
NRBC # BLD: 0 K/UL (ref 0–0.01)
NRBC BLD-RTO: 0 PER 100 WBC
PLATELET # BLD AUTO: 257 K/UL (ref 150–400)
PMV BLD AUTO: 9.9 FL (ref 8.9–12.9)
POTASSIUM SERPL-SCNC: 3.7 MMOL/L (ref 3.5–5.1)
PROT SERPL-MCNC: 7.4 G/DL (ref 6.4–8.2)
PROTHROMBIN TIME: 10.5 SEC (ref 9–11.1)
RBC # BLD AUTO: 3.93 M/UL (ref 4.1–5.7)
SAMPLES BEING HELD,HOLD: NORMAL
SERVICE CMNT-IMP: ABNORMAL
SODIUM SERPL-SCNC: 141 MMOL/L (ref 136–145)
WBC # BLD AUTO: 7.5 K/UL (ref 4.1–11.1)

## 2022-12-20 PROCEDURE — 70551 MRI BRAIN STEM W/O DYE: CPT

## 2022-12-20 PROCEDURE — 96374 THER/PROPH/DIAG INJ IV PUSH: CPT

## 2022-12-20 PROCEDURE — 99285 EMERGENCY DEPT VISIT HI MDM: CPT

## 2022-12-20 PROCEDURE — 85025 COMPLETE CBC W/AUTO DIFF WBC: CPT

## 2022-12-20 PROCEDURE — 82962 GLUCOSE BLOOD TEST: CPT

## 2022-12-20 PROCEDURE — 74011250636 HC RX REV CODE- 250/636: Performed by: HOSPITALIST

## 2022-12-20 PROCEDURE — G0378 HOSPITAL OBSERVATION PER HR: HCPCS

## 2022-12-20 PROCEDURE — 80053 COMPREHEN METABOLIC PANEL: CPT

## 2022-12-20 PROCEDURE — 70496 CT ANGIOGRAPHY HEAD: CPT

## 2022-12-20 PROCEDURE — 0042T CT CODE NEURO PERF W CBF: CPT

## 2022-12-20 PROCEDURE — 93005 ELECTROCARDIOGRAM TRACING: CPT

## 2022-12-20 PROCEDURE — 85610 PROTHROMBIN TIME: CPT

## 2022-12-20 PROCEDURE — 36415 COLL VENOUS BLD VENIPUNCTURE: CPT

## 2022-12-20 PROCEDURE — 74011250636 HC RX REV CODE- 250/636: Performed by: STUDENT IN AN ORGANIZED HEALTH CARE EDUCATION/TRAINING PROGRAM

## 2022-12-20 PROCEDURE — 70450 CT HEAD/BRAIN W/O DYE: CPT

## 2022-12-20 PROCEDURE — 74011000636 HC RX REV CODE- 636: Performed by: STUDENT IN AN ORGANIZED HEALTH CARE EDUCATION/TRAINING PROGRAM

## 2022-12-20 PROCEDURE — 94762 N-INVAS EAR/PLS OXIMTRY CONT: CPT

## 2022-12-20 RX ORDER — AMLODIPINE BESYLATE 5 MG/1
10 TABLET ORAL DAILY
Status: DISCONTINUED | OUTPATIENT
Start: 2022-12-21 | End: 2022-12-22 | Stop reason: HOSPADM

## 2022-12-20 RX ORDER — ALBUTEROL SULFATE 0.83 MG/ML
2.5 SOLUTION RESPIRATORY (INHALATION)
Status: DISCONTINUED | OUTPATIENT
Start: 2022-12-20 | End: 2022-12-22 | Stop reason: HOSPADM

## 2022-12-20 RX ORDER — GUAIFENESIN 100 MG/5ML
81 LIQUID (ML) ORAL DAILY
Status: DISCONTINUED | OUTPATIENT
Start: 2022-12-21 | End: 2022-12-22 | Stop reason: HOSPADM

## 2022-12-20 RX ORDER — LEVETIRACETAM 500 MG/5ML
1000 INJECTION, SOLUTION, CONCENTRATE INTRAVENOUS ONCE
Status: COMPLETED | OUTPATIENT
Start: 2022-12-20 | End: 2022-12-20

## 2022-12-20 RX ORDER — SODIUM CHLORIDE 9 MG/ML
75 INJECTION, SOLUTION INTRAVENOUS CONTINUOUS
Status: DISCONTINUED | OUTPATIENT
Start: 2022-12-20 | End: 2022-12-22

## 2022-12-20 RX ORDER — ACETAMINOPHEN 325 MG/1
650 TABLET ORAL
Status: DISCONTINUED | OUTPATIENT
Start: 2022-12-20 | End: 2022-12-22 | Stop reason: HOSPADM

## 2022-12-20 RX ORDER — ACETAMINOPHEN 650 MG/1
650 SUPPOSITORY RECTAL
Status: DISCONTINUED | OUTPATIENT
Start: 2022-12-20 | End: 2022-12-22 | Stop reason: HOSPADM

## 2022-12-20 RX ADMIN — SODIUM CHLORIDE 500 ML: 9 INJECTION, SOLUTION INTRAVENOUS at 13:54

## 2022-12-20 RX ADMIN — LEVETIRACETAM 1000 MG: 100 INJECTION INTRAVENOUS at 13:54

## 2022-12-20 RX ADMIN — IOPAMIDOL 100 ML: 755 INJECTION, SOLUTION INTRAVENOUS at 13:34

## 2022-12-20 RX ADMIN — IOPAMIDOL 40 ML: 755 INJECTION, SOLUTION INTRAVENOUS at 13:35

## 2022-12-20 RX ADMIN — SODIUM CHLORIDE 75 ML/HR: 9 INJECTION, SOLUTION INTRAVENOUS at 16:30

## 2022-12-20 NOTE — ED PROVIDER NOTES
Mr Antonella Young is a 55 yo man with PMHx of stroke who presents to the ED with complaints of blurring of vision with slurred speech since 1100. Reports that the sun was in his eyes, his vision went black intermittently and waxes and wanes. The vision changes are more prominent in the left eye when compared to the right eye. He also had slurred speech during this time. Has h/o stroke a year ago with intracranial hemorrhage with brain aneurysm. Denies headaches, weakness, numbness, tingling, fever, chills, head trauma. Dysarthria  Primary symptoms include speech difficulty. Pertinent negatives include no shortness of breath, no chest pain and no headaches. Blurred Vision   Associated symptoms include blurred vision. Pertinent negatives include no numbness, no discharge, no weakness and no fever. Past Medical History:   Diagnosis Date    Asthma     Chronic kidney disease     Chronic obstructive pulmonary disease (HCC)     Emphysema lung (HCC)     GERD (gastroesophageal reflux disease)     Heart failure ([address] 10/2021    1 EPISODE DUE TO HIGH BP, NOW CLEARED UP PER PT AND WIFE    Hypertension     Stroke ([facility] [address] 2022       Past Surgical History:   Procedure Laterality Date    HX APPENDECTOMY      HX ORTHOPAEDIC      collar bone (right)    HX OTHER SURGICAL      HEMORRHOIDECTOMY         Family History:   Problem Relation Age of Onset    Lung Disease Mother     Hypertension Father     Liver Disease Father     Anesth Problems Neg Hx        Social History     Socioeconomic History    Marital status:      Spouse name: Not on file    Number of children: Not on file    Years of education: Not on file    Highest education level: Not on file   Occupational History    Not on file   Tobacco Use    Smoking status: Every Day     Packs/day: 0.50     Years: 40.00     Pack years: 20.00     Types: Cigarettes    Smokeless tobacco: Never   Vaping Use    Vaping Use: Never used   Substance and Sexual Activity    Alcohol use:  Yes Alcohol/week: 2.0 standard drinks     Types: 2 Cans of beer per week     Comment: 2-3 Beers per week    Drug use: Yes     Types: Marijuana     Comment: 1 JOINT PER WEEK    Sexual activity: Not on file   Other Topics Concern    Not on file   Social History Narrative    Not on file     Social Determinants of Health     Financial Resource Strain: Not on file   Food Insecurity: Not on file   Transportation Needs: Not on file   Physical Activity: Not on file   Stress: Not on file   Social Connections: Not on file   Intimate Partner Violence: Not on file   Housing Stability: Not on file         ALLERGIES: Ace inhibitors    Review of Systems   Constitutional:  Negative for fever. HENT:  Negative for congestion. Eyes:  Positive for blurred vision. Negative for discharge. Respiratory:  Negative for shortness of breath. Cardiovascular:  Negative for chest pain. Gastrointestinal:  Negative for abdominal pain. Genitourinary:  Negative for dysuria. Musculoskeletal:  Negative for arthralgias. Skin:  Negative for rash. Allergic/Immunologic: Negative for immunocompromised state. Neurological:  Positive for speech difficulty. Negative for seizures, syncope, facial asymmetry, weakness, numbness and headaches. Psychiatric/Behavioral:  Negative for sleep disturbance. Vitals:    12/20/22 1258 12/20/22 1302   BP: (!) 146/85    Pulse: 67    Resp: 16    Temp:  98.7 °F (37.1 °C)   SpO2: 97%    Weight: 74.8 kg (165 lb)    Height: 5' 10\" (1.778 m)             Physical Exam  Constitutional:       Appearance: Normal appearance. HENT:      Head: Normocephalic and atraumatic. Eyes:      Extraocular Movements: Extraocular movements intact. Conjunctiva/sclera: Conjunctivae normal.   Cardiovascular:      Rate and Rhythm: Normal rate and regular rhythm. Pulmonary:      Effort: Pulmonary effort is normal.      Breath sounds: Normal breath sounds. No wheezing or rales.    Abdominal:      General: Bowel sounds are normal.      Palpations: Abdomen is soft. Tenderness: There is no abdominal tenderness. Musculoskeletal:      Cervical back: No rigidity or tenderness. Right lower leg: No edema. Left lower leg: No edema. Skin:     General: Skin is warm and dry. Neurological:      Mental Status: He is alert and oriented to person, place, and time. GCS: GCS eye subscore is 4. GCS verbal subscore is 5. GCS motor subscore is 6. Cranial Nerves: No dysarthria or facial asymmetry. Sensory: Sensation is intact. Motor: No weakness. Psychiatric:         Mood and Affect: Mood normal.         Behavior: Behavior normal.        MDM  Number of Diagnoses or Management Options  Blurred vision  Dysarthria  Diagnosis management comments: Mr Rena Schrader is here for stroke rule out. Not a candidate for tPa given h/o stroke with intracranial hemorrhage. Not on blood thinners. Ddx embolic vs hemorrhagic stroke vs TIA vs seizures vs metabolic disturbance. CT code stroke with no acute changes, chronic microvascular angiopathy. CTA head with no large vessel occlusion. As per Teleneuro,  considering likely partial seizures, give Keppra 1 gm now followed by 750 mg BID. Will need admission for further workup of stroke vs seizures. Perfect Serve Consult for Admission  2:18 PM    ED Room Number: GN16/29  Patient Name and age:   Liss Hansen 56 y.o.  male  Working Diagnosis: Dysarthria  (primary encounter diagnosis)  Blurred vision    COVID-19 Suspicion:  no  Sepsis present:  no  Reassessment needed: no  Code Status:  Full Code  Readmission: no  Isolation Requirements:  no  Recommended Level of Care:  telemetry  Department:[facility] ED - [phone]  Other:  Further workup of stroke vs seizure as per teleneuro              Amount and/or Complexity of Data Reviewed  Clinical lab tests: ordered and reviewed  Tests in the radiology section of CPT®: ordered and reviewed           Procedures

## 2022-12-20 NOTE — H&P
History & Physical    Primary Care Provider: Therese Adamson MD  Source of Information: Patient   CC: Visual disturbance and slurred speech      History of Presenting Illness: Yojana Henderson is a 56 y.o. male with past medical history of COPD, hypertension, heart failure, brain aneurysm/stroke who presents to the ER with the complaint of visual disturbance and slurred speech. He said at around 10 AM this morning while he was hanging sheet rock. He noticed that the sun was in his eye described as flashing lights and he could only see through the right eye. There was also associated slurred speech and inability to lift up both arms. There was no seizure. No lower extremity weakness. He denies syncope. Based on this he presented to the ER for evaluation. In the ER, he was status post code stroke. Not a TPA candidate due to prior history of intracranial hemorrhage. CT head performed showed no acute stroke, with sequela of chronic microvascular angiopathy. CTA head and neck showed no evidence of large vessel occlusion or significant vascular disease. Review of Systems:  A comprehensive review of systems was negative except for that written in the History of Present Illness. Past Medical History:   Diagnosis Date    Asthma     Chronic kidney disease     Chronic obstructive pulmonary disease (HCC)     Emphysema lung (HCC)     GERD (gastroesophageal reflux disease)     Heart failure ([facility] [address] 10/2021    1 EPISODE DUE TO HIGH BP, NOW CLEARED UP PER PT AND WIFE    Hypertension     Stroke ([facility] [address] 2022      Past Surgical History:   Procedure Laterality Date    HX APPENDECTOMY      HX ORTHOPAEDIC      collar bone (right)    HX OTHER SURGICAL      HEMORRHOIDECTOMY     Prior to Admission medications    Medication Sig Start Date End Date Taking? Authorizing Provider   polyethylene glycol (MIRALAX) 17 gram packet Take 1 Packet by mouth daily for 20 days.  12/5/22 12/25/22  Elsie Bynum MD   traMADoL Herb Highman) 50 mg tablet Take 50 mg by mouth every six (6) hours as needed for Pain. Provider, Historical   hydrALAZINE (APRESOLINE) 50 mg tablet Take 1.5 Tablets by mouth three (3) times daily. 11/15/22   Hermelindameir Ayoub T, DO   labetaloL (NORMODYNE) 200 mg tablet Take 2 Tablets by mouth every eight (8) hours. Patient taking differently: Take 400 mg by mouth three (3) times daily. 11/15/22   Hermelinda Ayoub T, DO   amLODIPine (NORVASC) 10 mg tablet Take 1 Tablet by mouth daily. 10/29/22   Beatrice Law MD   bumetanide (BUMEX) 1 mg tablet Take 1 Tablet by mouth two (2) times a day. Patient taking differently: Take 1 mg by mouth two (2) times a day. PT IS NOT TAKING THIS FOR HEART FAILURE, BUT FOR FLUID IN LEGS 10/28/22   Mello Marshall MD   albuterol (PROVENTIL HFA, VENTOLIN HFA, PROAIR HFA) 90 mcg/actuation inhaler Take 2 Puffs by inhalation every four (4) hours as needed. Reports taking 6 puffs as needed    OtherEugenio MD   inhalational spacing device For use with inhaler 10/5/22   Leanne Price MD     Allergies   Allergen Reactions    Ace Inhibitors Swelling      Family History   Problem Relation Age of Onset    Lung Disease Mother     Hypertension Father     Liver Disease Father     Anesth Problems Neg Hx         SOCIAL HISTORY:  Patient resides:  and lives with wife. Has 2 daughters  Independently    Assisted Living    SNF    With family care       Smoking history: smokes about 1/2 Pack per day.  Been smoking since age 17  None    Former    Chronic x     Alcohol history:   None    Social x   Chronic      Ambulates:   Independently x   w/cane    w/walker    w/wc    CODE STATUS:  DNR    Full x   Other      Objective:     Physical Exam:     Visit Vitals  BP (!) 169/94 (BP 1 Location: Right upper arm, BP Patient Position: At rest;Reclining)   Pulse 73   Temp 98.7 °F (37.1 °C)   Resp 13   Ht 5' 10\" (1.778 m)   Wt 74.8 kg (165 lb)   SpO2 96%   BMI 23.68 kg/m²      O2 Device: None (Room air)    General:  Alert, cooperative, no distress, appears stated age. Head:  Normocephalic, without obvious abnormality, atraumatic. Eyes:  Conjunctivae/corneas clear. PERRL, EOMs intact. Nose: Nares normal. Septum midline. Mucosa normal. No drainage or sinus tenderness. Throat: Lips, mucosa, and tongue normal. Edentulous   Neck: Supple,   Back:   Symmetric, no curvature. ROM normal. No CVA tenderness. Lungs:   Clear to auscultation bilaterally. Chest wall:  No tenderness or deformity. Heart:  Regular rate and rhythm, S1, S2 normal, no murmur, click, rub or gallop. Abdomen:   Soft, non-tender. Bowel sounds normal. No masses,  No organomegaly. Extremities: Extremities normal, atraumatic, no cyanosis or edema. Pulses: 2+ and symmetric all extremities. Skin: Skin color, texture, turgor normal. No rashes or lesions   Neurologic: CNII-XII intact. Data Review:     Recent Days:  Recent Labs     12/20/22  1316   WBC 7.5   HGB 12.1   HCT 36.8        Recent Labs     12/20/22  1316      K 3.7      CO2 23   *   BUN 23*   CREA 3.05*   CA 9.1   ALB 4.1   ALT 24   INR 1.0     No results for input(s): PH, PCO2, PO2, HCO3, FIO2 in the last 72 hours.     24 Hour Results:  Recent Results (from the past 24 hour(s))   GLUCOSE, POC    Collection Time: 12/20/22 12:59 PM   Result Value Ref Range    Glucose (POC) 262 (H) 65 - 117 mg/dL    Performed by Jeff Pascual    CBC WITH AUTOMATED DIFF    Collection Time: 12/20/22  1:16 PM   Result Value Ref Range    WBC 7.5 4.1 - 11.1 K/uL    RBC 3.93 (L) 4.10 - 5.70 M/uL    HGB 12.1 12.1 - 17.0 g/dL    HCT 36.8 36.6 - 50.3 %    MCV 93.6 80.0 - 99.0 FL    MCH 30.8 26.0 - 34.0 PG    MCHC 32.9 30.0 - 36.5 g/dL    RDW 13.0 11.5 - 14.5 %    PLATELET 207 109 - 803 K/uL    MPV 9.9 8.9 - 12.9 FL    NRBC 0.0 0  WBC    ABSOLUTE NRBC 0.00 0.00 - 0.01 K/uL    NEUTROPHILS 73 32 - 75 %    LYMPHOCYTES 13 12 - 49 %    MONOCYTES 6 5 - 13 %    EOSINOPHILS 7 0 - 7 %    BASOPHILS 1 0 - 1 %    IMMATURE GRANULOCYTES 0 0.0 - 0.5 %    ABS. NEUTROPHILS 5.4 1.8 - 8.0 K/UL    ABS. LYMPHOCYTES 1.0 0.8 - 3.5 K/UL    ABS. MONOCYTES 0.5 0.0 - 1.0 K/UL    ABS. EOSINOPHILS 0.6 (H) 0.0 - 0.4 K/UL    ABS. BASOPHILS 0.0 0.0 - 0.1 K/UL    ABS. IMM. GRANS. 0.0 0.00 - 0.04 K/UL    DF AUTOMATED     METABOLIC PANEL, COMPREHENSIVE    Collection Time: 12/20/22  1:16 PM   Result Value Ref Range    Sodium 141 136 - 145 mmol/L    Potassium 3.7 3.5 - 5.1 mmol/L    Chloride 108 97 - 108 mmol/L    CO2 23 21 - 32 mmol/L    Anion gap 10 5 - 15 mmol/L    Glucose 122 (H) 65 - 100 mg/dL    BUN 23 (H) 6 - 20 MG/DL    Creatinine 3.05 (H) 0.70 - 1.30 MG/DL    BUN/Creatinine ratio 8 (L) 12 - 20      eGFR 23 (L) >60 ml/min/1.73m2    Calcium 9.1 8.5 - 10.1 MG/DL    Bilirubin, total 0.5 0.2 - 1.0 MG/DL    ALT (SGPT) 24 12 - 78 U/L    AST (SGOT) 16 15 - 37 U/L    Alk. phosphatase 80 45 - 117 U/L    Protein, total 7.4 6.4 - 8.2 g/dL    Albumin 4.1 3.5 - 5.0 g/dL    Globulin 3.3 2.0 - 4.0 g/dL    A-G Ratio 1.2 1.1 - 2.2     PROTHROMBIN TIME + INR    Collection Time: 12/20/22  1:16 PM   Result Value Ref Range    INR 1.0 0.9 - 1.1      Prothrombin time 10.5 9.0 - 11.1 sec   SAMPLES BEING HELD    Collection Time: 12/20/22  1:16 PM   Result Value Ref Range    SAMPLES BEING HELD 1red,1sst,1dk grn     COMMENT        Add-on orders for these samples will be processed based on acceptable specimen integrity and analyte stability, which may vary by analyte.    EKG, 12 LEAD, INITIAL    Collection Time: 12/20/22  1:21 PM   Result Value Ref Range    Ventricular Rate 65 BPM    Atrial Rate 65 BPM    P-R Interval 166 ms    QRS Duration 106 ms    Q-T Interval 464 ms    QTC Calculation (Bezet) 482 ms    Calculated P Axis 47 degrees    Calculated R Axis 42 degrees    Calculated T Axis 78 degrees    Diagnosis       Normal sinus rhythm  Moderate voltage criteria for LVH, may be normal variant ( Sokolow-Herring , Brett product )  Cannot rule out Anterior infarct , age undetermined  Abnormal ECG  When compared with ECG of 26-OCT-2022 00:22,  ST no longer depressed in Lateral leads           Imaging:     CTA head  IMPRESSION  1. No evidence of large vessel occlusion or significant vascular disease. 2.  No significant perfusion abnormality. 3.  Incidental findings as above. Assessment:     Principal Problem:    Dysarthria (12/20/2022)    Visual disturbance/ Slurred Speech: TIA vs stroke  Currently does not have any focal deficits  Head Ct negative. CTA head and neck does not show any vascular occlusion  We will obtain MRI of the brain to rule out acute stroke  Consult neuro for eval  Start Aspirin 81 mg po daily. Check fasting lipid panel, Hba1c  Recent Echo 10/2022: EF 55-60%. Will repeat Echo with bubble study if MRI brain positive    2. ANCELMO on CKD stage 4  Scr 3.05 baseline around 2.70  This is 2/2 to IVVD due to bumex  S/p NS IVF bolus in the ER  Continue gentle IVF hydration  Monitor renal indices    3. Uncontrolled HTN: We will allow permissive hypertension for now  Start Hydralazine 20 mg I.V every 6 hrs PRN SBP > 170 mm hg  Resume home anti-hypertensive tomorrow    4. H/o COPD: Not brochospastic. Continue prn breathing tx    5. Chronic diastolic heart failure: He is intravascularly dry. Hold home bumex    6. H/o Intracranial hemorrhage:     7. Tobacco abuse: Counseled on cessation    8. Recent inguinal hernia repair    DVT PPX: SCD  Dispo; Admit patient to tele on observation  Surrogate decision maker;  Wife                 Signed By: Callum Correia MD     December 20, 2022

## 2022-12-20 NOTE — PROGRESS NOTES
CARE MANAGEMENT INITIAL ASSESSEMENT      NAME:   Darvin Schwab   :     1965   MRN:     [id number]       Emergency Contact:  Extended Emergency Contact Information  Primary Emergency Contact: Gabriela Dillon  Address: [place] [address]           [phone]           [facility], [address] [place] [address] Phone: [phone]  Mobile Phone: [phone]  Relation: Spouse  Preferred language: ENGLISH   needed? No  Secondary Emergency Contact: [address] [place] [address] Phone: [phone]  Mobile Phone: [phone]  Relation: Daughter    Advance Directive:  Full Code, does not have an advance directive. Volodymyr [employer] Healthcare Decision Maker:    Primary Decision Maker: Rika [facility] - [phone]     Reason for Admission:  Mr. Chrystal Urias is a 56 y.o. male with history that includes asthma, COPD, CKD, CVA, HTN, and GERD  who was emergently admitted for:  dysarthria    Patient Active Problem List   Diagnosis Code    ANCELMO (acute kidney injury) ([facility] [address] N17.9    Hypertensive emergency A90.0    Acute diastolic CHF (congestive heart failure) ([facility] [address] I50.31    Right inguinal hernia K40.90    Dysarthria R47.1       Assessment: In person with patient, spouse, and family    RUR:  N/A OBS  Risk Level:  N/A  Value-based purchasing:   No  Bundle patient:  No    Residency:  Private residence  Exterior Steps:   4  Interior Steps:  None    Lives With:  Spouse/Significant Other and other family members    Prior functioning:  Independent. Patient requires assistance with:  N/A    Prior DME required:  None    DME available:  None    Rehab history:  None    Discharge Concerns/Barriers Identified:  Medical conditions- significant      Insurer:  Payor: Sara Alfaro / Plan: [employer] / Product Type: Managed Care Medicaid /     Medicare Outpatient Observation Notice (MOON)/ [place] Outpatient Observation Notice (Raman Leon) provided to patient/representative with verbal explanation of the notice.  Time allotted for questions regarding the notice. Patient /representative provided a completed copy of the MOON/VOON notice. Copy placed on bedside chart. PCP: Leah Caro MD   Name of Practice:   [facility]   Current patient: Yes   Approximate date of last visit: about a month   Access to virtual PCP visits:  Unknown    Pharmacy:  [facility]. Financial/Difficulty affording medications:  None identified    COVID-19 vaccination status:  Not vaccinated    DC Transport:  Family      Transition of care plan:  Home with outpatient follow-up     Comments:   Pt admitted as OBS on 12/20/22 for dysarthria. CM met with Pt, spouse, and family members to complete initial assessment. Pt lives at home with spouse and family members. Pt has no hx of HH, home O2, or equipment. Pt is independent with ADLs and ambulation. Pt denies problems with either. Pt is unemployed. Pt is unable to drive. Pt has PT/OT/SLP evals pending. CM will review recommendations when available. Discharge plan is for Pt to return home. Family will transport Pt home.   _____________________________________  Lyndsey Bautista, [address] [facility]  Available via [address] [place] [address]  12/20/2022   4:45 PM      Care Management Interventions  PCP Verified by CM: Yes (Dr. Garry San)  Mode of Transport at Discharge:  Other (see comment) (family)  Transition of Care Consult (CM Consult): Discharge Planning  MyChart Signup: No  Discharge Durable Medical Equipment: No  Physical Therapy Consult: Yes  Occupational Therapy Consult: Yes  Speech Therapy Consult: Yes  Support Systems: Spouse/Significant Other, Child(seferino), Other Family Member(s)  Confirm Follow Up Transport: Family  Discharge Location  Patient Expects to be Discharged to[de-identified] Home

## 2022-12-20 NOTE — ED TRIAGE NOTES
Pt arrives to the ER for complaints of slurred speech and intermittent blurry vision in left eye that started around 1100 this morning. Denies any weakness, tingling or numbness in extremities. Denies chest pain or shortness of breath.

## 2022-12-21 ENCOUNTER — APPOINTMENT (OUTPATIENT)
Dept: NON INVASIVE DIAGNOSTICS | Age: 57
DRG: 045 | End: 2022-12-21
Attending: HOSPITALIST
Payer: MEDICAID

## 2022-12-21 PROBLEM — I63.9 CVA (CEREBRAL VASCULAR ACCIDENT) (HCC): Status: ACTIVE | Noted: 2022-12-21

## 2022-12-21 LAB
ANION GAP SERPL CALC-SCNC: 5 MMOL/L (ref 5–15)
ATRIAL RATE: 65 BPM
BUN SERPL-MCNC: 22 MG/DL (ref 6–20)
BUN/CREAT SERPL: 8 (ref 12–20)
CALCIUM SERPL-MCNC: 8.4 MG/DL (ref 8.5–10.1)
CALCULATED P AXIS, ECG09: 47 DEGREES
CALCULATED R AXIS, ECG10: 42 DEGREES
CALCULATED T AXIS, ECG11: 78 DEGREES
CHLORIDE SERPL-SCNC: 112 MMOL/L (ref 97–108)
CHOLEST SERPL-MCNC: 187 MG/DL
CO2 SERPL-SCNC: 26 MMOL/L (ref 21–32)
CREAT SERPL-MCNC: 2.81 MG/DL (ref 0.7–1.3)
DIAGNOSIS, 93000: NORMAL
ECHO AO ARCH DIAM: 3.3 CM
ECHO AO ASC DIAM: 4.2 CM
ECHO AO ASCENDING AORTA INDEX: 2.19 CM/M2
ECHO AV AREA PEAK VELOCITY: 2.8 CM2
ECHO AV AREA VTI: 2.4 CM2
ECHO AV AREA/BSA PEAK VELOCITY: 1.5 CM2/M2
ECHO AV AREA/BSA VTI: 1.3 CM2/M2
ECHO AV MEAN GRADIENT: 6 MMHG
ECHO AV MEAN VELOCITY: 1.2 M/S
ECHO AV PEAK GRADIENT: 11 MMHG
ECHO AV PEAK VELOCITY: 1.7 M/S
ECHO AV VELOCITY RATIO: 0.76
ECHO AV VTI: 38.6 CM
ECHO LA DIAMETER INDEX: 2.6 CM/M2
ECHO LA DIAMETER: 5 CM
ECHO LA VOL 2C: 77 ML (ref 18–58)
ECHO LA VOL 4C: 57 ML (ref 18–58)
ECHO LA VOL BP: 69 ML (ref 18–58)
ECHO LA VOL/BSA BIPLANE: 36 ML/M2 (ref 16–34)
ECHO LA VOLUME AREA LENGTH: 75 ML
ECHO LA VOLUME INDEX A2C: 40 ML/M2 (ref 16–34)
ECHO LA VOLUME INDEX A4C: 30 ML/M2 (ref 16–34)
ECHO LA VOLUME INDEX AREA LENGTH: 39 ML/M2 (ref 16–34)
ECHO LV E' LATERAL VELOCITY: 7 CM/S
ECHO LV E' SEPTAL VELOCITY: 6 CM/S
ECHO LV EDV A2C: 181 ML
ECHO LV EDV A4C: 179 ML
ECHO LV EDV BP: 182 ML (ref 67–155)
ECHO LV EDV INDEX A4C: 93 ML/M2
ECHO LV EDV INDEX BP: 95 ML/M2
ECHO LV EDV NDEX A2C: 94 ML/M2
ECHO LV EJECTION FRACTION A2C: 68 %
ECHO LV EJECTION FRACTION A4C: 68 %
ECHO LV EJECTION FRACTION BIPLANE: 68 % (ref 55–100)
ECHO LV ESV A2C: 58 ML
ECHO LV ESV A4C: 56 ML
ECHO LV ESV BP: 58 ML (ref 22–58)
ECHO LV ESV INDEX A2C: 30 ML/M2
ECHO LV ESV INDEX A4C: 29 ML/M2
ECHO LV ESV INDEX BP: 30 ML/M2
ECHO LV FRACTIONAL SHORTENING: 45 % (ref 28–44)
ECHO LV INTERNAL DIMENSION DIASTOLE INDEX: 2.66 CM/M2
ECHO LV INTERNAL DIMENSION DIASTOLIC: 5.1 CM (ref 4.2–5.9)
ECHO LV INTERNAL DIMENSION SYSTOLIC INDEX: 1.46 CM/M2
ECHO LV INTERNAL DIMENSION SYSTOLIC: 2.8 CM
ECHO LV IVSD: 1 CM (ref 0.6–1)
ECHO LV MASS 2D: 188 G (ref 88–224)
ECHO LV MASS INDEX 2D: 97.9 G/M2 (ref 49–115)
ECHO LV POSTERIOR WALL DIASTOLIC: 1 CM (ref 0.6–1)
ECHO LV RELATIVE WALL THICKNESS RATIO: 0.39
ECHO LVOT AREA: 3.5 CM2
ECHO LVOT AV VTI INDEX: 0.69
ECHO LVOT DIAM: 2.1 CM
ECHO LVOT MEAN GRADIENT: 4 MMHG
ECHO LVOT PEAK GRADIENT: 7 MMHG
ECHO LVOT PEAK VELOCITY: 1.3 M/S
ECHO LVOT STROKE VOLUME INDEX: 47.8 ML/M2
ECHO LVOT SV: 91.7 ML
ECHO LVOT VTI: 26.5 CM
ECHO MV A VELOCITY: 0.75 M/S
ECHO MV E DECELERATION TIME (DT): 286.5 MS
ECHO MV E VELOCITY: 1.02 M/S
ECHO MV E/A RATIO: 1.36
ECHO MV E/E' LATERAL: 14.57
ECHO MV E/E' RATIO (AVERAGED): 15.79
ECHO MV E/E' SEPTAL: 17
ECHO PULMONARY ARTERY END DIASTOLIC PRESSURE: 5 MMHG
ECHO PV MAX VELOCITY: 1.1 M/S
ECHO PV PEAK GRADIENT: 5 MMHG
ECHO PV REGURGITANT MAX VELOCITY: 1.1 M/S
ECHO RV FREE WALL PEAK S': 17 CM/S
ECHO RV INTERNAL DIMENSION: 3.2 CM
ECHO RV TAPSE: 2.7 CM (ref 1.7–?)
ECHO RVOT PEAK GRADIENT: 3 MMHG
ECHO RVOT PEAK VELOCITY: 0.8 M/S
ECHO TV REGURGITANT MAX VELOCITY: 2.71 M/S
ECHO TV REGURGITANT PEAK GRADIENT: 30 MMHG
EST. AVERAGE GLUCOSE BLD GHB EST-MCNC: 97 MG/DL
GLUCOSE SERPL-MCNC: 122 MG/DL (ref 65–100)
HBA1C MFR BLD: 5 % (ref 4–5.6)
HDLC SERPL-MCNC: 95 MG/DL
HDLC SERPL: 2 {RATIO} (ref 0–5)
LDLC SERPL CALC-MCNC: 77.8 MG/DL (ref 0–100)
P-R INTERVAL, ECG05: 166 MS
POTASSIUM SERPL-SCNC: 3.9 MMOL/L (ref 3.5–5.1)
Q-T INTERVAL, ECG07: 464 MS
QRS DURATION, ECG06: 106 MS
QTC CALCULATION (BEZET), ECG08: 482 MS
SODIUM SERPL-SCNC: 143 MMOL/L (ref 136–145)
TRIGL SERPL-MCNC: 71 MG/DL (ref ?–150)
VENTRICULAR RATE, ECG03: 65 BPM
VLDLC SERPL CALC-MCNC: 14.2 MG/DL

## 2022-12-21 PROCEDURE — 80048 BASIC METABOLIC PNL TOTAL CA: CPT

## 2022-12-21 PROCEDURE — 97116 GAIT TRAINING THERAPY: CPT

## 2022-12-21 PROCEDURE — G0378 HOSPITAL OBSERVATION PER HR: HCPCS

## 2022-12-21 PROCEDURE — 93306 TTE W/DOPPLER COMPLETE: CPT | Performed by: STUDENT IN AN ORGANIZED HEALTH CARE EDUCATION/TRAINING PROGRAM

## 2022-12-21 PROCEDURE — 80061 LIPID PANEL: CPT

## 2022-12-21 PROCEDURE — 97165 OT EVAL LOW COMPLEX 30 MIN: CPT

## 2022-12-21 PROCEDURE — 83036 HEMOGLOBIN GLYCOSYLATED A1C: CPT

## 2022-12-21 PROCEDURE — 97161 PT EVAL LOW COMPLEX 20 MIN: CPT

## 2022-12-21 PROCEDURE — 74011250636 HC RX REV CODE- 250/636: Performed by: HOSPITALIST

## 2022-12-21 PROCEDURE — 74011250637 HC RX REV CODE- 250/637: Performed by: HOSPITALIST

## 2022-12-21 PROCEDURE — 65270000029 HC RM PRIVATE

## 2022-12-21 PROCEDURE — 92610 EVALUATE SWALLOWING FUNCTION: CPT

## 2022-12-21 PROCEDURE — 36415 COLL VENOUS BLD VENIPUNCTURE: CPT

## 2022-12-21 PROCEDURE — 93306 TTE W/DOPPLER COMPLETE: CPT

## 2022-12-21 RX ORDER — HYDRALAZINE HYDROCHLORIDE 25 MG/1
75 TABLET, FILM COATED ORAL 3 TIMES DAILY
Status: DISCONTINUED | OUTPATIENT
Start: 2022-12-21 | End: 2022-12-22 | Stop reason: HOSPADM

## 2022-12-21 RX ORDER — LABETALOL 200 MG/1
400 TABLET, FILM COATED ORAL 3 TIMES DAILY
Status: DISCONTINUED | OUTPATIENT
Start: 2022-12-21 | End: 2022-12-22 | Stop reason: HOSPADM

## 2022-12-21 RX ORDER — ATORVASTATIN CALCIUM 20 MG/1
80 TABLET, FILM COATED ORAL DAILY
Status: DISCONTINUED | OUTPATIENT
Start: 2022-12-21 | End: 2022-12-21

## 2022-12-21 RX ORDER — ATORVASTATIN CALCIUM 20 MG/1
80 TABLET, FILM COATED ORAL
Status: DISCONTINUED | OUTPATIENT
Start: 2022-12-21 | End: 2022-12-22 | Stop reason: HOSPADM

## 2022-12-21 RX ADMIN — LABETALOL HYDROCHLORIDE 400 MG: 200 TABLET, FILM COATED ORAL at 21:51

## 2022-12-21 RX ADMIN — HYDRALAZINE HYDROCHLORIDE 75 MG: 25 TABLET, FILM COATED ORAL at 16:30

## 2022-12-21 RX ADMIN — ASPIRIN 81 MG: 81 TABLET, CHEWABLE ORAL at 09:23

## 2022-12-21 RX ADMIN — HYDRALAZINE HYDROCHLORIDE 75 MG: 25 TABLET, FILM COATED ORAL at 21:51

## 2022-12-21 RX ADMIN — ATORVASTATIN CALCIUM 80 MG: 20 TABLET, FILM COATED ORAL at 21:51

## 2022-12-21 RX ADMIN — LABETALOL HYDROCHLORIDE 400 MG: 200 TABLET, FILM COATED ORAL at 16:30

## 2022-12-21 RX ADMIN — AMLODIPINE BESYLATE 10 MG: 5 TABLET ORAL at 09:23

## 2022-12-21 RX ADMIN — LABETALOL HYDROCHLORIDE 400 MG: 200 TABLET, FILM COATED ORAL at 09:23

## 2022-12-21 RX ADMIN — SODIUM CHLORIDE 75 ML/HR: 9 INJECTION, SOLUTION INTRAVENOUS at 04:39

## 2022-12-21 RX ADMIN — HYDRALAZINE HYDROCHLORIDE 75 MG: 25 TABLET, FILM COATED ORAL at 09:23

## 2022-12-21 NOTE — PROGRESS NOTES
PHYSICAL THERAPY EVALUATION/DISCHARGE  Patient: Isabel Donnelly (56 y.o. male)  Date: 12/21/2022  Primary Diagnosis: Dysarthria [R47.1]  CVA (cerebral vascular accident) [facility]) [I63.9]       Precautions:          ASSESSMENT  Based on the objective data described below, the patient presents with returned to baseline or near baseline level of function following admission for a \"small acute infarction deep left parietal white matter/splenium of the corpus callosum\". He initially c/o impaired vision, dysarthria, and ED reported BUE weakness. He reports resolution of symptoms and demonstrates independent mobility during evaluation. Education provided regarding BEFAST with verbalized understanding. No additional therapy needs identified and will sign off. Functional Outcome Measure: The patient scored Total: 49/56 on the Pineda Balance Assessment which is indicative of low fall risk. Further skilled acute physical therapy is not indicated at this time. PLAN :  Recommendation for discharge: (in order for the patient to meet his/her long term goals)  No skilled physical therapy/ follow up rehabilitation needs identified at this time. This discharge recommendation:  Has been made in collaboration with the attending provider and/or case management    IF patient discharges home will need the following DME:  none         SUBJECTIVE:   Patient stated I\"m ready to go back to work.     OBJECTIVE DATA SUMMARY:   HISTORY:    Past Medical History:   Diagnosis Date    Asthma     Chronic kidney disease     Chronic obstructive pulmonary disease (HCC)     Emphysema lung ([address]     GERD (gastroesophageal reflux disease)     Heart failure ([address] 10/2021    1 EPISODE DUE TO HIGH BP, NOW CLEARED UP PER PT AND WIFE    Hypertension     Stroke ([address] 2022     Past Surgical History:   Procedure Laterality Date    HX APPENDECTOMY      HX ORTHOPAEDIC      collar bone (right)    HX OTHER SURGICAL      HEMORRHOIDECTOMY       Prior level of function: independent and working  Personal factors and/or comorbidities impacting plan of care:    Home Situation  Home Environment: Private residence  # Steps to Enter: 4  Rails to Enter: Yes  One/Two Story Residence: One story  Living Alone: No  Support Systems: Spouse/Significant Other  Patient Expects to be Discharged to[de-identified] Home  Current DME Used/Available at Home: None  Tub or Shower Type: Tub/Shower combination    EXAMINATION/PRESENTATION/DECISION MAKING:   Critical Behavior:  Neurologic State: Alert  Orientation Level: Oriented X4  Cognition: Follows commands  Safety/Judgement: Decreased insight into deficits, Decreased awareness of need for safety  Hearing: Auditory  Auditory Impairment: None  Skin:    Edema:   Range Of Motion:  AROM: Within functional limits                       Strength:    Strength: Within functional limits                    Tone & Sensation:   Tone: Normal              Sensation: Intact               Coordination:  Coordination: Within functional limits  Vision:   Tracking: Able to track stimulus in all quadrants w/o difficulty  Convergence: Normal (within 6 inches from nose)  Visual Fields:  (WFLs)  Acuity:  (baseline impaired vision in R eye)  Functional Mobility:  Bed Mobility:     Supine to Sit: Independent     Scooting: Independent  Transfers:  Sit to Stand: Modified independent  Stand to Sit: Modified independent        Bed to Chair: Modified independent              Balance:   Sitting: Intact  Standing: Intact  Ambulation/Gait Training:  Distance (ft): 160 Feet (ft)     Ambulation - Level of Assistance: Independent     Gait Description (WDL): Exceptions to WDL                 Speed/Cecy: Accelerated                        Stairs:               Therapeutic Exercises:       Functional Measure  Pineda Balance Test:    Sitting to Standin  Standing Unsupported: 4  Sitting with Back Unsupported: 4  Standing to Sittin  Transfers: 4  Standing Unsupported with Eyes Closed: 4  Standing Unsupported with Feet Together: 4  Reach Forward with Outstretched Arm: 4   Object: 3  Turn to Look Over Shoulders: 4  Turn 360 Degrees: 3  Alternate Foot on Step/Stool: 3  Standing Unsupported One Foot in Front: 2  Stand on One Le  Total: 49/56         56=Maximum possible score;   0-20=High fall risk  21-40=Moderate fall risk   41-56=Low fall risk        Physical Therapy Evaluation Charge Determination   History Examination Presentation Decision-Making   LOW Complexity : Zero comorbidities / personal factors that will impact the outcome / POC LOW Complexity : 1-2 Standardized tests and measures addressing body structure, function, activity limitation and / or participation in recreation  LOW Complexity : Stable, uncomplicated  LOW Complexity : FOTO score of       Based on the above components, the patient evaluation is determined to be of the following complexity level: LOW     Pain Rating:      Activity Tolerance:   Good    After treatment patient left in no apparent distress:   Sitting in chair and Call bell within reach    COMMUNICATION/EDUCATION:   The patients plan of care was discussed with: Registered nurse. Patient and/or family was verbally educated on the BE FAST acronym for signs/symptoms of CVA and TIA. Informed patient to refer to the Stroke Binder for further BE FAST information. All questions answered with patient indicating good understanding. Fall prevention education was provided and the patient/caregiver indicated understanding., Patient/family have participated as able in goal setting and plan of care. , and Patient/family agree to work toward stated goals and plan of care.     Thank you for this referral.  Elena Vogel, PT, DPT   Time Calculation: 20 mins

## 2022-12-21 NOTE — CONSULTS
Neurology Consult - Inpatient      Name:   Shira Stephenson  MRN:    [id number]    Date of Admission:  12/20/2022    Date of Consultation:  12/21/22       HISTORY OF PRESENT ILLNESS:     This is a 56 y.o. male with  has a past medical history of Asthma, Chronic kidney disease, Chronic obstructive pulmonary disease ([facility] [address] Emphysema lung ([facility] [address] GERD (gastroesophageal reflux disease), Heart failure ([facility] [address] (10/2021), Hypertension, and Stroke ([facility] (2022). .    Neurology is asked to see the patient for Stroke    Pleasant middle aged male. 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 Called his wife who brought him to ER. Says the visual change lasted few minutes but the speech difficulty lasted till her arrived in ER. Currently asymptomatic, no recurrence of symptoms during admission. Reviewed MRI findings with him:   1) multiple small chronic infarctions (bilateral thalami, corona radiata, bonnie)  2) multiple chronic microhemorrhages (bilateral)  3) severe chronic small vessel ischemic changes  4) tiny-small acute ischemia (n deep left parietal white matter, splenium of corpus callosum)    Pt says he wasn't aware of the chronic microhemorrhages or old strokes. He endorses having multiple/ numerous head injuries throughout his life (to account for the chronic microhemorrhages). He has long-standing difficult to control HTN, is a smoker (though has been trying to cut down), has CHF, CKD which account for the extensive chronic white matter changes as well as the multiple tiny old, bilateral strokes.      Not on any antiplatelet or statin medication prior to admission      Complete Review of Systems: reviewed on admission H&P    ==================================== Allergies   Allergen Reactions    Ace Inhibitors Swelling       Current Outpatient Medications   Medication Instructions    albuterol (PROVENTIL HFA, VENTOLIN HFA, PROAIR HFA) 90 mcg/actuation inhaler 2 Puffs, Inhalation, EVERY 4 HOURS AS NEEDED, Reports taking 6 puffs as needed    amLODIPine (NORVASC) 10 mg, Oral, DAILY    hydrALAZINE (APRESOLINE) 75 mg, Oral, 3 TIMES DAILY    inhalational spacing device For use with inhaler    labetaloL (NORMODYNE) 400 mg, Oral, EVERY 8 HOURS       Current Facility-Administered Medications   Medication Dose Route Frequency Provider Last Rate Last Admin    acetaminophen (TYLENOL) tablet 650 mg  650 mg Oral Q4H PRN Liliana Rodriguez MD        Or    acetaminophen (TYLENOL) solution 650 mg  650 mg Per NG tube Q4H PRN Liliana Rodriguez MD        Or    acetaminophen (TYLENOL) suppository 650 mg  650 mg Rectal Q4H PRN Liliana Rodriguez MD        aspirin chewable tablet 81 mg  81 mg Oral DAILY Liliana Rodriguez MD        0.9% sodium chloride infusion  75 mL/hr IntraVENous CONTINUOUS Liliana Rodriguez MD 75 mL/hr at 12/21/22 0439 75 mL/hr at 12/21/22 0439    amLODIPine (NORVASC) tablet 10 mg  10 mg Oral DAILY Liliana Rodriguez MD        albuterol (PROVENTIL VENTOLIN) nebulizer solution 2.5 mg  2.5 mg Nebulization Q4H PRN Jorge Henry MD           PSHx  has a past surgical history that includes hx appendectomy; hx orthopaedic; and hx other surgical.    SocHx  reports that he has been smoking cigarettes. He has a 20.00 pack-year smoking history. He has never used smokeless tobacco. He reports current alcohol use of about 2.0 standard drinks per week. He reports current drug use. Drug: Marijuana. FHx family history includes Hypertension in his father; Liver Disease in his father; Lung Disease in his mother.      PHYSICAL EXAM    Patient Vitals for the past 24 hrs:   Temp Pulse Resp BP SpO2   12/21/22 1138 -- -- -- (!) 149/82 --   12/21/22 0815 97.8 °F (36.6 °C) 69 16 Karyle Manos ) 149/82 97 %   12/21/22 0700 -- 63 -- -- --   12/21/22 0432 -- 66 -- -- --   12/21/22 0430 97.5 °F (36.4 °C) 63 16 (!) 150/91 95 %   12/21/22 0418 99 °F (37.2 °C) 63 12 131/87 95 %   12/21/22 0406 -- 61 11 135/84 95 %   12/21/22 0336 -- -- -- (!) 145/88 --   12/21/22 0200 -- 72 19 (!) 141/90 95 %   12/21/22 0101 -- 64 21 -- --   12/21/22 0100 -- 66 22 122/79 97 %   12/20/22 2345 -- 75 13 (!) 152/100 --   12/20/22 2330 -- 77 16 (!) 139/96 --   12/20/22 2315 -- 70 15 (!) 142/81 --   12/20/22 2252 -- 64 -- -- --   12/20/22 2000 98.8 °F (37.1 °C) 68 16 (!) 145/93 98 %   12/20/22 1615 98.6 °F (37 °C) 74 14 137/79 97 %   12/20/22 1515 98.7 °F (37.1 °C) 73 13 (!) 169/94 96 %   12/20/22 1415 98.8 °F (37.1 °C) 74 14 (!) 172/92 99 %   12/20/22 1323 -- -- -- -- 98 %   12/20/22 1319 98.7 °F (37.1 °C) 73 16 (!) 147/87 98 %   12/20/22 1302 98.7 °F (37.1 °C) -- -- -- --   12/20/22 1258 -- 67 16 (!) 146/85 97 %             General: Head: atraumatic. Eyes: conjunctiva clear. Neck: supple. Lungs: not examined. CV: not examined. Extremities: no edema. Skin: No rashes    Neurologic Exam:  Speech/ Language: no aphasia, no dysarthria. Alertness: oriented x 3.  CNs: Smell: not tested. Visual Fields (II): full to confrontation. Pupils (II): not examined. Funduscopic: not examined. Extraocular movements (III, IV, VI): conjugate in all directions, no CLAUS. Ptosis (III, VII): none. Facial Sensation (V): intact LT bilateral.  Facial Movements (VII): symmetric at rest and on activation. Hearing (VIII): normal.  Soft palate elevation (IX): not examined. Shoulder shrug (XI): symmetric, strong. Tongue protrusion (XII): midline    Motor:  5/5 in all exts. Sensory: intact LT in all exts. Cerebellar:  no resting tremors . Deep Tendon Reflexes: 1+ throughout. Plantar response: not tested. Gait: not tested (getting Echo).   Romberg: not tested      Labs/ Radiology     Labs:     12-:  CBC normal    CMP: Cr 3.05 (), GFR 23 (LL), Glucose 122  INR 1.0  A1c 5.0    12-: BMP with Cr 2.81 (HH), GFR 26, Glucose 122      Imaging:    MRI BRAIN WO CONT    Result Date: 12/20/2022  Small acute infarction deep left parietal white matter/splenium of the corpus callosum. Chronic white matter disease and multiple remote infarctions as above. CTA CODE NEURO HEAD AND NECK W CONT    Result Date: 12/20/2022  1. No evidence of large vessel occlusion or significant vascular disease. 2.  No significant perfusion abnormality. 3.  Incidental findings as above. CT CODE NEURO HEAD WO CONTRAST    Result Date: 12/20/2022  Sequela of chronic microvascular angiopathy. No acute intracranial abnormality. CT CODE NEURO PERF W CBF    Result Date: 12/20/2022  1. No evidence of large vessel occlusion or significant vascular disease. 2.  No significant perfusion abnormality. 3.  Incidental findings as above. Assessment/ Plan       ICD-10-CM ICD-9-CM    1. Dysarthria  R47.1 784.51       2. Blurred vision  H53.8 368.8           Tiny-small acute ischemic stroke    Multiple old small stroke (d/t uncontrolled HTN, smoking), multiple chronic microhemorrhages (suspect d/t repeated head trauma), severe chronic microvascular ischemic changes (smoking, HTN, CHF, CKD). D/w patient the importance of smoking cessation  D/w patient the importance of working closely with PCP and Cardiologist regarding HTN / CHF management    Agree with addition of ASA 81 mg /day and Lipitor 80 mg QHS  D/w patient no driving x 6 months after stroke, per DMV regulation    Can be d/c home today from a neurology standpoint, if BPs are adequately controlled and TTE doesn't show any significant abnormalities. Will see tomorrow if remains admitted  Otherwise follow up with me in clinic in 4-6 weeks      Thank you for asking the Neurology Service to evaluate Reed Victor.       Signed By: Henry Gutiérrez MD     December 21, 2022

## 2022-12-21 NOTE — PROGRESS NOTES
TRANSFER - OUT REPORT:    Verbal report given to [facility] RN(name) on Debi Winn  being transferred to 325(unit) for routine progression of care       Report consisted of patients Situation, Background, Assessment and   Recommendations(SBAR). Information from the following report(s) SBAR, Kardex, and ED Summary was reviewed with the receiving nurse. Lines:   Peripheral IV 12/20/22 Left Antecubital (Active)   Site Assessment Clean, dry, & intact 12/21/22 0000   Phlebitis Assessment 0 12/21/22 0000   Infiltration Assessment 0 12/21/22 0000   Dressing Status Clean, dry, & intact 12/21/22 0000   Dressing Type Transparent 12/21/22 0000   Hub Color/Line Status Pink; Infusing 12/21/22 0000   Action Taken Blood drawn 12/20/22 1319        Opportunity for questions and clarification was provided.       Patient transported with:   Registered Nurse

## 2022-12-21 NOTE — PROGRESS NOTES
OCCUPATIONAL THERAPY EVALUATION/DISCHARGE  Patient: Sena Hoffman (56 y.o. male)  Date: 12/21/2022  Primary Diagnosis: Dysarthria [R47.1]  CVA (cerebral vascular accident) [facility]) [I63.9]       Precautions:       ASSESSMENT  Based on the objective data described below, the patient presents with intact and symmetrical UE strength, ROM, and coordination, intact balance, and near baseline ADL performance following admission for CVA after pt presented with L eye vision changes and slurred speech. MRI revealed \"Small acute infarction deep left parietal white matter/splenium of the corpus callosum. \" Pt today is able to perform serial ADLs independently and only requires cues for safety with IV pole due to impulsivity. Vision intact in L eye and pt verbalizes baseline pre-morbid vision difficulties in R eye. No LOB noted during standing portions of tasks and pt receptive to all education, including BE FAST acronym. No further skilled OT services indicated at this time. Current Level of Function (ADLs/self-care): MOD I to independent    Functional Outcome Measure: The patient scored Total A-D  Total A-D (Motor Function): 66/66 on the Fugl-Pulido Assessment which is indicative of no impairment in upper extremity functional status. Other factors to consider for discharge: none additional     PLAN :  Recommendation for discharge: (in order for the patient to meet his/her long term goals)  No skilled occupational therapy/ follow up rehabilitation needs identified at this time. This discharge recommendation:  Has been made in collaboration with the attending provider and/or case management    IF patient discharges home will need the following DME:none       SUBJECTIVE:   Patient stated The doc told me I had a small stroke.     OBJECTIVE DATA SUMMARY:   HISTORY:   Past Medical History:   Diagnosis Date    Asthma     Chronic kidney disease     Chronic obstructive pulmonary disease ([facility] [address]     Emphysema lung ([facility] [address] GERD (gastroesophageal reflux disease)     Heart failure ([facility] [address] 10/2021    1 EPISODE DUE TO HIGH BP, NOW CLEARED UP PER PT AND WIFE    Hypertension     Stroke ([facility] [address] 2022     Past Surgical History:   Procedure Laterality Date    HX APPENDECTOMY      HX ORTHOPAEDIC      collar bone (right)    HX OTHER SURGICAL      HEMORRHOIDECTOMY       Prior Level of Function/Environment/Context: independent; works laying sheet rock; 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  Expanded or extensive additional review of patient history:     Home Situation  Home Environment: Private residence  # Steps to Enter: 4  Rails to Enter: Yes  One/Two Story Residence: One story  Living Alone: No  Support Systems: Spouse/Significant Other  Patient Expects to be Discharged to[de-identified] Home  Current DME Used/Available at Home: None  Tub or Shower Type: Tub/Shower combination    Hand dominance: Right    EXAMINATION OF PERFORMANCE DEFICITS:  Cognitive/Behavioral Status:  Neurologic State: Alert  Orientation Level: Oriented X4  Cognition: Impaired decision making; Impulsive; Follows commands  Perception: Appears intact  Perseveration: No perseveration noted  Safety/Judgement: Decreased insight into deficits; Decreased awareness of need for safety    Hearing: Auditory  Auditory Impairment: None    Vision/Perceptual:    Tracking: Able to track stimulus in all quadrants w/o difficulty    Convergence: Normal (within 6 inches from nose)    Visual Fields:  (WFLs)   Acuity:  (baseline impaired vision in R eye)      Range of Motion:  AROM: Within functional limits    Strength:  Strength: Within functional limits    Coordination:  Coordination: Within functional limits  Fine Motor Skills-Upper: Left Intact; Right Intact    Gross Motor Skills-Upper: Left Intact; Right Intact    Tone & Sensation:  Tone: Normal  Sensation: Intact                      Balance:  Sitting: Intact  Standing: Intact    Functional Mobility and Transfers for ADLs:  Bed Mobility:  Supine to Sit: Independent  Scooting: Independent    Transfers:  Sit to Stand: Modified independent  Stand to Sit: Modified independent  Bed to Chair: Modified independent  Bathroom Mobility: Modified independent  Toilet Transfer : Modified independent    ADL Assessment:  Feeding: Independent    Oral Facial Hygiene/Grooming: Independent    Bathing: Modified independent    Type of Bath: Chlorhexidine (CHG)    Upper Body Dressing: Independent    Lower Body Dressing: Independent    Toileting: Independent    ADL Intervention and task modifications:  Grooming  Grooming Assistance: Independent  Position Performed: Standing  Washing Hands: Independent    Lower Body Dressing Assistance  Slip on Shoes with Back: Independent  Leg Crossed Method Used: Yes  Position Performed: Seated edge of bed  Cues: Don    Cognitive Retraining  Safety/Judgement: Decreased insight into deficits; Decreased awareness of need for safety    Functional Measure:  Fugl-Pulido Assessment of Motor Recovery after Stroke:        Reflex Activity  Flexors/Biceps/Fingers: Can be elicited  Extensors/Triceps: Can be elicited  Reflex Subtotal: 4    Volitional Movement Within Synergies  Shoulder Retraction: Full  Shoulder Elevation: Full  Shoulder Abduction (90 degrees): Full  Shoulder External Rotation: Full  Elbow Flexion: Full  Forearm Supination: Full  Shoulder Adduction/Internal Rotation: Full  Elbow Extension: Full  Forearm Pronation: Full  Subtotal: 18    Volitional Movement Mixing Synergies  Hand to Lumbar Spine: Full  Shoulder Flexion (0-90 degrees): Full  Pronation-Supination: Full  Subtotal: 6    Volitional Movement With Little or No Synergy  Shoulder Abduction (0-90 degrees): Full  Shoulder Flexion ( degrees): Full  Pronation/Supination: Full  Subtotal : 6    Normal Reflex Activity  Biceps, Triceps, Finger Flexors:  Full  Subtotal : 2    Upper Extremity Total   Upper Extremity Total: 36    Wrist  Stability at 15 Degree Dorsiflexion: Full  Repeated Dorsiflexion/ Volar Flexion: Full  Stability at 15 Degree Dorsiflexion: Full  Repeated Dorsiflexion/ Volar Flexion: Full  Circumduction: Full  Wrist Total: 10    Hand  Mass Flexion: Full  Mass Extension: Full  Grasp A: Full  Grasp B: Full  Grasp C: Full  Grasp D: Full  Grasp E: Full  Hand Total: 14    Coordination/Speed  Tremor: None  Dysmetria: None  Time: <1s  Coordination/Speed Total : 6    Total A-D  Total A-D (Motor Function): 66/66     This is a reliable/valid measure of arm function after a neurological event. It has established value to characterize functional status and for measuring spontaneous and therapy-induced recovery; tests proximal and distal motor functions. Fugl-Pulido Assessment - UE scores recorded between five and 30 days post neurologic event can be used to predict UE recovery at six months post neurologic event. Severe = 0-21 points   Moderately Severe = 22-33 points   Moderate = 34-47 points   Mild = 48-66 points  ALBERTA Emerson, ALFREDA Arriaga, & JARAD Dan (1992). Measurement of motor recovery after stroke: Outcome assessment and sample size requirements.  Stroke, 23, pp. 3077-9260.   ------------------------------------------------------------------------------------------------------------------------------------------------------------------  MCID:  Stroke:   Lexi Solomon et al, 2001; n = 171; mean age 79 (6) years; assessed within 16 (12) days of stroke, Acute Stroke)  FMA Motor Scores from Admission to Discharge   10 point increase in FMA Upper Extremity = 1.5 change in discharge FIM   10 point increase in FMA Lower Extremity = 1.9 change in discharge FIM  MDC:   Stroke:   Naty Rowe et al, 2008, n = 14, mean age = 59.9 (14.6) years, assessed on average 14 (6.5) months post stroke, Chronic Stroke)   FMA = 5.2 points for the Upper Extremity portion of the assessment     Occupational Therapy Evaluation Charge Determination   History Examination Decision-Making   LOW Complexity : Brief history review LOW Complexity : 1-3 performance deficits relating to physical, cognitive , or psychosocial skils that result in activity limitations and / or participation restrictions  LOW Complexity : No comorbidities that affect functional and no verbal or physical assistance needed to complete eval tasks       Based on the above components, the patient evaluation is determined to be of the following complexity level: LOW   Pain Rating:  Pt denied pain    Activity Tolerance:   Good    After treatment patient left in no apparent distress:    Sitting in chair and Call bell within reach    COMMUNICATION/EDUCATION:   The patients plan of care was discussed with: Physical therapist and Registered nurse. Patient and/or family was verbally educated on the BE FAST acronym for signs/symptoms of CVA and TIA. Informed patient to refer to the Stroke Binder for further BE FAST information. All questions answered with patient indicating good understanding.      Thank you for this referral.  Sasha Rod OT  Time Calculation: 17 mins

## 2022-12-21 NOTE — PROGRESS NOTES
[id number] TRANSFER - IN REPORT:    Verbal report received from Liliana Morales (name) on Maksim Goyal  being received from ER(unit) for routine progression of care      Report consisted of patients Situation, Background, Assessment and   Recommendations(SBAR). Information from the following report(s) SBAR, Intake/Output, MAR, Recent Results, and Cardiac Rhythm NSR  was reviewed with the receiving nurse. Opportunity for questions and clarification was provided. Assessment completed upon patients arrival to unit and care assumed. 0700 Bedside shift change report given to Bharati Campo (oncoming nurse) by Jhoan Maynard (offgoing nurse). Report included the following information SBAR, Intake/Output, MAR, Recent Results, and Cardiac Rhythm NSR . This patient was assisted with Intentional Toileting every 2 hours during this shift as appropriate. Documentation of ambulation and output reflected on Flowsheet as appropriate. Purposeful hourly rounding was completed using AIDET and 5Ps. Outcomes of PHR documented as they occurred. Bed alarm in use as appropriate. Dual Suction and ambubag in place. Stroke Education provided to patient and the following topics were discussed    1. Patients personal risk factors for stroke are hypertension, smoking, and prior stroke    2. Warning signs of Stroke:        * Sudden numbness or weakness of the face, arm or leg, especially on one side of          The body            * Sudden confusion, trouble speaking or understanding        * Sudden trouble seeing in one or both eyes        * Sudden trouble walking, dizziness, loss of balance or coordination        * Sudden severe headache with no known cause      3. Importance of activation Emergency Medical Services ( 9-1-1 ) immediately if experience any warning signs of stroke. 4. Be sure and schedule a follow-up appointment with your primary care doctor or any specialists as instructed.      5. You must take medicine every day to treat your risk factors for stroke. Be sure to take your medicines exactly as your doctor tells you: no more, no less. Know what your medicines are for , what they do. Anti-thrombotics /anticoagulants can help prevent strokes. You are taking the following medicine(s)  aspirin      6. Smoking and second-hand smoke greatly increase your risk of stroke, cardiovascular disease and death. Smoking history cigarettes, several per day    7. Information provided was [facility] Stroke Education Binder    8. Documentation of teaching completed in Patient Education Activity and on Care Plan with teaching response noted?   yes

## 2022-12-21 NOTE — PROGRESS NOTES
SPEECH PATHOLOGY BEDSIDE SWALLOW EVALUATION/DISCHARGE  Patient: Bull Diego (56 y.o. male)  Date: 12/21/2022  Primary Diagnosis: Dysarthria [R47.1]       Precautions:        ASSESSMENT :  Based on the objective data described below, the patient presents with functional swallowing and speech . Reports dysarthria episode yesterday that resolved. Admitted 12-20-22 with dysarthria, vision changes. MRI brain:   IMPRESSION  Small acute infarction deep left parietal white matter/splenium of the corpus  callosum. Chronic white matter disease and multiple remote infarctions as above. PMH:  ICH 1 year ago with brain aneurysm, COPD, CKD, HTN, GERD, CHF. Skilled acute therapy provided by a speech-language pathologist is not indicated at this time. PLAN :  Recommendations:  Ok for regular diet, thin liquids. Helped patient order softer items on the menu. Discharge Recommendations: None     SUBJECTIVE:   Patient and his wife of  40 years argued about various topics the entire session. OBJECTIVE:     Past Medical History:   Diagnosis Date    Asthma     Chronic kidney disease     Chronic obstructive pulmonary disease (HCC)     Emphysema lung ([address]     GERD (gastroesophageal reflux disease)     Heart failure ([address] 10/2021    1 EPISODE DUE TO HIGH BP, NOW CLEARED UP PER PT AND WIFE    Hypertension     Stroke ([address] 2022     Past Surgical History:   Procedure Laterality Date    HX APPENDECTOMY      HX ORTHOPAEDIC      collar bone (right)    HX OTHER SURGICAL      HEMORRHOIDECTOMY     Prior Level of Function/Home Situation: lives with wife. Works as sheetrocker.    Home Situation  Home Environment: Private residence  # Steps to Enter: 4  Rails to Enter: Yes  One/Two Story Residence: One story  Living Alone: No  Support Systems: Spouse/Significant Other  Patient Expects to be Discharged to[de-identified] Home  Current DME Used/Available at Home: None  Tub or Shower Type: Tub/Shower combination  Diet prior to admission: regular, thins  Current Diet:  easy to chew, thins   Cognitive and Communication Status:  Neurologic State: Alert  Orientation Level: Oriented to person, Oriented to place, Oriented to situation  Cognition: Impaired decision making, Impulsive, Follows commands  Perception: Appears intact  Perseveration: No perseveration noted  Safety/Judgement: Decreased insight into deficits, Decreased awareness of need for safety  Oral Assessment:  Oral Assessment  Labial: No impairment  Dentition: Natural;Limited (\"I can't eat hard meats b/c I don't have any teeth on top\")  Oral Hygiene: WFL  Lingual: No impairment  Velum: No impairment  P.O. Trials:  Patient Position: upright in bed  Vocal quality prior to P.O.: No impairment (he and wife report slurred speech yesterday, but now resolved)      ORAL PHASE:      Bolus Acceptance: No impairment  Bolus Formation/Control: No impairment     Propulsion: No impairment  Oral Residue: None  PHARYNGEAL PHASE:l   Initiation of Swallow: No impairment  Laryngeal Elevation: Functional  Aspiration Signs/Symptoms: None  Pharyngeal Phase Characteristics: No impairment, issues, or problems                    NOMS:   The NOMS functional outcome measure was used to quantify this patient's level of swallowing impairment. Based on the NOMS, the patient was determined to be at level 7 for swallow function     NOMS Swallowing Levels:  Level 1 (CN): NPO  Level 2 (CM): NPO but takes consistency in therapy  Level 3 (CL): Takes less than 50% of nutrition p.o. and continues with nonoral feedings; and/or safe with mod cues; and/or max diet restriction  Level 4 (CK): Safe swallow but needs mod cues; and/or mod diet restriction; and/or still requires some nonoral feeding/supplements  Level 5 (CJ): Safe swallow with min diet restriction; and/or needs min cues  Level 6 (CI): Independent with p.o.; rare cues; usually self cues; may need to avoid some foods or needs extra time  Level 7 [facility]): Independent for all p.o.  MARILEE. (2003). National Outcomes Measurement System (NOMS): Adult Speech-Language Pathology User's Guide. Pain:  Pain Scale 1: Numeric (0 - 10)  Pain Intensity 1: 0     After treatment:   Patient left in no apparent distress in bed, Nursing notified, and Caregiver / family present    COMMUNICATION/EDUCATION:   Patient was educated regarding his deficit(s) of none as this relates to his diagnosis of transient dysarthria. Azul Zabala He and wife demonstrated Good understanding as evidenced by discussion. .    The patient's plan of care including recommendations, planned interventions, and recommended diet changes were discussed with: Registered nurse.      Thank you for this referral.  NETTIE Bishop  Time Calculation: 15 mins

## 2022-12-21 NOTE — PROGRESS NOTES
0700  Bedside and Verbal shift change report given to Suni Calloway RN (oncoming nurse) by Christian Wang RN (offgoing nurse). Report included the following information SBAR, Kardex, Procedure Summary, Intake/Output, MAR, Recent Results, and Med Rec Status. Stroke Education provided to patient and the following topics were discussed     1. Patients personal risk factors for stroke are hypertension, smoking, and prior stroke     2. Warning signs of Stroke:         * Sudden numbness or weakness of the face, arm or leg, especially on one side of          The body            * Sudden confusion, trouble speaking or understanding         * Sudden trouble seeing in one or both eyes         * Sudden trouble walking, dizziness, loss of balance or coordination         * Sudden severe headache with no known cause        3. Importance of activation Emergency Medical Services ( 9-1-1 ) immediately if experience any warning signs of stroke. 4. Be sure and schedule a follow-up appointment with your primary care doctor or any specialists as instructed. 5. You must take medicine every day to treat your risk factors for stroke. Be sure to take your medicines exactly as your doctor tells you: no more, no less. Know what your medicines are for , what they do. Anti-thrombotics /anticoagulants can help prevent strokes. You are taking the following medicine(s)  aspirin       6. Smoking and second-hand smoke greatly increase your risk of stroke, cardiovascular disease and death. Smoking history cigarettes, several per day     7. Information provided was [facility] Stroke Education Binder     8. Documentation of teaching completed in Patient Education Activity and on Care Plan with teaching response noted? yes        1900  Bedside and Verbal shift change report given to Grace Valdovinos RN (oncoming nurse) by Suni Calloway RN (offgoing nurse).  Report included the following information SBAR, Kardex, Procedure Summary, Intake/Output, MAR, Recent Results, and Med Rec Status.

## 2022-12-21 NOTE — PROGRESS NOTES
Hospitalist Progress Note              Raquel Fay MD.                                                             Cell: [phone]                               NAME:  Arthur Canada  :  1965  MRN:  [id number]  Date of Service:  2022    Summary: Arthur Canada is a 56 y.o. male with past medical history of COPD, hypertension, heart failure, brain aneurysm/stroke who presents to the ER with the complaint of visual disturbance and slurred speech       Assessment/Plan:  Visual disturbance/slurred speech: This is 2/2 to acute stroke  MRI of the brain demonstrates small acute infarction deep left parietal white matter/splenium of the corpus callosum. Head CT scan negative. CTA head and neck shows no major vessel occlusion  LDL 77.8, HbA1c 5.0  Continue aspirin, lipitor for secondary stroke prophylaxis  We will obtain 2 d echo with bubble study to evaluate for interatrial shunt  SLP and PT consult  Neurology consulted      Acute on CKD stage 4:   Nephrology consulted. Recommend outpatient follow up with nephrology- Dr Alexandria Escudero  Continue gentle IVF hydration    HTN: uncontrolled  We will resume home anti-hypertensive's- norvasc, hydralazine and labetalol    Tobacco abuse: Patient counseled on cessation    H/o COPD: Not brochospastic. Continue prn breathing tx     Chronic diastolic heart failure: He is intravascularly dry. Continue to hold home bumex     H/o Intracranial hemorrhage:      Recent inguinal hernia repair        Code status: Full  DVT prophylaxsis:Heparin  Dispo: pending. D/c tomorrow         Interval History/Subjective:  F/up for Acute stroke  No new complaints today. No extremity weakness. No visual changes  No chest pain or SOB    Review of Systems:  A comprehensive review of systems was negative except for that written in the HPI. Objective:     VITALS:   Last 24hrs VS reviewed since prior progress note.  Most recent are:  Visit Vitals  BP (!) 143/89 (BP 1 Location: Left upper arm, BP Patient Position: At rest)   Pulse 67   Temp 97.3 °F (36.3 °C)   Resp 16   Ht 5' 10\" (1.778 m)   Wt 74.8 kg (164 lb 14.5 oz)   SpO2 97%   BMI 23.66 kg/m²       Intake/Output Summary (Last 24 hours) at 12/21/2022 1621  Last data filed at 12/21/2022 1449  Gross per 24 hour   Intake 941.25 ml   Output 0 ml   Net 941.25 ml        PHYSICAL EXAM:  General: No acute distress, cooperative, pleasant   EENT: EOMI. Anicteric sclerae. Oral mucous moist, oropharynx benign  Resp: CTA bilaterally. No wheezing/rhonchi/rales. No accessory muscle use  CV: Regular rhythm, normal rate, no murmurs, gallops, rubs  GI: Soft, non distended, non tender. normoactive bowel sounds, no hepatosplenomegaly Extremities: No edema, warm, 2+ pulses throughout  Neurologic: Moves all extremities. AAOx3, CN II-XII grossly intact  Psych: Good insight. Not anxious nor agitated. Skin: Good Turgor, no rashes or ulcers    Lab Data Personally Reviewed: (see below)     Medications list Personally Reviewed:  x YES  NO     _______________________________________________________________________  Care Plan discussed with:  Patient/Family, Nurse, and     Total NON critical care TIME:  30 minutes    Stepan Perez MD     Procedures: see electronic medical records for all procedures/Xrays and details which were not copied into this note but were reviewed prior to creation of Plan. LABS:  Recent Labs     12/20/22  1316   WBC 7.5   HGB 12.1   HCT 36.8        Recent Labs     12/21/22  0157 12/20/22  1316    141   K 3.9 3.7   * 108   CO2 26 23   BUN 22* 23*   CREA 2.81* 3.05*   * 122*   CA 8.4* 9.1     Recent Labs     12/20/22  1316   ALT 24   AP 80   TBILI 0.5   TP 7.4   ALB 4.1   GLOB 3.3     Recent Labs     12/20/22  1316   INR 1.0   PTP 10.5      No results for input(s): FE, TIBC, PSAT, FERR in the last 72 hours.    No results found for: FOL, RBCF   No results for input(s): PH, PCO2, PO2 in the last 72 hours. No results for input(s): CPK, CKNDX, TROIQ in the last 72 hours.     No lab exists for component: CPKMB  Lab Results   Component Value Date/Time    Cholesterol, total 187 12/21/2022 01:57 AM    HDL Cholesterol 95 12/21/2022 01:57 AM    LDL, calculated 77.8 12/21/2022 01:57 AM    Triglyceride 71 12/21/2022 01:57 AM    CHOL/HDL Ratio 2.0 12/21/2022 01:57 AM     Lab Results   Component Value Date/Time    Glucose (POC) 262 (H) 12/20/2022 12:59 PM     Lab Results   Component Value Date/Time    Color YELLOW/STRAW 11/29/2022 03:18 PM    Appearance CLEAR 11/29/2022 03:18 PM    Specific gravity 1.009 11/29/2022 03:18 PM    pH (UA) 6.5 11/29/2022 03:18 PM    Protein 30 (A) 11/29/2022 03:18 PM    Glucose Negative 11/29/2022 03:18 PM    Ketone Negative 11/29/2022 03:18 PM    Bilirubin Negative 11/29/2022 03:18 PM    Urobilinogen 0.2 11/29/2022 03:18 PM    Nitrites Negative 11/29/2022 03:18 PM    Leukocyte Esterase Negative 11/29/2022 03:18 PM    Epithelial cells FEW 11/29/2022 03:18 PM    Bacteria Negative 11/29/2022 03:18 PM    WBC 0-4 11/29/2022 03:18 PM    RBC 0-5 11/29/2022 03:18 PM

## 2022-12-21 NOTE — PROGRESS NOTES
[address]  YOB: 1965          Assessment & Plan:     CKD 4, stable, f/b Dr. Joseph Loomis  TIA  HTN  Subnephrotic proteinuria  CHF  Tobacco use disorder    Rec:  Agree with mangement of  HTN  No need for RRT  Renal function stable  Continue to monitor labs and avoid nephrotoxins  Follow up with Dr. Joseph Loomis p d/c       Subjective:   CC: CKD4  HPI: Patient of Dr. Joseph Loomis with CKD 4 and baseline Cr 2.5-3 is seen for CKD. He was admitted with vision, speech, and motor abnormalities c/w TIA. His symptoms have resolved. Creat is stable. ROS: No sob/n/v  Current Facility-Administered Medications   Medication Dose Route Frequency    hydrALAZINE (APRESOLINE) tablet 75 mg  75 mg Oral TID    labetaloL (NORMODYNE) tablet 400 mg  400 mg Oral TID    atorvastatin (LIPITOR) tablet 80 mg  80 mg Oral QHS    acetaminophen (TYLENOL) tablet 650 mg  650 mg Oral Q4H PRN    Or    acetaminophen (TYLENOL) solution 650 mg  650 mg Per NG tube Q4H PRN    Or    acetaminophen (TYLENOL) suppository 650 mg  650 mg Rectal Q4H PRN    aspirin chewable tablet 81 mg  81 mg Oral DAILY    0.9% sodium chloride infusion  75 mL/hr IntraVENous CONTINUOUS    amLODIPine (NORVASC) tablet 10 mg  10 mg Oral DAILY    albuterol (PROVENTIL VENTOLIN) nebulizer solution 2.5 mg  2.5 mg Nebulization Q4H PRN          Objective:     Vitals:  Blood pressure (!) 149/82, pulse 69, temperature 97.8 °F (36.6 °C), resp. rate 16, height 5' 10\" (1.778 m), weight 74.8 kg (165 lb), SpO2 97 %. Temp (24hrs), Av.5 °F (36.9 °C), Min:97.5 °F (36.4 °C), Max:99 °F (37.2 °C)      Intake and Output:  No intake/output data recorded.    1901 -  0700  In: 941.3 [I.V.:941.3]  Out: -     Physical Exam:               GENERAL ASSESSMENT: NAD  HEENT: Nontraumatic   CHEST: CTA  HEART: S1S2  ABDOMEN: Soft,NT,  EXTREMITY: no EDEMA  NEURO: Grossly non focal          ECG/rhythm:    Data Review      No results for input(s): TNIPOC in the last 72 hours. No lab exists for component: ITNL   No results for input(s): CPK, CKMB, TROIQ in the last 72 hours. Recent Labs     12/21/22  0157 12/20/22  1316    141   K 3.9 3.7   * 108   CO2 26 23   BUN 22* 23*   CREA 2.81* 3.05*   * 122*   CA 8.4* 9.1   ALB  --  4.1   WBC  --  7.5   HGB  --  12.1   HCT  --  36.8   PLT  --  257      Recent Labs     12/20/22  1316   INR 1.0   PTP 10.5     Needs: urine analysis, urine sodium, protein and creatinine  Lab Results   Component Value Date/Time    Creatinine, urine random 41.60 10/26/2022 02:02 PM           : Letitia Arriaga MD  12/21/2022        [facility]:  [email or web address]  Rosendo Villanueva office:  [place] [address] [place] [address] [address]  [address]  Phone: [phone]  Fax :     [phone]    [place] office:  [address] [place] [address], [facility]  Phone - [phone]  Fax - [phone]

## 2022-12-22 VITALS
HEIGHT: 70 IN | SYSTOLIC BLOOD PRESSURE: 150 MMHG | TEMPERATURE: 97.8 F | DIASTOLIC BLOOD PRESSURE: 93 MMHG | OXYGEN SATURATION: 96 % | BODY MASS INDEX: 23.61 KG/M2 | RESPIRATION RATE: 16 BRPM | WEIGHT: 164.9 LBS | HEART RATE: 66 BPM

## 2022-12-22 LAB
ANION GAP SERPL CALC-SCNC: 10 MMOL/L (ref 5–15)
BUN SERPL-MCNC: 23 MG/DL (ref 6–20)
BUN/CREAT SERPL: 9 (ref 12–20)
CALCIUM SERPL-MCNC: 8.9 MG/DL (ref 8.5–10.1)
CHLORIDE SERPL-SCNC: 113 MMOL/L (ref 97–108)
CO2 SERPL-SCNC: 22 MMOL/L (ref 21–32)
CREAT SERPL-MCNC: 2.65 MG/DL (ref 0.7–1.3)
GLUCOSE SERPL-MCNC: 101 MG/DL (ref 65–100)
POTASSIUM SERPL-SCNC: 4 MMOL/L (ref 3.5–5.1)
SODIUM SERPL-SCNC: 145 MMOL/L (ref 136–145)

## 2022-12-22 PROCEDURE — 36415 COLL VENOUS BLD VENIPUNCTURE: CPT

## 2022-12-22 PROCEDURE — 74011250637 HC RX REV CODE- 250/637: Performed by: HOSPITALIST

## 2022-12-22 PROCEDURE — 80048 BASIC METABOLIC PNL TOTAL CA: CPT

## 2022-12-22 RX ORDER — LABETALOL 200 MG/1
400 TABLET, FILM COATED ORAL 3 TIMES DAILY
Qty: 90 TABLET | Refills: 3 | Status: SHIPPED | OUTPATIENT
Start: 2022-12-22

## 2022-12-22 RX ORDER — HYDRALAZINE HYDROCHLORIDE 100 MG/1
100 TABLET, FILM COATED ORAL 3 TIMES DAILY
Qty: 90 TABLET | Refills: 3 | Status: SHIPPED | OUTPATIENT
Start: 2022-12-22

## 2022-12-22 RX ORDER — ATORVASTATIN CALCIUM 80 MG/1
80 TABLET, FILM COATED ORAL
Qty: 90 TABLET | Refills: 3 | Status: SHIPPED | OUTPATIENT
Start: 2022-12-22

## 2022-12-22 RX ORDER — AMLODIPINE BESYLATE 10 MG/1
10 TABLET ORAL DAILY
Qty: 60 TABLET | Refills: 3 | Status: SHIPPED | OUTPATIENT
Start: 2022-12-22

## 2022-12-22 RX ORDER — GUAIFENESIN 100 MG/5ML
81 LIQUID (ML) ORAL DAILY
Qty: 90 TABLET | Refills: 3 | Status: SHIPPED | OUTPATIENT
Start: 2022-12-22

## 2022-12-22 RX ADMIN — HYDRALAZINE HYDROCHLORIDE 75 MG: 25 TABLET, FILM COATED ORAL at 09:53

## 2022-12-22 RX ADMIN — AMLODIPINE BESYLATE 10 MG: 5 TABLET ORAL at 09:53

## 2022-12-22 RX ADMIN — LABETALOL HYDROCHLORIDE 400 MG: 200 TABLET, FILM COATED ORAL at 09:52

## 2022-12-22 RX ADMIN — ASPIRIN 81 MG: 81 TABLET, CHEWABLE ORAL at 09:53

## 2022-12-22 NOTE — PROGRESS NOTES
Bedside and Verbal shift change report given to Christopher Kimbrough (oncoming nurse) by Abdias (offgoing nurse). Report included the following information SBAR, Kardex, MAR, Recent Results, and Cardiac Rhythm NSR .

## 2022-12-22 NOTE — PROGRESS NOTES
Problem: Falls - Risk of  Goal: *Absence of Falls  Description: Document Clearence Skates Fall Risk and appropriate interventions in the flowsheet.   Outcome: Progressing Towards Goal  Note: Fall Risk Interventions:            Medication Interventions: Patient to call before getting OOB, Bed/chair exit alarm                   Problem: Patient Education: Go to Patient Education Activity  Goal: Patient/Family Education  Outcome: Progressing Towards Goal     Problem: Patient Education: Go to Patient Education Activity  Goal: Patient/Family Education  Outcome: Progressing Towards Goal     Problem: TIA/CVA Stroke: 0-24 hours  Goal: Off Pathway (Use only if patient is Off Pathway)  Outcome: Progressing Towards Goal  Goal: Activity/Safety  Outcome: Progressing Towards Goal  Goal: Consults, if ordered  Outcome: Progressing Towards Goal  Goal: Diagnostic Test/Procedures  Outcome: Progressing Towards Goal  Goal: Nutrition/Diet  Outcome: Progressing Towards Goal  Goal: Discharge Planning  Outcome: Progressing Towards Goal  Goal: Medications  Outcome: Progressing Towards Goal  Goal: Respiratory  Outcome: Progressing Towards Goal  Goal: Treatments/Interventions/Procedures  Outcome: Progressing Towards Goal  Goal: Minimize risk of bleeding post-thrombolytic infusion  Outcome: Progressing Towards Goal  Goal: Monitor for complications post-thrombolytic infusion  Outcome: Progressing Towards Goal  Goal: Psychosocial  Outcome: Progressing Towards Goal  Goal: *Hemodynamically stable  Outcome: Progressing Towards Goal  Goal: *Neurologically stable  Description: Absence of additional neurological deficits    Outcome: Progressing Towards Goal  Goal: *Verbalizes anxiety and depression are reduced or absent  Outcome: Progressing Towards Goal  Goal: *Absence of Signs of Aspiration on Current Diet  Outcome: Progressing Towards Goal  Goal: *Absence of deep venous thrombosis signs and symptoms(Stroke Metric)  Outcome: Progressing Towards Goal  Goal: *Ability to perform ADLs and demonstrates progressive mobility and function  Outcome: Progressing Towards Goal  Goal: *Stroke education started(Stroke Metric)  Outcome: Progressing Towards Goal  Goal: *Dysphagia screen performed(Stroke Metric)  Outcome: Progressing Towards Goal  Goal: *Rehab consulted(Stroke Metric)  Outcome: Progressing Towards Goal     Problem: TIA/CVA Stroke: Day 2 Until Discharge  Goal: Off Pathway (Use only if patient is Off Pathway)  Outcome: Progressing Towards Goal  Goal: Activity/Safety  Outcome: Progressing Towards Goal  Goal: Diagnostic Test/Procedures  Outcome: Progressing Towards Goal  Goal: Nutrition/Diet  Outcome: Progressing Towards Goal  Goal: Discharge Planning  Outcome: Progressing Towards Goal  Goal: Medications  Outcome: Progressing Towards Goal  Goal: Respiratory  Outcome: Progressing Towards Goal  Goal: Treatments/Interventions/Procedures  Outcome: Progressing Towards Goal  Goal: Psychosocial  Outcome: Progressing Towards Goal  Goal: *Verbalizes anxiety and depression are reduced or absent  Outcome: Progressing Towards Goal  Goal: *Absence of aspiration  Outcome: Progressing Towards Goal  Goal: *Absence of deep venous thrombosis signs and symptoms(Stroke Metric)  Outcome: Progressing Towards Goal  Goal: *Optimal pain control at patient's stated goal  Outcome: Progressing Towards Goal  Goal: *Tolerating diet  Outcome: Progressing Towards Goal  Goal: *Ability to perform ADLs and demonstrates progressive mobility and function  Outcome: Progressing Towards Goal  Goal: *Stroke education continued(Stroke Metric)  Outcome: Progressing Towards Goal     Problem: Ischemic Stroke: Discharge Outcomes  Goal: *Verbalizes anxiety and depression are reduced or absent  Outcome: Progressing Towards Goal  Goal: *Verbalize understanding of risk factor modification(Stroke Metric)  Outcome: Progressing Towards Goal  Goal: *Hemodynamically stable  Outcome: Progressing Towards Goal  Goal: *Absence of aspiration pneumonia  Outcome: Progressing Towards Goal  Goal: *Aware of needed dietary changes  Outcome: Progressing Towards Goal  Goal: *Verbalize understanding of prescribed medications including anti-coagulants, anti-lipid, and/or anti-platelets(Stroke Metric)  Outcome: Progressing Towards Goal  Goal: *Tolerating diet  Outcome: Progressing Towards Goal  Goal: *Aware of follow-up diagnostics related to anticoagulants  Outcome: Progressing Towards Goal  Goal: *Ability to perform ADLs and demonstrates progressive mobility and function  Outcome: Progressing Towards Goal  Goal: *Absence of DVT(Stroke Metric)  Outcome: Progressing Towards Goal  Goal: *Absence of aspiration  Outcome: Progressing Towards Goal  Goal: *Optimal pain control at patient's stated goal  Outcome: Progressing Towards Goal  Goal: *Home safety concerns addressed  Outcome: Progressing Towards Goal  Goal: *Describes available resources and support systems  Outcome: Progressing Towards Goal  Goal: *Verbalizes understanding of activation of EMS(911) for stroke symptoms(Stroke Metric)  Outcome: Progressing Towards Goal  Goal: *Understands and describes signs and symptoms to report to providers(Stroke Metric)  Outcome: Progressing Towards Goal  Goal: *Neurolgocially stable (absence of additional neurological deficits)  Outcome: Progressing Towards Goal  Goal: *Verbalizes importance of follow-up with primary care physician(Stroke Metric)  Outcome: Progressing Towards Goal  Goal: *Smoking cessation discussed,if applicable(Stroke Metric)  Outcome: Progressing Towards Goal  Goal: *Depression screening completed(Stroke Metric)  Outcome: Progressing Towards Goal

## 2022-12-22 NOTE — PROGRESS NOTES
Physician Progress Note      Pratima Pandya  CSN #:                  [id number]  :                       1965  ADMIT DATE:       2022 1:03 PM  [address] [place] DATE:  RESPONDING  PROVIDER #:        Juliana Vaughan MD          QUERY TEXT:    Good afternoon  Pt admitted with TIA-like symptoms. Pt noted to have Dysarthria, slurred speech. If possible, please document in progress notes and discharge summary if you are evaluating and /or treating any of the following: The medical record reflects the following:  Risk Factors: Dysarthria, Uncontrolled HTN  Clinical Indicators: Code stroke called, H/o Intracranial hemorrhage, tobacco abuse  Per H&P \"Visual disturbance/ Slurred Speech: TIA vs stroke. Currently does not have any focal deficits\"  CT-no acute changes, chronic microvascular angiopathy  CTA-no large vessel occlusion  Treatment: CT, CTA    Thank you  Tyrone Tobias RN CDI  [phone]  Options provided:  -- Dysarthria, slurred speech due to CVA, TIA ruled out after study  -- Presenting symptoms of Dysarthria, slurred speech are due to TIA  -- Dysarthria, slurred speech due to uncontrolled hypertension  -- CVA & TIA ruled out after study  -- Other - I will add my own diagnosis  -- Disagree - Not applicable / Not valid  -- Disagree - Clinically unable to determine / Unknown  -- Refer to Clinical Documentation Reviewer    PROVIDER RESPONSE TEXT:    Dysarthria, slurred speech are due to CVA, TIA ruled out after study.     Query created by: Vicky Maria on 2022 12:16 PM      Electronically signed by:  Juliana Vaughan MD 2022 9:09 AM

## 2022-12-22 NOTE — PROGRESS NOTES
0700  Bedside and Verbal shift change report given to Lisa Underwood RN (oncoming nurse) by Stephanie Shelby RN (offgoing nurse). Report included the following information SBAR, Kardex, Procedure Summary, Intake/Output, MAR, Recent Results, and Med Rec Status. 21   I have reviewed discharge instructions with the patient and spouse. The patient and spouse verbalized understanding. IVs removed. Signed copy of AVS placed in pt chart.

## 2022-12-22 NOTE — DISCHARGE SUMMARY
Discharge Summary       PATIENT ID: Maksim Goyal  MRN: [id number]   YOB: 1965    DATE OF ADMISSION: 12/20/2022  1:03 PM    DATE OF DISCHARGE: 12/22/2022  PRIMARY CARE PROVIDER: Stuart Sinclair MD       DISCHARGING PHYSICIAN: Kaleigh Andrade MD    To contact this individual call [phone] and ask the  to page. If unavailable ask to be transferred the Adult Hospitalist Department. CONSULTATIONS: IP CONSULT TO NEUROLOGY  IP CONSULT TO NEPHROLOGY    PROCEDURES/SURGERIES: * No surgery found *    ADMITTING [address] [place] [address] COURSE:     Maksim Goyal is a 56 y.o. male with past medical history of COPD, hypertension, heart failure, brain aneurysm/stroke who presents to the ER with the complaint of visual disturbance and slurred speech    A/P    Visual disturbance/slurred speech: This is 2/2 to acute stroke  MRI of the brain demonstrates small acute infarction deep left parietal white matter/splenium of the corpus callosum. Head CT scan negative. CTA head and neck shows no major vessel occlusion  LDL 77.8, HbA1c 5.0  Continue aspirin, lipitor for secondary stroke prophylaxis  2 d echo: EF 60-65%. Bubble study negative for interatrial shunt  Currently no focal deficit. Slurred speech resolved  Neurology consulted. Outpatient follow up     Acute on CKD stage 4:   Nephrology consulted. Recommend outpatient follow up with nephrology- Dr Mayi Wallis  Scr on discharge 2.65     HTN: uncontrolled  We will optimize BP medication. C/w norvasc 10 mg PO daily, hydralazine 100 mg PO TID and labetalol 400 mg TID  Recommend home BP monitoring. Goal SBP < 140 mm Hg     Chronic Tobacco abuse: Patient counseled on cessation. H/o COPD: Not brochospastic. Continue prn breathing tx     Chronic diastolic heart failure: He was intravascularly dry on admission and hydrated with NS IVF.   We will not resume home bumex     H/o Intracranial hemorrhage: Resolved     Recent inguinal hernia repair: DISCHARGE DIAGNOSES / PLAN:       As above  Disposition: He is hemodynamically stable for discharge. D/c home with self and family care       PENDING TEST RESULTS:   At the time of discharge the following test results are still pending:     FOLLOW UP APPOINTMENTS:    Follow-up Information       Follow up With Specialties Details Why Contact Mayela Alexandra MD Neurology Schedule an appointment as soon as possible for a visit in 4 week(s) Call to schedule Hospital follow up visit with Dr Robertson/ Neurologist after your recent stroke [address] [facility] [address] [phone]      Benitez Cedeno MD Family Medicine   [address]  [phone]               ADDITIONAL CARE RECOMMENDATIONS:     DIET: Cardiac Diet    ACTIVITY: Activity as tolerated    WOUND CARE:     EQUIPMENT needed:       DISCHARGE MEDICATIONS:  Current Discharge Medication List        START taking these medications    Details   aspirin 81 mg chewable tablet Take 1 Tablet by mouth daily. Indications: stroke prevention  Qty: 90 Tablet, Refills: 3  Start date: 12/22/2022      atorvastatin (LIPITOR) 80 mg tablet Take 1 Tablet by mouth nightly. Indications: excessive fat in the blood  Qty: 90 Tablet, Refills: 3  Start date: 12/22/2022           CONTINUE these medications which have CHANGED    Details   amLODIPine (NORVASC) 10 mg tablet Take 1 Tablet by mouth daily. Qty: 60 Tablet, Refills: 3  Start date: 12/22/2022      hydrALAZINE (APRESOLINE) 100 mg tablet Take 1 Tablet by mouth three (3) times daily. Qty: 90 Tablet, Refills: 3  Start date: 12/22/2022      labetaloL (NORMODYNE) 200 mg tablet Take 2 Tablets by mouth three (3) times daily.   Qty: 90 Tablet, Refills: 3  Start date: 12/22/2022           CONTINUE these medications which have NOT CHANGED    Details   albuterol (PROVENTIL HFA, VENTOLIN HFA, PROAIR HFA) 90 mcg/actuation inhaler Take 2 Puffs by inhalation every four (4) hours as needed. Reports taking 6 puffs as needed      inhalational spacing device For use with inhaler  Qty: 1 Each, Refills: 0               NOTIFY YOUR PHYSICIAN FOR ANY OF THE FOLLOWING:   Fever over 101 degrees for 24 hours. Chest pain, shortness of breath, fever, chills, nausea, vomiting, diarrhea, change in mentation, falling, weakness, bleeding. Severe pain or pain not relieved by medications. Or, any other signs or symptoms that you may have questions about. DISPOSITION:   x Home With:   OT  PT  HH  RN       Long term SNF/Inpatient Rehab    Independent/assisted living    Hospice    Other:       PATIENT CONDITION AT DISCHARGE:     Functional status    Poor     Deconditioned    x Independent      Cognition    x Lucid     Forgetful     Dementia      Catheters/lines (plus indication)    Duran     PICC     PEG    x None      Code status   x  Full code     DNR      PHYSICAL EXAMINATION AT DISCHARGE:   Refer to Progress Note  PHYSICAL EXAM  General: No acute distress, cooperative, pleasant   EENT: EOMI. Anicteric sclerae. Oral mucous moist, oropharynx benign. Edentulous  Resp: CTA bilaterally. No wheezing/rhonchi/rales. No accessory muscle use  CV: Regular rhythm, normal rate, no murmurs, gallops, rubs  GI: Soft, non distended, non tender. normoactive bowel sounds, no hepatosplenomegaly Extremities: No edema, warm, 2+ pulses throughout  Neurologic: Moves all extremities. AAOx3, CN II-XII grossly intact  Psych: Good insight. Not anxious nor agitated.   Skin: Good Turgor, no rashes or ulcers    CHRONIC MEDICAL DIAGNOSES:  Problem List as of 12/22/2022 Date Reviewed: 11/14/2022            Codes Class Noted - Resolved    CVA (cerebral vascular accident) [facility]) ICD-10-CM: I63.9  ICD-9-CM: 434.91  12/21/2022 - Present        * (Principal) Dysarthria ICD-10-CM: R47.1  ICD-9-CM: 784.51  12/20/2022 - Present        Right inguinal hernia ICD-10-CM: K40.90  ICD-9-CM: 550.90  11/10/2022 - Present        Acute diastolic CHF (congestive heart failure) ([facility]) ICD-10-CM: I50.31  ICD-9-CM: 428.31, 428.0  10/26/2022 - Present        ANCELMO (acute kidney injury) ([facility] [address] ICD-10-CM: N17.9  ICD-9-CM: 584.9  10/19/2022 - Present        Hypertensive emergency ICD-10-CM: I16.1  ICD-9-CM: 401.9  10/19/2022 - Present           Greater than 30 minutes were spent with the patient on counseling and coordination of care    Signed:   Larry Mcdowell MD  12/22/2022  8:59 AM

## 2022-12-22 NOTE — DISCHARGE INSTRUCTIONS
Discharge Instructions       PATIENT ID: Karis November  MRN: [id number]   YOB: 1965    DATE OF ADMISSION: [unfilled]    DATE OF DISCHARGE: 12/22/2022    PRIMARY CARE PROVIDER: @PCP@       DISCHARGING PHYSICIAN: Bora Andrade MD    To contact this individual call [phone] and ask the  to page. If unavailable ask to be transferred the Adult Hospitalist Department. DISCHARGE DIAGNOSES Dysarthria, Slurred speech 2/2 to Acute CVA, Chronic tobacco abuse, Uncontrolled Hypertension, Chronic kidney disease stage 4, Chronic diastolic heart failure    CONSULTATIONS: [unfilled]    PROCEDURES/SURGERIES: * No surgery found *    PENDING TEST RESULTS:   At the time of discharge the following test results are still pending:     FOLLOW UP APPOINTMENTS:   [facility]     ADDITIONAL CARE RECOMMENDATIONS:     DIET: Cardiac Diet    ACTIVITY: Activity as tolerated    WOUND CARE:     EQUIPMENT needed:       DISCHARGE MEDICATIONS:   See Medication Reconciliation Form    It is important that you take the medication exactly as they are prescribed. Keep your medication in the bottles provided by the pharmacist and keep a list of the medication names, dosages, and times to be taken in your wallet. Do not take other medications without consulting your doctor. NOTIFY YOUR PHYSICIAN FOR ANY OF THE FOLLOWING:   Fever over 101 degrees for 24 hours. Chest pain, shortness of breath, fever, chills, nausea, vomiting, diarrhea, change in mentation, falling, weakness, bleeding. Severe pain or pain not relieved by medications. Or, any other signs or symptoms that you may have questions about.       DISPOSITION:   x Home With:   OT  PT  HH  RN       SNF/Inpatient Rehab/LTAC    Independent/assisted living    Hospice    Other:     CDMP Checked:   Yes x       Signed:   Bora Andrade MD  12/22/2022  9:10 AM

## 2022-12-28 ENCOUNTER — TELEPHONE (OUTPATIENT)
Dept: SURGERY | Age: 57
End: 2022-12-28

## 2022-12-28 NOTE — TELEPHONE ENCOUNTER
Please call pt's wife Amy Kesy back, she states that pt had hernia repair surgery on 12/05 by Dr. Vargas Weber. Pt had PO appt on 12/19 but was \"no show\" because he was in the hospital after having a stroke.  Pt's wife states that pt has a lot of tenderness around the groin as well as a golf ball size knot under the incision also near his groin and they are trying to figure out if they need to come in

## 2022-12-29 ENCOUNTER — OFFICE VISIT (OUTPATIENT)
Dept: SURGERY | Age: 57
End: 2022-12-29
Payer: MEDICAID

## 2022-12-29 VITALS
SYSTOLIC BLOOD PRESSURE: 121 MMHG | BODY MASS INDEX: 24.05 KG/M2 | RESPIRATION RATE: 18 BRPM | TEMPERATURE: 97.6 F | HEIGHT: 70 IN | OXYGEN SATURATION: 94 % | HEART RATE: 66 BPM | DIASTOLIC BLOOD PRESSURE: 74 MMHG | WEIGHT: 168 LBS

## 2022-12-29 DIAGNOSIS — R19.09 RIGHT GROIN MASS: Primary | ICD-10-CM

## 2022-12-29 PROCEDURE — 99024 POSTOP FOLLOW-UP VISIT: CPT | Performed by: SURGERY

## 2022-12-29 NOTE — PROGRESS NOTES
Identified pt with two pt identifiers (name and ). Reviewed chart in preparation for visit and have obtained necessary documentation. Asia Marie is a 56 y.o. male  Chief Complaint   Patient presents with    Post OP Follow Up     3 weeks follow up robotic-assisted laparoscopic right inguinal hernia repair with mesh. Visit Vitals  /74 (BP 1 Location: Right upper arm, BP Patient Position: Sitting, BP Cuff Size: Adult)   Pulse 66   Temp 97.6 °F (36.4 °C) (Oral)   Resp 18   Ht 5' 10\" (1.778 m)   Wt 168 lb (76.2 kg)   SpO2 94%   BMI 24.11 kg/m²       1. Have you been to the ER, urgent care clinic since your last visit? Hospitalized since your last visit? Yes [facility] - Stroke    2. Have you seen or consulted any other health care providers outside of the [address] [place] [address] since your last visit? Include any pap smears or colon screening. As mentioned above   Pt reports that he still feels a hard lump at the surgical area rt groin and discomfort.

## 2023-01-02 NOTE — PROGRESS NOTES
Subjective: Elizabeth Cleveland is a 56 y.o. male presents for postop care 3.5 weeks following robotic assisted laparoscopic right inguinal hernia repair with mesh. Appetite is good. Eating a regular diet without difficulty. Bowel movements are regular. He notes development of right groin firm, nontender mass; no significant change in size since its development. He denies fever, chills, wound drainage. He was recently evaluated at [place] [address] for possible CVA (preparing to mount [facility] in violation of his activity restrictions postoperatively). Objective:     Visit Vitals  /74 (BP 1 Location: Right upper arm, BP Patient Position: Sitting, BP Cuff Size: Adult)   Pulse 66   Temp 97.6 °F (36.4 °C) (Oral)   Resp 18   Ht 5' 10\" (1.778 m)   Wt 168 lb (76.2 kg)   SpO2 94%   BMI 24.11 kg/m²       General:  alert, cooperative, no distress, appears older than stated age   Abdomen: Nondistended, soft. Firm, 3 cm right groin mass, nonreducible, nontender. Incision:  Healing without signs of infection. Assessment:     Right groin mass following reduction, robotic assisted laparoscopic repair of large right inguinal hernia. Clinical findings suggestive of a seroma, but cannot rule out early recurrence, especially given inability of patient to follow activity restrictions. Plan:     1. Continue current medications. Add Motrin, heating pad to right groin area. 2.  Patient strongly encouraged to follow activity restrictions until recurrence is ruled out. 3.  Right groin ultrasound, with/without Valsalva maneuver. He will follow-up with me after imaging.

## 2023-01-10 ENCOUNTER — HOSPITAL ENCOUNTER (OUTPATIENT)
Dept: ULTRASOUND IMAGING | Age: 58
Discharge: HOME | End: 2023-01-10
Attending: SURGERY
Payer: MEDICAID

## 2023-01-10 DIAGNOSIS — R19.09 RIGHT GROIN MASS: ICD-10-CM

## 2023-01-10 PROCEDURE — 76705 ECHO EXAM OF ABDOMEN: CPT

## 2023-05-18 ENCOUNTER — TRANSCRIBE ORDERS (OUTPATIENT)
Facility: HOSPITAL | Age: 58
End: 2023-05-18

## 2023-05-18 DIAGNOSIS — I63.9: Primary | ICD-10-CM

## 2023-08-11 ENCOUNTER — TELEPHONE (OUTPATIENT)
Age: 58
End: 2023-08-11

## 2023-08-11 NOTE — TELEPHONE ENCOUNTER
Unable to leave message, mailbox full. Need to checkin and triage VV today with Dr. Adalberto Newsome.

## 2023-10-17 ENCOUNTER — APPOINTMENT (OUTPATIENT)
Facility: HOSPITAL | Age: 58
End: 2023-10-17
Payer: MEDICAID

## 2023-10-17 ENCOUNTER — APPOINTMENT (OUTPATIENT)
Facility: HOSPITAL | Age: 58
End: 2023-10-17
Attending: HOSPITALIST
Payer: MEDICAID

## 2023-10-17 ENCOUNTER — HOSPITAL ENCOUNTER (OUTPATIENT)
Facility: HOSPITAL | Age: 58
Setting detail: OBSERVATION
Discharge: HOME | End: 2023-10-18
Attending: EMERGENCY MEDICINE | Admitting: HOSPITALIST
Payer: MEDICAID

## 2023-10-17 DIAGNOSIS — R47.1 DYSARTHRIA: ICD-10-CM

## 2023-10-17 DIAGNOSIS — G45.9 TIA (TRANSIENT ISCHEMIC ATTACK): Primary | ICD-10-CM

## 2023-10-17 DIAGNOSIS — N18.4 STAGE 4 CHRONIC KIDNEY DISEASE (HCC): ICD-10-CM
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
## 2023-10-17 DIAGNOSIS — I63.9 CEREBROVASCULAR ACCIDENT (CVA), UNSPECIFIED MECHANISM (HCC): ICD-10-CM

## 2023-10-17 DIAGNOSIS — R26.2 DIFFICULTY WALKING: ICD-10-CM

## 2023-10-17 LAB
ALBUMIN SERPL-MCNC: 3.5 G/DL (ref 3.5–5)
ALBUMIN/GLOB SERPL: 1 (ref 1.1–2.2)
ALP SERPL-CCNC: 90 U/L (ref 45–117)
ALT SERPL-CCNC: 15 U/L (ref 12–78)
ANION GAP SERPL CALC-SCNC: 2 MMOL/L (ref 5–15)
AST SERPL-CCNC: 9 U/L (ref 15–37)
BASOPHILS # BLD: 0 K/UL (ref 0–0.1)
BASOPHILS NFR BLD: 0 % (ref 0–1)
BILIRUB SERPL-MCNC: 0.5 MG/DL (ref 0.2–1)
BUN SERPL-MCNC: 28 MG/DL (ref 6–20)
BUN/CREAT SERPL: 10 (ref 12–20)
CALCIUM SERPL-MCNC: 8.9 MG/DL (ref 8.5–10.1)
CHLORIDE SERPL-SCNC: 109 MMOL/L (ref 97–108)
CO2 SERPL-SCNC: 26 MMOL/L (ref 21–32)
COMMENT:: NORMAL
CREAT SERPL-MCNC: 2.93 MG/DL (ref 0.7–1.3)
DIFFERENTIAL METHOD BLD: ABNORMAL
EKG ATRIAL RATE: 57 BPM
EKG DIAGNOSIS: NORMAL
EKG P AXIS: 67 DEGREES
EKG P-R INTERVAL: 170 MS
EKG Q-T INTERVAL: 524 MS
EKG QRS DURATION: 96 MS
EKG QTC CALCULATION (BAZETT): 510 MS
EKG R AXIS: 53 DEGREES
EKG T AXIS: -85 DEGREES
EKG VENTRICULAR RATE: 57 BPM
EOSINOPHIL # BLD: 0.5 K/UL (ref 0–0.4)
EOSINOPHIL NFR BLD: 6 % (ref 0–7)
ERYTHROCYTE [DISTWIDTH] IN BLOOD BY AUTOMATED COUNT: 12.3 % (ref 11.5–14.5)
ETHANOL SERPL-MCNC: <10 MG/DL (ref 0–0.08)
GLOBULIN SER CALC-MCNC: 3.6 G/DL (ref 2–4)
GLUCOSE SERPL-MCNC: 132 MG/DL (ref 65–100)
HCT VFR BLD AUTO: 44.9 % (ref 36.6–50.3)
HGB BLD-MCNC: 15 G/DL (ref 12.1–17)
IMM GRANULOCYTES # BLD AUTO: 0 K/UL (ref 0–0.04)
IMM GRANULOCYTES NFR BLD AUTO: 0 % (ref 0–0.5)
LIPASE SERPL-CCNC: 41 U/L (ref 13–75)
LYMPHOCYTES # BLD: 1 K/UL (ref 0.8–3.5)
LYMPHOCYTES NFR BLD: 12 % (ref 12–49)
MAGNESIUM SERPL-MCNC: 2.4 MG/DL (ref 1.6–2.4)
MCH RBC QN AUTO: 31.4 PG (ref 26–34)
MCHC RBC AUTO-ENTMCNC: 33.4 G/DL (ref 30–36.5)
MCV RBC AUTO: 94.1 FL (ref 80–99)
MONOCYTES # BLD: 0.7 K/UL (ref 0–1)
MONOCYTES NFR BLD: 9 % (ref 5–13)
NEUTS SEG # BLD: 6 K/UL (ref 1.8–8)
NEUTS SEG NFR BLD: 73 % (ref 32–75)
NRBC # BLD: 0 K/UL (ref 0–0.01)
NRBC BLD-RTO: 0 PER 100 WBC
PLATELET # BLD AUTO: 229 K/UL (ref 150–400)
PMV BLD AUTO: 10.9 FL (ref 8.9–12.9)
POTASSIUM SERPL-SCNC: 4.1 MMOL/L (ref 3.5–5.1)
PROT SERPL-MCNC: 7.1 G/DL (ref 6.4–8.2)
RBC # BLD AUTO: 4.77 M/UL (ref 4.1–5.7)
SODIUM SERPL-SCNC: 137 MMOL/L (ref 136–145)
SPECIMEN HOLD: NORMAL
TROPONIN I SERPL HS-MCNC: 41 NG/L (ref 0–76)
WBC # BLD AUTO: 8.4 K/UL (ref 4.1–11.1)

## 2023-10-17 PROCEDURE — 99285 EMERGENCY DEPT VISIT HI MDM: CPT

## 2023-10-17 PROCEDURE — 71046 X-RAY EXAM CHEST 2 VIEWS: CPT

## 2023-10-17 PROCEDURE — 2580000003 HC RX 258: Performed by: HOSPITALIST

## 2023-10-17 PROCEDURE — 82077 ASSAY SPEC XCP UR&BREATH IA: CPT

## 2023-10-17 PROCEDURE — G0378 HOSPITAL OBSERVATION PER HR: HCPCS

## 2023-10-17 PROCEDURE — 83735 ASSAY OF MAGNESIUM: CPT

## 2023-10-17 PROCEDURE — 6360000002 HC RX W HCPCS: Performed by: EMERGENCY MEDICINE

## 2023-10-17 PROCEDURE — 36415 COLL VENOUS BLD VENIPUNCTURE: CPT

## 2023-10-17 PROCEDURE — 85025 COMPLETE CBC W/AUTO DIFF WBC: CPT

## 2023-10-17 PROCEDURE — 70551 MRI BRAIN STEM W/O DYE: CPT

## 2023-10-17 PROCEDURE — 6370000000 HC RX 637 (ALT 250 FOR IP): Performed by: HOSPITALIST

## 2023-10-17 PROCEDURE — 80053 COMPREHEN METABOLIC PANEL: CPT

## 2023-10-17 PROCEDURE — 70450 CT HEAD/BRAIN W/O DYE: CPT

## 2023-10-17 PROCEDURE — 90471 IMMUNIZATION ADMIN: CPT | Performed by: EMERGENCY MEDICINE

## 2023-10-17 PROCEDURE — 83690 ASSAY OF LIPASE: CPT

## 2023-10-17 PROCEDURE — 93005 ELECTROCARDIOGRAM TRACING: CPT | Performed by: EMERGENCY MEDICINE

## 2023-10-17 PROCEDURE — 93880 EXTRACRANIAL BILAT STUDY: CPT

## 2023-10-17 PROCEDURE — 2580000003 HC RX 258: Performed by: EMERGENCY MEDICINE

## 2023-10-17 PROCEDURE — 71250 CT THORAX DX C-: CPT

## 2023-10-17 PROCEDURE — 84484 ASSAY OF TROPONIN QUANT: CPT

## 2023-10-17 PROCEDURE — 90714 TD VACC NO PRESV 7 YRS+ IM: CPT | Performed by: EMERGENCY MEDICINE

## 2023-10-17 RX ORDER — ATORVASTATIN CALCIUM 20 MG/1
80 TABLET, FILM COATED ORAL NIGHTLY
Status: DISCONTINUED | OUTPATIENT
Start: 2023-10-17 | End: 2023-10-18 | Stop reason: HOSPADM

## 2023-10-17 RX ORDER — ASPIRIN 81 MG/1
81 TABLET, CHEWABLE ORAL DAILY
Status: DISCONTINUED | OUTPATIENT
Start: 2023-10-17 | End: 2023-10-18 | Stop reason: HOSPADM

## 2023-10-17 RX ORDER — POLYETHYLENE GLYCOL 3350 17 G/17G
17 POWDER, FOR SOLUTION ORAL DAILY PRN
Status: DISCONTINUED | OUTPATIENT
Start: 2023-10-17 | End: 2023-10-18 | Stop reason: HOSPADM

## 2023-10-17 RX ORDER — SODIUM CHLORIDE 9 MG/ML
INJECTION, SOLUTION INTRAVENOUS CONTINUOUS
Status: DISCONTINUED | OUTPATIENT
Start: 2023-10-17 | End: 2023-10-18

## 2023-10-17 RX ORDER — TETANUS AND DIPHTHERIA TOXOIDS ADSORBED 2; 2 [LF]/.5ML; [LF]/.5ML
0.5 INJECTION INTRAMUSCULAR
Status: COMPLETED | OUTPATIENT
Start: 2023-10-17 | End: 2023-10-17

## 2023-10-17 RX ORDER — ONDANSETRON 2 MG/ML
4 INJECTION INTRAMUSCULAR; INTRAVENOUS EVERY 6 HOURS PRN
Status: DISCONTINUED | OUTPATIENT
Start: 2023-10-17 | End: 2023-10-18 | Stop reason: HOSPADM

## 2023-10-17 RX ORDER — 0.9 % SODIUM CHLORIDE 0.9 %
1000 INTRAVENOUS SOLUTION INTRAVENOUS ONCE
Status: COMPLETED | OUTPATIENT
Start: 2023-10-17 | End: 2023-10-17

## 2023-10-17 RX ORDER — ENOXAPARIN SODIUM 100 MG/ML
30 INJECTION SUBCUTANEOUS DAILY
Status: DISCONTINUED | OUTPATIENT
Start: 2023-10-18 | End: 2023-10-18 | Stop reason: HOSPADM

## 2023-10-17 RX ORDER — ASPIRIN 300 MG/1
300 SUPPOSITORY RECTAL DAILY
Status: DISCONTINUED | OUTPATIENT
Start: 2023-10-17 | End: 2023-10-17

## 2023-10-17 RX ORDER — LABETALOL HYDROCHLORIDE 5 MG/ML
10 INJECTION, SOLUTION INTRAVENOUS EVERY 10 MIN PRN
Status: DISCONTINUED | OUTPATIENT
Start: 2023-10-17 | End: 2023-10-18 | Stop reason: HOSPADM

## 2023-10-17 RX ORDER — ONDANSETRON 4 MG/1
4 TABLET, ORALLY DISINTEGRATING ORAL EVERY 8 HOURS PRN
Status: DISCONTINUED | OUTPATIENT
Start: 2023-10-17 | End: 2023-10-18 | Stop reason: HOSPADM

## 2023-10-17 RX ADMIN — TETANUS AND DIPHTHERIA TOXOIDS ADSORBED 0.5 ML: 2; 2 INJECTION INTRAMUSCULAR at 11:03

## 2023-10-17 RX ADMIN — SODIUM CHLORIDE 1000 ML: 9 INJECTION, SOLUTION INTRAVENOUS at 10:44

## 2023-10-17 RX ADMIN — ASPIRIN 81 MG: 81 TABLET, CHEWABLE ORAL at 12:40

## 2023-10-17 RX ADMIN — SODIUM CHLORIDE: 9 INJECTION, SOLUTION INTRAVENOUS at 16:37

## 2023-10-17 RX ADMIN — ATORVASTATIN CALCIUM 80 MG: 20 TABLET, FILM COATED ORAL at 21:08

## 2023-10-17 NOTE — ED PROVIDER NOTES
[facility] EMERGENCY [facility]      Pt Name: Lucy Lam  MRN: [id number]  [address] [facility] [place] 1965  Date of evaluation: 10/17/2023  Provider: Alix Hansen DO      HISTORY OF PRESENT ILLNESS      HPI  51-year-old male with history as below, reportedly with CT imaging last year showing multiple prior strokes but no reported residual deficits, on 81 mg aspirin, presents to the emergency department with his wife noting a history of difficulty speaking as well as difficulty walking since Saturday 10/14. He seems like he is \"just not acting right\" since Saturday per his wife. He reportedly fell when getting out of the truck in the parking lot on arrival here. Unsure if he hit his head, per wife. On arrival he complains of a headache and has dysarthric speech but is GCS 15, moving all extremities, following commands in no distress. He denies any numbness, tingling, or focal weakness in arms or legs. Nursing Notes were reviewed.     REVIEW OF SYSTEMS         Review of Systems        PAST MEDICAL HISTORY     Past Medical History:   Diagnosis Date    Asthma     Chronic kidney disease     Chronic obstructive pulmonary disease (HCC)     Emphysema lung (HCC)     GERD (gastroesophageal reflux disease)     Heart failure ([address]) 10/2021    1 EPISODE DUE TO HIGH BP, NOW CLEARED UP PER PT AND WIFE    Hypertension     Stroke ([address]) 2022         SURGICAL HISTORY       Past Surgical History:   Procedure Laterality Date    APPENDECTOMY      HERNIA REPAIR Right 12/05/2022    Robotic-assisted laparoscopic right inguinal hernia repair with mesh by Dr. Marie Blew      collar bone (right)    OTHER SURGICAL HISTORY      HEMORRHOIDECTOMY         CURRENT MEDICATIONS       Previous Medications    ALBUTEROL SULFATE HFA (PROVENTIL;VENTOLIN;PROAIR) 108 (90 BASE) MCG/ACT INHALER    Inhale 2 puffs into the lungs every 4 hours as needed    AMLODIPINE (NORVASC) 10 MG TABLET    Take 1 tablet by mouth

## 2023-10-17 NOTE — H&P
albuterol sulfate HFA (PROVENTIL;VENTOLIN;PROAIR) 108 (90 Base) MCG/ACT inhaler Inhale 2 puffs into the lungs every 4 hours as needed    Automatic Reconciliation, Ar   amLODIPine (NORVASC) 10 MG tablet Take 1 tablet by mouth daily 12/22/22   Automatic Reconciliation, Ar   aspirin 81 MG chewable tablet Take 1 tablet by mouth daily 12/22/22   Automatic Reconciliation, Ar   atorvastatin (LIPITOR) 80 MG tablet Take 80 mg by mouth  Patient not taking: Reported on 10/17/2023 12/22/22   Automatic Reconciliation, Ar   hydrALAZINE (APRESOLINE) 100 MG tablet Take 1 tablet by mouth 3 times daily 12/22/22   Automatic Reconciliation, Ar   labetalol (NORMODYNE) 200 MG tablet Take 2 tablets by mouth 3 times daily 12/22/22   Automatic Reconciliation, Ar     Allergies   Allergen Reactions    Ace Inhibitors Swelling      Family History   Problem Relation Age of Onset    Hypertension Father     Liver Disease Father     Anesth Problems Neg Hx     Lung Disease Mother       Social History:  reports that he has been smoking cigarettes. He has been smoking an average of .25 packs per day. He has never used smokeless tobacco. He reports that he does not currently use alcohol after a past usage of about 2.0 standard drinks of alcohol per week. He reports current drug use. Drug: Marijuana Dorcus Sjogren).    Social Determinants of Health     Tobacco Use: High Risk (1/10/2023)    Patient History     Smoking Tobacco Use: Every Day     Smokeless Tobacco Use: Never     Passive Exposure: Not on file   Alcohol Use: Not on file   Financial Resource Strain: Not on file   Food Insecurity: Not on file   Transportation Needs: Not on file   Physical Activity: Not on file   Stress: Not on file   Social Connections: Not on file   Intimate Partner Violence: Not on file   Depression: Not at risk (12/29/2022)    PHQ-2     PHQ-2 Score: 0   Housing Stability: Not on file        Medications were reconciled to the best of my ability given all available resources at the

## 2023-10-17 NOTE — ED TRIAGE NOTES
Pt tripped in the parking lot pta, pt with slurred speech for a few days, +headache, denies blood thinners, denies dizziness, denies numbness/tingling to face, arms or legs

## 2023-10-18 ENCOUNTER — APPOINTMENT (OUTPATIENT)
Facility: HOSPITAL | Age: 58
End: 2023-10-18
Attending: HOSPITALIST
Payer: MEDICAID

## 2023-10-18 VITALS
RESPIRATION RATE: 21 BRPM | SYSTOLIC BLOOD PRESSURE: 157 MMHG | OXYGEN SATURATION: 96 % | BODY MASS INDEX: 23.32 KG/M2 | TEMPERATURE: 97.5 F | HEIGHT: 70 IN | HEART RATE: 63 BPM | DIASTOLIC BLOOD PRESSURE: 101 MMHG | WEIGHT: 162.9 LBS

## 2023-10-18 LAB
AMPHET UR QL SCN: NEGATIVE
APPEARANCE UR: CLEAR
BACTERIA URNS QL MICRO: NEGATIVE /HPF
BARBITURATES UR QL SCN: NEGATIVE
BENZODIAZ UR QL: NEGATIVE
BILIRUB UR QL: NEGATIVE
CANNABINOIDS UR QL SCN: POSITIVE
CHOLEST SERPL-MCNC: 189 MG/DL
COCAINE UR QL SCN: POSITIVE
COLOR UR: ABNORMAL
ECHO AV AREA PEAK VELOCITY: 3.9 CM2
ECHO AV AREA/BSA PEAK VELOCITY: 2 CM2/M2
ECHO AV PEAK GRADIENT: 6 MMHG
ECHO AV PEAK VELOCITY: 1.2 M/S
ECHO AV VELOCITY RATIO: 0.92
ECHO BSA: 1.89 M2
ECHO BSA: 1.89 M2
ECHO EST RA PRESSURE: 3 MMHG
ECHO LA DIAMETER INDEX: 2.15 CM/M2
ECHO LA DIAMETER: 4.1 CM
ECHO LA VOL 4C: 70 ML (ref 18–58)
ECHO LA VOL 4C: 75 ML (ref 18–58)
ECHO LV E' LATERAL VELOCITY: 5 CM/S
ECHO LV E' SEPTAL VELOCITY: 5 CM/S
ECHO LV FRACTIONAL SHORTENING: 32 % (ref 28–44)
ECHO LV INTERNAL DIMENSION DIASTOLE INDEX: 2.93 CM/M2
ECHO LV INTERNAL DIMENSION DIASTOLIC: 5.6 CM (ref 4.2–5.9)
ECHO LV INTERNAL DIMENSION SYSTOLIC INDEX: 1.99 CM/M2
ECHO LV INTERNAL DIMENSION SYSTOLIC: 3.8 CM
ECHO LV IVSD: 1.2 CM (ref 0.6–1)
ECHO LV MASS 2D: 280.5 G (ref 88–224)
ECHO LV MASS INDEX 2D: 146.8 G/M2 (ref 49–115)
ECHO LV POSTERIOR WALL DIASTOLIC: 1.2 CM (ref 0.6–1)
ECHO LV RELATIVE WALL THICKNESS RATIO: 0.43
ECHO LVOT AREA: 4.5 CM2
ECHO LVOT DIAM: 2.4 CM
ECHO LVOT PEAK GRADIENT: 5 MMHG
ECHO LVOT PEAK VELOCITY: 1.1 M/S
ECHO MV A VELOCITY: 0.81 M/S
ECHO MV E VELOCITY: 0.58 M/S
ECHO MV E/A RATIO: 0.72
ECHO MV E/E' LATERAL: 11.6
ECHO MV E/E' RATIO (AVERAGED): 11.6
ECHO MV E/E' SEPTAL: 11.6
ECHO RIGHT VENTRICULAR SYSTOLIC PRESSURE (RVSP): 34 MMHG
ECHO RV INTERNAL DIMENSION: 4.5 CM
ECHO RV TAPSE: 2.8 CM (ref 1.7–?)
ECHO TV REGURGITANT MAX VELOCITY: 2.79 M/S
ECHO TV REGURGITANT PEAK GRADIENT: 31 MMHG
EPITH CASTS URNS QL MICRO: ABNORMAL /LPF
ERYTHROCYTE [DISTWIDTH] IN BLOOD BY AUTOMATED COUNT: 12.6 % (ref 11.5–14.5)
EST. AVERAGE GLUCOSE BLD GHB EST-MCNC: 97 MG/DL
GLUCOSE UR STRIP.AUTO-MCNC: NEGATIVE MG/DL
HBA1C MFR BLD: 5 % (ref 4–5.6)
HCT VFR BLD AUTO: 39.5 % (ref 36.6–50.3)
HDLC SERPL-MCNC: 98 MG/DL
HDLC SERPL: 1.9 (ref 0–5)
HGB BLD-MCNC: 13 G/DL (ref 12.1–17)
HGB UR QL STRIP: NEGATIVE
HYALINE CASTS URNS QL MICRO: ABNORMAL /LPF (ref 0–5)
KETONES UR QL STRIP.AUTO: NEGATIVE MG/DL
LDLC SERPL CALC-MCNC: 75.2 MG/DL (ref 0–100)
LEUKOCYTE ESTERASE UR QL STRIP.AUTO: NEGATIVE
Lab: ABNORMAL
MCH RBC QN AUTO: 31.4 PG (ref 26–34)
MCHC RBC AUTO-ENTMCNC: 32.9 G/DL (ref 30–36.5)
MCV RBC AUTO: 95.4 FL (ref 80–99)
METHADONE UR QL: NEGATIVE
NITRITE UR QL STRIP.AUTO: NEGATIVE
NRBC # BLD: 0 K/UL (ref 0–0.01)
NRBC BLD-RTO: 0 PER 100 WBC
OPIATES UR QL: NEGATIVE
PCP UR QL: NEGATIVE
PH UR STRIP: 5.5 (ref 5–8)
PLATELET # BLD AUTO: 193 K/UL (ref 150–400)
PMV BLD AUTO: 11.1 FL (ref 8.9–12.9)
PROT UR STRIP-MCNC: 300 MG/DL
RBC # BLD AUTO: 4.14 M/UL (ref 4.1–5.7)
RBC #/AREA URNS HPF: ABNORMAL /HPF (ref 0–5)
SP GR UR REFRACTOMETRY: 1.02 (ref 1–1.03)
TRIGL SERPL-MCNC: 79 MG/DL
UROBILINOGEN UR QL STRIP.AUTO: 0.2 EU/DL (ref 0.2–1)
VAS LEFT CCA DIST EDV: 10.7 CM/S
VAS LEFT CCA DIST PSV: 55.2 CM/S
VAS LEFT CCA PROX EDV: 11.6 CM/S
VAS LEFT CCA PROX PSV: 74.4 CM/S
VAS LEFT ECA EDV: 12.5 CM/S
VAS LEFT ECA PSV: 70.9 CM/S
VAS LEFT ICA DIST EDV: 16.5 CM/S
VAS LEFT ICA DIST PSV: 42 CM/S
VAS LEFT ICA MID EDV: 12.6 CM/S
VAS LEFT ICA MID PSV: 40 CM/S
VAS LEFT ICA PROX EDV: 8 CM/S
VAS LEFT ICA PROX PSV: 31.2 CM/S
VAS LEFT ICA/CCA PSV: 0.8 NO UNITS
VAS LEFT VERTEBRAL EDV: 13 CM/S
VAS LEFT VERTEBRAL PSV: 50 CM/S
VAS RIGHT CCA DIST EDV: 9.9 CM/S
VAS RIGHT CCA DIST PSV: 50 CM/S
VAS RIGHT CCA PROX EDV: 7.8 CM/S
VAS RIGHT CCA PROX PSV: 71.1 CM/S
VAS RIGHT ECA EDV: 9.9 CM/S
VAS RIGHT ECA PSV: 63.9 CM/S
VAS RIGHT ICA DIST EDV: 13.6 CM/S
VAS RIGHT ICA DIST PSV: 35.7 CM/S
VAS RIGHT ICA MID EDV: 16.8 CM/S
VAS RIGHT ICA MID PSV: 45 CM/S
VAS RIGHT ICA PROX EDV: 7.6 CM/S
VAS RIGHT ICA PROX PSV: 32.8 CM/S
VAS RIGHT ICA/CCA PSV: 0.9 NO UNITS
VAS RIGHT VERTEBRAL EDV: 8.7 CM/S
VAS RIGHT VERTEBRAL PSV: 28.9 CM/S
VLDLC SERPL CALC-MCNC: 15.8 MG/DL
WBC # BLD AUTO: 6.8 K/UL (ref 4.1–11.1)
WBC URNS QL MICRO: ABNORMAL /HPF (ref 0–4)

## 2023-10-18 PROCEDURE — 6370000000 HC RX 637 (ALT 250 FOR IP): Performed by: HOSPITALIST

## 2023-10-18 PROCEDURE — 97165 OT EVAL LOW COMPLEX 30 MIN: CPT

## 2023-10-18 PROCEDURE — 93306 TTE W/DOPPLER COMPLETE: CPT

## 2023-10-18 PROCEDURE — G0378 HOSPITAL OBSERVATION PER HR: HCPCS

## 2023-10-18 PROCEDURE — 81001 URINALYSIS AUTO W/SCOPE: CPT

## 2023-10-18 PROCEDURE — 80307 DRUG TEST PRSMV CHEM ANLYZR: CPT

## 2023-10-18 PROCEDURE — 83036 HEMOGLOBIN GLYCOSYLATED A1C: CPT

## 2023-10-18 PROCEDURE — 92522 EVALUATE SPEECH PRODUCTION: CPT

## 2023-10-18 PROCEDURE — 6360000002 HC RX W HCPCS: Performed by: HOSPITALIST

## 2023-10-18 PROCEDURE — 99223 1ST HOSP IP/OBS HIGH 75: CPT | Performed by: NURSE PRACTITIONER

## 2023-10-18 PROCEDURE — 92610 EVALUATE SWALLOWING FUNCTION: CPT

## 2023-10-18 PROCEDURE — 85027 COMPLETE CBC AUTOMATED: CPT

## 2023-10-18 PROCEDURE — 36415 COLL VENOUS BLD VENIPUNCTURE: CPT

## 2023-10-18 PROCEDURE — 80061 LIPID PANEL: CPT

## 2023-10-18 PROCEDURE — 97162 PT EVAL MOD COMPLEX 30 MIN: CPT

## 2023-10-18 RX ORDER — LABETALOL 100 MG/1
400 TABLET, FILM COATED ORAL 3 TIMES DAILY
Status: DISCONTINUED | OUTPATIENT
Start: 2023-10-18 | End: 2023-10-18 | Stop reason: HOSPADM

## 2023-10-18 RX ORDER — AMLODIPINE BESYLATE 5 MG/1
10 TABLET ORAL DAILY
Status: DISCONTINUED | OUTPATIENT
Start: 2023-10-18 | End: 2023-10-18 | Stop reason: HOSPADM

## 2023-10-18 RX ADMIN — LABETALOL HYDROCHLORIDE 400 MG: 100 TABLET, FILM COATED ORAL at 13:53

## 2023-10-18 RX ADMIN — LABETALOL HYDROCHLORIDE 400 MG: 100 TABLET, FILM COATED ORAL at 09:26

## 2023-10-18 RX ADMIN — AMLODIPINE BESYLATE 10 MG: 5 TABLET ORAL at 09:26

## 2023-10-18 RX ADMIN — ASPIRIN 81 MG: 81 TABLET, CHEWABLE ORAL at 08:13

## 2023-10-18 NOTE — PLAN OF CARE
Problem: Discharge Planning  Goal: Discharge to home or other facility with appropriate resources  10/18/2023 0103 by Tristan Abbott RN  Outcome: Progressing  10/18/2023 0103 by Tristan Abbott RN  Outcome: Progressing  Flowsheets (Taken 10/17/2023 2000)  Discharge to home or other facility with appropriate resources:   Identify barriers to discharge with patient and caregiver   Arrange for needed discharge resources and transportation as appropriate   Identify discharge learning needs (meds, wound care, etc)  10/17/2023 1651 by Mira Gonzalez RN  Outcome: Progressing  Flowsheets (Taken 10/17/2023 1636)  Discharge to home or other facility with appropriate resources: Identify barriers to discharge with patient and caregiver     Problem: Chronic Conditions and Co-morbidities  Goal: Patient's chronic conditions and co-morbidity symptoms are monitored and maintained or improved  10/18/2023 0103 by Tristan Abbott RN  Outcome: Progressing  10/18/2023 0103 by Tristan Abbott RN  Outcome: Progressing     Problem: Safety - Adult  Goal: Free from fall injury  10/18/2023 0103 by Tristan Abbott, RN  Outcome: Progressing  10/18/2023 0103 by Tristan Abbott RN  Outcome: Progressing
Problem: Discharge Planning  Goal: Discharge to home or other facility with appropriate resources  10/18/2023 0916 by Good Jim RN  Outcome: Progressing  Flowsheets (Taken 10/18/2023 0800)  Discharge to home or other facility with appropriate resources: Identify barriers to discharge with patient and caregiver  10/18/2023 0103 by Александр Baeza RN  Outcome: Progressing  10/18/2023 0103 by Александр Baeza RN  Outcome: Progressing  Flowsheets (Taken 10/17/2023 2000)  Discharge to home or other facility with appropriate resources:   Identify barriers to discharge with patient and caregiver   Arrange for needed discharge resources and transportation as appropriate   Identify discharge learning needs (meds, wound care, etc)     Problem: Chronic Conditions and Co-morbidities  Goal: Patient's chronic conditions and co-morbidity symptoms are monitored and maintained or improved  10/18/2023 0916 by Good Jim RN  Outcome: Progressing  Flowsheets (Taken 10/18/2023 0800)  Care Plan - Patient's Chronic Conditions and Co-Morbidity Symptoms are Monitored and Maintained or Improved: Monitor and assess patient's chronic conditions and comorbid symptoms for stability, deterioration, or improvement  10/18/2023 0103 by Александр Baeza RN  Outcome: Progressing  10/18/2023 0103 by Александр Baeza RN  Outcome: Progressing     Problem: Safety - Adult  Goal: Free from fall injury  10/18/2023 0916 by Good Jim RN  Outcome: Progressing  10/18/2023 0103 by Александр Baeza RN  Outcome: Progressing  10/18/2023 0103 by Александр Baeza RN  Outcome: Progressing
Problem: Discharge Planning  Goal: Discharge to home or other facility with appropriate resources  10/18/2023 0957 by Quinton Foley  Outcome: Progressing  10/18/2023 0916 by Roro Thrasher RN  Outcome: Progressing  Flowsheets  Taken 10/18/2023 0801 by Quinton Foley  Discharge to home or other facility with appropriate resources: Identify barriers to discharge with patient and caregiver  Taken 10/18/2023 0800 by Roro Thrasher RN  Discharge to home or other facility with appropriate resources: Identify barriers to discharge with patient and caregiver  10/18/2023 0103 by Winston Omer RN  Outcome: Progressing  10/18/2023 0103 by Winston Omer RN  Outcome: Progressing  Flowsheets (Taken 10/17/2023 2000)  Discharge to home or other facility with appropriate resources:   Identify barriers to discharge with patient and caregiver   Arrange for needed discharge resources and transportation as appropriate   Identify discharge learning needs (meds, wound care, etc)     Problem: Chronic Conditions and Co-morbidities  Goal: Patient's chronic conditions and co-morbidity symptoms are monitored and maintained or improved  10/18/2023 0957 by Quinton Foley  Outcome: Progressing  10/18/2023 0916 by Roro Thrasher RN  Outcome: Progressing  Flowsheets  Taken 10/18/2023 0801 by [address], [address] - Patient's Chronic Conditions and Co-Morbidity Symptoms are Monitored and Maintained or Improved: Monitor and assess patient's chronic conditions and comorbid symptoms for stability, deterioration, or improvement  Taken 10/18/2023 0800 by Roro Thrasher RN  Care Plan - Patient's Chronic Conditions and Co-Morbidity Symptoms are Monitored and Maintained or Improved: Monitor and assess patient's chronic conditions and comorbid symptoms for stability, deterioration, or improvement  10/18/2023 0103 by Winston Omer RN  Outcome: Progressing  10/18/2023 0103 by Winston Omer
Problem: Discharge Planning  Goal: Discharge to home or other facility with appropriate resources  Outcome: Progressing  Flowsheets (Taken 10/17/2023 1636)  Discharge to home or other facility with appropriate resources: Identify barriers to discharge with patient and caregiver
Problem: Physical Therapy - Adult  Goal: By Discharge: Performs mobility at highest level of function for planned discharge setting. See evaluation for individualized goals. 10/18/2023 1020 by Dejon Rodrigues PT  Outcome: Progressing  Note: FUNCTIONAL STATUS PRIOR TO ADMISSION: Patient was independent and active without use of DME.    HOME SUPPORT PRIOR TO ADMISSION: The patient lived with his wife but did not require assistance. Physical Therapy Goals  Initiated 10/18/2023  1. Patient will perform sit to stand with independence within 7 day(s). 2.  Patient will transfer from bed to chair and chair to bed with independence using the least restrictive device within 7 day(s). 3.  Patient will ambulate with independence for 150 feet with the least restrictive device within 7 day(s). 4.  Patient will ascend/descend 4 stairs with independence within 7 day(s). 5.  Patient will improve Clarke Balance score by 7 points within 7 days. Problem: SLP Adult - Impaired Communication  Goal: By Discharge: Demonstrates communication skills at highest level of function for planned discharge setting. See evaluation for individualized goals. Description: Speech Therapy Goals:  1. Patient will name at least two compensatory speech strategies with min cues. Goal initiated 10/18/23.     10/18/2023 1057 by WAYNE Kiser  Outcome: Progressing     Problem: Occupational Therapy - Adult  Goal: By Discharge: Performs self-care activities at highest level of function for planned discharge setting. See evaluation for individualized goals. Description: FUNCTIONAL STATUS PRIOR TO ADMISSION:  Patient was ambulatory without use of DME.    , ADL Assistance: Independent,  ,  ,  ,  ,  , Homemaking Assistance: Independent, Ambulation Assistance: Independent, Transfer Assistance: Independent,       HOME SUPPORT: Patient lived with his wife but didn't require assistance. Occupational Therapy Goals:  Initiated 10/18/2023  1.
Problem: Physical Therapy - Adult  Goal: By Discharge: Performs mobility at highest level of function for planned discharge setting. See evaluation for individualized goals. Outcome: Progressing  Note: FUNCTIONAL STATUS PRIOR TO ADMISSION: Patient was independent and active without use of DME.    HOME SUPPORT PRIOR TO ADMISSION: The patient lived with his wife but did not require assistance. Physical Therapy Goals  Initiated 10/18/2023  1. Patient will perform sit to stand with independence within 7 day(s). 2.  Patient will transfer from bed to chair and chair to bed with independence using the least restrictive device within 7 day(s). 3.  Patient will ambulate with independence for 150 feet with the least restrictive device within 7 day(s). 4.  Patient will ascend/descend 4 stairs with independence within 7 day(s). 5.  Patient will improve Clarke Balance score by 7 points within 7 days. PHYSICAL THERAPY EVALUATION    Patient: Luigi Figueroa (42 y.o. male)  Date: 10/18/2023  Primary Diagnosis: TIA (transient ischemic attack) [G45.9]  Dysarthria [R47.1]  Difficulty walking [R26.2]  Fall, initial encounter [W19. XXXA]  Cerebrovascular accident (CVA), unspecified mechanism ([address]) [I63.9]  Stage 4 chronic kidney disease ([address]) [N18.4]       Precautions: Fall Risk                  RN cleared patient for session. ASSESSMENT :   DEFICITS/IMPAIRMENTS:   The patient is limited by decreased functional mobility, safety awareness, cognition, balance, fine-motor control     Today's session was limited due to elevated BP (212/134 at start of session). RN aware and provided medications during session. Patient was able to complete bed mobility at Mod I, transfers with supervision, and short bout of gait with CGA and cueing for safety. Further mobility deferred due to BP (219/125 after ambulating to chair).  He demonstrates impaired balance and decreased safety awareness and he will benefit from skilled intervention to
Balance:  Standing: Impaired  Balance  Sitting: Intact  Standing: Impaired  Standing - Static: Good  Standing - Dynamic: Fair        ADL Assessment:          Feeding: Independent       Grooming: Independent  Grooming Skilled Clinical Factors: inferred    UE Bathing: Supervision  UE Bathing Skilled Clinical Factors: inferred    LE Bathing: Supervision  LE Bathing Skilled Clinical Factors: inferred    UE Dressing: Supervision  UE Dressing Skilled Clinical Factors: inferred    LE Dressing: Supervision  LE Dressing Skilled Clinical Factors: seated, to doff/cyrus socks    Toileting: Supervision  Toileting Skilled Clinical Factors: inferred           ADL Intervention and task modifications:    Intervention/Education specific to: \"Stroke diagnoses\"    Patient was educated regarding his deficit(s) as listed above as this relates to his diagnosis of CVA/TIA work-up. He demonstrated  poor understanding as evidenced by verbal discussion. Patient and/or family was verbally educated on the BE FAST acronym for signs/symptoms of CVA and TIA. BE FAST was written on patient's communication board for visual education and reinforcement. All questions answered with patient indicating poor  understanding.      Fugl-Hansen Assessment of Motor Recovery after Stroke:     Reflex Activity  Flexors/Biceps/Fingers: Can be elicited  Extensors/Triceps: Can be elicited  Reflex Subtotal: 4    Volitional Movement Within Synergies  Shoulder Retraction: Full  Shoulder Elevation: Full  Shoulder Abduction (90 degrees): Full  Shoulder External Rotation: Full  Elbow Flexion: Full  Forearm Supination: Full  Shoulder Adduction/Internal Rotation: Full  Elbow Extension: Full  Forearm Pronation: Full  Subtotal: 18    Volitional Movement Mixing Synergies  Hand to Lumbar Spine: Full  Shoulder Flexion (0-90 degrees): Full  Pronation-Supination: Full  Subtotal: 6    Volitional Movement With Little or No Synergy  Shoulder Abduction (0-90 degrees):

## 2023-10-18 NOTE — CONSULTS
NEUROLOGY CONSULT NOTE    Name Brayan Crowley Age 66 y.o. MRN [id number]  1965     Consulting Physician: Adriana Montes MD      Chief Complaint:  slurred speech     Assessment:     Principal Problem:    TIA (transient ischemic attack)  Resolved Problems:    * No resolved hospital problems. *      Patient is a 66year-old male with history of asthma, CKD, COPD, GERD, HF, and multiple chronic strokes  presents yesterday with slurred speech that is now resolved. Wife also states he was not acting right. Patient had not taken any of his medications for 5 days until yesterday and his SBP was noted dramatically lower than his baseline 180-220. He admits to not taking his medications like he should but his wife states that she was giving him aspirin every day. He still smokes 10 cigarettes per day. Carotid dopplers show no evidence of stenosis. MRI brain wo contrast demonstrates no acute infarct but multiple chronic infarcts and T2 bilateral cerebellar hyperintensity. BUN/Cr 28/2.93, LDL 75.2, A1c 5.0, Utox: +cocaine, +THC    Concern for PRES given uncontrolled HTN and MRI findings in setting of cocaine abuse. At risk also for RCVS and vasculitis but less concern given intact neuro exam with no known seizure activity. Recommendations:   - Obtain CTA head/neck  - Goal SBP <160 avoiding hypotension.  - Continue aspirin 81 mg and atorvastatin 80 mg   - Smoking cessation counseling  - Discussed with patient refraining from cocaine abuse since can cause worsening HTN, ischemic infarcts, ICH and seizures      Will follow-up with CTA when available. Please contact in interim with any questions. History of Present Illness: This is a 66 y.o. male with history of asthma, CKD, COPD, GERD, HF, stroke , and current smoker who presents yesterday after having slurred speech. His wife at bedside notes that slurred speech started yesterday. Per chart review, he was \"just not acting right\".  He

## 2023-11-03 ENCOUNTER — TELEPHONE (OUTPATIENT)
Age: 58
End: 2023-11-03

## 2023-11-03 NOTE — TELEPHONE ENCOUNTER
Called the patient to schedule hospital follow up and I spoke to him and his wife back and forward and he stated that he is doing great and no need to follow up.

## 2024-07-09 ENCOUNTER — TELEPHONE (OUTPATIENT)
Age: 59
End: 2024-07-09

## 2024-07-09 NOTE — TELEPHONE ENCOUNTER
Phoned patient to schedule a sleep consult per Dr. Orestes Thurman for snoring and fatigue.  LM with patient's spouse requesting a return call.

## 2024-07-15 ENCOUNTER — APPOINTMENT (OUTPATIENT)
Facility: HOSPITAL | Age: 59
End: 2024-07-15
Payer: MEDICAID

## 2024-07-15 ENCOUNTER — HOSPITAL ENCOUNTER (OUTPATIENT)
Facility: HOSPITAL | Age: 59
Setting detail: OBSERVATION
Discharge: LEFT BEFORE DISCHARGE | End: 2024-07-16
Attending: EMERGENCY MEDICINE | Admitting: INTERNAL MEDICINE
Payer: MEDICAID

## 2024-07-15 DIAGNOSIS — N17.9 ACUTE RENAL FAILURE SUPERIMPOSED ON CHRONIC KIDNEY DISEASE, UNSPECIFIED ACUTE RENAL FAILURE TYPE, UNSPECIFIED CKD STAGE (HCC): ICD-10-CM

## 2024-07-15 DIAGNOSIS — I10 HYPERTENSION, UNSPECIFIED TYPE: Primary | ICD-10-CM

## 2024-07-15 DIAGNOSIS — R29.90 STROKE-LIKE SYMPTOMS: ICD-10-CM

## 2024-07-15 DIAGNOSIS — N18.9 ACUTE RENAL FAILURE SUPERIMPOSED ON CHRONIC KIDNEY DISEASE, UNSPECIFIED ACUTE RENAL FAILURE TYPE, UNSPECIFIED CKD STAGE (HCC): ICD-10-CM

## 2024-07-15 LAB
ALBUMIN SERPL-MCNC: 3.4 G/DL (ref 3.5–5)
ALBUMIN/GLOB SERPL: 1 (ref 1.1–2.2)
ALP SERPL-CCNC: 100 U/L (ref 45–117)
ALT SERPL-CCNC: 17 U/L (ref 12–78)
AMPHET UR QL SCN: NEGATIVE
ANION GAP SERPL CALC-SCNC: 5 MMOL/L (ref 5–15)
APPEARANCE UR: CLEAR
AST SERPL-CCNC: 9 U/L (ref 15–37)
BACTERIA URNS QL MICRO: NEGATIVE /HPF
BARBITURATES UR QL SCN: NEGATIVE
BASOPHILS # BLD: 0 K/UL (ref 0–0.1)
BASOPHILS NFR BLD: 1 % (ref 0–1)
BENZODIAZ UR QL: NEGATIVE
BILIRUB SERPL-MCNC: 0.5 MG/DL (ref 0.2–1)
BILIRUB UR QL: NEGATIVE
BUN SERPL-MCNC: 27 MG/DL (ref 6–20)
BUN/CREAT SERPL: 8 (ref 12–20)
CALCIUM SERPL-MCNC: 8.8 MG/DL (ref 8.5–10.1)
CANNABINOIDS UR QL SCN: NEGATIVE
CHLORIDE SERPL-SCNC: 111 MMOL/L (ref 97–108)
CO2 SERPL-SCNC: 26 MMOL/L (ref 21–32)
COCAINE UR QL SCN: POSITIVE
COLOR UR: ABNORMAL
COMMENT:: NORMAL
CREAT SERPL-MCNC: 3.57 MG/DL (ref 0.7–1.3)
DIFFERENTIAL METHOD BLD: ABNORMAL
EKG ATRIAL RATE: 65 BPM
EKG DIAGNOSIS: NORMAL
EKG P AXIS: -2 DEGREES
EKG P-R INTERVAL: 172 MS
EKG Q-T INTERVAL: 466 MS
EKG QRS DURATION: 100 MS
EKG QTC CALCULATION (BAZETT): 484 MS
EKG R AXIS: -3 DEGREES
EKG T AXIS: 152 DEGREES
EKG VENTRICULAR RATE: 65 BPM
EOSINOPHIL # BLD: 0.6 K/UL (ref 0–0.4)
EOSINOPHIL NFR BLD: 9 % (ref 0–7)
EPITH CASTS URNS QL MICRO: ABNORMAL /LPF
ERYTHROCYTE [DISTWIDTH] IN BLOOD BY AUTOMATED COUNT: 12.9 % (ref 11.5–14.5)
FOLATE SERPL-MCNC: 6.2 NG/ML (ref 5–21)
GLOBULIN SER CALC-MCNC: 3.4 G/DL (ref 2–4)
GLUCOSE SERPL-MCNC: 115 MG/DL (ref 65–100)
GLUCOSE UR STRIP.AUTO-MCNC: NEGATIVE MG/DL
HCT VFR BLD AUTO: 36.9 % (ref 36.6–50.3)
HGB BLD-MCNC: 12.8 G/DL (ref 12.1–17)
HGB UR QL STRIP: NEGATIVE
HYALINE CASTS URNS QL MICRO: ABNORMAL /LPF (ref 0–5)
IMM GRANULOCYTES # BLD AUTO: 0 K/UL (ref 0–0.04)
IMM GRANULOCYTES NFR BLD AUTO: 1 % (ref 0–0.5)
KETONES UR QL STRIP.AUTO: NEGATIVE MG/DL
LEUKOCYTE ESTERASE UR QL STRIP.AUTO: NEGATIVE
LIPASE SERPL-CCNC: 48 U/L (ref 13–75)
LYMPHOCYTES # BLD: 1.2 K/UL (ref 0.8–3.5)
LYMPHOCYTES NFR BLD: 16 % (ref 12–49)
Lab: ABNORMAL
MAGNESIUM SERPL-MCNC: 2.4 MG/DL (ref 1.6–2.4)
MCH RBC QN AUTO: 31.7 PG (ref 26–34)
MCHC RBC AUTO-ENTMCNC: 34.7 G/DL (ref 30–36.5)
MCV RBC AUTO: 91.3 FL (ref 80–99)
METHADONE UR QL: NEGATIVE
MONOCYTES # BLD: 0.7 K/UL (ref 0–1)
MONOCYTES NFR BLD: 9 % (ref 5–13)
NEUTS SEG # BLD: 4.7 K/UL (ref 1.8–8)
NEUTS SEG NFR BLD: 64 % (ref 32–75)
NITRITE UR QL STRIP.AUTO: NEGATIVE
NRBC # BLD: 0 K/UL (ref 0–0.01)
NRBC BLD-RTO: 0 PER 100 WBC
OPIATES UR QL: NEGATIVE
PCP UR QL: NEGATIVE
PH UR STRIP: 6.5 (ref 5–8)
PLATELET # BLD AUTO: 203 K/UL (ref 150–400)
PMV BLD AUTO: 10.9 FL (ref 8.9–12.9)
POTASSIUM SERPL-SCNC: 3.7 MMOL/L (ref 3.5–5.1)
PROT SERPL-MCNC: 6.8 G/DL (ref 6.4–8.2)
PROT UR STRIP-MCNC: 100 MG/DL
RBC # BLD AUTO: 4.04 M/UL (ref 4.1–5.7)
RBC #/AREA URNS HPF: ABNORMAL /HPF (ref 0–5)
SODIUM SERPL-SCNC: 142 MMOL/L (ref 136–145)
SP GR UR REFRACTOMETRY: 1.01 (ref 1–1.03)
SPECIMEN HOLD: NORMAL
SPECIMEN HOLD: NORMAL
T4 FREE SERPL-MCNC: 1.1 NG/DL (ref 0.8–1.5)
TROPONIN I SERPL HS-MCNC: 30 NG/L (ref 0–76)
TSH SERPL DL<=0.05 MIU/L-ACNC: 0.62 UIU/ML (ref 0.36–3.74)
UROBILINOGEN UR QL STRIP.AUTO: 0.2 EU/DL (ref 0.2–1)
VIT B12 SERPL-MCNC: 274 PG/ML (ref 193–986)
WBC # BLD AUTO: 7.3 K/UL (ref 4.1–11.1)
WBC URNS QL MICRO: ABNORMAL /HPF (ref 0–4)

## 2024-07-15 PROCEDURE — 84484 ASSAY OF TROPONIN QUANT: CPT

## 2024-07-15 PROCEDURE — 85025 COMPLETE CBC W/AUTO DIFF WBC: CPT

## 2024-07-15 PROCEDURE — 93005 ELECTROCARDIOGRAM TRACING: CPT | Performed by: EMERGENCY MEDICINE

## 2024-07-15 PROCEDURE — 81001 URINALYSIS AUTO W/SCOPE: CPT

## 2024-07-15 PROCEDURE — 70547 MR ANGIOGRAPHY NECK W/O DYE: CPT

## 2024-07-15 PROCEDURE — 80307 DRUG TEST PRSMV CHEM ANLYZR: CPT

## 2024-07-15 PROCEDURE — 84439 ASSAY OF FREE THYROXINE: CPT

## 2024-07-15 PROCEDURE — 84443 ASSAY THYROID STIM HORMONE: CPT

## 2024-07-15 PROCEDURE — 70544 MR ANGIOGRAPHY HEAD W/O DYE: CPT

## 2024-07-15 PROCEDURE — 76770 US EXAM ABDO BACK WALL COMP: CPT

## 2024-07-15 PROCEDURE — 70450 CT HEAD/BRAIN W/O DYE: CPT

## 2024-07-15 PROCEDURE — 2580000003 HC RX 258: Performed by: INTERNAL MEDICINE

## 2024-07-15 PROCEDURE — 99285 EMERGENCY DEPT VISIT HI MDM: CPT

## 2024-07-15 PROCEDURE — 83690 ASSAY OF LIPASE: CPT

## 2024-07-15 PROCEDURE — 36415 COLL VENOUS BLD VENIPUNCTURE: CPT

## 2024-07-15 PROCEDURE — 80053 COMPREHEN METABOLIC PANEL: CPT

## 2024-07-15 PROCEDURE — 6360000002 HC RX W HCPCS: Performed by: INTERNAL MEDICINE

## 2024-07-15 PROCEDURE — 82746 ASSAY OF FOLIC ACID SERUM: CPT

## 2024-07-15 PROCEDURE — 82607 VITAMIN B-12: CPT

## 2024-07-15 PROCEDURE — 70551 MRI BRAIN STEM W/O DYE: CPT

## 2024-07-15 PROCEDURE — 83735 ASSAY OF MAGNESIUM: CPT

## 2024-07-15 PROCEDURE — 2060000000 HC ICU INTERMEDIATE R&B

## 2024-07-15 PROCEDURE — 6370000000 HC RX 637 (ALT 250 FOR IP): Performed by: INTERNAL MEDICINE

## 2024-07-15 RX ORDER — SODIUM CHLORIDE 9 MG/ML
INJECTION, SOLUTION INTRAVENOUS PRN
Status: DISCONTINUED | OUTPATIENT
Start: 2024-07-15 | End: 2024-07-16 | Stop reason: HOSPADM

## 2024-07-15 RX ORDER — ONDANSETRON 4 MG/1
4 TABLET, ORALLY DISINTEGRATING ORAL EVERY 8 HOURS PRN
Status: DISCONTINUED | OUTPATIENT
Start: 2024-07-15 | End: 2024-07-16 | Stop reason: HOSPADM

## 2024-07-15 RX ORDER — POLYETHYLENE GLYCOL 3350 17 G/17G
17 POWDER, FOR SOLUTION ORAL DAILY PRN
Status: DISCONTINUED | OUTPATIENT
Start: 2024-07-15 | End: 2024-07-16 | Stop reason: HOSPADM

## 2024-07-15 RX ORDER — SODIUM CHLORIDE 0.9 % (FLUSH) 0.9 %
5-40 SYRINGE (ML) INJECTION EVERY 12 HOURS SCHEDULED
Status: DISCONTINUED | OUTPATIENT
Start: 2024-07-15 | End: 2024-07-16 | Stop reason: HOSPADM

## 2024-07-15 RX ORDER — HYDROXYZINE HYDROCHLORIDE 25 MG/1
25 TABLET, FILM COATED ORAL EVERY 6 HOURS PRN
COMMUNITY

## 2024-07-15 RX ORDER — ONDANSETRON 2 MG/ML
4 INJECTION INTRAMUSCULAR; INTRAVENOUS EVERY 6 HOURS PRN
Status: DISCONTINUED | OUTPATIENT
Start: 2024-07-15 | End: 2024-07-16 | Stop reason: HOSPADM

## 2024-07-15 RX ORDER — ASPIRIN 81 MG/1
81 TABLET, CHEWABLE ORAL DAILY
Status: DISCONTINUED | OUTPATIENT
Start: 2024-07-16 | End: 2024-07-16 | Stop reason: HOSPADM

## 2024-07-15 RX ORDER — LABETALOL HYDROCHLORIDE 5 MG/ML
10 INJECTION, SOLUTION INTRAVENOUS ONCE
Status: DISCONTINUED | OUTPATIENT
Start: 2024-07-15 | End: 2024-07-15

## 2024-07-15 RX ORDER — SODIUM CHLORIDE 0.9 % (FLUSH) 0.9 %
5-40 SYRINGE (ML) INJECTION PRN
Status: DISCONTINUED | OUTPATIENT
Start: 2024-07-15 | End: 2024-07-16 | Stop reason: HOSPADM

## 2024-07-15 RX ORDER — ATORVASTATIN CALCIUM 40 MG/1
80 TABLET, FILM COATED ORAL NIGHTLY
Status: DISCONTINUED | OUTPATIENT
Start: 2024-07-15 | End: 2024-07-16 | Stop reason: HOSPADM

## 2024-07-15 RX ADMIN — NICARDIPINE HYDROCHLORIDE 7.5 MG/HR: 2.5 INJECTION, SOLUTION INTRAVENOUS at 23:16

## 2024-07-15 RX ADMIN — NICARDIPINE HYDROCHLORIDE 5 MG/HR: 2.5 INJECTION, SOLUTION INTRAVENOUS at 19:50

## 2024-07-15 RX ADMIN — Medication 10 ML: at 21:20

## 2024-07-15 RX ADMIN — ATORVASTATIN CALCIUM 80 MG: 40 TABLET, FILM COATED ORAL at 21:48

## 2024-07-15 ASSESSMENT — ENCOUNTER SYMPTOMS
ABDOMINAL PAIN: 0
SORE THROAT: 0
BACK PAIN: 0
COUGH: 0

## 2024-07-15 ASSESSMENT — LIFESTYLE VARIABLES
HOW MANY STANDARD DRINKS CONTAINING ALCOHOL DO YOU HAVE ON A TYPICAL DAY: 3 OR 4
HOW OFTEN DO YOU HAVE A DRINK CONTAINING ALCOHOL: 2-3 TIMES A WEEK

## 2024-07-15 ASSESSMENT — PAIN - FUNCTIONAL ASSESSMENT: PAIN_FUNCTIONAL_ASSESSMENT: NONE - DENIES PAIN

## 2024-07-15 NOTE — ED TRIAGE NOTES
Patient is having trouble walking and dragging the right leg for the past couple of weeks. Female  states slurred speech for the past couple of days. Headache lasted week and is taking Motrin for it. Patient has history of stroke

## 2024-07-15 NOTE — ED PROVIDER NOTES
[facility]  EMERGENCY DEPARTMENT ENCOUNTER      Pt Name: Michael Rockwell  MRN: [id number]  Birthdate 1965  Date of evaluation: 7/15/2024  Provider: Joselyn Scherer MD    CHIEF COMPLAINT     No chief complaint on file.        HISTORY OF PRESENT ILLNESS    Michael Rockwell is a 57 yo M with h/o HTN, elevated cholesterol and prior stroke with left side numbness at baseline.  Who has had unsteady gait and slurred speech for the past 4 days.  He had a headache last week but none today.            Additional history from independent historians:     Review of External Medical Records:     Nursing Notes were reviewed.    REVIEW OF SYSTEMS       Review of Systems   Constitutional:  Negative for fever.   HENT:  Negative for sore throat.    Eyes:  Negative for visual disturbance.   Respiratory:  Negative for cough.    Cardiovascular:  Negative for chest pain.   Gastrointestinal:  Negative for abdominal pain.   Genitourinary:  Negative for dysuria.   Musculoskeletal:  Negative for back pain.   Skin:  Negative for rash.   Neurological:  Positive for speech difficulty. Negative for headaches.       Except as noted above the remainder of the review of systems was reviewed and negative.       PAST MEDICAL HISTORY     Past Medical History:   Diagnosis Date    Asthma     Chronic kidney disease     Chronic obstructive pulmonary disease (HCC)     Emphysema lung (HCC)     GERD (gastroesophageal reflux disease)     Heart failure (HCC) 10/2021    1 EPISODE DUE TO HIGH BP, NOW CLEARED UP PER PT AND WIFE    Hypertension     Stroke (HCC) 2022         SURGICAL HISTORY       Past Surgical History:   Procedure Laterality Date    APPENDECTOMY      HERNIA REPAIR Right 12/05/2022    Robotic-assisted laparoscopic right inguinal hernia repair with mesh by Dr. Tan    ORTHOPEDIC SURGERY      collar bone (right)    OTHER SURGICAL HISTORY      HEMORRHOIDECTOMY         CURRENT MEDICATIONS       Previous Medications    ALBUTEROL

## 2024-07-15 NOTE — H&P
on file   Social Connections: Not on file   Intimate Partner Violence: Not on file   Depression: Not at risk (12/29/2022)    PHQ-2     PHQ-2 Score: 0   Housing Stability: Not on file   Interpersonal Safety: Not At Risk (7/15/2024)    Interpersonal Safety Domain Source: IP Abuse Screening     Physical abuse: Denies     Verbal abuse: Denies     Emotional abuse: Denies     Financial abuse: Denies     Sexual abuse: Denies   Utilities: Not on file        Medications were reconciled to the best of my ability given all available resources at the time of admission. Route is PO if not otherwise noted.     Family and social history were personally reviewed, all pertinent and relevant details are outlined as above.    Objective:   BP (!) 203/131   Pulse 59   Temp 98.3 °F (36.8 °C) (Oral)   Resp 14   Ht 1.778 m (5' 10\")   Wt 72.3 kg (159 lb 6.3 oz)   SpO2 96%   BMI 22.87 kg/m²         PHYSICAL EXAM:   General: awake, NAD  HEENT: NCAT, PERRL, MMM, poor dentition  Neck: supple, no masses  Chest: Clear to auscultation bilaterally   CVS: regular rhythm, normal rate, no m/r/g appreciated, no peripheral edema  Abd: +BS, soft, NT, ND  MSK: PAIGE, LUE/LLE 5-/5  Neuro/Psych: pleasant mood and affect, Aox3, CN II-XII grossly intact, mild L hemiparesis, responds appropriately to questions and commands  Skin: warm, dry    Data Review:   I have independently reviewed and interpreted patient's lab and all other diagnostic data    Abnormal Labs Reviewed   CBC WITH AUTO DIFFERENTIAL - Abnormal; Notable for the following components:       Result Value    RBC 4.04 (*)     Eosinophils % 9 (*)     Immature Granulocytes % 1 (*)     Eosinophils Absolute 0.6 (*)     All other components within normal limits   COMPREHENSIVE METABOLIC PANEL - Abnormal; Notable for the following components:    Chloride 111 (*)     Glucose 115 (*)     BUN 27 (*)     Creatinine 3.57 (*)     BUN/Creatinine Ratio 8 (*)     Est, Glom Filt Rate 19 (*)     AST 9 (*)

## 2024-07-16 ENCOUNTER — APPOINTMENT (OUTPATIENT)
Facility: HOSPITAL | Age: 59
End: 2024-07-16
Attending: INTERNAL MEDICINE
Payer: MEDICAID

## 2024-07-16 VITALS
TEMPERATURE: 98.1 F | DIASTOLIC BLOOD PRESSURE: 103 MMHG | WEIGHT: 159 LBS | BODY MASS INDEX: 22.76 KG/M2 | OXYGEN SATURATION: 97 % | HEIGHT: 70 IN | RESPIRATION RATE: 16 BRPM | HEART RATE: 63 BPM | SYSTOLIC BLOOD PRESSURE: 162 MMHG

## 2024-07-16 PROBLEM — I10 HTN (HYPERTENSION), MALIGNANT: Status: ACTIVE | Noted: 2024-07-16

## 2024-07-16 LAB
ALBUMIN SERPL-MCNC: 3.4 G/DL (ref 3.5–5)
ALBUMIN/GLOB SERPL: 1 (ref 1.1–2.2)
ALP SERPL-CCNC: 102 U/L (ref 45–117)
ALT SERPL-CCNC: 15 U/L (ref 12–78)
ANION GAP SERPL CALC-SCNC: 6 MMOL/L (ref 5–15)
AST SERPL-CCNC: 12 U/L (ref 15–37)
BILIRUB SERPL-MCNC: 0.4 MG/DL (ref 0.2–1)
BUN SERPL-MCNC: 25 MG/DL (ref 6–20)
BUN/CREAT SERPL: 8 (ref 12–20)
CALCIUM SERPL-MCNC: 8.7 MG/DL (ref 8.5–10.1)
CHLORIDE SERPL-SCNC: 112 MMOL/L (ref 97–108)
CHOLEST SERPL-MCNC: 191 MG/DL
CO2 SERPL-SCNC: 23 MMOL/L (ref 21–32)
CREAT SERPL-MCNC: 3.11 MG/DL (ref 0.7–1.3)
CREAT UR-MCNC: 149 MG/DL
ECHO BSA: 1.89 M2
ECHO BSA: 1.89 M2
ERYTHROCYTE [DISTWIDTH] IN BLOOD BY AUTOMATED COUNT: 12.7 % (ref 11.5–14.5)
EST. AVERAGE GLUCOSE BLD GHB EST-MCNC: 97 MG/DL
GLOBULIN SER CALC-MCNC: 3.4 G/DL (ref 2–4)
GLUCOSE SERPL-MCNC: 97 MG/DL (ref 65–100)
HBA1C MFR BLD: 5 % (ref 4–5.6)
HCT VFR BLD AUTO: 41.3 % (ref 36.6–50.3)
HDLC SERPL-MCNC: 115 MG/DL
HDLC SERPL: 1.7 (ref 0–5)
HGB BLD-MCNC: 13.9 G/DL (ref 12.1–17)
LDLC SERPL CALC-MCNC: 56.8 MG/DL (ref 0–100)
MAGNESIUM SERPL-MCNC: 2.3 MG/DL (ref 1.6–2.4)
MCH RBC QN AUTO: 31 PG (ref 26–34)
MCHC RBC AUTO-ENTMCNC: 33.7 G/DL (ref 30–36.5)
MCV RBC AUTO: 92.2 FL (ref 80–99)
MICROALBUMIN UR-MCNC: 105 MG/DL
MICROALBUMIN/CREAT UR-RTO: 705 MG/G (ref 0–30)
NRBC # BLD: 0 K/UL (ref 0–0.01)
NRBC BLD-RTO: 0 PER 100 WBC
PLATELET # BLD AUTO: 225 K/UL (ref 150–400)
PMV BLD AUTO: 10.7 FL (ref 8.9–12.9)
POTASSIUM SERPL-SCNC: 3.4 MMOL/L (ref 3.5–5.1)
PROT SERPL-MCNC: 6.8 G/DL (ref 6.4–8.2)
RBC # BLD AUTO: 4.48 M/UL (ref 4.1–5.7)
SODIUM SERPL-SCNC: 141 MMOL/L (ref 136–145)
TRIGL SERPL-MCNC: 96 MG/DL
VAS AORTA DIST AP: 1.73 CM
VAS AORTA MID PSV: 57 CM/S
VAS AORTA PROX AP: 2.17 CM
VAS AORTA PROX PSV: 65.5 CM/S
VAS CELIAC EDV: 30.8 CM/S
VAS CELIAC PSV: 145.2 CM/S
VAS LEFT KIDNEY LENGTH: 8.63 CM
VAS LEFT KIDNEY WIDTH: 3.88 CM
VAS LEFT RENAL DIST EDV: 13.5 CM/S
VAS LEFT RENAL DIST PSV: 44.9 CM/S
VAS LEFT RENAL DIST RAR: 0.79
VAS LEFT RENAL DIST RI: 0.7
VAS LEFT RENAL LOWER PARENCHYMA EDV: 6.4 CM/S
VAS LEFT RENAL LOWER PARENCHYMA PSV: 22.5 CM/S
VAS LEFT RENAL LOWER PARENCHYMA RI: 0.72
VAS LEFT RENAL MID EDV: 10.2 CM/S
VAS LEFT RENAL MID PSV: 37.2 CM/S
VAS LEFT RENAL MID RAR: 0.65
VAS LEFT RENAL MID RI: 0.73
VAS LEFT RENAL MIDDLE PARENCHYMA EDV: 4.6 CM/S
VAS LEFT RENAL MIDDLE PARENCHYMA PSV: 17.3 CM/S
VAS LEFT RENAL MIDDLE PARENCHYMA RI: 0.73
VAS LEFT RENAL PROX EDV: 11.1 CM/S
VAS LEFT RENAL PROX PSV: 34.3 CM/S
VAS LEFT RENAL PROX RAR: 0.6
VAS LEFT RENAL PROX RI: 0.68
VAS LEFT RENAL RAR: 0.79
VAS LEFT RENAL UPPER PARENCHYMA EDV: 5.5 CM/S
VAS LEFT RENAL UPPER PARENCHYMA PSV: 21.2 CM/S
VAS LEFT RENAL UPPER PARENCHYMA RI: 0.74
VAS PROX SMA EDV: 22.6 CM/S
VAS PROX SMA PSV: 159 CM/S
VAS RIGHT KIDNEY LENGTH: 9.4 CM
VAS RIGHT KIDNEY WIDTH: 4.04 CM
VAS RIGHT RENAL DIST EDV: 10.1 CM/S
VAS RIGHT RENAL DIST PSV: 34.2 CM/S
VAS RIGHT RENAL DIST RAR: 0.6
VAS RIGHT RENAL DIST RI: 0.7
VAS RIGHT RENAL LOWER PARENCHYMA EDV: 5.6 CM/S
VAS RIGHT RENAL LOWER PARENCHYMA PSV: 14.1 CM/S
VAS RIGHT RENAL LOWER PARENCHYMA RI: 0.6
VAS RIGHT RENAL MID EDV: 14.3 CM/S
VAS RIGHT RENAL MID PSV: 43.1 CM/S
VAS RIGHT RENAL MID RAR: 0.76
VAS RIGHT RENAL MID RI: 0.67
VAS RIGHT RENAL MIDDLE PARENCHYMA EDV: 7.7 CM/S
VAS RIGHT RENAL MIDDLE PARENCHYMA PSV: 19.8 CM/S
VAS RIGHT RENAL MIDDLE PARENCHYMA RI: 0.61
VAS RIGHT RENAL PROX EDV: 10.1 CM/S
VAS RIGHT RENAL PROX PSV: 31.4 CM/S
VAS RIGHT RENAL PROX RAR: 0.55
VAS RIGHT RENAL PROX RI: 0.68
VAS RIGHT RENAL RAR: 0.76
VAS RIGHT RENAL UPPER PARENCHYMA EDV: 7.3 CM/S
VAS RIGHT RENAL UPPER PARENCHYMA PSV: 19.8 CM/S
VAS RIGHT RENAL UPPER PARENCHYMA RI: 0.63
VLDLC SERPL CALC-MCNC: 19.2 MG/DL
WBC # BLD AUTO: 8.3 K/UL (ref 4.1–11.1)

## 2024-07-16 PROCEDURE — 80053 COMPREHEN METABOLIC PANEL: CPT

## 2024-07-16 PROCEDURE — 6360000002 HC RX W HCPCS: Performed by: INTERNAL MEDICINE

## 2024-07-16 PROCEDURE — 2580000003 HC RX 258: Performed by: INTERNAL MEDICINE

## 2024-07-16 PROCEDURE — 80061 LIPID PANEL: CPT

## 2024-07-16 PROCEDURE — 99223 1ST HOSP IP/OBS HIGH 75: CPT | Performed by: NURSE PRACTITIONER

## 2024-07-16 PROCEDURE — 83735 ASSAY OF MAGNESIUM: CPT

## 2024-07-16 PROCEDURE — 97161 PT EVAL LOW COMPLEX 20 MIN: CPT

## 2024-07-16 PROCEDURE — 97530 THERAPEUTIC ACTIVITIES: CPT

## 2024-07-16 PROCEDURE — 6370000000 HC RX 637 (ALT 250 FOR IP): Performed by: INTERNAL MEDICINE

## 2024-07-16 PROCEDURE — 97165 OT EVAL LOW COMPLEX 30 MIN: CPT

## 2024-07-16 PROCEDURE — 93306 TTE W/DOPPLER COMPLETE: CPT

## 2024-07-16 PROCEDURE — 92610 EVALUATE SWALLOWING FUNCTION: CPT

## 2024-07-16 PROCEDURE — 82570 ASSAY OF URINE CREATININE: CPT

## 2024-07-16 PROCEDURE — 85027 COMPLETE CBC AUTOMATED: CPT

## 2024-07-16 PROCEDURE — G0378 HOSPITAL OBSERVATION PER HR: HCPCS

## 2024-07-16 PROCEDURE — 82043 UR ALBUMIN QUANTITATIVE: CPT

## 2024-07-16 PROCEDURE — 36415 COLL VENOUS BLD VENIPUNCTURE: CPT

## 2024-07-16 PROCEDURE — 83036 HEMOGLOBIN GLYCOSYLATED A1C: CPT

## 2024-07-16 PROCEDURE — 93975 VASCULAR STUDY: CPT

## 2024-07-16 RX ORDER — TAMSULOSIN HYDROCHLORIDE 0.4 MG/1
0.4 CAPSULE ORAL DAILY
Status: DISCONTINUED | OUTPATIENT
Start: 2024-07-16 | End: 2024-07-16 | Stop reason: HOSPADM

## 2024-07-16 RX ORDER — LABETALOL 100 MG/1
200 TABLET, FILM COATED ORAL EVERY 12 HOURS SCHEDULED
Status: DISCONTINUED | OUTPATIENT
Start: 2024-07-16 | End: 2024-07-16 | Stop reason: HOSPADM

## 2024-07-16 RX ORDER — POTASSIUM CHLORIDE 750 MG/1
40 TABLET, FILM COATED, EXTENDED RELEASE ORAL ONCE
Status: COMPLETED | OUTPATIENT
Start: 2024-07-16 | End: 2024-07-16

## 2024-07-16 RX ORDER — CLONIDINE HYDROCHLORIDE 0.1 MG/1
0.1 TABLET ORAL EVERY 4 HOURS PRN
Status: DISCONTINUED | OUTPATIENT
Start: 2024-07-16 | End: 2024-07-16 | Stop reason: HOSPADM

## 2024-07-16 RX ORDER — AMLODIPINE BESYLATE 5 MG/1
10 TABLET ORAL DAILY
Status: DISCONTINUED | OUTPATIENT
Start: 2024-07-16 | End: 2024-07-16 | Stop reason: HOSPADM

## 2024-07-16 RX ORDER — LABETALOL HYDROCHLORIDE 5 MG/ML
10 INJECTION, SOLUTION INTRAVENOUS EVERY 6 HOURS PRN
Status: DISCONTINUED | OUTPATIENT
Start: 2024-07-16 | End: 2024-07-16 | Stop reason: HOSPADM

## 2024-07-16 RX ADMIN — POTASSIUM CHLORIDE 40 MEQ: 750 TABLET, FILM COATED, EXTENDED RELEASE ORAL at 09:56

## 2024-07-16 RX ADMIN — AMLODIPINE BESYLATE 10 MG: 5 TABLET ORAL at 08:13

## 2024-07-16 RX ADMIN — LABETALOL HYDROCHLORIDE 200 MG: 100 TABLET, FILM COATED ORAL at 08:13

## 2024-07-16 RX ADMIN — ASPIRIN 81 MG: 81 TABLET, CHEWABLE ORAL at 08:13

## 2024-07-16 RX ADMIN — NICARDIPINE HYDROCHLORIDE 5 MG/HR: 2.5 INJECTION, SOLUTION INTRAVENOUS at 04:28

## 2024-07-16 RX ADMIN — NICARDIPINE HYDROCHLORIDE 5 MG/HR: 2.5 INJECTION, SOLUTION INTRAVENOUS at 09:28

## 2024-07-16 RX ADMIN — Medication 10 ML: at 08:14

## 2024-07-16 RX ADMIN — TAMSULOSIN HYDROCHLORIDE 0.4 MG: 0.4 CAPSULE ORAL at 09:56

## 2024-07-16 NOTE — ED NOTES
2030 Patient assessed at this time, Stated that he was feeling better. Family in room. Given update at this time.   2118 First attempt made to give Report, unsuccessful.   2134 Report given to floor at this time.   
Bedside and Verbal shift change report given to Ghazal RN (oncoming nurse) by Latisha RN (offgoing nurse). Report included the following information ED Encounter Summary, ED SBAR, MAR, and Recent Results.    
Patient getting Ultrasound scan of heart and leg.  
Risk  Bedside swallow: 3 oz Water Swallow Screen: Pass  [place] Coma Scale (GCS): [place] Coma Scale  Eye Opening: Spontaneous  Best Verbal Response: Oriented  Best Motor Response: Obeys commands  [place] Coma Scale Score: 15  Active LDA's:   Peripheral IV 07/15/24 Left Antecubital (Active)   Site Assessment Clean, dry & intact 07/15/24 1319   Line Status Flushed;Specimen collected 07/15/24 1319   Line Care Connections checked and tightened 07/15/24 1319   Phlebitis Assessment No symptoms 07/15/24 1319   Infiltration Assessment 0 07/15/24 1319   Alcohol Cap Used No 07/15/24 1319   Dressing Status New dressing applied 07/15/24 1319   Dressing Type Transparent 07/15/24 1319   Dressing Intervention New 07/15/24 1319     PO Status: Regular  Pertinent or High Risk Medications/Drips: no   If Yes, please provide details:     Titratable drips: yes   Pending Blood Product Administration: no     Sepsis    Sepsis Risk Score   Predictive Model Details          16 (Low)  Factor Value    Calculated 7/15/2024 21:06 12% O2 Delivery Method ROOM AIR    [facility] EARLY DETECTION OF SEPSIS VERSION 2 Model 9% Albumin 3.4 g/dL     7% Total Active Inpatient Meds 9     6% Creatinine 3.57 MG/DL     5% Has Imaging Procedure 1     5% Age 58 years old     4% BUN 27 MG/DL     -4% AST 9 U/L     -4% Relative Neutrophils 64 %     -3% Duration of Encounter .3 days      Recent Labs     07/15/24  1317   WBC 7.3       Recommendation    Pending orders   Consults ordered: IP CONSULT TO HOSPITALIST  IP CONSULT TO NEUROLOGY  IP CONSULT TO NEPHROLOGY    Consulted provider:      Plan for next 24 hours:     Additional Comments:        If any further questions, please call Sending RN at     Electronically signed by: Electronically signed by Michael Munson RN on 7/15/2024 at 9:12 PM

## 2024-07-16 NOTE — PROGRESS NOTES
Patient's wife arrived at bedside. Patient and wife decided patient no longer wanted to remain in hospital for treatment and wanted to leave immediately. Educated patient on condition and risks of leaving AMA. AMA paperwork was requested and signed by the patient. MD Angelito notified.      
Speech LAnguage Pathology EVALUATION/DISCHARGE    Patient: Michael Rockwell (58 y.o. male)  Date: 7/16/2024  Primary Diagnosis: Stroke-like symptoms [R29.90]  Hypertension, unspecified type [I10]  Acute renal failure superimposed on chronic kidney disease, unspecified acute renal failure type, unspecified CKD stage (HCC) [N17.9, N18.9]       Precautions:                     ASSESSMENT :  Patient presents with WNL oropharyngeal swallow function with all consistencies. No s/sx dysphagia noted at any time. No cognition or communication deficits noted to warrant further evaluation.     Patient will be discharged from skilled speech-language pathology services at this time.     PLAN :  Recommendations and Planned Interventions:  Diet: Regular and thin liquids  Straws OK     Acute SLP Services: No, patient will be discharged from acute skilled speech-language pathology at this time.  Discharge Recommendations: No, additional SLP treatment not indicated at discharge     SUBJECTIVE:   Patient awake, alert, pleasant and cooperative with all tasks, denies Hx dysphagia, denies Hx PNA, reports Hx multiple TIAs    OBJECTIVE:     Past Medical History:   Diagnosis Date    Asthma     Chronic kidney disease     Chronic obstructive pulmonary disease (HCC)     Emphysema lung (HCC)     GERD (gastroesophageal reflux disease)     Heart failure (formerly [facility]) 10/2021    1 EPISODE DUE TO HIGH BP, NOW CLEARED UP PER PT AND WIFE    Hypertension     Stroke (formerly [facility]) 2022     Past Surgical History:   Procedure Laterality Date    APPENDECTOMY      HERNIA REPAIR Right 12/05/2022    Robotic-assisted laparoscopic right inguinal hernia repair with mesh by Dr. Tan    ORTHOPEDIC SURGERY      collar bone (right)    OTHER SURGICAL HISTORY      HEMORRHOIDECTOMY     Prior Level of Function/Home Situation:   Social/Functional History  Lives With: Spouse  Type of Home: Apartment  Home Layout: One level  Home Access: Stairs to enter with rails  Entrance Stairs - 
for: \"RBCF\"   No results for input(s): \"PH\", \"PCO2\", \"PO2\" in the last 72 hours.  No results for input(s): \"CPK\" in the last 72 hours.    Invalid input(s): \"CPKMB\", \"CKNDX\", \"TROIQ\"  Lab Results   Component Value Date/Time    CHOL 191 07/16/2024 01:37 AM     07/16/2024 01:37 AM    LDL 56.8 07/16/2024 01:37 AM    LDL 75.2 10/18/2023 02:30 AM     No results found for: \"GLUCPOC\"  [unfilled]    Notes reviewed from all clinical/nonclinical/nursing services involved in patient's clinical care. Care coordination discussions were held with appropriate clinical/nonclinical/ nursing providers based on care coordination needs.         Patients current active Medications were reviewed, considered, added and adjusted based on the clinical condition today.      Home Medications were reconciled to the best of my ability given all available resources at the time of admission. Route is PO if not otherwise noted.      Admission Status:[id number]:::1}      Medications Reviewed:     Current Facility-Administered Medications   Medication Dose Route Frequency    amLODIPine (NORVASC) tablet 10 mg  10 mg Oral Daily    labetalol (NORMODYNE) tablet 200 mg  200 mg Oral 2 times per day    tamsulosin (FLOMAX) capsule 0.4 mg  0.4 mg Oral Daily    aspirin chewable tablet 81 mg  81 mg Oral Daily    atorvastatin (LIPITOR) tablet 80 mg  80 mg Oral Nightly    sodium chloride flush 0.9 % injection 5-40 mL  5-40 mL IntraVENous 2 times per day    sodium chloride flush 0.9 % injection 5-40 mL  5-40 mL IntraVENous PRN    0.9 % sodium chloride infusion   IntraVENous PRN    ondansetron (ZOFRAN-ODT) disintegrating tablet 4 mg  4 mg Oral Q8H PRN    Or    ondansetron (ZOFRAN) injection 4 mg  4 mg IntraVENous Q6H PRN    polyethylene glycol (GLYCOLAX) packet 17 g  17 g Oral Daily PRN    niCARdipine (CARDENE) 25 mg in sodium chloride 0.9 % 250 mL infusion ([id number])  2.5-15 mg/hr IntraVENous Continuous

## 2024-07-16 NOTE — PLAN OF CARE
Problem: Discharge Planning  Goal: Discharge to home or other facility with appropriate resources  7/16/2024 1146 by Kristin Daniels RN  Outcome: Progressing  Flowsheets (Taken 7/16/2024 0800)  Discharge to home or other facility with appropriate resources: Identify barriers to discharge with patient and caregiver  7/15/2024 2352 by Charley Almeida RN  Outcome: Progressing     Problem: Cardiovascular - Adult  Goal: Maintains optimal cardiac output and hemodynamic stability  7/16/2024 1146 by Kristin Daniels RN  Outcome: Progressing  Flowsheets (Taken 7/16/2024 0800)  Maintains optimal cardiac output and hemodynamic stability:   Monitor blood pressure and heart rate   Monitor urine output and notify Licensed Independent Practitioner for values outside of normal range  7/15/2024 2352 by Charley Almeida RN  Outcome: Progressing  Goal: Absence of cardiac dysrhythmias or at baseline  7/16/2024 1146 by Kristin Daniels RN  Outcome: Progressing  Flowsheets (Taken 7/16/2024 0800)  Absence of cardiac dysrhythmias or at baseline:   Monitor cardiac rate and rhythm   Assess for signs of decreased cardiac output  7/15/2024 2352 by Charley Almeida RN  Outcome: Progressing     Problem: Genitourinary - Adult  Goal: Absence of urinary retention  7/16/2024 1146 by Kristin Daniels RN  Outcome: Progressing  Flowsheets (Taken 7/16/2024 0800)  Absence of urinary retention: Assess patient’s ability to void and empty bladder  7/15/2024 2352 by Charley Almeida RN  Outcome: Progressing  Goal: Urinary catheter remains patent  7/16/2024 1146 by Kristin Daniels RN  Outcome: Progressing  Flowsheets (Taken 7/16/2024 0800)  Urinary catheter remains patent: Assess patency of urinary catheter  7/15/2024 2352 by Charley Almeida RN  Outcome: Progressing     Problem: Metabolic/Fluid and Electrolytes - Adult  Goal: Electrolytes maintained within normal limits  7/16/2024 1146 by Kristin Daniels RN  Outcome: Progressing  Flowsheets (Taken 7/16/2024 
  Problem: Physical Therapy - Adult  Goal: By Discharge: Performs mobility at highest level of function for planned discharge setting.  See evaluation for individualized goals.  Description: FUNCTIONAL STATUS PRIOR TO ADMISSION: Patient was independent and active without use of DME.  Has hx of strokes with residual left sided tingling.  Does note drive.  Note polysubstance abuse.      HOME SUPPORT PRIOR TO ADMISSION: The patient lived alone with his wife but did not require assistance.      Physical Therapy Goals  Initiated 7/16/2024  1.  Patient will move from supine to sit and sit to supine, scoot up and down, and roll side to side in bed with modified independence within 7 day(s).    2.  Patient will perform sit to stand with modified independence within 7 day(s).  3.  Patient will transfer from bed to chair and chair to bed with modified independence using the least restrictive device within 7 day(s).  4.  Patient will ambulate with modified independence for 150 feet with the least restrictive device within 7 day(s).   5.  Patient will ascend/descend 2 stairs with no handrail(s) with supervision/set-up within 7 day(s).  Outcome: Progressing  PHYSICAL THERAPY EVALUATION    Patient: Michael Rockwell (58 y.o. male)  Date: 7/16/2024  Primary Diagnosis: Stroke-like symptoms [R29.90]  Hypertension, unspecified type [I10]  Acute renal failure superimposed on chronic kidney disease, unspecified acute renal failure type, unspecified CKD stage (HCC) [N17.9, N18.9]       Precautions: Fall    ASSESSMENT :   The patient presents with slurred speech, orthostatic hypotension, impaired balance, decreased activity tolerance, and overall decline in functional mobility s/p admission for stroke workup - MRI showed severe chronic ischemic changes but no acute process.  At baseline, pt lives with his wife and was independent with mobility and ADLs. He has a hx of strokes with residual L sided tingling.  This date, pt initially 
2130 Report received from VICK Rodriguez.    Shift summary.    2200 Patient arrived from ED via stretcher, alert and oriented, able to ambulate to room with no assistance. Pt oriented to room and call bell left within reach. Pt on Nicardipine gtt, vitals trending, no headache reported.    Pt's wife, Nathalie, in room, PTA meds reconciled. Pt has been compliant with medication for hypertension, however the medications have not worked including when dosages were increased. Pt is on third PCP due to uncontrolled hypertension. Plan of care explained to both the pt and wife and opportunity for questions and clarification provided.    No acute events noted. Pt resting in bed comfortably, call bell left within reach.  Nicardipine gtt titrated as tolerated.    0735 Bedside shift change report given to VICK Foster by VICK Whitehead. Report included the following information SBAR, Kardex, MAR, Accordion, and Recent Results and Cardiac Rhythm NSR.    Problem: Cardiovascular - Adult  Goal: Maintains optimal cardiac output and hemodynamic stability  Outcome: Progressing     Problem: Genitourinary - Adult  Goal: Absence of urinary retention  Outcome: Progressing  Goal: Urinary catheter remains patent  Outcome: Progressing     Problem: Metabolic/Fluid and Electrolytes - Adult  Goal: Electrolytes maintained within normal limits  Outcome: Progressing     Problem: Discharge Planning  Goal: Discharge to home or other facility with appropriate resources  Outcome: Progressing     
Adequate for Discharge  7/16/2024 1146 by Kristin Daniels RN  Outcome: Progressing  Flowsheets (Taken 7/16/2024 0800)  Electrolytes maintained within normal limits: Monitor labs and assess patient for signs and symptoms of electrolyte imbalances  Goal: Hemodynamic stability and optimal renal function maintained  7/16/2024 1736 by Kristin Daniels RN  Outcome: Adequate for Discharge  7/16/2024 1146 by Kristin Daniels RN  Outcome: Progressing  Flowsheets (Taken 7/16/2024 0800)  Hemodynamic stability and optimal renal function maintained: Monitor labs and assess for signs and symptoms of volume excess or deficit  Goal: Glucose maintained within prescribed range  7/16/2024 1736 by Kristin Daniels RN  Outcome: Adequate for Discharge  7/16/2024 1146 by Kristin Daniels RN  Outcome: Progressing  Flowsheets (Taken 7/16/2024 0800)  Glucose maintained within prescribed range: Monitor blood glucose as ordered     Problem: Chronic Conditions and Co-morbidities  Goal: Patient's chronic conditions and co-morbidity symptoms are monitored and maintained or improved  7/16/2024 1736 by Kristin Daniels RN  Outcome: Adequate for Discharge  7/16/2024 1146 by Kristin Daniels RN  Outcome: Progressing  Flowsheets (Taken 7/16/2024 0800)  Care Plan - Patient's Chronic Conditions and Co-Morbidity Symptoms are Monitored and Maintained or Improved: Monitor and assess patient's chronic conditions and comorbid symptoms for stability, deterioration, or improvement     Problem: Safety - Adult  Goal: Free from fall injury  Outcome: Adequate for Discharge     
semi-reclined in bed, agreeable to therapy, cleared by RN. Pt was Mod I for bed mobility, with fair sitting balance. Pt was initially CGA for functional transfers stating dizziness upon standing, that did not improve with standing marches/sustained tolerance. Pt was CGA x2 to get to chair, with pt stated symptoms resolves. Attempted to further mobilize pt once in standing pt became increasingly unstable requiring Mod x2 to get back to chair, pt was noted to have slurred speech unable to get out name. Pt was Mod I for getting back into bed, RN made aware immediately and at bedside at end of session. Will continue to follow in acute setting.        Functional Outcome Measure:  The patient scored 64/66 on the Fugl Eli outcome measure       PLAN :  Recommendations and Planned Interventions:   self care training, therapeutic activities, functional mobility training, balance training, therapeutic exercise, endurance activities, patient education, home safety training, and family training/education    Frequency/Duration: OT Plan of Care: 5 times/week    Recommendation for discharge: (in order for the patient to meet his/her long term goals): Continue to assess pending progress    Other factors to consider for discharge: patient's current support system is unable to meet their requirements for physical assistance, high risk for falls, not safe to be alone, and concern for safely navigating or managing the home environment    IF patient discharges home will need the following DME: none       SUBJECTIVE:   Patient stated, “I feel fine.”  OBJECTIVE DATA SUMMARY:     Past Medical History:   Diagnosis Date    Asthma     Chronic kidney disease     Chronic obstructive pulmonary disease (HCC)     Emphysema lung (HCC)     GERD (gastroesophageal reflux disease)     Heart failure (HCC) 10/2021    1 EPISODE DUE TO HIGH BP, NOW CLEARED UP PER PT AND WIFE    Hypertension     Stroke (HCC) 2022     Past Surgical History:   Procedure

## 2024-07-16 NOTE — CONSULTS
with us once in the office and has been lost to follow-up since    He indicates no desire to live for more than a year.  He denies any other substance abuse  He denies NSAID use  He does have symptoms of BPH.  He reports voiding only once a day and is often requiring 2 apply suprapubic pressure to help voiding.       Past Medical Hx:   Past Medical History:   Diagnosis Date    Asthma     Chronic kidney disease     Chronic obstructive pulmonary disease (HCC)     Emphysema lung (HCC)     GERD (gastroesophageal reflux disease)     Heart failure (HCC) 10/2021    1 EPISODE DUE TO HIGH BP, NOW CLEARED UP PER PT AND WIFE    Hypertension     Stroke (HCC) 2022        Past Surgical Hx:     Past Surgical History:   Procedure Laterality Date    APPENDECTOMY      HERNIA REPAIR Right 12/05/2022    Robotic-assisted laparoscopic right inguinal hernia repair with mesh by Dr. Tan    ORTHOPEDIC SURGERY      collar bone (right)    OTHER SURGICAL HISTORY      HEMORRHOIDECTOMY         Allergies   Allergen Reactions    Ace Inhibitors Swelling       Social Hx:  reports that he has been smoking cigarettes. He has never used smokeless tobacco. He reports that he does not currently use alcohol after a past usage of about 2.0 standard drinks of alcohol per week. He reports current drug use. Drug: Marijuana (Weed).     Family History   Problem Relation Age of Onset    Hypertension Father     Liver Disease Father     Anesth Problems Neg Hx     Lung Disease Mother        Review of Systems:  A thorough twelve point review of system was performed today. Pertinent positives and negatives are mentioned in the HPI. The reminder of the ROS is negative and noncontributory.     Objective:    Vitals:    Vitals:    07/16/24 0630 07/16/24 0700 07/16/24 0730 07/16/24 0813   BP: (!) 154/96 128/74 (!) 155/95 (!) 149/94   Pulse: 86 63 63 67   Resp: 17 12 12    Temp:       TempSrc:       SpO2:       Weight:       Height:         I&O's:  07/15 0701 - 07/16 
  Exam:     BP (!) 102/58   Pulse 60   Temp 98.3 °F (36.8 °C) (Oral)   Resp 12   Ht 1.778 m (5' 10\")   Wt 72.1 kg (159 lb)   SpO2 97%   BMI 22.81 kg/m²      General: Well developed, well nourished. Patient in no apparent distress   Head: Normocephalic, atraumatic, anicteric sclera   Lungs:  No increased WOB   Cardiac: RRR                  Neurological Exam:  Mental Status: A&Ox3, Follows commands   Speech: Fluent no aphasia or dysarthria.   Cranial Nerves:   VFF.  Facial sensation is normal. Facial movement is symmetric.  Palate is midline.  Normal sternocleidomastoid strength. Tongue is midline. Hearing is intact bilat.   Eyes: PERRL, EOM's full, no nystagmus, no ptosis.   Motor:  5/5 x4. Normal bulk and tone.   Reflexes:   DTR 2+/4 and symmetrical.  Toes downgoing bilat.    Sensory:   Sensation intact to LT bilat throughout   Gait:  Deferred   Tremor:   No tremor noted.   Cerebellar:  FTN intact           Imaging  I personally reviewed the following images.  CT Results (maximum last 3):  CT Result (most recent):  CT HEAD WO CONTRAST 07/15/2024    Narrative  HEAD CT WITHOUT CONTRAST: 7/15/2024 3:26 PM    INDICATION: ataxia, slurred speech for several days    COMPARISON: 10/17/2023.    PROCEDURE: Axial images of the head were obtained without contrast. Coronal and  sagittal reformats were performed. CT dose reduction was achieved through use of  a standardized protocol tailored for this examination and automatic exposure  control for dose modulation.    FINDINGS: Periventricular white matter hypodensity is nonspecific, but may  represent extensive chronic small vessel ischemic disease, advanced for age.  This is not significantly changed from 10/17/2023. No loss of gray-white  differentiation to suggest late acute or early subacute infarction. The  ventricles and sulci are appropriate in size and configuration for age. No mass  effect or intracranial hemorrhage.    Impression  No acute intracranial

## 2024-12-23 ENCOUNTER — APPOINTMENT (OUTPATIENT)
Facility: HOSPITAL | Age: 59
DRG: 199 | End: 2024-12-23
Payer: MEDICAID

## 2024-12-23 ENCOUNTER — HOSPITAL ENCOUNTER (INPATIENT)
Facility: HOSPITAL | Age: 59
LOS: 1 days | Discharge: LEFT BEFORE DISCHARGE | DRG: 199 | End: 2024-12-24
Attending: EMERGENCY MEDICINE | Admitting: INTERNAL MEDICINE
Payer: MEDICAID

## 2024-12-23 DIAGNOSIS — J81.0 ACUTE PULMONARY EDEMA: Primary | ICD-10-CM

## 2024-12-23 DIAGNOSIS — I50.9 ACUTE ON CHRONIC CONGESTIVE HEART FAILURE, UNSPECIFIED HEART FAILURE TYPE (HCC): ICD-10-CM

## 2024-12-23 DIAGNOSIS — N17.9 ACUTE RENAL FAILURE SUPERIMPOSED ON CHRONIC KIDNEY DISEASE, UNSPECIFIED ACUTE RENAL FAILURE TYPE, UNSPECIFIED CKD STAGE (HCC): ICD-10-CM

## 2024-12-23 DIAGNOSIS — N18.9 ACUTE RENAL FAILURE SUPERIMPOSED ON CHRONIC KIDNEY DISEASE, UNSPECIFIED ACUTE RENAL FAILURE TYPE, UNSPECIFIED CKD STAGE (HCC): ICD-10-CM

## 2024-12-23 DIAGNOSIS — I10 ACCELERATED HYPERTENSION: ICD-10-CM

## 2024-12-23 PROBLEM — I16.0 HYPERTENSIVE URGENCY: Status: ACTIVE | Noted: 2024-12-23

## 2024-12-23 LAB
ALBUMIN SERPL-MCNC: 3.6 G/DL (ref 3.5–5)
ALBUMIN/GLOB SERPL: 1.1 (ref 1.1–2.2)
ALP SERPL-CCNC: 102 U/L (ref 45–117)
ALT SERPL-CCNC: 14 U/L (ref 12–78)
AMPHET UR QL SCN: NEGATIVE
ANION GAP SERPL CALC-SCNC: 4 MMOL/L (ref 2–12)
APPEARANCE UR: CLEAR
AST SERPL-CCNC: 10 U/L (ref 15–37)
B PERT DNA SPEC QL NAA+PROBE: NOT DETECTED
BACTERIA URNS QL MICRO: NEGATIVE /HPF
BARBITURATES UR QL SCN: NEGATIVE
BASOPHILS # BLD: 0.1 K/UL (ref 0–0.1)
BASOPHILS NFR BLD: 1 % (ref 0–1)
BENZODIAZ UR QL: NEGATIVE
BILIRUB SERPL-MCNC: 0.8 MG/DL (ref 0.2–1)
BILIRUB UR QL: NEGATIVE
BORDETELLA PARAPERTUSSIS BY PCR: NOT DETECTED
BUN SERPL-MCNC: 34 MG/DL (ref 6–20)
BUN/CREAT SERPL: 8 (ref 12–20)
C PNEUM DNA SPEC QL NAA+PROBE: NOT DETECTED
CALCIUM SERPL-MCNC: 8.6 MG/DL (ref 8.5–10.1)
CANNABINOIDS UR QL SCN: NEGATIVE
CHLORIDE SERPL-SCNC: 109 MMOL/L (ref 97–108)
CO2 SERPL-SCNC: 24 MMOL/L (ref 21–32)
COCAINE UR QL SCN: POSITIVE
COLOR UR: ABNORMAL
COMMENT:: NORMAL
COMMENT:: NORMAL
CREAT SERPL-MCNC: 4.2 MG/DL (ref 0.7–1.3)
CREAT UR-MCNC: 112 MG/DL
DIFFERENTIAL METHOD BLD: ABNORMAL
EKG ATRIAL RATE: 82 BPM
EKG DIAGNOSIS: NORMAL
EKG P AXIS: 67 DEGREES
EKG P-R INTERVAL: 168 MS
EKG Q-T INTERVAL: 416 MS
EKG QRS DURATION: 104 MS
EKG QTC CALCULATION (BAZETT): 486 MS
EKG R AXIS: 79 DEGREES
EKG T AXIS: -68 DEGREES
EKG VENTRICULAR RATE: 82 BPM
EOSINOPHIL # BLD: 1.1 K/UL (ref 0–0.4)
EOSINOPHIL NFR BLD: 11 % (ref 0–7)
EPITH CASTS URNS QL MICRO: ABNORMAL /LPF
ERYTHROCYTE [DISTWIDTH] IN BLOOD BY AUTOMATED COUNT: 12.6 % (ref 11.5–14.5)
ETHANOL SERPL-MCNC: <10 MG/DL (ref 0–0.08)
FLUAV SUBTYP SPEC NAA+PROBE: NOT DETECTED
FLUBV RNA SPEC QL NAA+PROBE: NOT DETECTED
GLOBULIN SER CALC-MCNC: 3.2 G/DL (ref 2–4)
GLUCOSE SERPL-MCNC: 147 MG/DL (ref 65–100)
GLUCOSE UR STRIP.AUTO-MCNC: 100 MG/DL
HADV DNA SPEC QL NAA+PROBE: NOT DETECTED
HCOV 229E RNA SPEC QL NAA+PROBE: NOT DETECTED
HCOV HKU1 RNA SPEC QL NAA+PROBE: NOT DETECTED
HCOV NL63 RNA SPEC QL NAA+PROBE: NOT DETECTED
HCOV OC43 RNA SPEC QL NAA+PROBE: NOT DETECTED
HCT VFR BLD AUTO: 35.5 % (ref 36.6–50.3)
HGB BLD-MCNC: 11.4 G/DL (ref 12.1–17)
HGB UR QL STRIP: ABNORMAL
HMPV RNA SPEC QL NAA+PROBE: NOT DETECTED
HPIV1 RNA SPEC QL NAA+PROBE: NOT DETECTED
HPIV2 RNA SPEC QL NAA+PROBE: NOT DETECTED
HPIV3 RNA SPEC QL NAA+PROBE: NOT DETECTED
HPIV4 RNA SPEC QL NAA+PROBE: NOT DETECTED
HYALINE CASTS URNS QL MICRO: ABNORMAL /LPF (ref 0–5)
IMM GRANULOCYTES # BLD AUTO: 0.1 K/UL (ref 0–0.04)
IMM GRANULOCYTES NFR BLD AUTO: 1 % (ref 0–0.5)
KETONES UR QL STRIP.AUTO: NEGATIVE MG/DL
LEUKOCYTE ESTERASE UR QL STRIP.AUTO: NEGATIVE
LYMPHOCYTES # BLD: 0.8 K/UL (ref 0.8–3.5)
LYMPHOCYTES NFR BLD: 8 % (ref 12–49)
Lab: ABNORMAL
M PNEUMO DNA SPEC QL NAA+PROBE: NOT DETECTED
MAGNESIUM SERPL-MCNC: 2.4 MG/DL (ref 1.6–2.4)
MCH RBC QN AUTO: 30.7 PG (ref 26–34)
MCHC RBC AUTO-ENTMCNC: 32.1 G/DL (ref 30–36.5)
MCV RBC AUTO: 95.7 FL (ref 80–99)
METHADONE UR QL: NEGATIVE
MONOCYTES # BLD: 0.9 K/UL (ref 0–1)
MONOCYTES NFR BLD: 9 % (ref 5–13)
NEUTS SEG # BLD: 6.7 K/UL (ref 1.8–8)
NEUTS SEG NFR BLD: 70 % (ref 32–75)
NITRITE UR QL STRIP.AUTO: NEGATIVE
NRBC # BLD: 0 K/UL (ref 0–0.01)
NRBC BLD-RTO: 0 PER 100 WBC
NT PRO BNP: ABNORMAL PG/ML
OPIATES UR QL: NEGATIVE
PCP UR QL: NEGATIVE
PH UR STRIP: 5.5 (ref 5–8)
PLATELET # BLD AUTO: 209 K/UL (ref 150–400)
PMV BLD AUTO: 10.6 FL (ref 8.9–12.9)
POTASSIUM SERPL-SCNC: 3.8 MMOL/L (ref 3.5–5.1)
PROT SERPL-MCNC: 6.8 G/DL (ref 6.4–8.2)
PROT UR STRIP-MCNC: 300 MG/DL
PROT UR-MCNC: 143 MG/DL (ref 0–11.9)
PROT/CREAT UR-RTO: 1.3
RBC # BLD AUTO: 3.71 M/UL (ref 4.1–5.7)
RBC #/AREA URNS HPF: ABNORMAL /HPF (ref 0–5)
RBC MORPH BLD: ABNORMAL
RSV RNA SPEC QL NAA+PROBE: NOT DETECTED
RV+EV RNA SPEC QL NAA+PROBE: NOT DETECTED
SARS-COV-2 RNA RESP QL NAA+PROBE: NOT DETECTED
SODIUM SERPL-SCNC: 137 MMOL/L (ref 136–145)
SP GR UR REFRACTOMETRY: 1.01 (ref 1–1.03)
SPECIMEN HOLD: NORMAL
SPECIMEN HOLD: NORMAL
TROPONIN I SERPL HS-MCNC: 142 NG/L (ref 0–76)
TROPONIN I SERPL HS-MCNC: 312 NG/L (ref 0–76)
TROPONIN I SERPL HS-MCNC: 764 NG/L (ref 0–76)
TROPONIN I SERPL HS-MCNC: 87 NG/L (ref 0–76)
TROPONIN I SERPL HS-MCNC: 90 NG/L (ref 0–76)
URINE CULTURE IF INDICATED: ABNORMAL
UROBILINOGEN UR QL STRIP.AUTO: 0.2 EU/DL (ref 0.2–1)
WBC # BLD AUTO: 9.7 K/UL (ref 4.1–11.1)
WBC URNS QL MICRO: ABNORMAL /HPF (ref 0–4)

## 2024-12-23 PROCEDURE — 51798 US URINE CAPACITY MEASURE: CPT

## 2024-12-23 PROCEDURE — 85025 COMPLETE CBC W/AUTO DIFF WBC: CPT

## 2024-12-23 PROCEDURE — 84156 ASSAY OF PROTEIN URINE: CPT

## 2024-12-23 PROCEDURE — 81001 URINALYSIS AUTO W/SCOPE: CPT

## 2024-12-23 PROCEDURE — 70551 MRI BRAIN STEM W/O DYE: CPT

## 2024-12-23 PROCEDURE — 6370000000 HC RX 637 (ALT 250 FOR IP): Performed by: INTERNAL MEDICINE

## 2024-12-23 PROCEDURE — 0202U NFCT DS 22 TRGT SARS-COV-2: CPT

## 2024-12-23 PROCEDURE — 84484 ASSAY OF TROPONIN QUANT: CPT

## 2024-12-23 PROCEDURE — 96365 THER/PROPH/DIAG IV INF INIT: CPT

## 2024-12-23 PROCEDURE — 36415 COLL VENOUS BLD VENIPUNCTURE: CPT

## 2024-12-23 PROCEDURE — 6360000002 HC RX W HCPCS: Performed by: EMERGENCY MEDICINE

## 2024-12-23 PROCEDURE — 80307 DRUG TEST PRSMV CHEM ANLYZR: CPT

## 2024-12-23 PROCEDURE — 6370000000 HC RX 637 (ALT 250 FOR IP): Performed by: EMERGENCY MEDICINE

## 2024-12-23 PROCEDURE — 82570 ASSAY OF URINE CREATININE: CPT

## 2024-12-23 PROCEDURE — 2060000000 HC ICU INTERMEDIATE R&B

## 2024-12-23 PROCEDURE — 93005 ELECTROCARDIOGRAM TRACING: CPT | Performed by: EMERGENCY MEDICINE

## 2024-12-23 PROCEDURE — 6360000002 HC RX W HCPCS: Performed by: INTERNAL MEDICINE

## 2024-12-23 PROCEDURE — 93010 ELECTROCARDIOGRAM REPORT: CPT | Performed by: SPECIALIST

## 2024-12-23 PROCEDURE — 2500000003 HC RX 250 WO HCPCS: Performed by: INTERNAL MEDICINE

## 2024-12-23 PROCEDURE — 83880 ASSAY OF NATRIURETIC PEPTIDE: CPT

## 2024-12-23 PROCEDURE — 71046 X-RAY EXAM CHEST 2 VIEWS: CPT

## 2024-12-23 PROCEDURE — 83735 ASSAY OF MAGNESIUM: CPT

## 2024-12-23 PROCEDURE — 96375 TX/PRO/DX INJ NEW DRUG ADDON: CPT

## 2024-12-23 PROCEDURE — 82077 ASSAY SPEC XCP UR&BREATH IA: CPT

## 2024-12-23 PROCEDURE — 80053 COMPREHEN METABOLIC PANEL: CPT

## 2024-12-23 PROCEDURE — 99285 EMERGENCY DEPT VISIT HI MDM: CPT

## 2024-12-23 RX ORDER — ASPIRIN 81 MG/1
81 TABLET, CHEWABLE ORAL DAILY
Status: DISCONTINUED | OUTPATIENT
Start: 2024-12-23 | End: 2024-12-24 | Stop reason: HOSPADM

## 2024-12-23 RX ORDER — SODIUM CHLORIDE 9 MG/ML
INJECTION, SOLUTION INTRAVENOUS PRN
Status: DISCONTINUED | OUTPATIENT
Start: 2024-12-23 | End: 2024-12-24 | Stop reason: HOSPADM

## 2024-12-23 RX ORDER — SODIUM CHLORIDE 0.9 % (FLUSH) 0.9 %
5-40 SYRINGE (ML) INJECTION PRN
Status: DISCONTINUED | OUTPATIENT
Start: 2024-12-23 | End: 2024-12-24 | Stop reason: HOSPADM

## 2024-12-23 RX ORDER — ONDANSETRON 2 MG/ML
4 INJECTION INTRAMUSCULAR; INTRAVENOUS EVERY 6 HOURS PRN
Status: DISCONTINUED | OUTPATIENT
Start: 2024-12-23 | End: 2024-12-24 | Stop reason: HOSPADM

## 2024-12-23 RX ORDER — BUMETANIDE 0.25 MG/ML
1 INJECTION, SOLUTION INTRAMUSCULAR; INTRAVENOUS ONCE
Status: COMPLETED | OUTPATIENT
Start: 2024-12-23 | End: 2024-12-23

## 2024-12-23 RX ORDER — ATORVASTATIN CALCIUM 40 MG/1
80 TABLET, FILM COATED ORAL NIGHTLY
Status: DISCONTINUED | OUTPATIENT
Start: 2024-12-23 | End: 2024-12-24 | Stop reason: HOSPADM

## 2024-12-23 RX ORDER — FUROSEMIDE 10 MG/ML
40 INJECTION INTRAMUSCULAR; INTRAVENOUS 2 TIMES DAILY
Status: DISCONTINUED | OUTPATIENT
Start: 2024-12-24 | End: 2024-12-24 | Stop reason: HOSPADM

## 2024-12-23 RX ORDER — ALBUTEROL SULFATE 90 UG/1
2 INHALANT RESPIRATORY (INHALATION) EVERY 4 HOURS PRN
Status: DISCONTINUED | OUTPATIENT
Start: 2024-12-23 | End: 2024-12-23

## 2024-12-23 RX ORDER — NICOTINE 21 MG/24HR
1 PATCH, TRANSDERMAL 24 HOURS TRANSDERMAL DAILY
Status: DISCONTINUED | OUTPATIENT
Start: 2024-12-23 | End: 2024-12-24 | Stop reason: HOSPADM

## 2024-12-23 RX ORDER — SODIUM CHLORIDE 0.9 % (FLUSH) 0.9 %
5-40 SYRINGE (ML) INJECTION EVERY 12 HOURS SCHEDULED
Status: DISCONTINUED | OUTPATIENT
Start: 2024-12-23 | End: 2024-12-24 | Stop reason: HOSPADM

## 2024-12-23 RX ORDER — ALBUTEROL SULFATE 0.83 MG/ML
2.5 SOLUTION RESPIRATORY (INHALATION) EVERY 4 HOURS PRN
Status: DISCONTINUED | OUTPATIENT
Start: 2024-12-23 | End: 2024-12-24 | Stop reason: HOSPADM

## 2024-12-23 RX ORDER — ACETAMINOPHEN 325 MG/1
650 TABLET ORAL EVERY 6 HOURS PRN
Status: DISCONTINUED | OUTPATIENT
Start: 2024-12-23 | End: 2024-12-24 | Stop reason: HOSPADM

## 2024-12-23 RX ORDER — HYDRALAZINE HYDROCHLORIDE 25 MG/1
100 TABLET, FILM COATED ORAL
Status: COMPLETED | OUTPATIENT
Start: 2024-12-23 | End: 2024-12-23

## 2024-12-23 RX ORDER — LABETALOL 200 MG/1
400 TABLET, FILM COATED ORAL 3 TIMES DAILY
Status: DISCONTINUED | OUTPATIENT
Start: 2024-12-23 | End: 2024-12-24 | Stop reason: HOSPADM

## 2024-12-23 RX ORDER — LORAZEPAM 2 MG/ML
1 INJECTION INTRAMUSCULAR ONCE
Status: COMPLETED | OUTPATIENT
Start: 2024-12-23 | End: 2024-12-23

## 2024-12-23 RX ORDER — POLYETHYLENE GLYCOL 3350 17 G/17G
17 POWDER, FOR SOLUTION ORAL DAILY PRN
Status: DISCONTINUED | OUTPATIENT
Start: 2024-12-23 | End: 2024-12-24 | Stop reason: HOSPADM

## 2024-12-23 RX ORDER — AMLODIPINE BESYLATE 5 MG/1
10 TABLET ORAL DAILY
Status: DISCONTINUED | OUTPATIENT
Start: 2024-12-23 | End: 2024-12-24 | Stop reason: HOSPADM

## 2024-12-23 RX ORDER — HEPARIN SODIUM 5000 [USP'U]/ML
5000 INJECTION, SOLUTION INTRAVENOUS; SUBCUTANEOUS EVERY 8 HOURS SCHEDULED
Status: DISCONTINUED | OUTPATIENT
Start: 2024-12-23 | End: 2024-12-24 | Stop reason: HOSPADM

## 2024-12-23 RX ADMIN — NITROGLYCERIN 1 INCH: 20 OINTMENT TOPICAL at 05:04

## 2024-12-23 RX ADMIN — ATORVASTATIN CALCIUM 80 MG: 40 TABLET, FILM COATED ORAL at 20:51

## 2024-12-23 RX ADMIN — NICARDIPINE HYDROCHLORIDE 10 MG/HR: 0.1 INJECTION, SOLUTION INTRAVENOUS at 19:26

## 2024-12-23 RX ADMIN — NICARDIPINE HYDROCHLORIDE 10 MG/HR: 0.1 INJECTION, SOLUTION INTRAVENOUS at 17:05

## 2024-12-23 RX ADMIN — HYDRALAZINE HYDROCHLORIDE 100 MG: 25 TABLET ORAL at 07:30

## 2024-12-23 RX ADMIN — Medication 3 MG: at 20:51

## 2024-12-23 RX ADMIN — HEPARIN SODIUM 5000 UNITS: 5000 INJECTION INTRAVENOUS; SUBCUTANEOUS at 14:29

## 2024-12-23 RX ADMIN — NICARDIPINE HYDROCHLORIDE 7.5 MG/HR: 0.1 INJECTION, SOLUTION INTRAVENOUS at 21:58

## 2024-12-23 RX ADMIN — NICARDIPINE HYDROCHLORIDE 10 MG/HR: 0.1 INJECTION, SOLUTION INTRAVENOUS at 07:25

## 2024-12-23 RX ADMIN — NICARDIPINE HYDROCHLORIDE 5 MG/HR: 0.1 INJECTION, SOLUTION INTRAVENOUS at 06:43

## 2024-12-23 RX ADMIN — ASPIRIN 81 MG CHEWABLE TABLET 81 MG: 81 TABLET CHEWABLE at 14:28

## 2024-12-23 RX ADMIN — HEPARIN SODIUM 5000 UNITS: 5000 INJECTION INTRAVENOUS; SUBCUTANEOUS at 20:51

## 2024-12-23 RX ADMIN — AMLODIPINE BESYLATE 10 MG: 5 TABLET ORAL at 14:29

## 2024-12-23 RX ADMIN — NICARDIPINE HYDROCHLORIDE 7.5 MG/HR: 0.1 INJECTION, SOLUTION INTRAVENOUS at 07:01

## 2024-12-23 RX ADMIN — LORAZEPAM 1 MG: 2 INJECTION INTRAMUSCULAR; INTRAVENOUS at 08:26

## 2024-12-23 RX ADMIN — NICARDIPINE HYDROCHLORIDE 10 MG/HR: 0.1 INJECTION, SOLUTION INTRAVENOUS at 09:31

## 2024-12-23 RX ADMIN — SODIUM CHLORIDE, PRESERVATIVE FREE 10 ML: 5 INJECTION INTRAVENOUS at 22:04

## 2024-12-23 RX ADMIN — NICARDIPINE HYDROCHLORIDE 10 MG/HR: 0.1 INJECTION, SOLUTION INTRAVENOUS at 17:55

## 2024-12-23 RX ADMIN — BUMETANIDE 1 MG: 0.25 INJECTION INTRAMUSCULAR; INTRAVENOUS at 05:51

## 2024-12-23 ASSESSMENT — PAIN - FUNCTIONAL ASSESSMENT
PAIN_FUNCTIONAL_ASSESSMENT: NONE - DENIES PAIN

## 2024-12-23 ASSESSMENT — LIFESTYLE VARIABLES
HOW OFTEN DO YOU HAVE A DRINK CONTAINING ALCOHOL: NEVER
HOW MANY STANDARD DRINKS CONTAINING ALCOHOL DO YOU HAVE ON A TYPICAL DAY: PATIENT DOES NOT DRINK

## 2024-12-23 NOTE — PROGRESS NOTES
Went in to see/examine pt in ED12 and advise them of his admission for hypertensive urgency which is now being treated with nicardipine gtt. Pt and his wife said they were here for his shortness of breath and not his blood pressure. Then wife started being very argumentative, confrontational, and cursing about meds given to him in the ED for his blood pressure, about the type of care his PCP provided, and recommendations given to them on his previous admission. Wife states she does not want him to be admitted and miss [facility]ann. Pt's UDS is positive for cocaine. Pt and his wife said they \"smoke a few blunts and someone must  have put the cocaine in there.\" I advised them that cocaine whether inhaled intentionally or not can cause the symptoms of SOB, elevated blood pressure and put him at risk for stroke, heart attack, etc. Wife continued to be unpleasant and argumentative. I advised them that they should discuss admission and if they are agreeable, I would go ahead with admission orders. However, if they do not wish to be admitted, there is a risk of another stroke or heart attack. I advised them that I could not recommendation discharge with the uncontrolled BP that he has and that he would have to sign out AMA. I stated that I would let them discuss and left the room. I d/w the ED Dr Piedra. I also spoke with Nikki in Patient Advocacy and requested they visit the pt and his wife.    Leeanne Moses MD

## 2024-12-23 NOTE — CONSULTS
results found for: \"MCA2\", \"MCAU2\"  Lab Results   Component Value Date/Time    BNP 15,131 10/27/2022 03:21 AM    BNP 30,752 10/26/2022 12:20 AM     US Results (most recent):  @BSHSILASTIMGCAT(VPO2577:1)@   XR CHEST (2 VW)  Narrative: EXAM: XR CHEST (2 VW)    INDICATION:  SOB    COMPARISON: 10/17/2023    TECHNIQUE: PA and lateral chest views    FINDINGS: The cardiac size is within normal limits. The pulmonary vasculature is  within normal limits.     Mild pulmonary edema is noted. The visualized bones and upper abdomen are  age-appropriate.  Impression: Mild pulmonary edema.    Electronically signed by AURORA COLLINS     Prior to Admission Medications   Prescriptions Last Dose Informant Patient Reported? Taking?   albuterol sulfate HFA (PROVENTIL;VENTOLIN;PROAIR) 108 (90 Base) MCG/ACT inhaler   Yes No   Sig: Inhale 2 puffs into the lungs every 4 hours as needed   amLODIPine (NORVASC) 10 MG tablet 12/14/2024  Yes No   Sig: Take 1 tablet by mouth daily   aspirin 81 MG chewable tablet 12/21/2024  Yes No   Sig: Take 1 tablet by mouth daily   atorvastatin (LIPITOR) 80 MG tablet 12/21/2024  Yes No   Sig: Take 1 tablet by mouth   hydrALAZINE (APRESOLINE) 100 MG tablet   Yes No   Sig: Take 1 tablet by mouth 3 times daily   Patient not taking: Reported on 7/15/2024   hydrOXYzine HCl (ATARAX) 25 MG tablet Not Taking  Yes No   Sig: Take 1 tablet by mouth every 6 hours as needed for Itching   Patient not taking: Reported on 12/23/2024   labetalol (NORMODYNE) 200 MG tablet 12/21/2024  Yes No   Sig: Take 2 tablets by mouth 3 times daily      Facility-Administered Medications: None         Imaging:    Medications list Personally Reviewed   [x]      Yes     []               No    Thank you for allowing us to participate in the care this patient.   We will follow patient with you.  Signed By: MD [facility]  [facility] Office  [address]  [place] [address]  Phone - [phone]

## 2024-12-23 NOTE — ED PROVIDER NOTES
DIFFERENTIAL - Abnormal; Notable for the following components:       Result Value    RBC 3.71 (*)     Hemoglobin 11.4 (*)     Hematocrit 35.5 (*)     Lymphocytes % 8 (*)     Eosinophils % 11 (*)     Immature Granulocytes % 1 (*)     Eosinophils Absolute 1.1 (*)     Immature Granulocytes Absolute 0.1 (*)     All other components within normal limits   COMPREHENSIVE METABOLIC PANEL - Abnormal; Notable for the following components:    Chloride 109 (*)     Glucose 147 (*)     BUN 34 (*)     Creatinine 4.20 (*)     BUN/Creatinine Ratio 8 (*)     Est, Glom Filt Rate 16 (*)     AST 10 (*)     All other components within normal limits   BRAIN NATRIURETIC PEPTIDE - Abnormal; Notable for the following components:    NT Pro-BNP 22,532 (*)     All other components within normal limits   TROPONIN - Abnormal; Notable for the following components:    Troponin, High Sensitivity 87 (*)     All other components within normal limits   URINALYSIS WITH REFLEX TO CULTURE - Abnormal; Notable for the following components:    Protein,  (*)     Glucose, Ur 100 (*)     Blood, Urine SMALL (*)     All other components within normal limits   URINE DRUG SCREEN - Abnormal; Notable for the following components:    Cocaine, Urine Positive (*)     All other components within normal limits   TROPONIN - Abnormal; Notable for the following components:    Troponin, High Sensitivity 90 (*)     All other components within normal limits   MAGNESIUM   ETHANOL   EXTRA TUBES HOLD       All other labs were within normal range or not returned as of this dictation.    EMERGENCY DEPARTMENT COURSE and DIFFERENTIAL DIAGNOSIS/MDM:   Vitals:    Vitals:    12/23/24 0643 12/23/24 0700 12/23/24 0715 12/23/24 0730   BP: (!) 243/165 (!) 211/142 (!) 205/123 (!) 199/123   Pulse: 88 88 95 96   Resp: 17 13 15 16   Temp:  97.8 °F (36.6 °C)     TempSrc:  Oral     SpO2: 96% 94% 93% 95%   Weight:               Medical Decision Making  59 y.o. male with history of CHF, CKD,

## 2024-12-23 NOTE — ED NOTES
Verbal shift change report given to Nia RN and Mireille RN  (oncoming nurse) by Jamie RN (offgoing nurse). Report included the following information Nurse Handoff Report, Index, ED Encounter Summary, ED SBAR, Adult Overview, MAR, and Recent Results.

## 2024-12-23 NOTE — PROGRESS NOTES
Physical Therapy - defer  Order received, chart reviewed, discussed pt case w/ OT.  Per OT pt does not identify new ADL or mobility deficits, does not want PT evaluation. Will defer therapy evaluation and complete order.  Please re-consult if needs arise. Thank you.

## 2024-12-23 NOTE — ED NOTES
TRANSFER - OUT REPORT:    Verbal report given to Daniella Rockwell  being transferred to [address]  [facility] for routine progression of patient care       Report consisted of patient's Situation, Background, Assessment and   Recommendations(SBAR).     Information from the following report(s) Nurse Handoff Report, ED SBAR, Adult Overview, MAR, and Recent Results was reviewed with the receiving nurse.    Luckey Fall Assessment:    Presents to emergency department  because of falls (Syncope, seizure, or loss of consciousness): No  Age > 70: No  Altered Mental Status, Intoxication with alcohol or substance confusion (Disorientation, impaired judgment, poor safety awaremess, or inability to follow instructions): No  Impaired Mobility: Ambulates or transfers with assistive devices or assistance; Unable to ambulate or transer.: No  Nursing Judgement: No          Lines:   Peripheral IV 12/23/24 Left Antecubital (Active)   Site Assessment Clean, dry & intact 12/23/24 0416   Line Status Specimen collected;Normal saline locked 12/23/24 0416   Line Care Connections checked and tightened 12/23/24 0416   Phlebitis Assessment No symptoms 12/23/24 0416   Infiltration Assessment 0 12/23/24 0416   Alcohol Cap Used No 12/23/24 0416   Dressing Status Clean, dry & intact 12/23/24 0416   Dressing Type Transparent 12/23/24 0416   Dressing Intervention New 12/23/24 0416        Opportunity for questions and clarification was provided.      Patient transported with:  Monitor and Registered Nurse

## 2024-12-23 NOTE — PROGRESS NOTES
OT order received, chart reviewed. Patient seen in ED, explained role of OT. Patient does not identify any new ADL or mobility deficits. He does not feel that he is in need of therapy services and politely declined. Completing OT order.   Willa Pena, OTR/L

## 2024-12-23 NOTE — PROGRESS NOTES
1930  Verbal bedside report given to YESENIA Jimenez RN oncoming nurse by JACQUI Pardo RN off-going nurse.  Report included current pt status and condition, recent results, hx of present illness, heart rate and rhythm (ST), and respiratory status.       1750  Verbal report received by JACQUI Pardo RN from ED, RN  on pt incoming from ED for progression of care.  Report consisted of SBAR, Kardex, ED Summary and Cardiac Rhythm.  Patient arrived on [facility] via stretcher. Patient oriented to room and call bell, vital signs obtained, still hypertensive.  Cardene gtt re-started    Pt refused bed alarm.    Pt refused dual skin assessment at this time, may be more amenable later.    Placed new IV in forearm instead of AC per pt request to prevent \"that d*mn machine\" from making noise.

## 2024-12-23 NOTE — ED TRIAGE NOTES
Patient arrives c/o shortness of breath. Patient states he has COPD and last smoked a cigarette 4 days ago.     -chest pain     Patient hypertensive in triage

## 2024-12-23 NOTE — H&P
History and Physical    Date of Service:  12/23/2024  Primary Care Provider: Orestes Thurman MD  Source of information: The patient, Chart review, and Spouse/family member    Chief Complaint: Shortness of Breath      History of Presenting Illness:   Michael Rockwell is a 59 y.o. male with asthma, CKD Stage 4, chronic HFpEF, COPD, GERD, HTN, tobacco use disorder, and h/o multiple strokes who presented from home with SOB and wheezing x2 days. Pt smoked his last cigarette ~4-5 days ago. BP was very elevated in the ED. Pt has not taken his BP meds in a few days. Pt was given hydralazine 100mg 100mg po x1, bumetanide 1mg IV x1, NTP in the ED. He was then started on nicardipine gtt.      REVIEW OF SYSTEMS:  Pertinent items are noted in the History of Present Illness.     Past Medical History:   Diagnosis Date    Asthma     Chronic kidney disease     Chronic obstructive pulmonary disease (HCC)     Emphysema lung (HCC)     GERD (gastroesophageal reflux disease)     Heart failure (HCC) 10/2021    1 EPISODE DUE TO HIGH BP, NOW CLEARED UP PER PT AND WIFE    Hypertension     Stroke (HCC) 2022      Past Surgical History:   Procedure Laterality Date    APPENDECTOMY      HERNIA REPAIR Right 12/05/2022    Robotic-assisted laparoscopic right inguinal hernia repair with mesh by Dr. Tan    ORTHOPEDIC SURGERY      collar bone (right)    OTHER SURGICAL HISTORY      HEMORRHOIDECTOMY     Prior to Admission medications    Medication Sig Start Date End Date Taking? Authorizing Provider   hydrOXYzine HCl (ATARAX) 25 MG tablet Take 1 tablet by mouth every 6 hours as needed for Itching  Patient not taking: Reported on 12/23/2024    Provider, MD Norah   albuterol sulfate HFA (PROVENTIL;VENTOLIN;PROAIR) 108 (90 Base) MCG/ACT inhaler Inhale 2 puffs into the lungs every 4 hours as needed    Automatic Reconciliation, Ar   amLODIPine (NORVASC) 10 MG tablet Take 1 tablet by mouth daily 12/22/22   Automatic Reconciliation, Ar

## 2024-12-24 VITALS
OXYGEN SATURATION: 97 % | TEMPERATURE: 97.8 F | BODY MASS INDEX: 22.33 KG/M2 | DIASTOLIC BLOOD PRESSURE: 92 MMHG | RESPIRATION RATE: 13 BRPM | HEART RATE: 93 BPM | WEIGHT: 155.65 LBS | SYSTOLIC BLOOD PRESSURE: 150 MMHG

## 2024-12-24 LAB
ALBUMIN SERPL-MCNC: 3.3 G/DL (ref 3.5–5)
ANION GAP SERPL CALC-SCNC: 6 MMOL/L (ref 2–12)
BASOPHILS # BLD: 0.1 K/UL (ref 0–0.1)
BASOPHILS NFR BLD: 1 % (ref 0–1)
BUN SERPL-MCNC: 32 MG/DL (ref 6–20)
BUN/CREAT SERPL: 8 (ref 12–20)
CALCIUM SERPL-MCNC: 8.6 MG/DL (ref 8.5–10.1)
CHLORIDE SERPL-SCNC: 112 MMOL/L (ref 97–108)
CO2 SERPL-SCNC: 21 MMOL/L (ref 21–32)
CREAT SERPL-MCNC: 3.81 MG/DL (ref 0.7–1.3)
DIFFERENTIAL METHOD BLD: ABNORMAL
EOSINOPHIL # BLD: 1.1 K/UL (ref 0–0.4)
EOSINOPHIL NFR BLD: 15 % (ref 0–7)
ERYTHROCYTE [DISTWIDTH] IN BLOOD BY AUTOMATED COUNT: 12.7 % (ref 11.5–14.5)
GLUCOSE SERPL-MCNC: 91 MG/DL (ref 65–100)
HCT VFR BLD AUTO: 33.2 % (ref 36.6–50.3)
HGB BLD-MCNC: 10.9 G/DL (ref 12.1–17)
IMM GRANULOCYTES # BLD AUTO: 0 K/UL (ref 0–0.04)
IMM GRANULOCYTES NFR BLD AUTO: 0 % (ref 0–0.5)
LYMPHOCYTES # BLD: 0.9 K/UL (ref 0.8–3.5)
LYMPHOCYTES NFR BLD: 12 % (ref 12–49)
MCH RBC QN AUTO: 31.1 PG (ref 26–34)
MCHC RBC AUTO-ENTMCNC: 32.8 G/DL (ref 30–36.5)
MCV RBC AUTO: 94.9 FL (ref 80–99)
MONOCYTES # BLD: 0.7 K/UL (ref 0–1)
MONOCYTES NFR BLD: 9 % (ref 5–13)
NEUTS SEG # BLD: 4.7 K/UL (ref 1.8–8)
NEUTS SEG NFR BLD: 63 % (ref 32–75)
NRBC # BLD: 0 K/UL (ref 0–0.01)
NRBC BLD-RTO: 0 PER 100 WBC
PHOSPHATE SERPL-MCNC: 3.3 MG/DL (ref 2.6–4.7)
PLATELET # BLD AUTO: 217 K/UL (ref 150–400)
PMV BLD AUTO: 10.4 FL (ref 8.9–12.9)
POTASSIUM SERPL-SCNC: 3.6 MMOL/L (ref 3.5–5.1)
RBC # BLD AUTO: 3.5 M/UL (ref 4.1–5.7)
RBC MORPH BLD: ABNORMAL
SODIUM SERPL-SCNC: 139 MMOL/L (ref 136–145)
TROPONIN I SERPL HS-MCNC: 824 NG/L (ref 0–76)
TROPONIN I SERPL HS-MCNC: 873 NG/L (ref 0–76)
WBC # BLD AUTO: 7.5 K/UL (ref 4.1–11.1)
WBC MORPH BLD: ABNORMAL

## 2024-12-24 PROCEDURE — 6360000002 HC RX W HCPCS: Performed by: INTERNAL MEDICINE

## 2024-12-24 PROCEDURE — 6360000002 HC RX W HCPCS: Performed by: EMERGENCY MEDICINE

## 2024-12-24 PROCEDURE — 84484 ASSAY OF TROPONIN QUANT: CPT

## 2024-12-24 PROCEDURE — 93005 ELECTROCARDIOGRAM TRACING: CPT

## 2024-12-24 PROCEDURE — 85025 COMPLETE CBC W/AUTO DIFF WBC: CPT

## 2024-12-24 PROCEDURE — 80069 RENAL FUNCTION PANEL: CPT

## 2024-12-24 PROCEDURE — 36415 COLL VENOUS BLD VENIPUNCTURE: CPT

## 2024-12-24 RX ADMIN — NICARDIPINE HYDROCHLORIDE 7.5 MG/HR: 0.1 INJECTION, SOLUTION INTRAVENOUS at 03:30

## 2024-12-24 RX ADMIN — NICARDIPINE HYDROCHLORIDE 7.5 MG/HR: 0.1 INJECTION, SOLUTION INTRAVENOUS at 06:24

## 2024-12-24 RX ADMIN — NICARDIPINE HYDROCHLORIDE 7.5 MG/HR: 0.1 INJECTION, SOLUTION INTRAVENOUS at 00:34

## 2024-12-24 RX ADMIN — HEPARIN SODIUM 5000 UNITS: 5000 INJECTION INTRAVENOUS; SUBCUTANEOUS at 06:01

## 2024-12-24 NOTE — PROGRESS NOTES
0700: Patient pulled out both Iv's. Patient stated that he was leaving right now regardless if the provider was coming to bedside. RN messaged MD Andrae about patient wanting to leave AMA.  RN urged patient to wait a few minutes for provider to come to bedside. Patient and his wife were reluctant to wait. RN and VICK Abernathy discussed with patient in the [address] about the risk of stroke, myocardial infarction, and death. RNs advised patient that his blood pressure still remained uncontrolled. Patient acknowledged and understood the risks and stated \"Im okay with anything that happens to me\".     0715: Patient signed AMA form and left with wife.

## 2024-12-24 NOTE — ED NOTES
Assumed care of this 59-year-old male presenting with hypertensive emergency and pulmonary edema.  Prior team had consulted the ICU for evaluation given his respiratory status and hypertension above 200 necessitating nicardipine drip.  ICU attending is evaluated the patient and recommends a dose of hydralazine and lorazepam and reassessing the patient before admitting to the ICU.    Reassessment: Following the above interventions, patient's pressures are much improved.  Will admit to stepdown.    Total critical care time (not including time spent performing separately reportable procedures): 35      Mervin Piedra MD  12/23/24 3238

## 2024-12-24 NOTE — DISCHARGE SUMMARY
Discharge Summary       PATIENT ID: Michael Rockwell  MRN: [id number]   YOB: 1965    DATE OF ADMISSION: 12/23/2024  4:40 AM    DATE OF DISCHARGE: 12/24/2024   PRIMARY CARE PROVIDER: Orestes Thurman MD     ATTENDING PHYSICIAN: Masood Abebe  DISCHARGING PROVIDER: Masood Abebe MD      CONSULTATIONS: IP CONSULT TO INTENSIVIST  IP CONSULT TO NEPHROLOGY  IP CONSULT TO CARDIOLOGY    PROCEDURES/SURGERIES: * No surgery found *    ADMISSION SUMMARY AND HOSPITAL COURSE:     Michael Rockwell is a 59 y.o. male with asthma, CKD Stage 4, chronic HFpEF, COPD, GERD, HTN, tobacco use disorder, and h/o multiple strokes who presented from home with SOB and wheezing x2 days. Pt smoked his last cigarette ~4-5 days ago. BP was very elevated in the ED. Pt has not taken his BP meds in a few days. Pt was given hydralazine 100mg 100mg po x1, bumetanide 1mg IV x1, NTP in the ED. He was then started on nicardipine gtt.     Patient was admitted for further evaluation.  Regarding hypertensive urgency patient was started on nicardipine drip and restarted on home amlodipine.  Labetalol was held due to presence of cocaine in the urine drug screen.  Patient also with pulmonary edema likely acute on chronic HFpEF and also elevated troponin due to demand ischemia from hypertensive urgency.  Patient also started on IV Lasix.  Upon arrival to the floor, patient has refused bed alarm, has refused skin assessment.  Later RN informed me that patient pulled his IV out and was trying to leave AMA.  While on my way up to the unit, patient already has left.    aAMA NOTE     Michael Rockwell has decided to leave the hospital against medical advice (AMA). He is competent and verbalizes understanding about the risks of leaving AMA, including permanent disability and/or death.  He's had opportunities to ask questions about the his condition.  The patient has been informed that he may return to this ER for care at any time and urged to to seek medical

## 2024-12-27 LAB
EKG ATRIAL RATE: 91 BPM
EKG DIAGNOSIS: NORMAL
EKG P AXIS: 58 DEGREES
EKG P-R INTERVAL: 166 MS
EKG Q-T INTERVAL: 408 MS
EKG QRS DURATION: 100 MS
EKG QTC CALCULATION (BAZETT): 501 MS
EKG R AXIS: -1 DEGREES
EKG T AXIS: 194 DEGREES
EKG VENTRICULAR RATE: 91 BPM

## 2025-04-22 ENCOUNTER — APPOINTMENT (OUTPATIENT)
Facility: HOSPITAL | Age: 60
DRG: 469 | End: 2025-04-22
Payer: MEDICAID

## 2025-04-22 ENCOUNTER — HOSPITAL ENCOUNTER (INPATIENT)
Facility: HOSPITAL | Age: 60
LOS: 2 days | Discharge: HOME | DRG: 469 | End: 2025-04-24
Attending: EMERGENCY MEDICINE | Admitting: FAMILY MEDICINE
Payer: MEDICAID

## 2025-04-22 DIAGNOSIS — N17.9 AKI (ACUTE KIDNEY INJURY): Primary | ICD-10-CM

## 2025-04-22 DIAGNOSIS — R53.1 GENERALIZED WEAKNESS: ICD-10-CM

## 2025-04-22 LAB
ALBUMIN SERPL-MCNC: 3.7 G/DL (ref 3.5–5)
ALBUMIN/GLOB SERPL: 1.2 (ref 1.1–2.2)
ALP SERPL-CCNC: 87 U/L (ref 45–117)
ALT SERPL-CCNC: 19 U/L (ref 12–78)
ANION GAP SERPL CALC-SCNC: 6 MMOL/L (ref 2–12)
AST SERPL-CCNC: 13 U/L (ref 15–37)
BASOPHILS # BLD: 0.04 K/UL (ref 0–0.1)
BASOPHILS NFR BLD: 0.6 % (ref 0–1)
BILIRUB SERPL-MCNC: 0.7 MG/DL (ref 0.2–1)
BUN SERPL-MCNC: 41 MG/DL (ref 6–20)
BUN/CREAT SERPL: 8 (ref 12–20)
CALCIUM SERPL-MCNC: 8.9 MG/DL (ref 8.5–10.1)
CHLORIDE SERPL-SCNC: 109 MMOL/L (ref 97–108)
CO2 SERPL-SCNC: 24 MMOL/L (ref 21–32)
CREAT SERPL-MCNC: 5.11 MG/DL (ref 0.7–1.3)
DIFFERENTIAL METHOD BLD: ABNORMAL
EKG ATRIAL RATE: 66 BPM
EKG DIAGNOSIS: NORMAL
EKG P AXIS: 51 DEGREES
EKG P-R INTERVAL: 164 MS
EKG Q-T INTERVAL: 506 MS
EKG QRS DURATION: 98 MS
EKG QTC CALCULATION (BAZETT): 530 MS
EKG R AXIS: 65 DEGREES
EKG T AXIS: 252 DEGREES
EKG VENTRICULAR RATE: 66 BPM
EOSINOPHIL # BLD: 0.7 K/UL (ref 0–0.4)
EOSINOPHIL NFR BLD: 9.6 % (ref 0–7)
ERYTHROCYTE [DISTWIDTH] IN BLOOD BY AUTOMATED COUNT: 13.1 % (ref 11.5–14.5)
FLUAV RNA SPEC QL NAA+PROBE: NOT DETECTED
FLUBV RNA SPEC QL NAA+PROBE: NOT DETECTED
GLOBULIN SER CALC-MCNC: 3.2 G/DL (ref 2–4)
GLUCOSE SERPL-MCNC: 91 MG/DL (ref 65–100)
HCT VFR BLD AUTO: 34.5 % (ref 36.6–50.3)
HGB BLD-MCNC: 11 G/DL (ref 12.1–17)
IMM GRANULOCYTES # BLD AUTO: 0.03 K/UL (ref 0–0.04)
IMM GRANULOCYTES NFR BLD AUTO: 0.4 % (ref 0–0.5)
LYMPHOCYTES # BLD: 1.15 K/UL (ref 0.8–3.5)
LYMPHOCYTES NFR BLD: 15.8 % (ref 12–49)
MCH RBC QN AUTO: 29.9 PG (ref 26–34)
MCHC RBC AUTO-ENTMCNC: 31.9 G/DL (ref 30–36.5)
MCV RBC AUTO: 93.8 FL (ref 80–99)
MONOCYTES # BLD: 0.71 K/UL (ref 0–1)
MONOCYTES NFR BLD: 9.8 % (ref 5–13)
NEUTS SEG # BLD: 4.64 K/UL (ref 1.8–8)
NEUTS SEG NFR BLD: 63.8 % (ref 32–75)
NRBC # BLD: 0 K/UL (ref 0–0.01)
NRBC BLD-RTO: 0 PER 100 WBC
PLATELET # BLD AUTO: 229 K/UL (ref 150–400)
PMV BLD AUTO: 10.8 FL (ref 8.9–12.9)
POTASSIUM SERPL-SCNC: 4.1 MMOL/L (ref 3.5–5.1)
PROCALCITONIN SERPL-MCNC: 0.2 NG/ML
PROT SERPL-MCNC: 6.9 G/DL (ref 6.4–8.2)
RBC # BLD AUTO: 3.68 M/UL (ref 4.1–5.7)
SARS-COV-2 RNA RESP QL NAA+PROBE: NOT DETECTED
SODIUM SERPL-SCNC: 139 MMOL/L (ref 136–145)
SOURCE: NORMAL
TROPONIN I SERPL HS-MCNC: 74 NG/L (ref 0–76)
WBC # BLD AUTO: 7.3 K/UL (ref 4.1–11.1)

## 2025-04-22 PROCEDURE — 6360000002 HC RX W HCPCS: Performed by: FAMILY MEDICINE

## 2025-04-22 PROCEDURE — 87636 SARSCOV2 & INF A&B AMP PRB: CPT

## 2025-04-22 PROCEDURE — 2580000003 HC RX 258: Performed by: FAMILY MEDICINE

## 2025-04-22 PROCEDURE — 1100000000 HC RM PRIVATE

## 2025-04-22 PROCEDURE — 93005 ELECTROCARDIOGRAM TRACING: CPT | Performed by: EMERGENCY MEDICINE

## 2025-04-22 PROCEDURE — 85025 COMPLETE CBC W/AUTO DIFF WBC: CPT

## 2025-04-22 PROCEDURE — 84484 ASSAY OF TROPONIN QUANT: CPT

## 2025-04-22 PROCEDURE — 2500000003 HC RX 250 WO HCPCS: Performed by: FAMILY MEDICINE

## 2025-04-22 PROCEDURE — 71046 X-RAY EXAM CHEST 2 VIEWS: CPT

## 2025-04-22 PROCEDURE — 80053 COMPREHEN METABOLIC PANEL: CPT

## 2025-04-22 PROCEDURE — 6370000000 HC RX 637 (ALT 250 FOR IP): Performed by: FAMILY MEDICINE

## 2025-04-22 PROCEDURE — 70450 CT HEAD/BRAIN W/O DYE: CPT

## 2025-04-22 PROCEDURE — 84145 PROCALCITONIN (PCT): CPT

## 2025-04-22 PROCEDURE — 99285 EMERGENCY DEPT VISIT HI MDM: CPT

## 2025-04-22 RX ORDER — SODIUM CHLORIDE 0.9 % (FLUSH) 0.9 %
5-40 SYRINGE (ML) INJECTION PRN
Status: DISCONTINUED | OUTPATIENT
Start: 2025-04-22 | End: 2025-04-24 | Stop reason: HOSPADM

## 2025-04-22 RX ORDER — SODIUM CHLORIDE, SODIUM LACTATE, POTASSIUM CHLORIDE, CALCIUM CHLORIDE 600; 310; 30; 20 MG/100ML; MG/100ML; MG/100ML; MG/100ML
INJECTION, SOLUTION INTRAVENOUS CONTINUOUS
Status: DISCONTINUED | OUTPATIENT
Start: 2025-04-22 | End: 2025-04-24 | Stop reason: HOSPADM

## 2025-04-22 RX ORDER — SODIUM CHLORIDE 9 MG/ML
INJECTION, SOLUTION INTRAVENOUS PRN
Status: DISCONTINUED | OUTPATIENT
Start: 2025-04-22 | End: 2025-04-24 | Stop reason: HOSPADM

## 2025-04-22 RX ORDER — POLYETHYLENE GLYCOL 3350 17 G/17G
17 POWDER, FOR SOLUTION ORAL DAILY PRN
Status: DISCONTINUED | OUTPATIENT
Start: 2025-04-22 | End: 2025-04-24 | Stop reason: HOSPADM

## 2025-04-22 RX ORDER — ATORVASTATIN CALCIUM 40 MG/1
80 TABLET, FILM COATED ORAL NIGHTLY
Status: DISCONTINUED | OUTPATIENT
Start: 2025-04-22 | End: 2025-04-24 | Stop reason: HOSPADM

## 2025-04-22 RX ORDER — ACETAMINOPHEN 325 MG/1
650 TABLET ORAL EVERY 6 HOURS PRN
Status: DISCONTINUED | OUTPATIENT
Start: 2025-04-22 | End: 2025-04-24 | Stop reason: HOSPADM

## 2025-04-22 RX ORDER — AMLODIPINE BESYLATE 5 MG/1
10 TABLET ORAL DAILY
Status: DISCONTINUED | OUTPATIENT
Start: 2025-04-22 | End: 2025-04-24 | Stop reason: HOSPADM

## 2025-04-22 RX ORDER — ONDANSETRON 2 MG/ML
4 INJECTION INTRAMUSCULAR; INTRAVENOUS EVERY 6 HOURS PRN
Status: DISCONTINUED | OUTPATIENT
Start: 2025-04-22 | End: 2025-04-24 | Stop reason: HOSPADM

## 2025-04-22 RX ORDER — ACETAMINOPHEN 650 MG/1
650 SUPPOSITORY RECTAL EVERY 6 HOURS PRN
Status: DISCONTINUED | OUTPATIENT
Start: 2025-04-22 | End: 2025-04-24 | Stop reason: HOSPADM

## 2025-04-22 RX ORDER — ASPIRIN 81 MG/1
81 TABLET, CHEWABLE ORAL DAILY
Status: DISCONTINUED | OUTPATIENT
Start: 2025-04-22 | End: 2025-04-24 | Stop reason: HOSPADM

## 2025-04-22 RX ORDER — SODIUM CHLORIDE 0.9 % (FLUSH) 0.9 %
5-40 SYRINGE (ML) INJECTION EVERY 12 HOURS SCHEDULED
Status: DISCONTINUED | OUTPATIENT
Start: 2025-04-22 | End: 2025-04-24 | Stop reason: HOSPADM

## 2025-04-22 RX ORDER — ENOXAPARIN SODIUM 100 MG/ML
30 INJECTION SUBCUTANEOUS DAILY
Status: DISCONTINUED | OUTPATIENT
Start: 2025-04-22 | End: 2025-04-24 | Stop reason: HOSPADM

## 2025-04-22 RX ORDER — LABETALOL 200 MG/1
400 TABLET, FILM COATED ORAL 3 TIMES DAILY
Status: DISCONTINUED | OUTPATIENT
Start: 2025-04-22 | End: 2025-04-24 | Stop reason: HOSPADM

## 2025-04-22 RX ORDER — ONDANSETRON 4 MG/1
4 TABLET, ORALLY DISINTEGRATING ORAL EVERY 8 HOURS PRN
Status: DISCONTINUED | OUTPATIENT
Start: 2025-04-22 | End: 2025-04-24 | Stop reason: HOSPADM

## 2025-04-22 RX ADMIN — SODIUM CHLORIDE, SODIUM LACTATE, POTASSIUM CHLORIDE, AND CALCIUM CHLORIDE: .6; .31; .03; .02 INJECTION, SOLUTION INTRAVENOUS at 19:36

## 2025-04-22 RX ADMIN — ATORVASTATIN CALCIUM 80 MG: 40 TABLET, FILM COATED ORAL at 22:33

## 2025-04-22 RX ADMIN — ENOXAPARIN SODIUM 30 MG: 100 INJECTION SUBCUTANEOUS at 19:38

## 2025-04-22 RX ADMIN — SODIUM CHLORIDE, PRESERVATIVE FREE 10 ML: 5 INJECTION INTRAVENOUS at 19:37

## 2025-04-22 RX ADMIN — LABETALOL HYDROCHLORIDE 400 MG: 200 TABLET, FILM COATED ORAL at 22:34

## 2025-04-22 ASSESSMENT — PAIN - FUNCTIONAL ASSESSMENT: PAIN_FUNCTIONAL_ASSESSMENT: 0-10

## 2025-04-22 ASSESSMENT — PAIN SCALES - GENERAL
PAINLEVEL_OUTOF10: 0
PAINLEVEL_OUTOF10: 0

## 2025-04-22 NOTE — ED NOTES
ED TO INPATIENT SBAR HANDOFF    Patient Name: Michael Rockwell   :  1965  59 y.o.   MRN:  [id number]  ED Room #:  ER16/16     Situation  Code Status: Full Code   Allergies: Ace inhibitors  Weight: Patient Vitals for the past 96 hrs (Last 3 readings):   Weight   25 1400 70.8 kg (156 lb 1.4 oz)       Arrived from: home    Chief Complaint:   Chief Complaint   Patient presents with    Fatigue       Hospital Problem/Diagnosis:  Principal Problem:    JEWELS (acute kidney injury)  Resolved Problems:    * No resolved hospital problems. *      Mobility: no current mobility problem   ED Fall Risk: Presents to emergency department  because of falls (Syncope, seizure, or loss of consciousness): No, Age > 70: No, Altered Mental Status, Intoxication with alcohol or substance confusion (Disorientation, impaired judgment, poor safety awaremess, or inability to follow instructions): No, Impaired Mobility: Ambulates or transfers with assistive devices or assistance; Unable to ambulate or transer.: Yes, Nursing Judgement: Yes   Fell in ED or prior to admission: no   Restraints: no     Sitter: no   Family/Caregiver Present: no    Neet to know social/safety information: none    Background  History:   Past Medical History:   Diagnosis Date    Asthma     Chronic kidney disease     Chronic obstructive pulmonary disease (HCC)     Emphysema lung (HCC)     GERD (gastroesophageal reflux disease)     Heart failure ([facility]) 10/2021    1 EPISODE DUE TO HIGH BP, NOW CLEARED UP PER PT AND WIFE    Hypertension     Stroke ([facility])        Assessment    Abnormal Assessment Findings: weakness to left side from previous stroke   Imaging:   CT Head W/O Contrast   Final Result   No acute intracranial process seen            Electronically signed by Willa Pereyra      XR CHEST (2 VW)   Final Result      No acute process.         Electronically signed by Lee Manzanares      US RETROPERITONEAL COMPLETE    (Results Pending)     Abnormal labs:   Abnormal

## 2025-04-22 NOTE — ED PROVIDER NOTES
[facility] EMERGENCY DEPARTMENT  EMERGENCY DEPARTMENT ENCOUNTER      Pt Name: Michael Rockwell  MRN: [id number]  Birthdate 1965  Date of evaluation: 4/22/2025  Provider: Richard Cavanaugh MD      HISTORY OF PRESENT ILLNESS      59-year-old male with history of asthma, CKD, COPD, GERD, hypertension, previous stroke in December with residual left-sided deficits presents to the emergency department with wife with concern for worsening generalized weakness, cough, congestion, no fevers.  Symptoms for the last few weeks, may be worse for the last few days.  No injury.    The history is provided by the patient, the spouse and medical records.           Nursing Notes were reviewed.    REVIEW OF SYSTEMS         Review of Systems        PAST MEDICAL HISTORY     Past Medical History:   Diagnosis Date    Asthma     Chronic kidney disease     Chronic obstructive pulmonary disease (HCC)     Emphysema lung (HCC)     GERD (gastroesophageal reflux disease)     Heart failure (HCC) 10/2021    1 EPISODE DUE TO HIGH BP, NOW CLEARED UP PER PT AND WIFE    Hypertension     Stroke (HCC) 2022         SURGICAL HISTORY       Past Surgical History:   Procedure Laterality Date    APPENDECTOMY      HERNIA REPAIR Right 12/05/2022    Robotic-assisted laparoscopic right inguinal hernia repair with mesh by Dr. Tan    ORTHOPEDIC SURGERY      collar bone (right)    OTHER SURGICAL HISTORY      HEMORRHOIDECTOMY         CURRENT MEDICATIONS       Previous Medications    ALBUTEROL SULFATE HFA (PROVENTIL;VENTOLIN;PROAIR) 108 (90 BASE) MCG/ACT INHALER    Inhale 2 puffs into the lungs every 4 hours as needed    AMLODIPINE (NORVASC) 10 MG TABLET    Take 1 tablet by mouth daily    ASPIRIN 81 MG CHEWABLE TABLET    Take 1 tablet by mouth daily    ATORVASTATIN (LIPITOR) 80 MG TABLET    Take 1 tablet by mouth    HYDRALAZINE (APRESOLINE) 100 MG TABLET    Take 1 tablet by mouth 3 times daily    HYDROXYZINE HCL (ATARAX) 25 MG TABLET    Take 1 tablet by

## 2025-04-22 NOTE — ED PROVIDER NOTES
[facility] Consult for Admission  5:15 PM    ED Room Number: ER16/16  Patient Name and age:  Michael Rockwell 59 y.o.  male  Working Diagnosis:   1. JEWELS (acute kidney injury)    2. Generalized weakness        COVID-19 Suspicion: no  Sepsis present:  no  Reassessment needed: no  Code Status:  Full Code  Readmission: no  Isolation Requirements:  no  Recommended Level of Care:  med/surg  Department: [facility] Adult ED ([phone])    Other:  59y M with COPD, prior CVA, HTN here with generalized weakness, cough, congestion. Ongoing for the past few weeks. Wife reports unsteady gait and slurred speech int he past few weeks. Has prior L-sided deficits from CVA but seems worse. CT head ok. Labs show JEWELS - normal creatinine around 2-3 but 5 today. Electrolytes ok. CXR clear.           Michael Jason MD  04/22/25 0403

## 2025-04-22 NOTE — ED TRIAGE NOTES
Pt arrives ambulatory to ED with reports of feeling unwell today. His wife reports he has been SOB and had a congested cough and fatigued for the last month, within the last couple of weeks he has begun slurring his speech, experiencing weakness/ unsteady gait, and last night he began to have loss of appetite. He has left sided deficits from prior stroke but his wife reports it has gotten worse.They report his SBP is usually elevated in the 200s, in triage BP is 132/78.     Hx CVA in December    Denies thinners or chest pain.    Dr. Cavanaugh assessing pt in triage.

## 2025-04-22 NOTE — H&P
History and Physical    Date of Service:  4/22/2025  Primary Care Provider: Orestes Thurman MD  Source of information: The patient and Chart review    Chief Complaint: Fatigue      History of Presenting Illness:   Michael Rockwell is a 59 y.o. male who presents with multiple complaints including generalized weakness, unsteady gait, slurred speech, loss of appetite.  Patient accompanied by his wife in the ER.  Reports that patient has been having fatigue for the last month.  Within the last couple weeks, he started experiencing unsteady gait due to generalized weakness and noted some slurred speech.  Patient with known history of left-sided deficit from prior stroke and wife thinks that weakness is getting worse.  Patient subsequently presented to the emergency room and vital signs on presentation were stable.  CT of the head negative for acute pathology.  However patient was noted to have elevated serum creatinine of 5.11.  Last known creatinine was 4 months ago and it was 3.81 which looks like his baseline.  Hospitalist service requested admit the patient for further evaluation management.    The patient denies any headache, blurry vision, sore throat, trouble swallowing, trouble with speech, chest pain, SOB, cough, fever, chills, N/V/D, abd pain, urinary symptoms, constipation, recent travels, sick contacts, focal or generalized neurological symptoms, falls, injuries, rashes, contact with COVID-19 diagnosed patients, hematemesis, melena, hemoptysis, hematuria, rashes, denies starting any new medications and denies any other concerns or problems besides as mentioned above.         REVIEW OF SYSTEMS:  A comprehensive review of systems was negative except for that written in the History of Present Illness.     Past Medical History:   Diagnosis Date    Asthma     Chronic kidney disease     Chronic obstructive pulmonary disease (HCC)     Emphysema lung (HCC)     GERD (gastroesophageal reflux disease)     Heart

## 2025-04-23 ENCOUNTER — APPOINTMENT (OUTPATIENT)
Facility: HOSPITAL | Age: 60
DRG: 469 | End: 2025-04-23
Payer: MEDICAID

## 2025-04-23 LAB
ANION GAP SERPL CALC-SCNC: 8 MMOL/L (ref 2–12)
APPEARANCE UR: CLEAR
BACTERIA URNS QL MICRO: NEGATIVE /HPF
BILIRUB UR QL: NEGATIVE
BUN SERPL-MCNC: 40 MG/DL (ref 6–20)
BUN/CREAT SERPL: 9 (ref 12–20)
CALCIUM SERPL-MCNC: 8.3 MG/DL (ref 8.5–10.1)
CHLORIDE SERPL-SCNC: 110 MMOL/L (ref 97–108)
CO2 SERPL-SCNC: 23 MMOL/L (ref 21–32)
COLOR UR: ABNORMAL
CREAT SERPL-MCNC: 4.64 MG/DL (ref 0.7–1.3)
CREAT UR-MCNC: 152 MG/DL
EPITH CASTS URNS QL MICRO: ABNORMAL /LPF
GLUCOSE SERPL-MCNC: 93 MG/DL (ref 65–100)
GLUCOSE UR STRIP.AUTO-MCNC: NEGATIVE MG/DL
HGB UR QL STRIP: NEGATIVE
KETONES UR QL STRIP.AUTO: NEGATIVE MG/DL
LEUKOCYTE ESTERASE UR QL STRIP.AUTO: NEGATIVE
NITRITE UR QL STRIP.AUTO: NEGATIVE
OSMOLALITY UR: 376 MOSM/KG H2O
PH UR STRIP: 5.5 (ref 5–8)
POTASSIUM SERPL-SCNC: 3.7 MMOL/L (ref 3.5–5.1)
PROT UR STRIP-MCNC: 100 MG/DL
RBC #/AREA URNS HPF: ABNORMAL /HPF (ref 0–5)
SODIUM SERPL-SCNC: 141 MMOL/L (ref 136–145)
SODIUM UR-SCNC: 44 MMOL/L
SP GR UR REFRACTOMETRY: 1.01 (ref 1–1.03)
UROBILINOGEN UR QL STRIP.AUTO: 0.2 EU/DL (ref 0.2–1)
WBC URNS QL MICRO: ABNORMAL /HPF (ref 0–4)

## 2025-04-23 PROCEDURE — 6370000000 HC RX 637 (ALT 250 FOR IP): Performed by: FAMILY MEDICINE

## 2025-04-23 PROCEDURE — 82570 ASSAY OF URINE CREATININE: CPT

## 2025-04-23 PROCEDURE — 2500000003 HC RX 250 WO HCPCS: Performed by: FAMILY MEDICINE

## 2025-04-23 PROCEDURE — 84300 ASSAY OF URINE SODIUM: CPT

## 2025-04-23 PROCEDURE — 2580000003 HC RX 258: Performed by: FAMILY MEDICINE

## 2025-04-23 PROCEDURE — 6360000002 HC RX W HCPCS: Performed by: FAMILY MEDICINE

## 2025-04-23 PROCEDURE — 1100000000 HC RM PRIVATE

## 2025-04-23 PROCEDURE — 83935 ASSAY OF URINE OSMOLALITY: CPT

## 2025-04-23 PROCEDURE — 87086 URINE CULTURE/COLONY COUNT: CPT

## 2025-04-23 PROCEDURE — 76770 US EXAM ABDO BACK WALL COMP: CPT

## 2025-04-23 PROCEDURE — 80048 BASIC METABOLIC PNL TOTAL CA: CPT

## 2025-04-23 PROCEDURE — 51798 US URINE CAPACITY MEASURE: CPT

## 2025-04-23 PROCEDURE — 81001 URINALYSIS AUTO W/SCOPE: CPT

## 2025-04-23 RX ORDER — HYDRALAZINE HYDROCHLORIDE 20 MG/ML
5 INJECTION INTRAMUSCULAR; INTRAVENOUS EVERY 6 HOURS PRN
Status: DISCONTINUED | OUTPATIENT
Start: 2025-04-23 | End: 2025-04-24 | Stop reason: HOSPADM

## 2025-04-23 RX ADMIN — LABETALOL HYDROCHLORIDE 400 MG: 200 TABLET, FILM COATED ORAL at 14:33

## 2025-04-23 RX ADMIN — ASPIRIN 81 MG CHEWABLE TABLET 81 MG: 81 TABLET CHEWABLE at 09:30

## 2025-04-23 RX ADMIN — SODIUM CHLORIDE, SODIUM LACTATE, POTASSIUM CHLORIDE, AND CALCIUM CHLORIDE: .6; .31; .03; .02 INJECTION, SOLUTION INTRAVENOUS at 15:32

## 2025-04-23 RX ADMIN — SODIUM CHLORIDE, PRESERVATIVE FREE 10 ML: 5 INJECTION INTRAVENOUS at 09:30

## 2025-04-23 RX ADMIN — LABETALOL HYDROCHLORIDE 400 MG: 200 TABLET, FILM COATED ORAL at 09:29

## 2025-04-23 RX ADMIN — AMLODIPINE BESYLATE 10 MG: 5 TABLET ORAL at 09:29

## 2025-04-23 RX ADMIN — LABETALOL HYDROCHLORIDE 400 MG: 200 TABLET, FILM COATED ORAL at 22:00

## 2025-04-23 RX ADMIN — ATORVASTATIN CALCIUM 80 MG: 40 TABLET, FILM COATED ORAL at 22:00

## 2025-04-23 RX ADMIN — ENOXAPARIN SODIUM 30 MG: 100 INJECTION SUBCUTANEOUS at 09:30

## 2025-04-23 NOTE — ACP (ADVANCE CARE PLANNING)
Advance Care Planning     Advance Care Planning Inpatient Note  Spiritual Care Department    Today's Date: 4/23/2025  Unit: [facility] 5W1 ORTHO SPINE    Received request from IDT Member.  Upon review of chart and communication with care team, patient's decision making abilities are not in question.. Patient was/were present in the room during visit.    Goals of ACP Conversation:  Discuss advance care planning documents  Facilitate a discussion related to patient's goals of care as they align with the patient's values and beliefs.    Health Care Decision Makers:       Primary Decision Maker: MoizNathalie - [facility] - [phone]  Summary:  Completed New Documents    Advance Care Planning Documents (Patient Wishes):  Healthcare Power of /Advance Directive Appointment of Health Care Agent  Living Will/Advance Directive  Anatomical Gift/Organ Donation     Assessment:  I visited with patient Michael \"Marysol\" Moiz for a consult to complete an advanced medical directive.   He was in good spirits and confident in his choices.   He thanked me for assisting completing the document and is aware that spiritual care is available upon request.       Interventions:  Provided education on documents for clarity and greater understanding  Discussed and provided education on state decision maker hierarchy  Assisted in the completion of documents according to patient's wishes at this time    Care Preferences Communicated:   No    Outcomes/Plan:  New advance directive completed.  Existing advance directive reviewed with patient; no changes to patient's previously recorded wishes.  Returned original document(s) to patient, as well as copies for distribution to appointed agents  Copy of advance directive given to staff to scan into medical record.  Teach Back Method used to verify the patient's and/or Healthcare Decision Maker's understanding of key information in the advance directive documents    Electronically signed by Doc

## 2025-04-23 NOTE — CONSULTS
NEPHROLOGY CONSULT NOTE     Patient: Michael Rockwell MRN: [id number]  PCP: Orestes Thurman MD   :     1965  Age:   59 y.o.  Sex:  male      Referring physician: Ashish Cannon MD  Reason for consultation: 59 y.o. male with Generalized weakness [R53.1]  JEWELS (acute kidney injury) [N17.9] complicated by JEWELS   Admission Date: 2025  4:29 PM  LOS: 1 day      ASSESSMENT and PLAN :   JEWELS on CKD:  - suspect volume depletion from poor po intake vs progression of disease  - U/S neg for hydro, did show large bladder volume  - will check UA  - cont IVF  - strict I/Os  - daily labs   - no urgent need for RRT at this time    CKD 4:  - former Dr. Barnes pt  - baseline Cr around 2.8 to 3    Volume depletion:  - cont LR for now    HTN:  - cont current meds    Encephalopathy/weakness:  - improving    HLD     Active Problems / Assessment AAActive  :   Principal Problem:    JEWELS (acute kidney injury)  Resolved Problems:    * No resolved hospital problems. *       Subjective:   HPI: Michael Rockwell is a 59 y.o.  male who has been admitted to the hospital for fatigue. He has a hx of CKD 4, used to follow with Dr. Barnes, but last seen by him in , saw us a few times in the hospital. He has progressive CKD, baseline around 2.8 to 3 at best.  He has been experiencing weakness, gait issues, loss of appetite.  CT scan neg.  Cr 5.1 on admission.  Normal K and bcb.  Denies NSAID use.  C/o poor po intake for several weeks, no n/v/d reported.      Past Medical Hx:   Past Medical History:   Diagnosis Date    Asthma     Chronic kidney disease     Chronic obstructive pulmonary disease (HCC)     Emphysema lung (HCC)     GERD (gastroesophageal reflux disease)     Heart failure (HCC) 10/2021    1 EPISODE DUE TO HIGH BP, NOW CLEARED UP PER PT AND WIFE    Hypertension     Stroke (HCC)         Past Surgical Hx:     Past Surgical History:   Procedure Laterality Date    APPENDECTOMY      HERNIA REPAIR Right 2022

## 2025-04-23 NOTE — CARE COORDINATION
Transition of Care Plan:    RUR: 15%   Prior Level of Functioning: Independent   Disposition: Home with Family Assistance   MCKAY: 24-48hrs.   Follow up appointments: PCP   DME needed: None   Transportation at discharge: Spouse   IM/IMM Medicare/ letter given: N/A    Emergency Contact:   Nathalie Rockwell (Spouse)b [phone]  Discharge Caregiver contacted prior to discharge? N  Care Conference needed? N   Barriers to discharge: Medical clearance   Following: Nephro, PT/OT     Per conversation with the pt; this cm met the pt at the bedside to complete the initial assessment. The pt confirmed his demographics and insurance provider. The pt reported that huis preferred pharmacy is [facility] on [address]. The pt reported that he does not have hx of HH or rehab. The pt reported that he is independent with ADLs and IADls. The pt reported that his apartment is one level with no entry steps. The pt reported that his spouse typically drives him to and from appointment as he does not have a license. The pt reported that he does not feel he will require any supports at discharge.        04/23/25 1536   Service Assessment   Patient Orientation Alert and Oriented   Cognition Alert   History Provided By Patient   Support Systems Spouse/Significant Other   Patient's Healthcare Decision Maker is: Legal Next of Kin   PCP Verified by CM Yes   Last Visit to PCP Within last 3 months   Prior Functional Level Independent in ADLs/IADLs   Current Functional Level Independent in ADLs/IADLs   Can patient return to prior living arrangement Yes   Ability to make needs known: Good   Family able to assist with home care needs: Yes   Financial Resources Medicaid   Social/Functional History   Lives With Spouse   Type of Home Apartment   Home Layout One level   Bathroom Toilet Standard   Prior Level of Assist for ADLs Independent   Prior Level of Assist for Homemaking Independent   Ambulation Assistance Independent   Prior Level of Assist for

## 2025-04-24 VITALS
DIASTOLIC BLOOD PRESSURE: 82 MMHG | OXYGEN SATURATION: 98 % | BODY MASS INDEX: 22.35 KG/M2 | SYSTOLIC BLOOD PRESSURE: 134 MMHG | TEMPERATURE: 97.5 F | HEIGHT: 70 IN | RESPIRATION RATE: 18 BRPM | HEART RATE: 54 BPM | WEIGHT: 156.09 LBS

## 2025-04-24 LAB
ALBUMIN SERPL-MCNC: 3 G/DL (ref 3.5–5)
ANION GAP SERPL CALC-SCNC: 5 MMOL/L (ref 2–12)
BACTERIA SPEC CULT: NORMAL
BUN SERPL-MCNC: 35 MG/DL (ref 6–20)
BUN/CREAT SERPL: 8 (ref 12–20)
CALCIUM SERPL-MCNC: 8.4 MG/DL (ref 8.5–10.1)
CHLORIDE SERPL-SCNC: 113 MMOL/L (ref 97–108)
CO2 SERPL-SCNC: 23 MMOL/L (ref 21–32)
CREAT SERPL-MCNC: 4.51 MG/DL (ref 0.7–1.3)
GLUCOSE SERPL-MCNC: 89 MG/DL (ref 65–100)
PHOSPHATE SERPL-MCNC: 4.4 MG/DL (ref 2.6–4.7)
POTASSIUM SERPL-SCNC: 3.7 MMOL/L (ref 3.5–5.1)
SERVICE CMNT-IMP: NORMAL
SODIUM SERPL-SCNC: 141 MMOL/L (ref 136–145)

## 2025-04-24 PROCEDURE — 6370000000 HC RX 637 (ALT 250 FOR IP): Performed by: FAMILY MEDICINE

## 2025-04-24 PROCEDURE — 2500000003 HC RX 250 WO HCPCS: Performed by: FAMILY MEDICINE

## 2025-04-24 PROCEDURE — 80069 RENAL FUNCTION PANEL: CPT

## 2025-04-24 PROCEDURE — 6360000002 HC RX W HCPCS: Performed by: FAMILY MEDICINE

## 2025-04-24 PROCEDURE — 2580000003 HC RX 258: Performed by: FAMILY MEDICINE

## 2025-04-24 RX ORDER — HYDRALAZINE HYDROCHLORIDE 20 MG/ML
10 INJECTION INTRAMUSCULAR; INTRAVENOUS ONCE
Status: DISCONTINUED | OUTPATIENT
Start: 2025-04-24 | End: 2025-04-24 | Stop reason: HOSPADM

## 2025-04-24 RX ADMIN — LABETALOL HYDROCHLORIDE 400 MG: 200 TABLET, FILM COATED ORAL at 13:07

## 2025-04-24 RX ADMIN — AMLODIPINE BESYLATE 10 MG: 5 TABLET ORAL at 08:21

## 2025-04-24 RX ADMIN — SODIUM CHLORIDE, SODIUM LACTATE, POTASSIUM CHLORIDE, AND CALCIUM CHLORIDE: .6; .31; .03; .02 INJECTION, SOLUTION INTRAVENOUS at 10:30

## 2025-04-24 RX ADMIN — ENOXAPARIN SODIUM 30 MG: 100 INJECTION SUBCUTANEOUS at 08:22

## 2025-04-24 RX ADMIN — ASPIRIN 81 MG CHEWABLE TABLET 81 MG: 81 TABLET CHEWABLE at 08:21

## 2025-04-24 RX ADMIN — SODIUM CHLORIDE, PRESERVATIVE FREE 10 ML: 5 INJECTION INTRAVENOUS at 08:22

## 2025-04-24 RX ADMIN — LABETALOL HYDROCHLORIDE 400 MG: 200 TABLET, FILM COATED ORAL at 08:21

## 2025-04-24 NOTE — DISCHARGE SUMMARY
Discharge Summary       PATIENT ID: Michael Rockwell  MRN: [id number]   YOB: 1965    DATE OF ADMISSION: 4/22/2025  4:29 PM    DATE OF DISCHARGE: 4/24/2025  PRIMARY CARE PROVIDER: Orestes Thurman MD     ATTENDING PHYSICIAN:   DISCHARGING PROVIDER: RYLEE Andrews NP    To contact this individual call [phone] and ask the  to page.  If unavailable ask to be transferred the Adult Hospitalist Department.    CONSULTATIONS: IP CONSULT TO NEPHROLOGY  IP CONSULT TO SPIRITUAL SERVICES    PROCEDURES/SURGERIES: * No surgery found *    ADMITTING DIAGNOSES & HOSPITAL COURSE:   Michael Rockwell is a 59 y.o. male who presents with multiple complaints including generalized weakness, unsteady gait, slurred speech, loss of appetite.  Patient accompanied by his wife in the ER.  Reports that patient has been having fatigue for the last month.  Within the last couple weeks, he started experiencing unsteady gait due to generalized weakness and noted some slurred speech.  Patient with known history of left-sided deficit from prior stroke and wife thinks that weakness is getting worse.  Patient subsequently presented to the emergency room and vital signs on presentation were stable.  CT of the head negative for acute pathology.  However patient was noted to have elevated serum creatinine of 5.11.  Last known creatinine was 4 months ago and it was 3.81 which looks like his baseline.  Hospitalist service requested admit the patient for further evaluation management     4/24/2025  BP controlled, 134/82  Cr 4.51  Baseline Cr 2.8-3 at baseline  Discussed with nephrology, clear for discharge    Chronic cough, smokes ~ 1/2ppd x 10 years, smokes marijuana. Advised to stop smoking      DISCHARGE DIAGNOSES / PLAN:      JEWELS on CKD stage IV  - suspected volume depletion from poor po intake vs progression of disease   - US negative for hydro, showed large bladder vollume  - strict I/Os  - baseline r around 2.8-3  -

## 2025-04-24 NOTE — PLAN OF CARE
Problem: Chronic Conditions and Co-morbidities  Goal: Patient's chronic conditions and co-morbidity symptoms are monitored and maintained or improved  Outcome: Adequate for Discharge  Flowsheets (Taken 4/24/2025 0845)  Care Plan - Patient's Chronic Conditions and Co-Morbidity Symptoms are Monitored and Maintained or Improved: Monitor and assess patient's chronic conditions and comorbid symptoms for stability, deterioration, or improvement     Problem: Discharge Planning  Goal: Discharge to home or other facility with appropriate resources  Outcome: Adequate for Discharge  Flowsheets (Taken 4/24/2025 0845)  Discharge to home or other facility with appropriate resources: Identify barriers to discharge with patient and caregiver     Problem: Safety - Adult  Goal: Free from fall injury  Outcome: Adequate for Discharge  Flowsheets (Taken 4/24/2025 0845)  Free From Fall Injury: Instruct family/caregiver on patient safety     Problem: Metabolic/Fluid and Electrolytes - Adult  Goal: Electrolytes maintained within normal limits  Outcome: Adequate for Discharge  Goal: Hemodynamic stability and optimal renal function maintained  Outcome: Adequate for Discharge  Goal: Glucose maintained within prescribed range  Outcome: Adequate for Discharge

## 2025-04-24 NOTE — PROGRESS NOTES
A Spiritual Care Partner Volunteer visited Michael Rockwell at [facility] in [facility] 5W1 ORTHO SPINE on 4/24/2025     Documented by: Chaplain Татьяна Dowell  
Occupational Therapy  04/23/25    OT order received. Discussed pt case with PT who spoke directly with patient. Per PT, pt is not interested in OT/PT services during admission. He feels as if he is at his baseline and able to perform his ADLs. Will sign off from OT at this time due to pt's wishes. Thank you for the consult. Isabella Servin, OT    
Patient refused dual RN skin assessment.  
Patient was seen walking down the [place] stating \"I'm leaving\". He still had his iv in and we had not went over discharge education with him yet. I redirected him to his room and then took out his iv and then printed out and went over the education with him.  He had no questions at this time.     1614: Patients Wife came in and went over AVS with her and patient. Both verbalized understanding. Patient and Wife walked off the unit refusing people aiding in discharge.     
Pt in room, denying need for PT, stating that he feels normal and that he washes cars every day and does not have weakness anymore. Pt amenable to screen but not eval. Pt demoing increased instability slightly but likely is baseline, able to walk in hallway without significant issue. Pt has residual L deficits from previous stroke. Pt does not require any PT at this time. Will complete evaluation.     Nasrin Campbell, DPT, PT    
for: \"CULT\"      I have reviewed the flowsheets.  Chart and Pertinent Notes have been reviewed.   No change in PMH ,family and social history from Consult note.      Kelechi Deleon MD  [facility]     
Facility-Administered Medications   Medication Dose Route Frequency    aspirin chewable tablet 81 mg  81 mg Oral Daily    atorvastatin (LIPITOR) tablet 80 mg  80 mg Oral Nightly    lactated ringers infusion   IntraVENous Continuous    sodium chloride flush 0.9 % injection 5-40 mL  5-40 mL IntraVENous 2 times per day    sodium chloride flush 0.9 % injection 5-40 mL  5-40 mL IntraVENous PRN    0.9 % sodium chloride infusion   IntraVENous PRN    enoxaparin Sodium (LOVENOX) injection 30 mg  30 mg SubCUTAneous Daily    ondansetron (ZOFRAN-ODT) disintegrating tablet 4 mg  4 mg Oral Q8H PRN    Or    ondansetron (ZOFRAN) injection 4 mg  4 mg IntraVENous Q6H PRN    polyethylene glycol (GLYCOLAX) packet 17 g  17 g Oral Daily PRN    acetaminophen (TYLENOL) tablet 650 mg  650 mg Oral Q6H PRN    Or    acetaminophen (TYLENOL) suppository 650 mg  650 mg Rectal Q6H PRN    labetalol (NORMODYNE) tablet 400 mg  400 mg Oral TID    amLODIPine (NORVASC) tablet 10 mg  10 mg Oral Daily     ______________________________________________________________________  EXPECTED LENGTH OF STAY: Unable to retrieve estimated LOS  ACTUAL LENGTH OF STAY:          1                 Jaleel Perez, APRN - CNP

## 2025-04-24 NOTE — DISCHARGE INSTRUCTIONS
Discharge Instructions       PATIENT ID: Michael Rockwell  MRN: [id number]   YOB: 1965    DATE OF ADMISSION: 4/22/2025   DATE OF DISCHARGE: 4/24/2025    PRIMARY CARE PROVIDER: Orestes Thurman     ATTENDING PHYSICIAN: Ashish Cannon MD   DISCHARGING PROVIDER: RYLEE Andrews NP    To contact this individual call [phone] and ask the  to page.   If unavailable ask to be transferred the Adult Hospitalist Department.    DISCHARGE DIAGNOSES ***    CONSULTATIONS: Nephrology    PROCEDURES/SURGERIES: * No surgery found *    PENDING TEST RESULTS:   At the time of discharge the following test results are still pending: none    FOLLOW UP APPOINTMENTS:    PCP    ADDITIONAL CARE RECOMMENDATIONS:  Your Creatinine on discharge is 4.5, your baseline is around 3  Follow up with Dr. Barnes in 2 weeks   Stop smokng    DIET: Regular    ACTIVITY: activity as tolerated    WOUND CARE: none    EQUIPMENT needed: none      DISCHARGE MEDICATIONS:   See Medication Reconciliation Form    It is important that you take the medication exactly as they are prescribed.   Keep your medication in the bottles provided by the pharmacist and keep a list of the medication names, dosages, and times to be taken in your wallet.   Do not take other medications without consulting your doctor.       NOTIFY YOUR PHYSICIAN FOR ANY OF THE FOLLOWING:   Fever over 101 degrees for 24 hours.   Chest pain, shortness of breath, fever, chills, nausea, vomiting, diarrhea, change in mentation, falling, weakness, bleeding. Severe pain or pain not relieved by medications.  Or, any other signs or symptoms that you may have questions about.      DISPOSITION:  x  Home With:   OT  PT  HH  RN       SNF/Inpatient Rehab/LTAC    Independent/assisted living    Hospice    Other:     CDMP Checked:   Yes x     PROBLEM LIST Updated:  Yes x       Signed:   RYLEE Andrews NP  4/24/2025  3:38 PM

## 2025-05-20 ENCOUNTER — HOSPITAL ENCOUNTER (INPATIENT)
Facility: HOSPITAL | Age: 60
LOS: 1 days | Discharge: LEFT BEFORE DISCHARGE | DRG: 045 | End: 2025-05-22
Attending: EMERGENCY MEDICINE | Admitting: FAMILY MEDICINE
Payer: MEDICAID

## 2025-05-20 ENCOUNTER — APPOINTMENT (OUTPATIENT)
Facility: HOSPITAL | Age: 60
DRG: 045 | End: 2025-05-20
Payer: MEDICAID

## 2025-05-20 DIAGNOSIS — I63.9 STROKE (HCC): ICD-10-CM

## 2025-05-20 DIAGNOSIS — I63.9 CEREBROVASCULAR ACCIDENT (CVA), UNSPECIFIED MECHANISM (HCC): ICD-10-CM

## 2025-05-20 DIAGNOSIS — R47.9 DIFFICULTY WITH SPEECH: Primary | ICD-10-CM

## 2025-05-20 PROBLEM — G81.90 HEMIPLEGIA (HCC): Status: ACTIVE | Noted: 2025-05-20

## 2025-05-20 PROBLEM — F17.200 CURRENT SMOKER: Status: ACTIVE | Noted: 2025-05-20

## 2025-05-20 PROBLEM — R20.0 LEFT SIDED NUMBNESS: Status: ACTIVE | Noted: 2025-05-20

## 2025-05-20 LAB
ALBUMIN SERPL-MCNC: 3.6 G/DL (ref 3.5–5)
ALBUMIN/GLOB SERPL: 1.2 (ref 1.1–2.2)
ALP SERPL-CCNC: 73 U/L (ref 45–117)
ALT SERPL-CCNC: 14 U/L (ref 12–78)
ANION GAP SERPL CALC-SCNC: 6 MMOL/L (ref 2–12)
AST SERPL-CCNC: 8 U/L (ref 15–37)
BASOPHILS # BLD: 0.03 K/UL (ref 0–0.1)
BASOPHILS NFR BLD: 0.4 % (ref 0–1)
BILIRUB SERPL-MCNC: 0.6 MG/DL (ref 0.2–1)
BUN SERPL-MCNC: 34 MG/DL (ref 6–20)
BUN/CREAT SERPL: 8 (ref 12–20)
CALCIUM SERPL-MCNC: 8.5 MG/DL (ref 8.5–10.1)
CHLORIDE SERPL-SCNC: 108 MMOL/L (ref 97–108)
CO2 SERPL-SCNC: 24 MMOL/L (ref 21–32)
COMMENT:: NORMAL
CREAT SERPL-MCNC: 4.5 MG/DL (ref 0.7–1.3)
DIFFERENTIAL METHOD BLD: ABNORMAL
EKG ATRIAL RATE: 61 BPM
EKG DIAGNOSIS: NORMAL
EKG P AXIS: 70 DEGREES
EKG P-R INTERVAL: 178 MS
EKG Q-T INTERVAL: 518 MS
EKG QRS DURATION: 100 MS
EKG QTC CALCULATION (BAZETT): 521 MS
EKG R AXIS: 74 DEGREES
EKG T AXIS: 266 DEGREES
EKG VENTRICULAR RATE: 61 BPM
EOSINOPHIL # BLD: 0.52 K/UL (ref 0–0.4)
EOSINOPHIL NFR BLD: 7.2 % (ref 0–7)
ERYTHROCYTE [DISTWIDTH] IN BLOOD BY AUTOMATED COUNT: 13.3 % (ref 11.5–14.5)
GLOBULIN SER CALC-MCNC: 3 G/DL (ref 2–4)
GLUCOSE BLD STRIP.AUTO-MCNC: 112 MG/DL (ref 65–117)
GLUCOSE SERPL-MCNC: 112 MG/DL (ref 65–100)
HCT VFR BLD AUTO: 36.1 % (ref 36.6–50.3)
HGB BLD-MCNC: 11.4 G/DL (ref 12.1–17)
IMM GRANULOCYTES # BLD AUTO: 0.04 K/UL (ref 0–0.04)
IMM GRANULOCYTES NFR BLD AUTO: 0.6 % (ref 0–0.5)
INR PPP: 1 (ref 0.9–1.1)
LYMPHOCYTES # BLD: 0.69 K/UL (ref 0.8–3.5)
LYMPHOCYTES NFR BLD: 9.6 % (ref 12–49)
MCH RBC QN AUTO: 30.2 PG (ref 26–34)
MCHC RBC AUTO-ENTMCNC: 31.6 G/DL (ref 30–36.5)
MCV RBC AUTO: 95.5 FL (ref 80–99)
MONOCYTES # BLD: 0.5 K/UL (ref 0–1)
MONOCYTES NFR BLD: 6.9 % (ref 5–13)
NEUTS SEG # BLD: 5.42 K/UL (ref 1.8–8)
NEUTS SEG NFR BLD: 75.3 % (ref 32–75)
NRBC # BLD: 0 K/UL (ref 0–0.01)
NRBC BLD-RTO: 0 PER 100 WBC
PLATELET # BLD AUTO: 180 K/UL (ref 150–400)
PMV BLD AUTO: 11.3 FL (ref 8.9–12.9)
POTASSIUM SERPL-SCNC: 3.7 MMOL/L (ref 3.5–5.1)
PROT SERPL-MCNC: 6.6 G/DL (ref 6.4–8.2)
PROTHROMBIN TIME: 10.3 SEC (ref 9.2–11.2)
RBC # BLD AUTO: 3.78 M/UL (ref 4.1–5.7)
RBC MORPH BLD: ABNORMAL
SERVICE CMNT-IMP: NORMAL
SODIUM SERPL-SCNC: 138 MMOL/L (ref 136–145)
SPECIMEN HOLD: NORMAL
TROPONIN I SERPL HS-MCNC: 38 NG/L (ref 0–76)
WBC # BLD AUTO: 7.2 K/UL (ref 4.1–11.1)

## 2025-05-20 PROCEDURE — G0378 HOSPITAL OBSERVATION PER HR: HCPCS

## 2025-05-20 PROCEDURE — 80053 COMPREHEN METABOLIC PANEL: CPT

## 2025-05-20 PROCEDURE — 70551 MRI BRAIN STEM W/O DYE: CPT

## 2025-05-20 PROCEDURE — 6370000000 HC RX 637 (ALT 250 FOR IP): Performed by: STUDENT IN AN ORGANIZED HEALTH CARE EDUCATION/TRAINING PROGRAM

## 2025-05-20 PROCEDURE — 70544 MR ANGIOGRAPHY HEAD W/O DYE: CPT

## 2025-05-20 PROCEDURE — 93005 ELECTROCARDIOGRAM TRACING: CPT | Performed by: EMERGENCY MEDICINE

## 2025-05-20 PROCEDURE — 84484 ASSAY OF TROPONIN QUANT: CPT

## 2025-05-20 PROCEDURE — APPNB45 APP NON BILLABLE 31-45 MINUTES

## 2025-05-20 PROCEDURE — 85610 PROTHROMBIN TIME: CPT

## 2025-05-20 PROCEDURE — 92610 EVALUATE SWALLOWING FUNCTION: CPT

## 2025-05-20 PROCEDURE — 85025 COMPLETE CBC W/AUTO DIFF WBC: CPT

## 2025-05-20 PROCEDURE — 99222 1ST HOSP IP/OBS MODERATE 55: CPT | Performed by: NURSE PRACTITIONER

## 2025-05-20 PROCEDURE — 99285 EMERGENCY DEPT VISIT HI MDM: CPT

## 2025-05-20 PROCEDURE — 82962 GLUCOSE BLOOD TEST: CPT

## 2025-05-20 PROCEDURE — 2500000003 HC RX 250 WO HCPCS: Performed by: STUDENT IN AN ORGANIZED HEALTH CARE EDUCATION/TRAINING PROGRAM

## 2025-05-20 PROCEDURE — 70450 CT HEAD/BRAIN W/O DYE: CPT

## 2025-05-20 RX ORDER — SODIUM CHLORIDE 0.9 % (FLUSH) 0.9 %
5-40 SYRINGE (ML) INJECTION EVERY 12 HOURS SCHEDULED
Status: DISCONTINUED | OUTPATIENT
Start: 2025-05-20 | End: 2025-05-22 | Stop reason: HOSPADM

## 2025-05-20 RX ORDER — ONDANSETRON 4 MG/1
4 TABLET, ORALLY DISINTEGRATING ORAL EVERY 8 HOURS PRN
Status: DISCONTINUED | OUTPATIENT
Start: 2025-05-20 | End: 2025-05-22 | Stop reason: HOSPADM

## 2025-05-20 RX ORDER — SODIUM CHLORIDE 0.9 % (FLUSH) 0.9 %
5-40 SYRINGE (ML) INJECTION PRN
Status: DISCONTINUED | OUTPATIENT
Start: 2025-05-20 | End: 2025-05-22 | Stop reason: HOSPADM

## 2025-05-20 RX ORDER — ASPIRIN 81 MG/1
81 TABLET, CHEWABLE ORAL DAILY
Status: DISCONTINUED | OUTPATIENT
Start: 2025-05-20 | End: 2025-05-22 | Stop reason: HOSPADM

## 2025-05-20 RX ORDER — POLYETHYLENE GLYCOL 3350 17 G/17G
17 POWDER, FOR SOLUTION ORAL DAILY PRN
Status: DISCONTINUED | OUTPATIENT
Start: 2025-05-20 | End: 2025-05-22 | Stop reason: HOSPADM

## 2025-05-20 RX ORDER — SODIUM CHLORIDE 9 MG/ML
INJECTION, SOLUTION INTRAVENOUS PRN
Status: DISCONTINUED | OUTPATIENT
Start: 2025-05-20 | End: 2025-05-22 | Stop reason: HOSPADM

## 2025-05-20 RX ORDER — IOPAMIDOL 755 MG/ML
100 INJECTION, SOLUTION INTRAVASCULAR
Status: DISCONTINUED | OUTPATIENT
Start: 2025-05-20 | End: 2025-05-22 | Stop reason: HOSPADM

## 2025-05-20 RX ORDER — ASPIRIN 300 MG/1
300 SUPPOSITORY RECTAL DAILY
Status: DISCONTINUED | OUTPATIENT
Start: 2025-05-20 | End: 2025-05-22 | Stop reason: HOSPADM

## 2025-05-20 RX ORDER — ONDANSETRON 2 MG/ML
4 INJECTION INTRAMUSCULAR; INTRAVENOUS EVERY 6 HOURS PRN
Status: DISCONTINUED | OUTPATIENT
Start: 2025-05-20 | End: 2025-05-22 | Stop reason: HOSPADM

## 2025-05-20 RX ADMIN — SODIUM CHLORIDE, PRESERVATIVE FREE 10 ML: 5 INJECTION INTRAVENOUS at 21:31

## 2025-05-20 RX ADMIN — ASPIRIN 81 MG CHEWABLE TABLET 81 MG: 81 TABLET CHEWABLE at 14:52

## 2025-05-20 ASSESSMENT — PAIN - FUNCTIONAL ASSESSMENT
PAIN_FUNCTIONAL_ASSESSMENT: ACTIVITIES ARE NOT PREVENTED
PAIN_FUNCTIONAL_ASSESSMENT: 0-10

## 2025-05-20 ASSESSMENT — PAIN DESCRIPTION - PAIN TYPE: TYPE: ACUTE PAIN

## 2025-05-20 ASSESSMENT — PAIN DESCRIPTION - LOCATION: LOCATION: HEAD

## 2025-05-20 ASSESSMENT — PAIN SCALES - GENERAL
PAINLEVEL_OUTOF10: 0
PAINLEVEL_OUTOF10: 5

## 2025-05-20 ASSESSMENT — PAIN DESCRIPTION - ORIENTATION: ORIENTATION: POSTERIOR

## 2025-05-20 ASSESSMENT — PAIN DESCRIPTION - FREQUENCY: FREQUENCY: CONTINUOUS

## 2025-05-20 ASSESSMENT — PAIN DESCRIPTION - DESCRIPTORS: DESCRIPTORS: ACHING

## 2025-05-20 ASSESSMENT — PAIN DESCRIPTION - ONSET: ONSET: ON-GOING

## 2025-05-20 NOTE — ED PROVIDER NOTES
[facility] EMERGENCY DEPARTMENT  EMERGENCY DEPARTMENT ENCOUNTER      Pt Name: Michael Rockwell  MRN: [id number]  Birthdate 1965  Date of evaluation: 5/20/2025  Provider: Richard Cavanaugh MD      HISTORY OF PRESENT ILLNESS      59-year-old male with history of CKD, COPD, GERD, stroke, hypertension presents to the emergency department sudden onset of difficulty with speech.  Last known well was 7 AM.  Denies weakness.  No blood thinners.  Recent admission for JEWELS.    Wife notes onset of speech difficulty about an hour ago but may have had left arm and leg weakness and numbness beginning yesterday.     The history is provided by the patient and medical records.           Nursing Notes were reviewed.    REVIEW OF SYSTEMS         Review of Systems        PAST MEDICAL HISTORY     Past Medical History:   Diagnosis Date    Asthma     Chronic kidney disease     Chronic obstructive pulmonary disease (HCC)     Emphysema lung (HCC)     GERD (gastroesophageal reflux disease)     Heart failure (HCC) 10/2021    1 EPISODE DUE TO HIGH BP, NOW CLEARED UP PER PT AND WIFE    Hypertension     Stroke (HCC) 2022         SURGICAL HISTORY       Past Surgical History:   Procedure Laterality Date    APPENDECTOMY      HERNIA REPAIR Right 12/05/2022    Robotic-assisted laparoscopic right inguinal hernia repair with mesh by Dr. Tan    ORTHOPEDIC SURGERY      collar bone (right)    OTHER SURGICAL HISTORY      HEMORRHOIDECTOMY         CURRENT MEDICATIONS       Previous Medications    ALBUTEROL SULFATE HFA (PROVENTIL;VENTOLIN;PROAIR) 108 (90 BASE) MCG/ACT INHALER    Inhale 2 puffs into the lungs every 4 hours as needed    AMLODIPINE (NORVASC) 10 MG TABLET    Take 1 tablet by mouth daily    ASPIRIN 81 MG CHEWABLE TABLET    Take 1 tablet by mouth daily    ATORVASTATIN (LIPITOR) 80 MG TABLET    Take 1 tablet by mouth    LABETALOL (NORMODYNE) 200 MG TABLET    Take 2 tablets by mouth 3 times daily       ALLERGIES     Ace  inhibitors    FAMILY HISTORY       Family History   Problem Relation Age of Onset    Hypertension Father     Liver Disease Father     Anesth Problems Neg Hx     Lung Disease Mother           SOCIAL HISTORY       Social History     Socioeconomic History    Marital status:    Tobacco Use    Smoking status: Every Day     Current packs/day: 0.25     Types: Cigarettes    Smokeless tobacco: Never   Substance and Sexual Activity    Alcohol use: Not Currently     Alcohol/week: 2.0 standard drinks of alcohol    Drug use: Yes     Types: Marijuana (Weed)         PHYSICAL EXAM       ED Triage Vitals   BP Systolic BP Percentile Diastolic BP Percentile Temp Temp src Pulse Resp SpO2   -- -- -- -- -- -- -- --      Height Weight         -- --             There is no height or weight on file to calculate BMI.    Physical Exam  Vitals and nursing note reviewed.   Constitutional:       Appearance: He is not ill-appearing.   Cardiovascular:      Rate and Rhythm: Normal rate.   Pulmonary:      Effort: No respiratory distress.   Neurological:      Mental Status: He is alert and oriented to person, place, and time.      Comments: Some difficulty with speech, no focal arm or leg weakness.  Complains of decree sensation left arm             EMERGENCY DEPARTMENT COURSE and DIFFERENTIAL DIAGNOSIS/MDM:   Vitals:  There were no vitals filed for this visit.      Medical Decision Making  59-year-old male presents to the emergency department difficulty with speech.  He seems slightly confused on my evaluation, may be some encephalopathy.  There is a history of multiple strokes and patient is on aspirin but no blood thinners.  Very difficult to ascertain time of onset, wife notes some abnormalities yesterday, may have been normal this morning, would not be a good candidate for thrombolytic or thrombectomy therapy.  He has CKD and will defer CTA in favor of MRA after hospitalization.  Will admit for further management.    Amount and/or

## 2025-05-20 NOTE — CONSULTS
NEUROLOGY CONSULT NOTE    Name Michael Rockwell Age 59 y.o.   MRN [id number]  1965     Consulting Physician: Yuki Perez MD      Chief Complaint:  L-sided numbness/tingling      Assessment:     Principal Problem:    Stroke-like symptoms  Resolved Problems:    * No resolved hospital problems. *    Patient is a 59 yom with h/o bilat basal ganglia and L cerebellum acute infarcts, CKD, HTN, dyslipidemia and current smoker who presented this am with acute L-sided numbness and weakness with word finding difficulty. Symptoms lasted about 45 minute and then patient returned to baseline. NIHSS 6.  CT head no acute intracranial abnormality. CTA deferred by ED staff.     Possible acute stroke versus recrudescence of chronic stroke  Recommendations:   - Obtain MRI brain  - Continue aspirin   - Continue home atorvastatin for goal LDL <70  - Send lipid panel and A1c  - Obtain TTE if MRI +stroke  - Goal normotension/normoglycemia  - Smoking cessation counseling      Further plan pending MRI results. Please contact with any questions.  History of Present Illness:      This is a 59 y.o. male known well to the Neurology service with history of strokes  with residual L-hemiparesis, CKD stage V, HTN, dyslipidemia, anemia, GERD, COPD, substance abuse and current smoker who presented to the ED today after onset of L sided numbness and weakness. HE has a history of chronic bilateral basal ganglia and L cerebellum infarcts. Symptoms started around 1030. He also had word finding difficulty. Patient also reports previous gait imbalance this am. Neurology is asked to consult for possible acute CVA.     Allergies   Allergen Reactions    Ace Inhibitors Swelling        Prior to Admission medications    Medication Sig Start Date End Date Taking? Authorizing Provider   albuterol sulfate HFA (PROVENTIL;VENTOLIN;PROAIR) 108 (90 Base) MCG/ACT inhaler Inhale 2 puffs into the lungs every 4 hours as needed   Yes Automatic  05/20/2025 11:11 AM     Lab Results   Component Value Date/Time     05/20/2025 11:11 AM    K 3.7 05/20/2025 11:11 AM     05/20/2025 11:11 AM    CO2 24 05/20/2025 11:11 AM    BUN 34 05/20/2025 11:11 AM       Signed:  VENTURA Clayton  5/20/2025  4:58 PM

## 2025-05-20 NOTE — PLAN OF CARE
Problem: Chronic Conditions and Co-morbidities  Goal: Patient's chronic conditions and co-morbidity symptoms are monitored and maintained or improved  Outcome: Progressing  Flowsheets (Taken 5/20/2025 1530)  Care Plan - Patient's Chronic Conditions and Co-Morbidity Symptoms are Monitored and Maintained or Improved:   Monitor and assess patient's chronic conditions and comorbid symptoms for stability, deterioration, or improvement   Collaborate with multidisciplinary team to address chronic and comorbid conditions and prevent exacerbation or deterioration   Update acute care plan with appropriate goals if chronic or comorbid symptoms are exacerbated and prevent overall improvement and discharge     Problem: Discharge Planning  Goal: Discharge to home or other facility with appropriate resources  Outcome: Progressing  Flowsheets (Taken 5/20/2025 1530)  Discharge to home or other facility with appropriate resources:   Identify barriers to discharge with patient and caregiver   Arrange for needed discharge resources and transportation as appropriate   Identify discharge learning needs (meds, wound care, etc)     Problem: Pain  Goal: Verbalizes/displays adequate comfort level or baseline comfort level  Outcome: Progressing     Problem: Skin/Tissue Integrity  Goal: Skin integrity remains intact  Description: 1.  Monitor for areas of redness and/or skin breakdown2.  Assess vascular access sites hourly3.  Every 4-6 hours minimum:  Change oxygen saturation probe site4.  Every 4-6 hours:  If on nasal continuous positive airway pressure, respiratory therapy assess nares and determine need for appliance change or resting period  Outcome: Progressing     Problem: Safety - Adult  Goal: Free from fall injury  Outcome: Progressing  Flowsheets (Taken 5/20/2025 1530)  Free From Fall Injury: Instruct family/caregiver on patient safety

## 2025-05-20 NOTE — PROGRESS NOTES
Speech LAnguage Pathology EVALUATION    Patient: Michael Rockwell (59 y.o. male)  Date: 5/20/2025  Primary Diagnosis: Stroke-like symptoms [R29.90]    Precautions: NA    ASSESSMENT:  Patient presents with grossly functional oropharyngeal swallow during bedside assessment. Patient reports he avoids certain dry/hard solids at baseline due to his dentition. With PO trials today, patient demonstrated efficient mastication of solids given additional time. No clinical s/s of aspiration were appreciated with either trialed consistency. Recommend he continue current PO diet with general swallow precautions.     Patient/family report patient had change in communication status during onset of symptoms. Patient with mild L oral-motor deficits at his baseline. He was able to communicate basic wants and needs at this time. Will follow for MRI Brain to further determine plan of care for speech/language interventions.      PLAN :  Recommendations and Planned Interventions:  Diet: Regular and thin liquids  Oral medications with thin liquids/as tolerated  Self-select softer/easier-to-chew foods  Routine oral care    Recommend next SLP session: Communication evaluation as deemed clinically appropriate     Acute SLP Services: SLP Plan of Care: 2 times/week. Patient's rehabilitation potential is considered to be Good.  Discharge Recommendations: Continue to assess pending progress     SUBJECTIVE:   Patient stated, “My wife said I stopped talking.”    OBJECTIVE:     Past Medical History:   Diagnosis Date    Asthma     Chronic kidney disease     Chronic obstructive pulmonary disease (HCC)     Emphysema lung (HCC)     GERD (gastroesophageal reflux disease)     Heart failure (HCC) 10/2021    1 EPISODE DUE TO HIGH BP, NOW CLEARED UP PER PT AND WIFE    Hypertension     Stroke (HCC) 2022     Past Surgical History:   Procedure Laterality Date    APPENDECTOMY      HERNIA REPAIR Right 12/05/2022    Robotic-assisted laparoscopic right inguinal  hernia repair with mesh by Dr. Tan    ORTHOPEDIC SURGERY      collar bone (right)    OTHER SURGICAL HISTORY      HEMORRHOIDECTOMY     Prior Level of Function/Home Situation:   Baseline Diet: Regular texture with thin liquids    Baseline Assessment:  Current Diet: Regular  Current Liquid Diet: Thin  Prior Dysphagia History: Patient reports baseline oral deficits with dry solids related to his dentition but self-selects appropriate solids at home.  Patient Complaint: Patient/his wife report patient with speech/language changes from baseline. MRI Brain pending.    Cognitive and Communication Status:  Neurologic State: Alert  Orientation Level: Oriented to person, DNT other areas of orientation  Cognition: Follows commands    Dysphagia:  Oral Assessment:  Labial: Reduced L seal  Dentition: Edentulous  Oral Hygiene: Moist;Clean  Lingual: No impairment  Velum: No Impairment  Mandible: No impairment    P.O. Trials:  Assessment Method(s): Observation  Patient Position: Upright in stretcher  Vocal Quality: No Impairment  Consistency Presented: Thin;Regular  How Presented: Self-fed/presented;Cup/sip  Bolus Acceptance: No impairment  Bolus Formation/Control: No impairment  Propulsion: No impairment  Oral Residue: None  Initiation of Swallow: Present  Laryngeal Elevation: Present  Aspiration Signs/Symptoms: None  Pharyngeal Phase Characteristics: No impairment, issues, or problems    Respiratory Status/Airway:  Room air    Outcome Measure:  Functional Oral Intake Scale (FOIS): 6--Total oral diet with multiple consistencies without special preparation, but with specific food limitations    After treatment:   Patient left in no apparent distress in bed, Call bell left within reach, Nursing notified, and Caregiver present    COMMUNICATION/EDUCATION:   The patient's plan of care including recommendations, planned interventions were discussed with: Registered nurse    Patient/family have participated as able in goal setting and

## 2025-05-20 NOTE — ED NOTES
Changed patient into gown and offered grippy socks. Pt declined grippy socks. Left them at bedside table for future use.

## 2025-05-20 NOTE — ED TRIAGE NOTES
Patient reports trouble speaking and slurred speech since 7 am when he went to work. No Blood thinners. Bp 143/102     Code Stroke Level 1 [place] -

## 2025-05-20 NOTE — H&P
History & Physical    Primary Care Provider: Orestes Thurman MD  Source of Information: Patient and chart review    History of Presenting Illness:   Michael Rockwell is a 59 y.o. male with history of CVA with residual left hemiparesis, CKD stage V, hypertension, dyslipidemia, anemia, GERD, COPD, who presented to ED accompanied by his wife concerns for stroke.  Patient and wife at bedside report onset of speech difficulty describing what appears to be difficulty with word finding and expression noted this morning around 7 AM.  Wife at bedside also reports there may have been some concern for disequilibrium yesterday evening but unsure; describing some left arm and left lower extremity weakness.  Chart review shows documented history of left hemiparesis.     The patient denies any fever, chills, chest or abdominal pain, nausea, vomiting, cough, congestion, recent illness, palpitations, or dysuria.  Remarkable vitals on ER Presentation: Vital signs stable  Labs Remarkable for: Creatinine 4.5  ER Images: CT head showed no acute process  ER Rx: None     Review of Systems:  Pertinent items are noted in the History of Present Illness.     Past Medical History:   Diagnosis Date    Asthma     Chronic kidney disease     Chronic obstructive pulmonary disease (HCC)     Emphysema lung (HCC)     GERD (gastroesophageal reflux disease)     Heart failure (HCC) 10/2021    1 EPISODE DUE TO HIGH BP, NOW CLEARED UP PER PT AND WIFE    Hypertension     Stroke (HCC) 2022      Past Surgical History:   Procedure Laterality Date    APPENDECTOMY      HERNIA REPAIR Right 12/05/2022    Robotic-assisted laparoscopic right inguinal hernia repair with mesh by Dr. Tan    ORTHOPEDIC SURGERY      collar bone (right)    OTHER SURGICAL HISTORY      HEMORRHOIDECTOMY     Prior to Admission medications    Medication Sig Start Date End Date Taking? Authorizing Provider   albuterol sulfate HFA (PROVENTIL;VENTOLIN;PROAIR) 108 (90 Base) MCG/ACT inhaler    NEURO:  Mental status: Oriented to time, situation, place and person. Able to follow commands appropriately.  Speech is mildly dysarthric  Cranial Nerves:  Attention and fund of knowledge is appropriate/intact. Pt speech is normal. Normal recent and remote memory. EOMI, PERRL, no nystagmus, no ptosis. No aphasia appreciated. Full facial strength, no facial asymmetry. Hearing intact bilaterally. Tongue protrudes to midline, palate elevates symmetrically. Shrug Shoulders B/L  Motor: Normal bulk and tone, 5/5 strength x 4 extremities proximally and distally  Coordination: No dysmetria on FTN and intact HTS testing  Reflexes: +2 throughout, down going toes bilaterally   Sensation: Intact x 4 extremities to light touch  Gait:  Deferred            EKG: Normal sinus    Data Review:     Recent Days:  Recent Labs     05/20/25  1111   WBC 7.2   HGB 11.4*   HCT 36.1*        Recent Labs     05/20/25  1111      K 3.7      CO2 24   BUN 34*   ALT 14   INR 1.0     No results for input(s): \"PH\", \"PCO2\", \"PO2\", \"HCO3\", \"FIO2\" in the last 72 hours.    24 Hour Results:  Recent Results (from the past 24 hours)   POCT Glucose    Collection Time: 05/20/25 10:58 AM   Result Value Ref Range    POC Glucose 112 65 - 117 mg/dL    Performed by: KIMBERLY An    CBC with Auto Differential    Collection Time: 05/20/25 11:11 AM   Result Value Ref Range    WBC 7.2 4.1 - 11.1 K/uL    RBC 3.78 (L) 4.10 - 5.70 M/uL    Hemoglobin 11.4 (L) 12.1 - 17.0 g/dL    Hematocrit 36.1 (L) 36.6 - 50.3 %    MCV 95.5 80.0 - 99.0 FL    MCH 30.2 26.0 - 34.0 PG    MCHC 31.6 30.0 - 36.5 g/dL    RDW 13.3 11.5 - 14.5 %    Platelets 180 150 - 400 K/uL    MPV 11.3 8.9 - 12.9 FL    Nucleated RBCs 0.0 0  WBC    nRBC 0.00 0.00 - 0.01 K/uL    Neutrophils % 75.3 (H) 32.0 - 75.0 %    Lymphocytes % 9.6 (L) 12.0 - 49.0 %    Monocytes % 6.9 5.0 - 13.0 %    Eosinophils % 7.2 (H) 0.0 - 7.0 %    Basophils % 0.4 0.0 - 1.0 %    Immature Granulocytes % 0.6 (H)

## 2025-05-20 NOTE — PROGRESS NOTES
Code Stroke Documentation      Symptoms: Slurred speech   Baseline mRS:   0   Last Known Well: 1300 on 2025    Medical hx: Past Medical History:   Diagnosis Date    Asthma     Chronic kidney disease     Chronic obstructive pulmonary disease (HCC)     Emphysema lung (HCC)     GERD (gastroesophageal reflux disease)     Heart failure (HCC) 10/2021    1 EPISODE DUE TO HIGH BP, NOW CLEARED UP PER PT AND WIFE    Hypertension     Stroke (HCC)       Vitals: Vitals:    25 1100   BP: (!) 143/102   Pulse: 68   Resp: 18   Temp: 98.4 °F (36.9 °C)   SpO2: 100%      AC/APT:  Asa 81    VAN: Negative   NIHSS: 1a-LOC:0  1b-Month/Age:1  1c-Open/Close Hand:0  2-Best Gaze:0  3-Visual Fields:0  4-Facial Palsy:2  5a-Left Arm:0  5b-Right Arm:0  6a-Left Le  6b-Right Le  7-Limb Ataxia:2  8-Sensory:0  9-Best Language:0  10-Dysarthria:1  11-Extinction/Inattention:0  TOTAL SCORE:6   Imaging (personally reviewed): CT: No acute intracranial abnormality   CTA: Deferred per ED MD   CTP:    Plan: tNK Candidate: NO  Mechanical thrombectomy Candidate:NO    *Perform dysphagia screening prior to any PO intake*     Discussed with: Dr. Cavanaugh, primary RN, and tele-neuro Dr. Arguello     Arrival time: 1101    I have spent 35 of critical care time involved in lab review, consultations with specialist, family decision making and documentation. During this entire length of time I was immediately available to the patient.    CHRISSY Mejia  Neurovascular Nurse Practitioner

## 2025-05-21 PROBLEM — R29.90 STROKE-LIKE EPISODE: Status: ACTIVE | Noted: 2025-05-21

## 2025-05-21 LAB
CHOLEST SERPL-MCNC: 172 MG/DL
EST. AVERAGE GLUCOSE BLD GHB EST-MCNC: 105 MG/DL
HBA1C MFR BLD: 5.3 % (ref 4–5.6)
HDLC SERPL-MCNC: 92 MG/DL
HDLC SERPL: 1.9 (ref 0–5)
LDLC SERPL CALC-MCNC: 67.8 MG/DL (ref 0–100)
T4 FREE SERPL-MCNC: 1.1 NG/DL (ref 0.8–1.5)
TRIGL SERPL-MCNC: 61 MG/DL
TSH SERPL DL<=0.05 MIU/L-ACNC: 0.49 UIU/ML (ref 0.36–3.74)
VLDLC SERPL CALC-MCNC: 12.2 MG/DL

## 2025-05-21 PROCEDURE — 92523 SPEECH SOUND LANG COMPREHEN: CPT

## 2025-05-21 PROCEDURE — 97165 OT EVAL LOW COMPLEX 30 MIN: CPT

## 2025-05-21 PROCEDURE — 97161 PT EVAL LOW COMPLEX 20 MIN: CPT

## 2025-05-21 PROCEDURE — 80061 LIPID PANEL: CPT

## 2025-05-21 PROCEDURE — 6370000000 HC RX 637 (ALT 250 FOR IP): Performed by: STUDENT IN AN ORGANIZED HEALTH CARE EDUCATION/TRAINING PROGRAM

## 2025-05-21 PROCEDURE — 84443 ASSAY THYROID STIM HORMONE: CPT

## 2025-05-21 PROCEDURE — 97530 THERAPEUTIC ACTIVITIES: CPT

## 2025-05-21 PROCEDURE — 2060000000 HC ICU INTERMEDIATE R&B

## 2025-05-21 PROCEDURE — 84439 ASSAY OF FREE THYROXINE: CPT

## 2025-05-21 PROCEDURE — 97535 SELF CARE MNGMENT TRAINING: CPT

## 2025-05-21 PROCEDURE — 97116 GAIT TRAINING THERAPY: CPT

## 2025-05-21 PROCEDURE — 83036 HEMOGLOBIN GLYCOSYLATED A1C: CPT

## 2025-05-21 PROCEDURE — 2500000003 HC RX 250 WO HCPCS: Performed by: STUDENT IN AN ORGANIZED HEALTH CARE EDUCATION/TRAINING PROGRAM

## 2025-05-21 RX ADMIN — ASPIRIN 81 MG CHEWABLE TABLET 81 MG: 81 TABLET CHEWABLE at 09:12

## 2025-05-21 RX ADMIN — SODIUM CHLORIDE, PRESERVATIVE FREE 10 ML: 5 INJECTION INTRAVENOUS at 09:14

## 2025-05-21 ASSESSMENT — PAIN SCALES - GENERAL
PAINLEVEL_OUTOF10: 0

## 2025-05-21 NOTE — PLAN OF CARE
Problem: Chronic Conditions and Co-morbidities  Goal: Patient's chronic conditions and co-morbidity symptoms are monitored and maintained or improved  5/20/2025 2328 by Patricia Carroll RN  Outcome: Progressing  5/20/2025 1558 by Jordana Lassiter RN  Outcome: Progressing  Flowsheets (Taken 5/20/2025 1530)  Care Plan - Patient's Chronic Conditions and Co-Morbidity Symptoms are Monitored and Maintained or Improved:   Monitor and assess patient's chronic conditions and comorbid symptoms for stability, deterioration, or improvement   Collaborate with multidisciplinary team to address chronic and comorbid conditions and prevent exacerbation or deterioration   Update acute care plan with appropriate goals if chronic or comorbid symptoms are exacerbated and prevent overall improvement and discharge     Problem: Discharge Planning  Goal: Discharge to home or other facility with appropriate resources  5/20/2025 2328 by Patricia Carroll RN  Outcome: Progressing  5/20/2025 1558 by Jordana Lassiter RN  Outcome: Progressing  Flowsheets (Taken 5/20/2025 1530)  Discharge to home or other facility with appropriate resources:   Identify barriers to discharge with patient and caregiver   Arrange for needed discharge resources and transportation as appropriate   Identify discharge learning needs (meds, wound care, etc)     Problem: Pain  Goal: Verbalizes/displays adequate comfort level or baseline comfort level  5/20/2025 2328 by Patricia Carroll RN  Outcome: Progressing  5/20/2025 1558 by Jordana Lassiter RN  Outcome: Progressing     Problem: Skin/Tissue Integrity  Goal: Skin integrity remains intact  Description: 1.  Monitor for areas of redness and/or skin breakdown2.  Assess vascular access sites hourly3.  Every 4-6 hours minimum:  Change oxygen saturation probe site4.  Every 4-6 hours:  If on nasal continuous positive airway pressure, respiratory therapy assess nares and determine need for appliance change or resting

## 2025-05-21 NOTE — PROGRESS NOTES
Speech LAnguage Pathology EVALUATION/DISCHARGE    Patient: Michael Rockwell (59 y.o. male)  Date: 5/21/2025  Primary Diagnosis: Stroke-like symptoms [R29.90]  Difficulty with speech [R47.9]  Stroke-like episode [R29.90]    Precautions: NA    ASSESSMENT:  Patient demonstrated functional expressive and receptive language skills during today's assessment. He was able to follow multi-step commands and answer complex yes/no questions. Patient with accurate confrontation and responsive naming; mild deficits in convergent and divergent naming noted which appeared secondary to reduced mental flexibility vs. more abstract reasoning. Suspect patient may have residual cognitive deficits from prior neurologic events which impact complex language but do not impair function at this level of care.     Patient will be discharged from skilled speech-language pathology services at this time.     PLAN :  Recommendations and Planned Interventions:  No further SLP indicated at this level of care    Acute SLP Services: No, patient will be discharged from acute skilled speech-language pathology at this time.  Discharge Recommendations: No, additional SLP treatment not indicated at discharge     SUBJECTIVE:   Patient stated, “My wife brought me\" when asked what vehicle transports a patient to the hospital.    OBJECTIVE:     Past Medical History:   Diagnosis Date    Asthma     Chronic kidney disease     Chronic obstructive pulmonary disease (HCC)     Emphysema lung (HCC)     GERD (gastroesophageal reflux disease)     Heart failure (HCC) 10/2021    1 EPISODE DUE TO HIGH BP, NOW CLEARED UP PER PT AND WIFE    Hypertension     Stroke (HCC) 2022     Past Surgical History:   Procedure Laterality Date    APPENDECTOMY      HERNIA REPAIR Right 12/05/2022    Robotic-assisted laparoscopic right inguinal hernia repair with mesh by Dr. Tan    ORTHOPEDIC SURGERY      collar bone (right)    OTHER SURGICAL HISTORY      HEMORRHOIDECTOMY     Cognitive

## 2025-05-21 NOTE — CARE COORDINATION
Care Management Initial Assessment       RUR:  n/a   Inpatient   Readmission? Yes - dc'd from [facility] 4/24 1st IM letter given? No n/a  1st  letter given: No    INDIGO: Pt admitted from home, will return when stable  - pending PT OT eval; anticipate no needs    Transport: spouse confirmed    CM met with Pt at bedside to introduce self and role.  Pt lives w/ spouse in a 1 level apartment w/o RAFAEL.     ADLs: independent   Wife drives, Pt does not have licence   DME: none  PCP follow up: PCP confirmed - seen 5 months ago  Wants a new PCP, but will arrange through [place]? Office   Previous Home Health: none  Previous Skilled Nursing Facility: none  Previous Inpatient Rehab: none  Insurance verified: yes; [employer]   Pharmacy: [facility] [address]   Emergency Contact:   MoizNathalie (Spouse)  [phone] (Mobile)     11am: Verified face sheet and demographics w/ Pt - lives independently w/ spouse, no Hx of SNF/IPR/HH. Pt 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 as he does not have a license, but spouse able to drive as needed. PT OT eval pending, but anticipate no needs. Spouse will transport at dc, no other INDIGO needs indicated at this time.      The program assesses the family and/or caregiver's readiness, willingness, and ability to provide or support and self-management activities for the patient as needed.    CM will follow patient progress and assist as needed with INDIGO plan.       05/21/25 1142   Service Assessment   Patient Orientation Alert and Oriented;Person;Place;Situation;Self   Cognition Alert   History Provided By Patient   Primary Caregiver Self   Support Systems Family Members   Patient's Healthcare Decision Maker is: Named in Scanned ACP Document   PCP Verified by CM Yes   Last Visit to PCP Within last 3 months   Prior Functional Level Independent in ADLs/IADLs   Current Functional Level Independent in ADLs/IADLs   Can patient return to prior living arrangement Yes   Ability to make needs known: Good   Family able to  assist with home care needs: Yes   Would you like for me to discuss the discharge plan with any other family members/significant others, and if so, who? Yes  (spouse)   Financial Resources Medicaid   Social/Functional History   Lives With Spouse   Type of Home Apartment   Home Layout One level   Bathroom Shower/Tub None   Bathroom Equipment None   Bathroom Accessibility Accessible   Home Equipment None   Receives Help From Family   Prior Level of Assist for ADLs Independent   Prior Level of Assist for Homemaking Independent   Homemaking Responsibilities Yes   Ambulation Assistance Independent   Prior Level of Assist for Transfers Independent   Active  No   Patient's  Info does not have a license   Mode of Transportation Car   Occupation Self employed   Discharge Planning   Type of Residence Apartment   Living Arrangements Spouse/Significant Other   Patient expects to be discharged to: Apartment   Services At/After Discharge   Confirm Follow Up Transport Family       XENIA DixonW

## 2025-05-21 NOTE — PROGRESS NOTES
Physician Progress Note      PATIENT:               MECHELLE WILLOUGHBY  CSN #:                  [id number]  :                       1965  ADMIT DATE:       2025 11:12 AM  DISCH DATE:  RESPONDING  PROVIDER #:        Federica Hernandez PA-C          QUERY TEXT:    Encephalopathy  is documented in the medical record al attending  notes    Please provide additional clinical indicators supportive of your   documentation. Or please document if the diagnosis of Encephalopathy  has been   ruled out after study.    The clinical indicators include:  -   ER   MD  -  \"  He seems slightly confused on my evaluation, may be   some encephalopathy.\"  Neurological:  Mental Status: He is alert and oriented to person, place, and time.    H&P-  \"   Alert, cooperative, no distress, appears stated age.\"    MRI-    Two punctate foci of acute infarction, involving the left centrum   semiovale and right parietal white matter.      neuro consult-    Status: A&Ox3, Follows commands    - speech- Patient demonstrated functional expressive and receptive   language skills during today's assessment. He was able to follow multi-step   commands and answer complex yes/no questions.  Options provided:  -- Encephalopathy  present as evidenced by, please  give  further  support,   Please document evidence.  -- Encephalopathy  was ruled out  -- Other - I will add my own diagnosis  -- Disagree - Not applicable / Not valid  -- Disagree - Clinically unable to determine / Unknown  -- Refer to Clinical Documentation Reviewer    PROVIDER RESPONSE TEXT:    Encephalopathy was ruled out after study.    Query created by: Majo Marcos on 2025 1:28 PM      Electronically signed by:  Federica Hernandez PA-C 2025 1:36 PM

## 2025-05-21 NOTE — PROGRESS NOTES
OCCUPATIONAL THERAPY EVALUATION/DISCHARGE  Patient: Michael Rockwell (59 y.o. male)  Date: 5/21/2025  Primary Diagnosis: Stroke-like symptoms [R29.90]  Difficulty with speech [R47.9]  Stroke-like episode [R29.90]         Precautions:                    ASSESSMENT :  Pt admitted due to episode of word finding issues with left side weakness.  Pt with poor recollection of event when asked.  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  Pt continues to smoke and refers to smoking numerous times during assessment. CT negative for acute changes.  MRI revealed \" Two punctate foci of acute infarction, involving the left centrum semiovale and right parietal white matter.\"  He continues to demonstrate numbness of left side which is his baseline.  He is impulsive with functional mobility needing frequent cues for safety and to slow mobility to ensure safety.  He has a h/o left hemiparesis but reports he feels like he is back to his normal.     Today, pt progressed to the bathroom for toileting and grooming activities.  He demonstrates no LOB with activities and difficulty with fine motor/gross motor coordination with functional activities.  He stood at the sink for grooming with supervision.  Pt reports he is back to his baseline.  Pt has no further needs for acute OT intervention.  Recommend home with support from family.     Functional Outcome Measure:  The patient scored 66 on the Fugl Hansen outcome measure.      Further skilled acute occupational therapy is not indicated at this time.     PLAN :  Recommend with staff: Up with supervision    Recommendation for discharge: (in order for the patient to meet his/her long term goals):   No skilled occupational therapy    Other factors to consider for discharge: impaired cognition and high risk for falls    IF patient discharges home will need the following DME: none     SUBJECTIVE:   Patient stated, “I need a cigarett.”    OBJECTIVE      Balance:      Balance  Sitting: Intact  Standing: Impaired  Standing - Static: Good  Standing - Dynamic: Good      ADL Assessment:          Feeding: Supervision       Grooming: Supervision       UE Bathing: Supervision       Product Used : Bath wipes;Soap and water;Protective barrier    LE Bathing: Supervision       UE Dressing: Supervision       LE Dressing: Supervision       Toileting: Supervision              Functional Mobility: Supervision  Functional Mobility Skilled Clinical Factors: impulsive and quick with movements     Product Used : Bath wipes;Soap and water;Protective barrier    ADL Intervention and task modifications:    Mobility performed to and from the bathroom with supervision.  He attempted to void bladder but unable to void.  Pt then completed light grooming at the sink.  He moves very quickly and requires cues for safety with all tasks.                                                                                                                                                                                                                                    Fugl-Hansen Assessment of Motor Recovery after Stroke:     Reflex Activity  Flexors/Biceps/Fingers: Can be elicited  Extensors/Triceps: Can be elicited  Reflex Subtotal: 4    Volitional Movement Within Synergies  Shoulder Retraction: Full  Shoulder Elevation: Full  Shoulder Abduction (90 degrees): Full  Shoulder External Rotation: Full  Elbow Flexion: Full  Forearm Supination: Full  Shoulder Adduction/Internal Rotation: Full  Elbow Extension: Full  Forearm Pronation: Full  Subtotal: 18    Volitional Movement Mixing Synergies  Hand to Lumbar Spine: Full  Shoulder Flexion (0-90 degrees): Full  Pronation-Supination: Full  Subtotal: 6    Volitional Movement With Little or No Synergy  Shoulder Abduction (0-90 degrees): Full  Shoulder Flexion ( degrees): Full  Pronation/Supination: Full  Subtotal: 6    Normal Reflex Activity  Biceps,

## 2025-05-21 NOTE — CARE COORDINATION
05/21/25 1156   Readmission Assessment   Number of Days since last admission? 8-30 days   Previous Disposition Home with Family   Who is being Interviewed Patient   What was the patient's/caregiver's perception as to why they think they needed to return back to the hospital? Other (Comment)  (medical)   Did you visit your Primary Care Physician after you left the hospital, before you returned this time? No   Why weren't you able to visit your PCP? Did not have an appointment   Did you see a specialist, such as Cardiac, Pulmonary, Orthopedic Physician, etc. after you left the hospital? No   Who advised the patient to return to the hospital? Self-referral   Does the patient report anything that got in the way of taking their medications? No   In our efforts to provide the best possible care to you and others like you, can you think of anything that we could have done to help you after you left the hospital the first time, so that you might not have needed to return so soon? Other (Comment)  (none)     PRABHA Dixon

## 2025-05-21 NOTE — PLAN OF CARE
Problem: Physical Therapy - Adult  Goal: By Discharge: Performs mobility at highest level of function for planned discharge setting.  See evaluation for individualized goals.  Description: FUNCTIONAL STATUS PRIOR TO ADMISSION: Patient was independent and active without use of DME.    HOME SUPPORT PRIOR TO ADMISSION: The patient lived with his spouse but did not require assistance.    Physical Therapy Goals  Initiated 5/21/2025  1.  Patient will move from supine to sit and sit to supine and scoot up and down in bed with independence within 7 day(s).    2.  Patient will perform sit to stand with independence within 7 day(s).  3.  Patient will transfer from bed to chair and chair to bed with independence using the least restrictive device within 7 day(s).  4.  Patient will ambulate with independence for 300 feet with the least restrictive device within 7 day(s).   5.  Patient will improve Clarke Balance score by 7 points within 7 days.    Outcome: Progressing   PHYSICAL THERAPY EVALUATION    Patient: Michael Rockwell (59 y.o. male)  Date: 5/21/2025  Primary Diagnosis: Stroke-like symptoms [R29.90]  Difficulty with speech [R47.9]  Stroke-like episode [R29.90]       Precautions:              ASSESSMENT :   DEFICITS/IMPAIRMENTS:   The patient is limited by decreased functional mobility, strength, coordination, balance, and gait following admission for c/o speech changes; MRI + for L centrum semiovale and R parietal infarcts. PMHx significant for B BG and L cerebellar infarcts with residual L weakness and impaired coordination/balance. At baseline, he lived at home with his spouse and is generally indep and active without AD.      Today, he was able to complete bed mobility and transfers with SUP for safety; mildly impulsive requiring cues for safety. Gait training completed on unit without AD; demos mild L path drift and instabilities with distractions or dual tasks but no major LOB with multi level reaching and

## 2025-05-21 NOTE — PLAN OF CARE
Problem: Chronic Conditions and Co-morbidities  Goal: Patient's chronic conditions and co-morbidity symptoms are monitored and maintained or improved  5/20/2025 2328 by Patricia Carroll RN  Outcome: Progressing  Flowsheets (Taken 5/20/2025 2000)  Care Plan - Patient's Chronic Conditions and Co-Morbidity Symptoms are Monitored and Maintained or Improved:   Monitor and assess patient's chronic conditions and comorbid symptoms for stability, deterioration, or improvement   Collaborate with multidisciplinary team to address chronic and comorbid conditions and prevent exacerbation or deterioration   Update acute care plan with appropriate goals if chronic or comorbid symptoms are exacerbated and prevent overall improvement and discharge     Problem: Discharge Planning  Goal: Discharge to home or other facility with appropriate resources  5/21/2025 1030 by Torrie Lechuga RN  Outcome: Progressing  5/20/2025 2328 by Patricia Carroll RN  Outcome: Progressing  Flowsheets (Taken 5/20/2025 2000)  Discharge to home or other facility with appropriate resources:   Identify barriers to discharge with patient and caregiver   Identify discharge learning needs (meds, wound care, etc)   Arrange for needed discharge resources and transportation as appropriate   Arrange for interpreters to assist at discharge as needed   Refer to discharge planning if patient needs post-hospital services based on physician order or complex needs related to functional status, cognitive ability or social support system     Problem: Pain  Goal: Verbalizes/displays adequate comfort level or baseline comfort level  5/21/2025 1030 by Torrie Lechuga RN  Outcome: Progressing  5/20/2025 2328 by Patricia Carroll RN  Outcome: Progressing     Problem: Skin/Tissue Integrity  Goal: Skin integrity remains intact  Description: 1.  Monitor for areas of redness and/or skin breakdown2.  Assess vascular access sites hourly3.  Every 4-6 hours minimum:  Change oxygen saturation

## 2025-05-21 NOTE — PROGRESS NOTES
Verbal abuse: Denies     Emotional abuse: Denies     Financial abuse: Denies     Sexual abuse: Denies   Utilities: Not on file       Review of Systems:   Pertinent items are noted in HPI.       Vital Signs:    Last 24hrs VS reviewed since prior progress note. Most recent are:  Vitals:    05/21/25 1200   BP:    Pulse: 65   Resp:    Temp:    SpO2:        No intake or output data in the 24 hours ending 05/21/25 1249     Physical Examination:     I had a face to face encounter with this patient and independently examined them on 5/21/2025 as outlined below:          General : alert x 3, awake, no acute distress,   HEENT: PEERL, EOMI, moist mucus membrane  Neck: supple, no JVD, no meningeal signs  Chest: Clear to auscultation bilaterally   CVS: S1 S2 heard, Capillary refill less than 2 seconds  Abd: soft/ non tender, non distended, BS physiological,   Ext: no clubbing, no cyanosis, no edema, brisk 2+ DP pulses  Neuro/Psych: pleasant mood and affect, CN 2-12 grossly intact, sensory grossly within normal limit, Left sided HP (chronic)  Skin: warm     Data Review:    Review and/or order of clinical lab test      I have personally and independently reviewed all pertinent labs, diagnostic studies, imaging, and have provided independent interpretation of the same.     Labs:     Recent Labs     05/20/25  1111   WBC 7.2   HGB 11.4*   HCT 36.1*        Recent Labs     05/20/25  1111      K 3.7      CO2 24   BUN 34*     Recent Labs     05/20/25  1111   ALT 14   GLOB 3.0     Recent Labs     05/20/25  1111   INR 1.0      No results for input(s): \"TIBC\" in the last 72 hours.    Invalid input(s): \"FE\", \"PSAT\", \"FERR\"   No results found for: \"RBCF\"   No results for input(s): \"PH\", \"PCO2\", \"PO2\" in the last 72 hours.  No results for input(s): \"CPK\" in the last 72 hours.    Invalid input(s): \"CPKMB\", \"CKNDX\", \"TROIQ\"  Lab Results   Component Value Date/Time    CHOL 172 05/21/2025 01:16 AM    HDL 92 05/21/2025 01:16 AM     LDL 67.8 05/21/2025 01:16 AM    LDL 75.2 10/18/2023 02:30 AM     No results found for: \"GLUCPOC\"  [unfilled]    Notes reviewed from all clinical/nonclinical/nursing services involved in patient's clinical care. Care coordination discussions were held with appropriate clinical/nonclinical/ nursing providers based on care coordination needs.         Patients current active Medications were reviewed, considered, added and adjusted based on the clinical condition today.      Home Medications were reconciled to the best of my ability given all available resources at the time of admission. Route is PO if not otherwise noted.      Admission Status:[id number]:::1}      Medications Reviewed:     Current Facility-Administered Medications   Medication Dose Route Frequency    iopamidol (ISOVUE-370) 76 % injection 100 mL  100 mL IntraVENous ONCE PRN    iopamidol (ISOVUE-370) 76 % injection 100 mL  100 mL IntraVENous ONCE PRN    sodium chloride flush 0.9 % injection 5-40 mL  5-40 mL IntraVENous 2 times per day    sodium chloride flush 0.9 % injection 5-40 mL  5-40 mL IntraVENous PRN    0.9 % sodium chloride infusion   IntraVENous PRN    ondansetron (ZOFRAN-ODT) disintegrating tablet 4 mg  4 mg Oral Q8H PRN    Or    ondansetron (ZOFRAN) injection 4 mg  4 mg IntraVENous Q6H PRN    polyethylene glycol (GLYCOLAX) packet 17 g  17 g Oral Daily PRN    aspirin chewable tablet 81 mg  81 mg Oral Daily    Or    aspirin suppository 300 mg  300 mg Rectal Daily     ______________________________________________________________________  EXPECTED LENGTH OF STAY: Unable to retrieve estimated LOS  ACTUAL LENGTH OF STAY:          0                 Federica Hernandez PA-C

## 2025-05-21 NOTE — PROGRESS NOTES
MRI brain shows acute infarcts to R parietal WM and L centrum semiovale. MRA head with no significant stenosis or occlusion. TTE pending. Will need outpatient cardiac monitoring after discharge. Will see patient tomorrow prior to discharge.    Cammy Torres, RAJP  Neurology

## 2025-05-22 ENCOUNTER — HOSPITAL ENCOUNTER (INPATIENT)
Facility: HOSPITAL | Age: 60
LOS: 1 days | Discharge: LEFT BEFORE DISCHARGE | DRG: 045 | End: 2025-05-23
Attending: EMERGENCY MEDICINE | Admitting: FAMILY MEDICINE
Payer: MEDICAID

## 2025-05-22 ENCOUNTER — APPOINTMENT (OUTPATIENT)
Facility: HOSPITAL | Age: 60
DRG: 045 | End: 2025-05-22
Payer: MEDICAID

## 2025-05-22 VITALS
WEIGHT: 159.1 LBS | DIASTOLIC BLOOD PRESSURE: 129 MMHG | TEMPERATURE: 97.7 F | RESPIRATION RATE: 18 BRPM | HEART RATE: 69 BPM | SYSTOLIC BLOOD PRESSURE: 190 MMHG | OXYGEN SATURATION: 99 % | HEIGHT: 70 IN | BODY MASS INDEX: 22.78 KG/M2

## 2025-05-22 DIAGNOSIS — I63.9 ACUTE CEREBROVASCULAR ACCIDENT (HCC): ICD-10-CM

## 2025-05-22 DIAGNOSIS — I10 POORLY-CONTROLLED HYPERTENSION: Primary | ICD-10-CM

## 2025-05-22 DIAGNOSIS — G93.40 ACUTE ENCEPHALOPATHY: ICD-10-CM

## 2025-05-22 DIAGNOSIS — I63.9 ISCHEMIC STROKE (HCC): ICD-10-CM

## 2025-05-22 DIAGNOSIS — N18.9 CHRONIC KIDNEY DISEASE, UNSPECIFIED CKD STAGE: ICD-10-CM

## 2025-05-22 LAB
ALBUMIN SERPL-MCNC: 3.7 G/DL (ref 3.5–5)
ALBUMIN/GLOB SERPL: 1.2 (ref 1.1–2.2)
ALP SERPL-CCNC: 75 U/L (ref 45–117)
ALT SERPL-CCNC: 14 U/L (ref 12–78)
ANION GAP SERPL CALC-SCNC: 4 MMOL/L (ref 2–12)
ANION GAP SERPL CALC-SCNC: 9 MMOL/L (ref 2–12)
AST SERPL-CCNC: 7 U/L (ref 15–37)
BASOPHILS # BLD: 0.04 K/UL (ref 0–0.1)
BASOPHILS NFR BLD: 0.6 % (ref 0–1)
BILIRUB SERPL-MCNC: 0.3 MG/DL (ref 0.2–1)
BUN SERPL-MCNC: 34 MG/DL (ref 6–20)
BUN SERPL-MCNC: 35 MG/DL (ref 6–20)
BUN/CREAT SERPL: 8 (ref 12–20)
BUN/CREAT SERPL: 8 (ref 12–20)
CALCIUM SERPL-MCNC: 8.5 MG/DL (ref 8.5–10.1)
CALCIUM SERPL-MCNC: 8.8 MG/DL (ref 8.5–10.1)
CHLORIDE SERPL-SCNC: 108 MMOL/L (ref 97–108)
CHLORIDE SERPL-SCNC: 112 MMOL/L (ref 97–108)
CO2 SERPL-SCNC: 21 MMOL/L (ref 21–32)
CO2 SERPL-SCNC: 26 MMOL/L (ref 21–32)
COMMENT:: NORMAL
CREAT SERPL-MCNC: 4.4 MG/DL (ref 0.7–1.3)
CREAT SERPL-MCNC: 4.46 MG/DL (ref 0.7–1.3)
DIFFERENTIAL METHOD BLD: ABNORMAL
ECHO BSA: 1.84 M2
EOSINOPHIL # BLD: 0.59 K/UL (ref 0–0.4)
EOSINOPHIL NFR BLD: 8.7 % (ref 0–7)
ERYTHROCYTE [DISTWIDTH] IN BLOOD BY AUTOMATED COUNT: 13.6 % (ref 11.5–14.5)
ETHANOL SERPL-MCNC: <10 MG/DL (ref 0–0.08)
GLOBULIN SER CALC-MCNC: 3.2 G/DL (ref 2–4)
GLUCOSE SERPL-MCNC: 126 MG/DL (ref 65–100)
GLUCOSE SERPL-MCNC: 93 MG/DL (ref 65–100)
HCT VFR BLD AUTO: 38 % (ref 36.6–50.3)
HGB BLD-MCNC: 11.9 G/DL (ref 12.1–17)
IMM GRANULOCYTES # BLD AUTO: 0.03 K/UL (ref 0–0.04)
IMM GRANULOCYTES NFR BLD AUTO: 0.4 % (ref 0–0.5)
LYMPHOCYTES # BLD: 1.02 K/UL (ref 0.8–3.5)
LYMPHOCYTES NFR BLD: 15 % (ref 12–49)
MCH RBC QN AUTO: 30.6 PG (ref 26–34)
MCHC RBC AUTO-ENTMCNC: 31.3 G/DL (ref 30–36.5)
MCV RBC AUTO: 97.7 FL (ref 80–99)
MONOCYTES # BLD: 0.51 K/UL (ref 0–1)
MONOCYTES NFR BLD: 7.5 % (ref 5–13)
NEUTS SEG # BLD: 4.63 K/UL (ref 1.8–8)
NEUTS SEG NFR BLD: 67.8 % (ref 32–75)
NRBC # BLD: 0 K/UL (ref 0–0.01)
NRBC BLD-RTO: 0 PER 100 WBC
PLATELET # BLD AUTO: 205 K/UL (ref 150–400)
PMV BLD AUTO: 11.1 FL (ref 8.9–12.9)
POTASSIUM SERPL-SCNC: 3.9 MMOL/L (ref 3.5–5.1)
POTASSIUM SERPL-SCNC: 4.2 MMOL/L (ref 3.5–5.1)
PROT SERPL-MCNC: 6.9 G/DL (ref 6.4–8.2)
RBC # BLD AUTO: 3.89 M/UL (ref 4.1–5.7)
SODIUM SERPL-SCNC: 138 MMOL/L (ref 136–145)
SODIUM SERPL-SCNC: 142 MMOL/L (ref 136–145)
SPECIMEN HOLD: NORMAL
WBC # BLD AUTO: 6.8 K/UL (ref 4.1–11.1)

## 2025-05-22 PROCEDURE — 2500000003 HC RX 250 WO HCPCS: Performed by: STUDENT IN AN ORGANIZED HEALTH CARE EDUCATION/TRAINING PROGRAM

## 2025-05-22 PROCEDURE — 2060000000 HC ICU INTERMEDIATE R&B

## 2025-05-22 PROCEDURE — 2709999900 HC NON-CHARGEABLE SUPPLY: Performed by: INTERNAL MEDICINE

## 2025-05-22 PROCEDURE — 6370000000 HC RX 637 (ALT 250 FOR IP): Performed by: NURSE PRACTITIONER

## 2025-05-22 PROCEDURE — 6360000002 HC RX W HCPCS: Performed by: FAMILY MEDICINE

## 2025-05-22 PROCEDURE — 33285 INSJ SUBQ CAR RHYTHM MNTR: CPT | Performed by: INTERNAL MEDICINE

## 2025-05-22 PROCEDURE — 6360000002 HC RX W HCPCS: Performed by: INTERNAL MEDICINE

## 2025-05-22 PROCEDURE — 93005 ELECTROCARDIOGRAM TRACING: CPT | Performed by: STUDENT IN AN ORGANIZED HEALTH CARE EDUCATION/TRAINING PROGRAM

## 2025-05-22 PROCEDURE — 6360000002 HC RX W HCPCS: Performed by: EMERGENCY MEDICINE

## 2025-05-22 PROCEDURE — 2580000003 HC RX 258: Performed by: FAMILY MEDICINE

## 2025-05-22 PROCEDURE — 99223 1ST HOSP IP/OBS HIGH 75: CPT | Performed by: INTERNAL MEDICINE

## 2025-05-22 PROCEDURE — 96374 THER/PROPH/DIAG INJ IV PUSH: CPT

## 2025-05-22 PROCEDURE — 6370000000 HC RX 637 (ALT 250 FOR IP): Performed by: STUDENT IN AN ORGANIZED HEALTH CARE EDUCATION/TRAINING PROGRAM

## 2025-05-22 PROCEDURE — 0JH602Z INSERTION OF MONITORING DEVICE INTO CHEST SUBCUTANEOUS TISSUE AND FASCIA, OPEN APPROACH: ICD-10-PCS | Performed by: INTERNAL MEDICINE

## 2025-05-22 PROCEDURE — 99285 EMERGENCY DEPT VISIT HI MDM: CPT

## 2025-05-22 PROCEDURE — 80053 COMPREHEN METABOLIC PANEL: CPT

## 2025-05-22 PROCEDURE — 6370000000 HC RX 637 (ALT 250 FOR IP): Performed by: FAMILY MEDICINE

## 2025-05-22 PROCEDURE — 85025 COMPLETE CBC W/AUTO DIFF WBC: CPT

## 2025-05-22 PROCEDURE — 99233 SBSQ HOSP IP/OBS HIGH 50: CPT | Performed by: NURSE PRACTITIONER

## 2025-05-22 PROCEDURE — 82077 ASSAY SPEC XCP UR&BREATH IA: CPT

## 2025-05-22 PROCEDURE — C1764 EVENT RECORDER, CARDIAC: HCPCS | Performed by: INTERNAL MEDICINE

## 2025-05-22 DEVICE — 3+ INSERTABLE CARDIAC MONITOR
Type: IMPLANTABLE DEVICE | Status: FUNCTIONAL
Brand: ASSERT-IQ™

## 2025-05-22 RX ORDER — CLOPIDOGREL BISULFATE 75 MG/1
75 TABLET ORAL DAILY
Status: DISCONTINUED | OUTPATIENT
Start: 2025-05-22 | End: 2025-05-22 | Stop reason: HOSPADM

## 2025-05-22 RX ORDER — ENOXAPARIN SODIUM 100 MG/ML
30 INJECTION SUBCUTANEOUS DAILY
Status: DISCONTINUED | OUTPATIENT
Start: 2025-05-22 | End: 2025-05-22

## 2025-05-22 RX ORDER — SODIUM CHLORIDE 0.9 % (FLUSH) 0.9 %
5-40 SYRINGE (ML) INJECTION EVERY 12 HOURS SCHEDULED
Status: DISCONTINUED | OUTPATIENT
Start: 2025-05-22 | End: 2025-05-23 | Stop reason: HOSPADM

## 2025-05-22 RX ORDER — ATORVASTATIN CALCIUM 20 MG/1
80 TABLET, FILM COATED ORAL NIGHTLY
Status: DISCONTINUED | OUTPATIENT
Start: 2025-05-22 | End: 2025-05-22 | Stop reason: HOSPADM

## 2025-05-22 RX ORDER — LABETALOL 100 MG/1
400 TABLET, FILM COATED ORAL 3 TIMES DAILY
Status: DISCONTINUED | OUTPATIENT
Start: 2025-05-22 | End: 2025-05-22 | Stop reason: HOSPADM

## 2025-05-22 RX ORDER — SODIUM CHLORIDE 9 MG/ML
INJECTION, SOLUTION INTRAVENOUS PRN
Status: DISCONTINUED | OUTPATIENT
Start: 2025-05-22 | End: 2025-05-23 | Stop reason: HOSPADM

## 2025-05-22 RX ORDER — LABETALOL HYDROCHLORIDE 5 MG/ML
20 INJECTION, SOLUTION INTRAVENOUS
Status: COMPLETED | OUTPATIENT
Start: 2025-05-22 | End: 2025-05-22

## 2025-05-22 RX ORDER — ONDANSETRON 2 MG/ML
4 INJECTION INTRAMUSCULAR; INTRAVENOUS EVERY 6 HOURS PRN
Status: DISCONTINUED | OUTPATIENT
Start: 2025-05-22 | End: 2025-05-23 | Stop reason: HOSPADM

## 2025-05-22 RX ORDER — SODIUM CHLORIDE 0.9 % (FLUSH) 0.9 %
5-40 SYRINGE (ML) INJECTION PRN
Status: DISCONTINUED | OUTPATIENT
Start: 2025-05-22 | End: 2025-05-23 | Stop reason: HOSPADM

## 2025-05-22 RX ORDER — ONDANSETRON 4 MG/1
4 TABLET, ORALLY DISINTEGRATING ORAL EVERY 8 HOURS PRN
Status: DISCONTINUED | OUTPATIENT
Start: 2025-05-22 | End: 2025-05-23 | Stop reason: HOSPADM

## 2025-05-22 RX ORDER — LORAZEPAM 2 MG/ML
1 INJECTION INTRAMUSCULAR ONCE
Status: COMPLETED | OUTPATIENT
Start: 2025-05-22 | End: 2025-05-22

## 2025-05-22 RX ORDER — HYDRALAZINE HYDROCHLORIDE 20 MG/ML
10 INJECTION INTRAMUSCULAR; INTRAVENOUS EVERY 6 HOURS PRN
Status: DISCONTINUED | OUTPATIENT
Start: 2025-05-22 | End: 2025-05-23 | Stop reason: HOSPADM

## 2025-05-22 RX ORDER — LABETALOL 100 MG/1
400 TABLET, FILM COATED ORAL 3 TIMES DAILY
Status: DISCONTINUED | OUTPATIENT
Start: 2025-05-22 | End: 2025-05-22

## 2025-05-22 RX ORDER — ATORVASTATIN CALCIUM 40 MG/1
80 TABLET, FILM COATED ORAL NIGHTLY
Status: DISCONTINUED | OUTPATIENT
Start: 2025-05-22 | End: 2025-05-23 | Stop reason: HOSPADM

## 2025-05-22 RX ORDER — ASPIRIN 300 MG/1
300 SUPPOSITORY RECTAL DAILY
Status: DISCONTINUED | OUTPATIENT
Start: 2025-05-22 | End: 2025-05-23 | Stop reason: HOSPADM

## 2025-05-22 RX ORDER — ASPIRIN 81 MG/1
81 TABLET, CHEWABLE ORAL DAILY
Status: DISCONTINUED | OUTPATIENT
Start: 2025-05-22 | End: 2025-05-23 | Stop reason: HOSPADM

## 2025-05-22 RX ORDER — AMLODIPINE BESYLATE 5 MG/1
10 TABLET ORAL DAILY
Status: DISCONTINUED | OUTPATIENT
Start: 2025-05-22 | End: 2025-05-22 | Stop reason: HOSPADM

## 2025-05-22 RX ORDER — POLYETHYLENE GLYCOL 3350 17 G/17G
17 POWDER, FOR SOLUTION ORAL DAILY PRN
Status: DISCONTINUED | OUTPATIENT
Start: 2025-05-22 | End: 2025-05-23 | Stop reason: HOSPADM

## 2025-05-22 RX ADMIN — LORAZEPAM 1 MG: 2 INJECTION INTRAMUSCULAR; INTRAVENOUS at 21:08

## 2025-05-22 RX ADMIN — ASPIRIN 81 MG CHEWABLE TABLET 81 MG: 81 TABLET CHEWABLE at 21:08

## 2025-05-22 RX ADMIN — CLOPIDOGREL BISULFATE 75 MG: 75 TABLET, FILM COATED ORAL at 15:29

## 2025-05-22 RX ADMIN — ATORVASTATIN CALCIUM 80 MG: 40 TABLET, FILM COATED ORAL at 21:08

## 2025-05-22 RX ADMIN — LABETALOL HYDROCHLORIDE 400 MG: 100 TABLET, FILM COATED ORAL at 11:51

## 2025-05-22 RX ADMIN — SODIUM CHLORIDE, PRESERVATIVE FREE 10 ML: 5 INJECTION INTRAVENOUS at 08:12

## 2025-05-22 RX ADMIN — AMLODIPINE BESYLATE 10 MG: 5 TABLET ORAL at 11:44

## 2025-05-22 RX ADMIN — ASPIRIN 81 MG CHEWABLE TABLET 81 MG: 81 TABLET CHEWABLE at 08:12

## 2025-05-22 RX ADMIN — NICARDIPINE HYDROCHLORIDE 5 MG/HR: 25 INJECTION INTRAVENOUS at 22:29

## 2025-05-22 RX ADMIN — LABETALOL HYDROCHLORIDE 20 MG: 5 INJECTION, SOLUTION INTRAVENOUS at 20:09

## 2025-05-22 ASSESSMENT — PAIN SCALES - GENERAL
PAINLEVEL_OUTOF10: 0

## 2025-05-22 ASSESSMENT — PAIN - FUNCTIONAL ASSESSMENT: PAIN_FUNCTIONAL_ASSESSMENT: NONE - DENIES PAIN

## 2025-05-22 NOTE — NURSE NAVIGATOR
Neuroscience Care Navigation Note    Patient Overview   The patient is a 59 y.o. male with a history of prior CVA with residual left hemiparesis, CKD stage V, hypertension, dyslipidemia, anemia, GERD, and COPD presented with new-onset dysarthria and concerns for stroke. Imaging revealed two small acute infarcts in the left centrum semiovale and right parietal white matter. MRI findings were discussed with the patient, who is currently at his baseline for left-sided weakness. Neurology and cardiology are involved; a TTE with bubble study is pending. Blood pressure is being managed permissively due to stroke protocol. The patient is stable and expected to discharge home soon.    Past Medical History:       Diagnosis Date    Asthma     Chronic kidney disease     Chronic obstructive pulmonary disease (HCC)     Emphysema lung (HCC)     GERD (gastroesophageal reflux disease)     Heart failure (HCC) 10/2021    1 EPISODE DUE TO HIGH BP, NOW CLEARED UP PER PT AND WIFE    Hypertension     Stroke ([facility]) 2022       Past Surgical History:       Procedure Laterality Date    APPENDECTOMY      HERNIA REPAIR Right 12/05/2022    Robotic-assisted laparoscopic right inguinal hernia repair with mesh by Dr. Tan    ORTHOPEDIC SURGERY      collar bone (right)    OTHER SURGICAL HISTORY      HEMORRHOIDECTOMY       Assessment   Clinical Status & Health Management  Diagnosis Understanding: The patient understands key aspects of their condition and treatment but may benefit from further clarification.    Medication Adherence: The patient consistently takes medications as prescribed. Demonstrates full adherence with no reported issues.    Mobility/Functional Status: The patient is able to ambulate in his room  with minimal assistance.. The patient is at risk for falls due to motor impairments (e.g., gait instability).    Pain Management: The patient reports no issues with pain at this time..    Discharge mRS Score: 2 - Slight disability: able

## 2025-05-22 NOTE — ED PROVIDER NOTES
[facility] 6S NEURO-SCI TELE  EMERGENCY DEPARTMENT ENCOUNTER      Pt Name: Michael Rockwell  MRN: [id number]  Birthdate 1965  Date of evaluation: 5/22/2025  Provider: Moe Chaparro MD    CHIEF COMPLAINT       Chief Complaint   Patient presents with    Consultation         HISTORY OF PRESENT ILLNESS   (Location/Symptom, Timing/Onset, Context/Setting, Quality, Duration, Modifying Factors, Severity)  Note limiting factors.   59M w/ hx HTN, CHF, CKD, CVA, COPD returns to ER for re-admission. Pt was admitted for recent stroke and encephalopathy. He eloped from the inpatient unit after an EP procedure for placement of a loop recorder. Family states that has been confused for past one week and this is unchanged. Not c/o any pain, SOB or N/V. Family brought him back to ER so he can complete his work up. He has no physical complaints.             Review of External Medical Records:     Nursing Notes were reviewed.    REVIEW OF SYSTEMS    (2-9 systems for level 4, 10 or more for level 5)     Review of Systems   Unable to perform ROS: Mental status change       Except as noted above the remainder of the review of systems was reviewed and negative.       PAST MEDICAL HISTORY     Past Medical History:   Diagnosis Date    Asthma     Chronic kidney disease     Chronic obstructive pulmonary disease (HCC)     Emphysema lung (HCC)     GERD (gastroesophageal reflux disease)     Heart failure (HCC) 10/2021    1 EPISODE DUE TO HIGH BP, NOW CLEARED UP PER PT AND WIFE    Hypertension     Stroke (HCC) 2022         SURGICAL HISTORY       Past Surgical History:   Procedure Laterality Date    APPENDECTOMY      EP DEVICE PROCEDURE N/A 5/22/2025    Loop recorder insert performed by Ti Cunha MD at [facility] CARDIAC CATH LAB    HERNIA REPAIR Right 12/05/2022    Robotic-assisted laparoscopic right inguinal hernia repair with mesh by Dr. Tan    ORTHOPEDIC SURGERY      collar bone (right)    OTHER SURGICAL HISTORY      HEMORRHOIDECTOMY  specified.    DISCHARGE MEDICATIONS:  Discharge Medication List as of 5/23/2025  9:58 AM            Moe Chaparro MD (electronically signed)  Emergency Attending Physician       Perfect Serve Consult for Admission  2:17 PM    ED Room Number: 670/01  Patient Name and age:  Michael Rockwell 59 y.o.  male  [id number]  Working Diagnosis:   1. Poorly-controlled hypertension    2. Ischemic stroke (HCC)    3. Acute encephalopathy    4. Chronic kidney disease, unspecified CKD stage    5. Acute cerebrovascular accident (HCC)        Department: [facility] Adult ED - [phone]  Recommended Level of Care: telemetry  Readmission: No    Other:         Moe Chaparro MD  05/23/25 2278

## 2025-05-22 NOTE — CONSULTS
NEUROLOGY PROGRESS NOTE    Name Michael Rockwell Age 59 y.o.   MRN [id number]  1965     Consulting Physician: Hunter Yanez, *      Chief Complaint:  L-sided numbness/tingling      Assessment:     Principal Problem:    Stroke-like symptoms  Active Problems:    Difficulty with speech    Left sided numbness    Hemiplegia (HCC)    Current smoker    Stroke-like episode  Resolved Problems:    * No resolved hospital problems. *    Patient is a 59 yom with h/o bilat basal ganglia and L cerebellum infarcts, CKD, HTN, dyslipidemia and current smoker who presented this am with acute L-sided numbness and weakness with word finding difficulty. Symptoms lasted about 45 minute and then patient returned to baseline. NIHSS 6.  CT head no acute intracranial abnormality. CTA deferred by ED staff.     25:  MRI brain shows acute infarcts to R parietal white matter and L centrum semiovale. MRA head without significant stenosis or occlusion. Patient had brief episode of atrial tachycardia yesterday and reports palpitations for years. He states he was taking his aspirin and statin every day.   LDL 67.8, A1c 5.3    Multifocal acute infarcts/cryptogenic stroke  Recommendations:   - Continue aspirin 81 mg daily   - Start Plavix 75 mg daily x 30 days   - Continue atorvastatin 80 mg for goal LDL <70  - TTE pending  - Cardiology consulted and plan for ILR placement today  - Goal normotension/normoglycemia  - Smoking cessation counseling. Refusing nicotine patch today   - PT/OT/SLP assessments done  - Patient already has Neurology f/u appt scheduled 25   - Discussed no driving with the patient for 6 months from stroke or until cleared in outpatient. He verbalized understanding.      Will f/u with TTE once completed. Please contact with any questions, otherwise no further recommendations.  History of Present Illness:      This is a 59 y.o. male known well to the Neurology service with history of strokes

## 2025-05-22 NOTE — PROGRESS NOTES
Physical Therapy  Patient currently off the floor.  Will defer and continue to follow.  Latisha Case, PT

## 2025-05-22 NOTE — PROCEDURES
PROCEDURE NOTE  Date: 5/22/2025   Name: Michael Rockwell  YOB: 1965    Procedures  Cardiac Procedure Note   Patient: Michael Rockwell  MRN: [id number]  SSN: [id number]   YOB: 1965 Age: 59 y.o.  Sex: male    Date of Procedure: 5/22/2025   Pre-procedure Diagnosis: cryptogenic stroke  Post-procedure Diagnosis: same  Procedure: loop recorder implant  :  Dr. Ti Cunha MD    Assistant(s):  None  Anesthesia: local lidocaine  Estimated Blood Loss: Less than 10 mL   Specimens Removed: None  Findings: left chest loop recorder implant  Complications: None   Implants: Abbott loop recorder  Signed by:  Ti Cunha MD  5/22/2025  3:25 PM

## 2025-05-22 NOTE — PROGRESS NOTES
Hospitalist Progress Note  Hunter Montes MD  Answering service: [phone] OR [phone] from in house phone        Date of Service:  2025  NAME:  Michael Rockwell  :  1965  MRN:  [id number]      Admission Summary:   Michael Rockwell is a 59 y.o. male with history of CVA with residual left hemiparesis, CKD stage V, hypertension, dyslipidemia, anemia, GERD, COPD, who presented to ED accompanied by his wife concerns for stroke.  Patient and wife at bedside report onset of speech difficulty describing what appears to be difficulty with word finding and expression noted this morning around 7 AM.  Wife at bedside also reports there may have been some concern for disequilibrium yesterday evening but unsure; describing some left arm and left lower extremity weakness.  Chart review shows concern for left hemiparesis.      The patient denies any fever, chills, chest or abdominal pain, nausea, vomiting, cough, congestion, recent illness, palpitations, or dysuria.     Remarkable vitals on ER Presentation: Vital signs stable  Labs Remarkable for: Creatinine 4.5  ER Images: CT head showed no acute process  ER Rx: None    Interval history / Subjective:   Seen and examined for follow up of CVA. No acute complaints. Discussed that we are waiting for the echo, cardio consult, and nephro consult and hopefully he can go home this afternoon.     Assessment & Plan:      Strokelike symptoms  - Patient presenting with dysarthria, ?Encephalopathy  - Left-sided weakness from history appears to be chronic  - CT head showed no acute process  - Evaluated by teleneurology and per ED report CTA deferred due to CKD and patient deemed poor candidate for potential thrombectomy  - MRI brain: Two punctate foci of acute infarction, involving the left centrum semiovale  and right parietal white matte  - MRA brain unremarkable  - TTE with  injection 4 mg  4 mg IntraVENous Q6H PRN    polyethylene glycol (GLYCOLAX) packet 17 g  17 g Oral Daily PRN    aspirin chewable tablet 81 mg  81 mg Oral Daily    Or    aspirin suppository 300 mg  300 mg Rectal Daily     ______________________________________________________________________  EXPECTED LENGTH OF STAY: Unable to retrieve estimated LOS  ACTUAL LENGTH OF STAY:          1                 Hunter Montes MD

## 2025-05-22 NOTE — ED TRIAGE NOTES
Patient was admitted to hospital today and thought that he had been discharged so he left the hospital. Family brought him back to complete testing.

## 2025-05-22 NOTE — PLAN OF CARE
Problem: Chronic Conditions and Co-morbidities  Goal: Patient's chronic conditions and co-morbidity symptoms are monitored and maintained or improved  Outcome: Progressing  Flowsheets (Taken 5/21/2025 2000)  Care Plan - Patient's Chronic Conditions and Co-Morbidity Symptoms are Monitored and Maintained or Improved:   Monitor and assess patient's chronic conditions and comorbid symptoms for stability, deterioration, or improvement   Collaborate with multidisciplinary team to address chronic and comorbid conditions and prevent exacerbation or deterioration   Update acute care plan with appropriate goals if chronic or comorbid symptoms are exacerbated and prevent overall improvement and discharge     Problem: Discharge Planning  Goal: Discharge to home or other facility with appropriate resources  Outcome: Progressing  Flowsheets (Taken 5/21/2025 2000)  Discharge to home or other facility with appropriate resources:   Identify barriers to discharge with patient and caregiver   Identify discharge learning needs (meds, wound care, etc)   Arrange for needed discharge resources and transportation as appropriate   Arrange for interpreters to assist at discharge as needed   Refer to discharge planning if patient needs post-hospital services based on physician order or complex needs related to functional status, cognitive ability or social support system     Problem: Pain  Goal: Verbalizes/displays adequate comfort level or baseline comfort level  Outcome: Progressing     Problem: Skin/Tissue Integrity  Goal: Skin integrity remains intact  Description: 1.  Monitor for areas of redness and/or skin breakdown2.  Assess vascular access sites hourly3.  Every 4-6 hours minimum:  Change oxygen saturation probe site4.  Every 4-6 hours:  If on nasal continuous positive airway pressure, respiratory therapy assess nares and determine need for appliance change or resting period  Outcome: Progressing  Flowsheets  Taken 5/21/2025 2000 by

## 2025-05-22 NOTE — DISCHARGE SUMMARY
Discharge Summary       PATIENT ID: Michael Rockwell  MRN: [id number]   YOB: 1965    DATE OF ADMISSION: 5/20/2025 11:12 AM    DATE OF DISCHARGE: 5/22/25   PRIMARY CARE PROVIDER: Orestes Thurman MD     ATTENDING PHYSICIAN: Hunter Montes MD  DISCHARGING PROVIDER: Hunter Montes MD    To contact this individual call [phone] and ask the  to page.  If unavailable ask to be transferred the Adult Hospitalist Department.    CONSULTATIONS: IP CONSULT TO TELE-NEUROLOGY  IP CONSULT TO NEUROLOGY  IP CONSULT TO CASE MANAGEMENT  IP CONSULT TO NEPHROLOGY  IP CONSULT TO CARDIOLOGY    PROCEDURES/SURGERIES: Procedure(s):  Loop recorder insert    ADMITTING DIAGNOSES & HOSPITAL COURSE:   Michael Rockwell is a 59 y.o. male with history of CVA with residual left hemiparesis, CKD stage V, hypertension, dyslipidemia, anemia, GERD, COPD, who presented to ED accompanied by his wife concerns for stroke.  Patient and wife at bedside report onset of speech difficulty describing what appears to be difficulty with word finding and expression noted this morning around 7 AM.  Wife at bedside also reports there may have been some concern for disequilibrium yesterday evening but unsure; describing some left arm and left lower extremity weakness.  Chart review shows documented history of left hemiparesis.      The patient denies any fever, chills, chest or abdominal pain, nausea, vomiting, cough, congestion, recent illness, palpitations, or dysuria.  Remarkable vitals on ER Presentation: Vital signs stable  Labs Remarkable for: Creatinine 4.5  ER Images: CT head showed no acute process  ER Rx: None      Strokelike symptoms  - Patient presenting with dysarthria, ?Encephalopathy, left-sided weakness  - Left-sided weakness from history appears to be chronic  - CT head showed no acute process  - Evaluated by teleneurology and per ED report CTA deferred due to CKD and patient deemed poor candidate for

## 2025-05-22 NOTE — CARE COORDINATION
11am: Pt discussed in IDRs - no PT OT needs, Pt for dc home 5/22. No other INDIGO needs indicated.     XENIA DixonW

## 2025-05-22 NOTE — CONSULTS
NEPHROLOGY CONSULT NOTE     Patient: Michael Rockwell MRN: [id number]  PCP: Orestes Thurman MD   :     1965  Age:   59 y.o.  Sex:  male      Referring physician: Hunter Yanez, *  Reason for consultation: 59 y.o. male with Stroke-like symptoms [R29.90]  Difficulty with speech [R47.9]  Stroke-like episode [R29.90] complicated by JEWELS   Admission Date: 2025 11:12 AM  LOS: 1 day      ASSESSMENT and PLAN :   JEWELS on CKD:  - suspect progressive disease  - U/S neg for hydro on last admission, did show large bladder volume  - Cr stable  - ok for d/c once BP stabilized and ok from neuro standpoint  - no urgent need for RRT, but will need to follow up with nephrologist and start preparations for this     CKD 4:  - former Dr. Barnes pt  - baseline Cr around 2.8 to 3     HTN:  - resume home meds    CVA:  - MRI showing 2 new acute infarctions  - per neurology     Hx of CVA  HLD     Active Problems / Assessment AAActive  :   Principal Problem:    Stroke-like symptoms  Active Problems:    Difficulty with speech    Left sided numbness    Hemiplegia (HCC)    Current smoker    Stroke-like episode  Resolved Problems:    * No resolved hospital problems. *       Subjective:   HPI: Michael Rockwell is a 59 y.o.  male who has been admitted to the hospital for word finding issues yesterday evening.  He has a hx of CKD 4, see by us about a month ago.  Cr 5, improved to 4.5 upon d/c.  He did not f/up with his outpt nephrologist.  CT head neg for any acute issues.   MRI showing 2 new infarcts.  Feeling better today and back to baseline.  Cr 4.5 and stable.  BP uncontrolled.  He is about to receive his home meds which were on hold yesterday.  No cp or sob reported    Past Medical Hx:   Past Medical History:   Diagnosis Date    Asthma     Chronic kidney disease     Chronic obstructive pulmonary disease (HCC)     Emphysema lung (HCC)     GERD (gastroesophageal reflux disease)     Heart failure (HCC) 10/2021    1  EPISODE DUE TO HIGH BP, NOW CLEARED UP PER PT AND WIFE    Hypertension     Stroke (HCC) 2022        Past Surgical Hx:     Past Surgical History:   Procedure Laterality Date    APPENDECTOMY      HERNIA REPAIR Right 12/05/2022    Robotic-assisted laparoscopic right inguinal hernia repair with mesh by Dr. Tan    ORTHOPEDIC SURGERY      collar bone (right)    OTHER SURGICAL HISTORY      HEMORRHOIDECTOMY       Medications:  Prior to Admission medications    Medication Sig Start Date End Date Taking? Authorizing Provider   albuterol sulfate HFA (PROVENTIL;VENTOLIN;PROAIR) 108 (90 Base) MCG/ACT inhaler Inhale 2 puffs into the lungs every 4 hours as needed   Yes Automatic Reconciliation, Ar   amLODIPine (NORVASC) 10 MG tablet Take 1 tablet by mouth daily 12/22/22  Yes Automatic Reconciliation, Ar   aspirin 81 MG chewable tablet Take 1 tablet by mouth daily 12/22/22  Yes Automatic Reconciliation, Ar   atorvastatin (LIPITOR) 80 MG tablet Take 1 tablet by mouth 12/22/22  Yes Automatic Reconciliation, Ar   labetalol (NORMODYNE) 200 MG tablet Take 2 tablets by mouth 3 times daily 12/22/22  Yes Automatic Reconciliation, Ar       Allergies   Allergen Reactions    Ace Inhibitors Swelling       Social Hx:  reports that he has been smoking cigarettes. He has never used smokeless tobacco. He reports that he does not currently use alcohol after a past usage of about 2.0 standard drinks of alcohol per week. He reports current drug use. Drug: Marijuana (Weed).     Family History   Problem Relation Age of Onset    Hypertension Father     Liver Disease Father     Anesth Problems Neg Hx     Lung Disease Mother        Review of Systems:  A twelve point review of system was performed today. Pertinent positives and negatives are mentioned in the HPI. The reminder of the ROS is negative and noncontributory.     Objective:    Vitals:    Vitals:    05/22/25 0600 05/22/25 1000 05/22/25 1056 05/22/25 1144   BP:   (!) 195/135 (!) 194/165

## 2025-05-22 NOTE — ED NOTES
6:50 PM  I have evaluated the patient as the Provider in Rapid Medical Evaluation (RME). I have reviewed his vital signs and the triage nurse assessment. I have talked with the patient and any available family and advised that I am the provider in triage and have ordered the appropriate study to initiate their work up based on the clinical presentation during my assessment. I have advised that the patient will be accommodated in the Main ED as soon as possible. I have also requested to contact the triage nurse or myself immediately if the patient experiences any changes in their condition during this brief waiting period.    59-year-old male presents ED with altered mental status. Patient was admitted to this 05/20 - 05/22 for a CVA. Today he had a loop recorder placed by Dr. Cunha. Patient was confused after this procedure and reports that he eloped from the hospital. His wife signed AMA paperwork on behalf of patient has brought him back now. She notes that she brought him back because she believes he needs more testing and is concerned that he is confused right now. BP is elevated in triage, 221/133 while in triage.     ANGELITA LAGOS Ashley, PA  05/22/25 2641

## 2025-05-22 NOTE — PLAN OF CARE
Problem: Chronic Conditions and Co-morbidities  Goal: Patient's chronic conditions and co-morbidity symptoms are monitored and maintained or improved  5/22/2025 1003 by Kristin Daniels RN  Outcome: Progressing  Flowsheets (Taken 5/22/2025 0800)  Care Plan - Patient's Chronic Conditions and Co-Morbidity Symptoms are Monitored and Maintained or Improved: Monitor and assess patient's chronic conditions and comorbid symptoms for stability, deterioration, or improvement  5/22/2025 0228 by Luck, Rylen, RN  Outcome: Progressing  Flowsheets (Taken 5/21/2025 2000)  Care Plan - Patient's Chronic Conditions and Co-Morbidity Symptoms are Monitored and Maintained or Improved:   Monitor and assess patient's chronic conditions and comorbid symptoms for stability, deterioration, or improvement   Collaborate with multidisciplinary team to address chronic and comorbid conditions and prevent exacerbation or deterioration   Update acute care plan with appropriate goals if chronic or comorbid symptoms are exacerbated and prevent overall improvement and discharge     Problem: Discharge Planning  Goal: Discharge to home or other facility with appropriate resources  5/22/2025 1003 by Kristin Daniels RN  Outcome: Progressing  Flowsheets (Taken 5/22/2025 0800)  Discharge to home or other facility with appropriate resources: Identify barriers to discharge with patient and caregiver  5/22/2025 0228 by Luck, Rylen, RN  Outcome: Progressing  Flowsheets (Taken 5/21/2025 2000)  Discharge to home or other facility with appropriate resources:   Identify barriers to discharge with patient and caregiver   Identify discharge learning needs (meds, wound care, etc)   Arrange for needed discharge resources and transportation as appropriate   Arrange for interpreters to assist at discharge as needed   Refer to discharge planning if patient needs post-hospital services based on physician order or complex needs related to functional status, cognitive ability  or social support system     Problem: Pain  Goal: Verbalizes/displays adequate comfort level or baseline comfort level  5/22/2025 1003 by Kristin Daniels RN  Outcome: Progressing  5/22/2025 0228 by Luck, Rylen, RN  Outcome: Progressing     Problem: Skin/Tissue Integrity  Goal: Skin integrity remains intact  Description: 1.  Monitor for areas of redness and/or skin breakdown2.  Assess vascular access sites hourly3.  Every 4-6 hours minimum:  Change oxygen saturation probe site4.  Every 4-6 hours:  If on nasal continuous positive airway pressure, respiratory therapy assess nares and determine need for appliance change or resting period  5/22/2025 1003 by Kristin Daniels RN  Outcome: Progressing  Flowsheets (Taken 5/22/2025 0800)  Skin Integrity Remains Intact: Monitor for areas of redness and/or skin breakdown  5/22/2025 0228 by Luck, Rylen, RN  Outcome: Progressing  Flowsheets  Taken 5/21/2025 2000 by Luck, Rylen, RN  Skin Integrity Remains Intact:   Monitor for areas of redness and/or skin breakdown   Assess vascular access sites hourly   Every 4-6 hours minimum:  Change oxygen saturation probe site   Every 4-6 hours:  If on nasal continuous positive airway pressure, assess nares and determine need for appliance change or resting period   Turn and reposition as indicated   Assess need for specialty bed   Positioning devices  Taken 5/21/2025 1644 by Annmarie Jean RN  Skin Integrity Remains Intact: Monitor for areas of redness and/or skin breakdown  Taken 5/21/2025 1600 by Annmarie Jean RN  Skin Integrity Remains Intact: Monitor for areas of redness and/or skin breakdown     Problem: Safety - Adult  Goal: Free from fall injury  5/22/2025 1003 by Kristin Daniels RN  Outcome: Progressing  Flowsheets (Taken 5/22/2025 0800)  Free From Fall Injury: Instruct family/caregiver on patient safety  5/22/2025 0228 by Luck, Rylen, RN  Outcome: Progressing  Flowsheets  Taken 5/21/2025 2000 by Luck, Rylen, RN  Free From Fall

## 2025-05-23 ENCOUNTER — APPOINTMENT (OUTPATIENT)
Facility: HOSPITAL | Age: 60
DRG: 045 | End: 2025-05-23
Attending: FAMILY MEDICINE
Payer: MEDICAID

## 2025-05-23 VITALS
TEMPERATURE: 96.8 F | HEART RATE: 71 BPM | BODY MASS INDEX: 22.79 KG/M2 | WEIGHT: 159.19 LBS | OXYGEN SATURATION: 96 % | RESPIRATION RATE: 18 BRPM | HEIGHT: 70 IN | DIASTOLIC BLOOD PRESSURE: 109 MMHG | SYSTOLIC BLOOD PRESSURE: 158 MMHG

## 2025-05-23 LAB
ANION GAP SERPL CALC-SCNC: 8 MMOL/L (ref 2–12)
BUN SERPL-MCNC: 34 MG/DL (ref 6–20)
BUN/CREAT SERPL: 8 (ref 12–20)
CALCIUM SERPL-MCNC: 8.9 MG/DL (ref 8.5–10.1)
CHLORIDE SERPL-SCNC: 111 MMOL/L (ref 97–108)
CHOLEST SERPL-MCNC: 169 MG/DL
CO2 SERPL-SCNC: 21 MMOL/L (ref 21–32)
CREAT SERPL-MCNC: 4.21 MG/DL (ref 0.7–1.3)
ECHO AO ROOT DIAM: 3.6 CM
ECHO AO ROOT INDEX: 1.9 CM/M2
ECHO AR MAX VEL PISA: 4 M/S
ECHO AV MEAN GRADIENT: 4 MMHG
ECHO AV MEAN VELOCITY: 0.9 M/S
ECHO AV PEAK GRADIENT: 8 MMHG
ECHO AV PEAK VELOCITY: 1.4 M/S
ECHO AV REGURGITANT PHT: 897.2 MS
ECHO AV VTI: 25.5 CM
ECHO BSA: 1.89 M2
ECHO EST RA PRESSURE: 3 MMHG
ECHO LA DIAMETER INDEX: 2.01 CM/M2
ECHO LA DIAMETER: 3.8 CM
ECHO LA TO AORTIC ROOT RATIO: 1.06
ECHO LA VOL A-L A2C: 74 ML (ref 18–58)
ECHO LA VOL A-L A4C: 80 ML (ref 18–58)
ECHO LA VOL MOD A2C: 70 ML (ref 18–58)
ECHO LA VOL MOD A4C: 73 ML (ref 18–58)
ECHO LA VOLUME AREA LENGTH: 83 ML
ECHO LA VOLUME INDEX A-L A2C: 39 ML/M2 (ref 16–34)
ECHO LA VOLUME INDEX A-L A4C: 42 ML/M2 (ref 16–34)
ECHO LA VOLUME INDEX AREA LENGTH: 44 ML/M2 (ref 16–34)
ECHO LA VOLUME INDEX MOD A2C: 37 ML/M2 (ref 16–34)
ECHO LA VOLUME INDEX MOD A4C: 39 ML/M2 (ref 16–34)
ECHO LV E' LATERAL VELOCITY: 4.28 CM/S
ECHO LV E' SEPTAL VELOCITY: 4.44 CM/S
ECHO LV EF PHYSICIAN: 55 %
ECHO LV FRACTIONAL SHORTENING: 20 % (ref 28–44)
ECHO LV INTERNAL DIMENSION DIASTOLE INDEX: 2.7 CM/M2
ECHO LV INTERNAL DIMENSION DIASTOLIC: 5.1 CM (ref 4.2–5.9)
ECHO LV INTERNAL DIMENSION SYSTOLIC INDEX: 2.17 CM/M2
ECHO LV INTERNAL DIMENSION SYSTOLIC: 4.1 CM
ECHO LV IVSD: 1.5 CM (ref 0.6–1)
ECHO LV MASS 2D: 349 G (ref 88–224)
ECHO LV MASS INDEX 2D: 184.6 G/M2 (ref 49–115)
ECHO LV POSTERIOR WALL DIASTOLIC: 1.6 CM (ref 0.6–1)
ECHO LV RELATIVE WALL THICKNESS RATIO: 0.63
ECHO LVOT AREA: 2.8 CM2
ECHO LVOT DIAM: 1.9 CM
ECHO MV A VELOCITY: 0.71 M/S
ECHO MV AREA PHT: 3.6 CM2
ECHO MV E DECELERATION TIME (DT): 213.5 MS
ECHO MV E VELOCITY: 0.7 M/S
ECHO MV E/A RATIO: 0.99
ECHO MV E/E' LATERAL: 16.36
ECHO MV E/E' RATIO (AVERAGED): 16.06
ECHO MV E/E' SEPTAL: 15.77
ECHO MV MAX VELOCITY: 0.8 M/S
ECHO MV MEAN GRADIENT: 1 MMHG
ECHO MV MEAN VELOCITY: 0.5 M/S
ECHO MV PEAK GRADIENT: 3 MMHG
ECHO MV PRESSURE HALF TIME (PHT): 61.9 MS
ECHO MV VTI: 28.6 CM
ECHO RV FREE WALL PEAK S': 12.6 CM/S
ECHO RV TAPSE: 2.3 CM (ref 1.7–?)
EKG ATRIAL RATE: 70 BPM
EKG DIAGNOSIS: NORMAL
EKG P AXIS: 68 DEGREES
EKG P-R INTERVAL: 182 MS
EKG Q-T INTERVAL: 424 MS
EKG QRS DURATION: 100 MS
EKG QTC CALCULATION (BAZETT): 457 MS
EKG R AXIS: 49 DEGREES
EKG T AXIS: 256 DEGREES
EKG VENTRICULAR RATE: 70 BPM
ERYTHROCYTE [DISTWIDTH] IN BLOOD BY AUTOMATED COUNT: 13.3 % (ref 11.5–14.5)
EST. AVERAGE GLUCOSE BLD GHB EST-MCNC: 100 MG/DL
GLUCOSE SERPL-MCNC: 100 MG/DL (ref 65–100)
HBA1C MFR BLD: 5.1 % (ref 4–5.6)
HCT VFR BLD AUTO: 36.7 % (ref 36.6–50.3)
HDLC SERPL-MCNC: 94 MG/DL
HDLC SERPL: 1.8 (ref 0–5)
HGB BLD-MCNC: 12.1 G/DL (ref 12.1–17)
LDLC SERPL CALC-MCNC: 54.6 MG/DL (ref 0–100)
MCH RBC QN AUTO: 30.7 PG (ref 26–34)
MCHC RBC AUTO-ENTMCNC: 33 G/DL (ref 30–36.5)
MCV RBC AUTO: 93.1 FL (ref 80–99)
NRBC # BLD: 0 K/UL (ref 0–0.01)
NRBC BLD-RTO: 0 PER 100 WBC
PLATELET # BLD AUTO: 195 K/UL (ref 150–400)
PMV BLD AUTO: 11.6 FL (ref 8.9–12.9)
POTASSIUM SERPL-SCNC: 3.6 MMOL/L (ref 3.5–5.1)
RBC # BLD AUTO: 3.94 M/UL (ref 4.1–5.7)
SODIUM SERPL-SCNC: 140 MMOL/L (ref 136–145)
TRIGL SERPL-MCNC: 102 MG/DL
VLDLC SERPL CALC-MCNC: 20.4 MG/DL
WBC # BLD AUTO: 6.7 K/UL (ref 4.1–11.1)

## 2025-05-23 PROCEDURE — 80048 BASIC METABOLIC PNL TOTAL CA: CPT

## 2025-05-23 PROCEDURE — 85027 COMPLETE CBC AUTOMATED: CPT

## 2025-05-23 PROCEDURE — 93306 TTE W/DOPPLER COMPLETE: CPT

## 2025-05-23 PROCEDURE — 83036 HEMOGLOBIN GLYCOSYLATED A1C: CPT

## 2025-05-23 PROCEDURE — 6370000000 HC RX 637 (ALT 250 FOR IP): Performed by: FAMILY MEDICINE

## 2025-05-23 PROCEDURE — 6360000002 HC RX W HCPCS: Performed by: FAMILY MEDICINE

## 2025-05-23 PROCEDURE — 80061 LIPID PANEL: CPT

## 2025-05-23 PROCEDURE — 2580000003 HC RX 258: Performed by: FAMILY MEDICINE

## 2025-05-23 PROCEDURE — 36415 COLL VENOUS BLD VENIPUNCTURE: CPT

## 2025-05-23 RX ORDER — AMLODIPINE BESYLATE 5 MG/1
10 TABLET ORAL DAILY
Status: DISCONTINUED | OUTPATIENT
Start: 2025-05-23 | End: 2025-05-23

## 2025-05-23 RX ORDER — CLOPIDOGREL BISULFATE 75 MG/1
75 TABLET ORAL DAILY
Status: DISCONTINUED | OUTPATIENT
Start: 2025-05-23 | End: 2025-05-23 | Stop reason: HOSPADM

## 2025-05-23 RX ORDER — CLONIDINE HYDROCHLORIDE 0.1 MG/1
0.1 TABLET ORAL 2 TIMES DAILY
Qty: 60 TABLET | Refills: 0 | Status: SHIPPED | OUTPATIENT
Start: 2025-05-23

## 2025-05-23 RX ORDER — LABETALOL 100 MG/1
400 TABLET, FILM COATED ORAL 3 TIMES DAILY
Status: DISCONTINUED | OUTPATIENT
Start: 2025-05-23 | End: 2025-05-23 | Stop reason: HOSPADM

## 2025-05-23 RX ORDER — AMLODIPINE BESYLATE 5 MG/1
10 TABLET ORAL DAILY
Status: DISCONTINUED | OUTPATIENT
Start: 2025-05-23 | End: 2025-05-23 | Stop reason: HOSPADM

## 2025-05-23 RX ORDER — CLOPIDOGREL BISULFATE 75 MG/1
75 TABLET ORAL DAILY
Qty: 30 TABLET | Refills: 0 | Status: SHIPPED | OUTPATIENT
Start: 2025-05-24

## 2025-05-23 RX ORDER — CLONIDINE HYDROCHLORIDE 0.1 MG/1
0.1 TABLET ORAL 2 TIMES DAILY
Status: DISCONTINUED | OUTPATIENT
Start: 2025-05-23 | End: 2025-05-23 | Stop reason: HOSPADM

## 2025-05-23 RX ORDER — HYDROCHLOROTHIAZIDE 25 MG/1
12.5 TABLET ORAL DAILY
Status: DISCONTINUED | OUTPATIENT
Start: 2025-05-23 | End: 2025-05-23 | Stop reason: HOSPADM

## 2025-05-23 RX ORDER — HYDROCHLOROTHIAZIDE 12.5 MG/1
25 TABLET ORAL DAILY
Qty: 30 TABLET | Refills: 0 | Status: SHIPPED | OUTPATIENT
Start: 2025-05-24

## 2025-05-23 RX ADMIN — LABETALOL HYDROCHLORIDE 400 MG: 100 TABLET, FILM COATED ORAL at 08:36

## 2025-05-23 RX ADMIN — CLONIDINE HYDROCHLORIDE 0.1 MG: 0.1 TABLET ORAL at 08:36

## 2025-05-23 RX ADMIN — AMLODIPINE BESYLATE 10 MG: 5 TABLET ORAL at 08:32

## 2025-05-23 RX ADMIN — NICARDIPINE HYDROCHLORIDE 5 MG/HR: 25 INJECTION INTRAVENOUS at 02:43

## 2025-05-23 RX ADMIN — HYDROCHLOROTHIAZIDE 12.5 MG: 25 TABLET ORAL at 08:32

## 2025-05-23 RX ADMIN — CLOPIDOGREL BISULFATE 75 MG: 75 TABLET, FILM COATED ORAL at 08:32

## 2025-05-23 RX ADMIN — ASPIRIN 81 MG CHEWABLE TABLET 81 MG: 81 TABLET CHEWABLE at 08:32

## 2025-05-23 NOTE — DISCHARGE SUMMARY
Discharge Summary       PATIENT ID: Michael Rockwell  MRN: [id number]   YOB: 1965    DATE OF ADMISSION: 5/22/2025  7:24 PM    DATE OF DISCHARGE: 5/23/2025   PRIMARY CARE PROVIDER: Orestes Thurman MD     ATTENDING PHYSICIAN: Masood bAebe  DISCHARGING PROVIDER: Masood Abebe MD      CONSULTATIONS: IP CONSULT TO NEUROLOGY  IP CONSULT TO CASE MANAGEMENT    PROCEDURES/SURGERIES: * No surgery found *    ADMISSION SUMMARY AND HOSPITAL COURSE:     Michael Rockwell is a 59 y.o. male with a past medical history of asthma/COPD, CKD V, GERD, hypertension, HFpEF, anemia, and past stroke with residual left-sided hemiparesis who presents after he left AMA from this hospital for stroke admission with ILR implantation.   He was initially admitted on May 20 with focal neurologic deficit described as expressive aphasia, left upper, and lower extremity weakness, and and ataxia concerning for stroke.  He was also noted to be encephalopathic.  MRI brain showed 2 punctate foci of acute infarction involving the left centrum semiovale, and right parietal white matter.  Echocardiogram, and  loop recorder implantation as an outpatient was pending when patient left due to believing that he had been discharged.  Family states that he was confused, and has brought him back for further evaluation.    Upon admission to the hospital, echocardiogram was attempted however patient needs to get IV line for staging agitated study however patient refused continuing attempt to place IV.  Discussed with neurology service and they recommended echo cardiogram as part of the stroke workup and also needs to wait for result to make sure no further intervention needed in case of abnormal echo.  I have discussed with patient and wife present at bedside about the importance of completing the stroke workup however they have decided to leave AMA.  Neurology has recommended aspirin, Plavix and statin and prescriptions have been sent to their

## 2025-05-23 NOTE — PROGRESS NOTES
Speech Pathology Note    New orders received for SLP evaluation acknowledged.  Patient was discharged by SLP on 5/21.  Unable to complete new evaluation as RN indicated patient was agitated and combative with security in room and likely leaving AMA.  Will sign off at this time as no neurological changes have occurred since last SLP evaluation.    Thank you,  Sugey Crook, SLP

## 2025-05-23 NOTE — PROGRESS NOTES
0730 Bedside shift report from Yara RN    0900 Wife is agitated stated \"yall aren't doing your jobs\" requesting to see the hospitalist and requesting discharge papers    0915 Andrae VORA @ bedside, pt education provided on risks of leaving, provider unable to provide discharge papers d/t pt condition, pt and wife upset and agitated     0945 Pt and wife agitated,Code atlas called, pt and wife requesting to leave, educated on risks of leaving against medical advice, AMA signed formed    0977 Pt left AMA with RN supervisor and security      Date of follow up: 3/8/2018    Reason for follow up:  Presumed ischemic cardiomyopathy  Chronic systolic heart failure  Hypotension    PCP: Molly Man MD    HPI:   89 year old female resident of [facility] os an assisted living facility with a history of presumed ischemia cardiomyopathy, NSTEMI (11/16), severe left ventricular systolic dysfunction, chronic systolic heart failure, pulmonary hypertension, hypertension, dyslipidemia, osteopenia, osteoarthritis, eczema and chronic left lower extremity wound who presents for a follow up visit.    11/10-11/14/16 admitted with worsening exertional dyspnea, orthopnea and bilateral lower extremity edema of one month duration. Presenting BNP was 757 and Troponin I was 0.11. Echocardiogram revealed an ejection fraction of 19%.      Patient moved to [facility] in 12/16.  Patient uses a walker at all times and walks the halls or even in her house.    Patient Active Problem List    Diagnosis Date Noted   • Arthritis 02/12/2018     Priority: Low   • Idiopathic hypotension 11/30/2017     Priority: Low   • Secondary hypertension 02/23/2017     Priority: Low   • Cardiomyopathy (CMS/[facility]ly [facility]) 11/17/2016     Priority: Low   • Pulmonary hypertension 11/17/2016     Priority: Low   • Chronic systolic heart failure (CMS/[facility]ly [facility]) 11/17/2016     Priority: Low   • Localized edema 11/17/2016     Priority: Low   • NSTEMI (non-ST elevated myocardial infarction) (CMS/Formerly [facility]) 11/17/2016     Priority: Low   • Hospital discharge follow-up 11/17/2016     Priority: Low   • Elevated blood pressure reading without diagnosis of hypertension 10/26/2012     Priority: Low   • Osteopenia 10/26/2012     Priority: Low   • Hip joint replacement by other means 04/24/2009     Priority: Low     Past Medical History:   Diagnosis Date   • Chronic pain    • Elevated blood pressure reading without diagnosis of hypertension    • Osteoarthrosis, unspecified  whether generalized or localized, lower leg     Right knee   • Osteoporosis 9/28/2005   • Other and unspecified hyperlipidemia    • Primary localized osteoarthrosis, pelvic region and thigh     Left and right hip     Past Surgical History:   Procedure Laterality Date   • Close tear system fistula  1-1-2002   • Colonoscopy diagnostic  11/15/2006   • Dexa bone density axial skeleton  9/28/2005   • Hysterectomy  10/30/2007   • Joint replacement      bilateral hip replacement   • Repair detach retina,scleral buckle  09/01/1991    OD,Retinal Detachment Repair/Scleral Buckle   • Total abdom hysterectomy  01/01/2007   • Total hip replacement  01/18/1999    Right hip   • Total hip replacement  4/13/08    left     Prior to Admission medications    Medication Sig Start Date End Date Taking? Authorizing Provider   CALCIUM PO Take 1 tablet (=1000mg) by mouth once daily on Tuesday, Thursday and Saturday.   Yes Outside Provider   Multiple Vitamins-Minerals (MULTIVITAMIN ADULT PO) Take 1 tablet by mouth once daily on Mondays, Wednesdays and Fridays.   Yes Outside Provider   amoxicillin (AMOXIL) 500 MG tablet 4 tabs one hour prior to dental work 10/6/14  Yes Chino Powell MD       ALLERGIES:  No Known Allergies  Social History   Substance Use Topics   • Smoking status: Never Smoker   • Smokeless tobacco: Never Used   • Alcohol use No     Family History   Problem Relation Age of Onset   • Heart Father      MI   • Heart disease Father    • Cancer Daughter      Breast   • Diabetes Daughter      Type 1   • Dementia/Alzheimers Brother    • Diabetes Brother      Type 2   • Heart Son      heart  attack age 50   • Diabetes Brother      type 2   • Thyroid Daughter    • Thyroid Son    • Heart Son      Review of Systems :  No unexplained weight gain  Respiratory:   Negative for apnea, shortness of breath, orthopnea, PND, chest tightness.    Cardiovascular:   Negative for chest pain, palpitations, lightheadedness, near-syncope, syncope or  leg swelling.    Neurological:   Negative for dizziness    Objective:    Vital Last Value 24 Hour Range   Temperature 97.4 °F (36.3 °C) (03/08/18 1258) Temp  Min: 97.4 °F (36.3 °C)  Max: 97.4 °F (36.3 °C)   Pulse 71 (03/08/18 1258) Pulse  Min: 71  Max: 71   Respiratory 16 (03/08/18 1258) Resp  Min: 16  Max: 16   Non-Invasive  Blood Pressure 113/54 (03/08/18 1258) BP  Min: 113/54  Max: 113/54   Arterial  Blood Pressure   No Data Recorded   Pulse Oximetry   No Data Recorded     Vital Today Admission   Weight 57.8 kg (03/08/18 1258) Weight: 57.8 kg (03/08/18 1258)     Weight over the past 48 Hours:  Patient Vitals for the past 48 hrs:   Weight   03/08/18 1258 57.8 kg      Physical Exam     Constitutional:   Oriented to person, place, and time. Appears well-developed and well-nourished. No distress.   HENT:   No obvious deformity.  Head:   Normocephalic and atraumatic.   Neck:   No JVD present. Carotid bruit is not present.   Cardiovascular:   Normal rate, regular rhythm and intact distal pulses.  Exam reveals no gallop and no friction rub.    Murmur heard.  Pulmonary/Chest:   Effort normal and breath sounds normal. No respiratory distress. No wheezes or rales.    Musculoskeletal:   No edema   Neurological:   Alert and oriented to person, place, and time.   Psychiatric:   Normal mood and affect. Behavior is normal. Judgment and thought content normal.     Laboratory Results:  Lab Results   Component Value Date    SODIUM 143 02/12/2018    POTASSIUM 4.3 02/12/2018    BUN 30 (H) 02/12/2018    CREATININE 1.22 (H) 02/12/2018    WBC 5.7 02/12/2018    HCT 42.6 02/12/2018    HGB 13.9 02/12/2018    INR 1.7 05/13/2009    CPK 83 10/27/2015    RAPDTR 0.12 (HH) 11/11/2016    GLUCOSE 96 02/12/2018    TSH 2.390 11/10/2016     (H) 11/10/2016    CHOLESTEROL 182 02/12/2018     02/12/2018    CALCLDL 60 02/12/2018    TRIGLYCERIDE 87 02/12/2018    MG 2.4 11/14/2016     ECG:    Results for orders placed or performed during the  hospital encounter of 11/10/16   ECG   Result Value Ref Range    Systolic Blood Pressure 133     Diastolic Blood Pressure 80     Ventricular Rate EKG/Min (BPM) 96     Atrial Rate (BPM) 96     WY-Interval (MSEC) 182     QRS-Interval (MSEC) 126     QT-Interval (MSEC) 398     QTc 502     P Axis (Degrees) 70     R Axis (Degrees) -79     T Axis (Degrees) 104     REPORT TEXT       Normal sinus rhythm  Left axis deviation  Nonspecific intraventricular block  Cannot rule out  Anteroseptal infarct  , age undetermined  Abnormal ECG  When compared with ECG of  23-MAY-2013 10:41,  Vent. rate  has increased  BY  34 BPM  Confirmed by PAYAM BUSCH, DWAIN STEWART ([id number]) on 11/10/2016 11:07:26 AM       Imaging and other studies:  Reviewed  Chest X-Ray     Best Practice Guidelines:    AMI and/or Systolic Heart Failure  · LVEF documented :yes  · ACE/ARB ordered for LVEF<40%: No, hypotension  · Beta Blocker ordered:Yes  · Aldosterone blocking agent prescribed for acute AMI patients with LVEF<40% and history of current diabetes or CHF: hypotension, creatinine rising  · AICD for EF < 35%: Conservative management per patient and family preferred conservative management  · Hydralazine and Nitrate ordered for an  patient with LVEF <40% :Not Indicated    Atrial Fibrillation: Not Applicable, If Yes, then Anticoagulation ordered:Not Applicable     Echocardiogram 11/11/16:  Severely decreased left ventricular systolic function  Left ventricular ejection fraction, 19 %  Grade II/IV diastolic dysfunction  Mild to moderate mitral valve regurgitation  Mild-to-moderate tricuspid valve regurgitation  Moderate pulmonary hypertension, Right ventricular systolic pressure 57.3 mmHg  Pleural effusion noted  No prior study available for comparison    CXR 11/10/16  Findings most suggestive of underlying congestive heart failure.    Regadenoson nuclear stress test 05/23/13:  No evidence of infarct or ischemia.  LVEF 71%    DSE  05/01/13:  Estimated left ventricular ejection fraction 60 %.  Dobutamine stress test cancelled.  Regadenoson nuclear stress test ordered as patient has baseline LBBB on EKG.    Assessment:  89 year old female  resident of [facility] independent section os an assisted living facility with a history of presumed ischemia cardiomyopathy, NSTEMI (11/16), severe left ventricular systolic dysfunction, chronic systolic heart failure, pulmonary hypertension, hypertension, dyslipidemia, osteopenia, osteoarthritis, eczema and chronic left lower extremity wound who presents for a follow up visit.    Plan:    Cardiomyopathy/ Chronic systolic heart failure/ Pulmonary hypertension/ Pleural effusion:  Continue Lasix to 40 mg daily therapy.     Patient can use additional Lasix 20-40 mg as needed for weight gain > 3 lb in a day or > 5 lb in a week, worsening shortness of breath or increasing edema (has not taken any since last visit).  Check daily weight.   A low sodium (below 1000 mg daily) diet recommended.  Avoid use of NSAID including but not limited to Ibuprofen, Advil and Aleve.   Continue Carvedilol 3.125 mg twice daily therapy.  Lisinopril therapy was discontinued during hospitalization because of hypotension.    Presumed coronary artery disease/ NSTEMI, mildly elevated Troponin 11/16:  Continue Aspirin, statin and beta blocker therapy therapy.  Patient and family want conservative, non-invasive management.    Hypertension:  Low normal blood pressure.  Home monitoring systolic blood pressure 116-120 mmHg  Asymptomatic.  Recommend Midodrine 2.5 mg twice daily if patient has symptomatic hypotension.    Thank you for allowing me to participate in the care of your patient please do not hesitate to call with questions.

## 2025-05-23 NOTE — CONSULTS
Neurology was re-consulted on patient today but he is not a new consult. He was seen yesterday for the second time while admitted for stroke work-up and then left AMA prior to getting his TTE. He came back last night for TTE. Discussed with Dr. Abebe that we should get a read on the TTE prior to him being discharged today to eval for thrombus. Also discussed with Dr. Ramirez starting Plavix indefinitely in addition to aspirin. Patient should start Plavix 75 mg daily today. If patient needs OAC in future, will need to discontinue at least Plavix. No other change to previous plan stated yesterday.     Cammy Torres, RAJP  Neurology

## 2025-05-23 NOTE — PROGRESS NOTES
2300: Pt observed to be climbing out the bed with bed alarm unplugged. RN entered room to redirect patient and placed back in bed. Both bed alarm pad and bed exit placed on to prevent fall.    0112: Pt continously trying to get out of bed. Due to confusion, increased risk for fall, and continuous attempts to keep patient in bed, safety sitter has been ordered to monitor the patient.    0625: Code EDGAR called d/t patient becoming increasingly more aggressive with sitter and threatening to leave. This nurse offered to call pt daughter who he previously told to call for anything last night, he said \"fuck that bitch, call my wife and hand me the phone.\" This writer dialed wife and handed patient the phone. Dr. Saunders arrived assessed orientation, and informed this nurse that if his wife arrives (pt called her) discussion of leaving AMA will occur. During conversation pt stated \"did my wife pay you all to do this to me?\" Pt wife POA and Advanced directive decision maker. Pt informed this nurse he does not care what she has to say and what the medical staff has to say he is leaving and does not care if he has another stroke. Code Edgar cancelled. This nurse went in and discussed with patient to stay in bed and wait until wife arrives to make any decisions further about leaving. Pt understanding. Pt then stated he was going to go home, divorce his wife, and never come back to a hospital again.    0700: Dr. Mirza stopped at nurses station, this nurse asked if he would discuss AMA paperwork. Pt agreed to stay till he received his Echo. Dr. Mirza informed Dr. Abebe and [employer] to prioritize patient.

## 2025-05-23 NOTE — PLAN OF CARE
Problem: Chronic Conditions and Co-morbidities  Goal: Patient's chronic conditions and co-morbidity symptoms are monitored and maintained or improved  Outcome: Progressing  Flowsheets (Taken 5/22/2025 2208)  Care Plan - Patient's Chronic Conditions and Co-Morbidity Symptoms are Monitored and Maintained or Improved:   Monitor and assess patient's chronic conditions and comorbid symptoms for stability, deterioration, or improvement   Collaborate with multidisciplinary team to address chronic and comorbid conditions and prevent exacerbation or deterioration   Update acute care plan with appropriate goals if chronic or comorbid symptoms are exacerbated and prevent overall improvement and discharge     Problem: Discharge Planning  Goal: Discharge to home or other facility with appropriate resources  Outcome: Progressing  Flowsheets (Taken 5/22/2025 2208)  Discharge to home or other facility with appropriate resources:   Arrange for needed discharge resources and transportation as appropriate   Identify barriers to discharge with patient and caregiver   Arrange for interpreters to assist at discharge as needed   Identify discharge learning needs (meds, wound care, etc)   Refer to discharge planning if patient needs post-hospital services based on physician order or complex needs related to functional status, cognitive ability or social support system     Problem: Safety - Adult  Goal: Free from fall injury  Outcome: Progressing  Flowsheets (Taken 5/22/2025 2212)  Free From Fall Injury: Instruct family/caregiver on patient safety     Problem: Risk for Elopement  Goal: Patient will not exit the unit/facility without proper excort  Outcome: Progressing  Flowsheets  Taken 5/23/2025 0100  Nursing Interventions for Elopement Risk:   Assist with personal care needs such as toileting, eating, dressing, as needed to reduce the risk of wandering   Collaborate with family members/caregivers to mitigate the elopement risk

## 2025-05-23 NOTE — CONSULTS
[facility] CARDIOLOGY                     Cardiac EP Hospital Care Note     [x]Initial Encounter     [ ]Follow-up     Patient Name: Michael Rockwell - :1965 - MRN:[id number]   Primary Cardiologist: none   Consulting Cardiologist: Ti Cunha MD     Reason for encounter: cryptogenic stroke, ILR consideration     HPI:       Michael Rockwell is a 59 y.o. male with PMH significant for CVA with residual left hemiparesis, CKD IV, HTN, dyslipidemia, anemia, COPD, still smoking who initially presented to ED with c/o sudden onset difficulty word finding and expression. CT head with no acute process. Evaluated by teleneurology and per ED CTA deferred d/t CKD and patient deemed poor candidate for potential thrombectomy. MRI brain demonstrated two punctate foci of acute infarction, involving the left centrum semiovale and right parietal white matter. EP consulted for cardiac monitoring s/p cryptogenic stroke.        Assessment and Plan     Cryptogenic stroke   - Hx chronic bilateral basal ganglia and L cerebellum infarcts   - Presented to hospital with sudden onset difficulty word finding, dysarthria   - CT head negative for acute process   - CTA H/N deferred in ED   - MRI brain demonstrated two punctate foci of acute infarction, involving the left centrum semiovale and right parietal white matter   - MRA head without significant stenosis or occlusion   - Echo was normal LVEF and mild BERE  - one episode of brief AT   - he reports palpitations off and on for years lasting just seconds at a time that he never had evaluated     He has old and new strokes that are bilateral and different vascular territories  This is consistent with cryptogenic strokes  - recommend ILR implantation for long term monitoring for AF as his strokes likely embolic in nature. He is agreeable to this.Will plan for procedure later today     CKD IV   - Cr stable   - Nephrology Dr Deleon following     HTN   - Bp high  - managed by hospitalist

## 2025-05-23 NOTE — H&P
History and Physical    Date of Service:  5/22/2025  Primary Care Provider: Orestes Thurman MD  Source of information: patient    Chief Complaint: Consultation      History of Presenting Illness:   Michael Rockwell is a 59 y.o. male with a past medical history of asthma/COPD, CKD V, GERD, hypertension, HFpEF, anemia, and past stroke with residual left-sided hemiparesis who presents after he left AMA from this hospital for stroke admission with ILR implantation.    He was initially admitted on May 20 with focal neurologic deficit described as expressive aphasia, left upper, and lower extremity weakness, and and ataxia concerning for stroke.  He was also noted to be encephalopathic.  MRI brain showed 2 punctate foci of acute infarction involving the left centrum semiovale, and right parietal white matter.  Echocardiogram, and  loop recorder implantation as an outpatient was pending when patient left due to believing that he had been discharged.  Family states that he was confused, and has brought him back for further evaluation.    In the ED, he is hypertensive to 221/133.  Other vitals are stable.  Labs show BUN 35, and creatinine 4.4 (b/l 4.5-4.6).    In the ED, he received 20 mg IV labetalol     REVIEW OF SYSTEMS:  A comprehensive review of systems was negative except for that written in the History of Present Illness.     Past Medical History:   Diagnosis Date    Asthma     Chronic kidney disease     Chronic obstructive pulmonary disease (HCC)     Emphysema lung (HCC)     GERD (gastroesophageal reflux disease)     Heart failure (HCC) 10/2021    1 EPISODE DUE TO HIGH BP, NOW CLEARED UP PER PT AND WIFE    Hypertension     Stroke (HCC) 2022      Past Surgical History:   Procedure Laterality Date    APPENDECTOMY      HERNIA REPAIR Right 12/05/2022    Robotic-assisted laparoscopic right inguinal hernia repair with mesh by Dr. Tan    ORTHOPEDIC SURGERY      collar bone (right)    OTHER SURGICAL HISTORY

## 2025-07-01 ENCOUNTER — APPOINTMENT (OUTPATIENT)
Facility: HOSPITAL | Age: 60
DRG: 194 | End: 2025-07-01
Payer: MEDICAID

## 2025-07-01 ENCOUNTER — HOSPITAL ENCOUNTER (INPATIENT)
Facility: HOSPITAL | Age: 60
LOS: 2 days | Discharge: HOME | DRG: 194 | End: 2025-07-03
Attending: EMERGENCY MEDICINE | Admitting: FAMILY MEDICINE
Payer: MEDICAID

## 2025-07-01 DIAGNOSIS — J81.0 ACUTE PULMONARY EDEMA (HCC): ICD-10-CM

## 2025-07-01 DIAGNOSIS — I16.1 HYPERTENSIVE EMERGENCY: Primary | ICD-10-CM

## 2025-07-01 DIAGNOSIS — R79.89 POSITIVE D DIMER: ICD-10-CM

## 2025-07-01 DIAGNOSIS — F14.90 COCAINE USE: ICD-10-CM

## 2025-07-01 DIAGNOSIS — N18.9 ACUTE KIDNEY INJURY SUPERIMPOSED ON CKD: ICD-10-CM

## 2025-07-01 DIAGNOSIS — N17.9 ACUTE KIDNEY INJURY SUPERIMPOSED ON CKD: ICD-10-CM

## 2025-07-01 LAB
ALBUMIN SERPL-MCNC: 3.6 G/DL (ref 3.5–5)
ALBUMIN/GLOB SERPL: 1 (ref 1.1–2.2)
ALP SERPL-CCNC: 88 U/L (ref 45–117)
ALT SERPL-CCNC: 16 U/L (ref 12–78)
AMPHET UR QL SCN: NEGATIVE
ANION GAP SERPL CALC-SCNC: 7 MMOL/L (ref 2–12)
AST SERPL-CCNC: 7 U/L (ref 15–37)
BARBITURATES UR QL SCN: NEGATIVE
BASOPHILS # BLD: 0.03 K/UL (ref 0–0.1)
BASOPHILS NFR BLD: 0.3 % (ref 0–1)
BENZODIAZ UR QL: NEGATIVE
BILIRUB SERPL-MCNC: 0.7 MG/DL (ref 0.2–1)
BUN SERPL-MCNC: 46 MG/DL (ref 6–20)
BUN/CREAT SERPL: 8 (ref 12–20)
CALCIUM SERPL-MCNC: 8.9 MG/DL (ref 8.5–10.1)
CANNABINOIDS UR QL SCN: NEGATIVE
CHLORIDE SERPL-SCNC: 109 MMOL/L (ref 97–108)
CO2 SERPL-SCNC: 24 MMOL/L (ref 21–32)
COCAINE UR QL SCN: POSITIVE
COMMENT:: NORMAL
COMMENT:: NORMAL
CREAT SERPL-MCNC: 5.76 MG/DL (ref 0.7–1.3)
D DIMER PPP FEU-MCNC: 3 MG/L FEU (ref 0–0.65)
DIFFERENTIAL METHOD BLD: ABNORMAL
EKG ATRIAL RATE: 95 BPM
EKG DIAGNOSIS: NORMAL
EKG P AXIS: 74 DEGREES
EKG P-R INTERVAL: 180 MS
EKG Q-T INTERVAL: 382 MS
EKG QRS DURATION: 98 MS
EKG QTC CALCULATION (BAZETT): 480 MS
EKG R AXIS: 86 DEGREES
EKG T AXIS: -61 DEGREES
EKG VENTRICULAR RATE: 95 BPM
EOSINOPHIL # BLD: 0.68 K/UL (ref 0–0.4)
EOSINOPHIL NFR BLD: 7.2 % (ref 0–7)
ERYTHROCYTE [DISTWIDTH] IN BLOOD BY AUTOMATED COUNT: 13.4 % (ref 11.5–14.5)
GLOBULIN SER CALC-MCNC: 3.6 G/DL (ref 2–4)
GLUCOSE SERPL-MCNC: 100 MG/DL (ref 65–100)
HCT VFR BLD AUTO: 31.1 % (ref 36.6–50.3)
HGB BLD-MCNC: 9.8 G/DL (ref 12.1–17)
IMM GRANULOCYTES # BLD AUTO: 0.04 K/UL (ref 0–0.04)
IMM GRANULOCYTES NFR BLD AUTO: 0.4 % (ref 0–0.5)
LYMPHOCYTES # BLD: 0.41 K/UL (ref 0.8–3.5)
LYMPHOCYTES NFR BLD: 4.4 % (ref 12–49)
Lab: ABNORMAL
MCH RBC QN AUTO: 31.1 PG (ref 26–34)
MCHC RBC AUTO-ENTMCNC: 31.5 G/DL (ref 30–36.5)
MCV RBC AUTO: 98.7 FL (ref 80–99)
METHADONE UR QL: NEGATIVE
MONOCYTES # BLD: 0.87 K/UL (ref 0–1)
MONOCYTES NFR BLD: 9.3 % (ref 5–13)
NEUTS SEG # BLD: 7.37 K/UL (ref 1.8–8)
NEUTS SEG NFR BLD: 78.4 % (ref 32–75)
NRBC # BLD: 0 K/UL (ref 0–0.01)
NRBC BLD-RTO: 0 PER 100 WBC
NT PRO BNP: ABNORMAL PG/ML
OPIATES UR QL: NEGATIVE
PCP UR QL: NEGATIVE
PLATELET # BLD AUTO: 203 K/UL (ref 150–400)
PMV BLD AUTO: 11 FL (ref 8.9–12.9)
POTASSIUM SERPL-SCNC: 3.4 MMOL/L (ref 3.5–5.1)
PROT SERPL-MCNC: 7.2 G/DL (ref 6.4–8.2)
RBC # BLD AUTO: 3.15 M/UL (ref 4.1–5.7)
RBC MORPH BLD: ABNORMAL
SODIUM SERPL-SCNC: 140 MMOL/L (ref 136–145)
SPECIMEN HOLD: NORMAL
TROPONIN I SERPL HS-MCNC: 110 NG/L (ref 0–76)
TROPONIN I SERPL HS-MCNC: 88 NG/L (ref 0–76)
WBC # BLD AUTO: 9.4 K/UL (ref 4.1–11.1)

## 2025-07-01 PROCEDURE — 80307 DRUG TEST PRSMV CHEM ANLYZR: CPT

## 2025-07-01 PROCEDURE — 84484 ASSAY OF TROPONIN QUANT: CPT

## 2025-07-01 PROCEDURE — 3430000000 HC RX DIAGNOSTIC RADIOPHARMACEUTICAL: Performed by: EMERGENCY MEDICINE

## 2025-07-01 PROCEDURE — 2580000003 HC RX 258: Performed by: EMERGENCY MEDICINE

## 2025-07-01 PROCEDURE — 83880 ASSAY OF NATRIURETIC PEPTIDE: CPT

## 2025-07-01 PROCEDURE — 80053 COMPREHEN METABOLIC PANEL: CPT

## 2025-07-01 PROCEDURE — 93005 ELECTROCARDIOGRAM TRACING: CPT | Performed by: EMERGENCY MEDICINE

## 2025-07-01 PROCEDURE — 96365 THER/PROPH/DIAG IV INF INIT: CPT

## 2025-07-01 PROCEDURE — 6360000002 HC RX W HCPCS: Performed by: FAMILY MEDICINE

## 2025-07-01 PROCEDURE — 6370000000 HC RX 637 (ALT 250 FOR IP): Performed by: EMERGENCY MEDICINE

## 2025-07-01 PROCEDURE — 99285 EMERGENCY DEPT VISIT HI MDM: CPT

## 2025-07-01 PROCEDURE — 6370000000 HC RX 637 (ALT 250 FOR IP): Performed by: FAMILY MEDICINE

## 2025-07-01 PROCEDURE — 85379 FIBRIN DEGRADATION QUANT: CPT

## 2025-07-01 PROCEDURE — 6370000000 HC RX 637 (ALT 250 FOR IP)

## 2025-07-01 PROCEDURE — 6360000002 HC RX W HCPCS: Performed by: EMERGENCY MEDICINE

## 2025-07-01 PROCEDURE — 71045 X-RAY EXAM CHEST 1 VIEW: CPT

## 2025-07-01 PROCEDURE — A9540 TC99M MAA: HCPCS | Performed by: EMERGENCY MEDICINE

## 2025-07-01 PROCEDURE — 96366 THER/PROPH/DIAG IV INF ADDON: CPT

## 2025-07-01 PROCEDURE — 2500000003 HC RX 250 WO HCPCS: Performed by: FAMILY MEDICINE

## 2025-07-01 PROCEDURE — 2060000000 HC ICU INTERMEDIATE R&B

## 2025-07-01 PROCEDURE — 85025 COMPLETE CBC W/AUTO DIFF WBC: CPT

## 2025-07-01 PROCEDURE — 78580 LUNG PERFUSION IMAGING: CPT

## 2025-07-01 RX ORDER — CLONIDINE HYDROCHLORIDE 0.1 MG/1
0.1 TABLET ORAL 2 TIMES DAILY
Status: DISCONTINUED | OUTPATIENT
Start: 2025-07-01 | End: 2025-07-03 | Stop reason: HOSPADM

## 2025-07-01 RX ORDER — ASPIRIN 81 MG/1
324 TABLET, CHEWABLE ORAL ONCE
Status: COMPLETED | OUTPATIENT
Start: 2025-07-01 | End: 2025-07-01

## 2025-07-01 RX ORDER — SODIUM CHLORIDE 0.9 % (FLUSH) 0.9 %
5-40 SYRINGE (ML) INJECTION EVERY 12 HOURS SCHEDULED
Status: DISCONTINUED | OUTPATIENT
Start: 2025-07-01 | End: 2025-07-03 | Stop reason: HOSPADM

## 2025-07-01 RX ORDER — SODIUM CHLORIDE 9 MG/ML
INJECTION, SOLUTION INTRAVENOUS PRN
Status: DISCONTINUED | OUTPATIENT
Start: 2025-07-01 | End: 2025-07-03 | Stop reason: HOSPADM

## 2025-07-01 RX ORDER — SODIUM CHLORIDE 0.9 % (FLUSH) 0.9 %
5-40 SYRINGE (ML) INJECTION PRN
Status: DISCONTINUED | OUTPATIENT
Start: 2025-07-01 | End: 2025-07-03 | Stop reason: HOSPADM

## 2025-07-01 RX ORDER — NITROGLYCERIN 0.4 MG/1
TABLET SUBLINGUAL
Status: COMPLETED
Start: 2025-07-01 | End: 2025-07-01

## 2025-07-01 RX ORDER — IPRATROPIUM BROMIDE AND ALBUTEROL SULFATE 2.5; .5 MG/3ML; MG/3ML
1 SOLUTION RESPIRATORY (INHALATION) EVERY 4 HOURS PRN
Status: DISCONTINUED | OUTPATIENT
Start: 2025-07-01 | End: 2025-07-03 | Stop reason: HOSPADM

## 2025-07-01 RX ORDER — ASPIRIN 81 MG/1
81 TABLET, CHEWABLE ORAL DAILY
Status: DISCONTINUED | OUTPATIENT
Start: 2025-07-02 | End: 2025-07-03 | Stop reason: HOSPADM

## 2025-07-01 RX ORDER — NITROGLYCERIN 0.4 MG/1
0.4 TABLET SUBLINGUAL PRN
Status: DISCONTINUED | OUTPATIENT
Start: 2025-07-01 | End: 2025-07-03 | Stop reason: HOSPADM

## 2025-07-01 RX ORDER — LANOLIN ALCOHOL/MO/W.PET/CERES
100 CREAM (GRAM) TOPICAL DAILY
Status: DISCONTINUED | OUTPATIENT
Start: 2025-07-01 | End: 2025-07-03 | Stop reason: HOSPADM

## 2025-07-01 RX ORDER — ACETAMINOPHEN 325 MG/1
650 TABLET ORAL EVERY 6 HOURS PRN
Status: DISCONTINUED | OUTPATIENT
Start: 2025-07-01 | End: 2025-07-03 | Stop reason: HOSPADM

## 2025-07-01 RX ORDER — DIAZEPAM 10 MG/2ML
5 INJECTION, SOLUTION INTRAMUSCULAR; INTRAVENOUS
Status: DISCONTINUED | OUTPATIENT
Start: 2025-07-01 | End: 2025-07-03 | Stop reason: HOSPADM

## 2025-07-01 RX ORDER — DIAZEPAM 10 MG/2ML
20 INJECTION, SOLUTION INTRAMUSCULAR; INTRAVENOUS
Status: DISCONTINUED | OUTPATIENT
Start: 2025-07-01 | End: 2025-07-03 | Stop reason: HOSPADM

## 2025-07-01 RX ORDER — DIAZEPAM 10 MG/2ML
10 INJECTION, SOLUTION INTRAMUSCULAR; INTRAVENOUS
Status: DISCONTINUED | OUTPATIENT
Start: 2025-07-01 | End: 2025-07-03 | Stop reason: HOSPADM

## 2025-07-01 RX ORDER — POLYETHYLENE GLYCOL 3350 17 G/17G
17 POWDER, FOR SOLUTION ORAL DAILY PRN
Status: DISCONTINUED | OUTPATIENT
Start: 2025-07-01 | End: 2025-07-03 | Stop reason: HOSPADM

## 2025-07-01 RX ORDER — HEPARIN SODIUM 5000 [USP'U]/ML
5000 INJECTION, SOLUTION INTRAVENOUS; SUBCUTANEOUS EVERY 8 HOURS SCHEDULED
Status: DISCONTINUED | OUTPATIENT
Start: 2025-07-01 | End: 2025-07-03 | Stop reason: HOSPADM

## 2025-07-01 RX ORDER — ONDANSETRON 2 MG/ML
4 INJECTION INTRAMUSCULAR; INTRAVENOUS EVERY 6 HOURS PRN
Status: DISCONTINUED | OUTPATIENT
Start: 2025-07-01 | End: 2025-07-03 | Stop reason: HOSPADM

## 2025-07-01 RX ORDER — ONDANSETRON 4 MG/1
4 TABLET, ORALLY DISINTEGRATING ORAL EVERY 8 HOURS PRN
Status: DISCONTINUED | OUTPATIENT
Start: 2025-07-01 | End: 2025-07-03 | Stop reason: HOSPADM

## 2025-07-01 RX ORDER — AMLODIPINE BESYLATE 5 MG/1
10 TABLET ORAL DAILY
Status: DISCONTINUED | OUTPATIENT
Start: 2025-07-01 | End: 2025-07-03 | Stop reason: HOSPADM

## 2025-07-01 RX ORDER — ATORVASTATIN CALCIUM 40 MG/1
80 TABLET, FILM COATED ORAL DAILY
Status: DISCONTINUED | OUTPATIENT
Start: 2025-07-01 | End: 2025-07-03 | Stop reason: HOSPADM

## 2025-07-01 RX ORDER — ACETAMINOPHEN 650 MG/1
650 SUPPOSITORY RECTAL EVERY 6 HOURS PRN
Status: DISCONTINUED | OUTPATIENT
Start: 2025-07-01 | End: 2025-07-03 | Stop reason: HOSPADM

## 2025-07-01 RX ORDER — DIAZEPAM 10 MG/2ML
15 INJECTION, SOLUTION INTRAMUSCULAR; INTRAVENOUS
Status: DISCONTINUED | OUTPATIENT
Start: 2025-07-01 | End: 2025-07-03 | Stop reason: HOSPADM

## 2025-07-01 RX ORDER — CLOPIDOGREL BISULFATE 75 MG/1
75 TABLET ORAL DAILY
Status: DISCONTINUED | OUTPATIENT
Start: 2025-07-01 | End: 2025-07-03 | Stop reason: HOSPADM

## 2025-07-01 RX ORDER — FUROSEMIDE 10 MG/ML
60 INJECTION INTRAMUSCULAR; INTRAVENOUS 2 TIMES DAILY
Status: DISCONTINUED | OUTPATIENT
Start: 2025-07-01 | End: 2025-07-03

## 2025-07-01 RX ADMIN — NITROGLYCERIN 0.4 MG: 0.4 TABLET SUBLINGUAL at 09:03

## 2025-07-01 RX ADMIN — NICARDIPINE HYDROCHLORIDE 14 MG/HR: 2.5 INJECTION, SOLUTION INTRAVENOUS at 22:01

## 2025-07-01 RX ADMIN — NICARDIPINE HYDROCHLORIDE 15 MG/HR: 2.5 INJECTION, SOLUTION INTRAVENOUS at 19:58

## 2025-07-01 RX ADMIN — NICARDIPINE HYDROCHLORIDE 15 MG/HR: 2.5 INJECTION, SOLUTION INTRAVENOUS at 16:34

## 2025-07-01 RX ADMIN — ASPIRIN 324 MG: 81 TABLET, CHEWABLE ORAL at 09:03

## 2025-07-01 RX ADMIN — CLOPIDOGREL BISULFATE 75 MG: 75 TABLET, FILM COATED ORAL at 19:05

## 2025-07-01 RX ADMIN — NITROGLYCERIN 0.4 MG: 0.4 TABLET SUBLINGUAL at 08:49

## 2025-07-01 RX ADMIN — NICARDIPINE HYDROCHLORIDE 15 MG/HR: 2.5 INJECTION, SOLUTION INTRAVENOUS at 14:22

## 2025-07-01 RX ADMIN — CLONIDINE HYDROCHLORIDE 0.1 MG: 0.1 TABLET ORAL at 21:10

## 2025-07-01 RX ADMIN — SODIUM CHLORIDE, PRESERVATIVE FREE 10 ML: 5 INJECTION INTRAVENOUS at 21:10

## 2025-07-01 RX ADMIN — NICARDIPINE HYDROCHLORIDE 12.5 MG/HR: 2.5 INJECTION, SOLUTION INTRAVENOUS at 12:38

## 2025-07-01 RX ADMIN — NICARDIPINE HYDROCHLORIDE 15 MG/HR: 2.5 INJECTION, SOLUTION INTRAVENOUS at 18:08

## 2025-07-01 RX ADMIN — ATORVASTATIN CALCIUM 80 MG: 40 TABLET, FILM COATED ORAL at 19:05

## 2025-07-01 RX ADMIN — FUROSEMIDE 60 MG: 10 INJECTION, SOLUTION INTRAMUSCULAR; INTRAVENOUS at 19:05

## 2025-07-01 RX ADMIN — NITROGLYCERIN 0.4 MG: 0.4 TABLET SUBLINGUAL at 08:42

## 2025-07-01 RX ADMIN — KIT FOR THE PREPARATION OF TECHNETIUM TC 99M ALBUMIN AGGREGATED 4.4 MILLICURIE: 2.5 INJECTION, POWDER, FOR SOLUTION INTRAVENOUS at 13:50

## 2025-07-01 RX ADMIN — AMLODIPINE BESYLATE 10 MG: 5 TABLET ORAL at 19:05

## 2025-07-01 RX ADMIN — NICARDIPINE HYDROCHLORIDE 5 MG/HR: 2.5 INJECTION, SOLUTION INTRAVENOUS at 10:04

## 2025-07-01 ASSESSMENT — PAIN DESCRIPTION - ORIENTATION
ORIENTATION: MID
ORIENTATION: MID

## 2025-07-01 ASSESSMENT — PAIN SCALES - GENERAL
PAINLEVEL_OUTOF10: 5
PAINLEVEL_OUTOF10: 7
PAINLEVEL_OUTOF10: 2

## 2025-07-01 ASSESSMENT — ENCOUNTER SYMPTOMS
VOMITING: 0
DIARRHEA: 0
COLOR CHANGE: 0
BACK PAIN: 0
NAUSEA: 0
SHORTNESS OF BREATH: 0
ABDOMINAL PAIN: 0
CONSTIPATION: 0

## 2025-07-01 ASSESSMENT — PAIN DESCRIPTION - DESCRIPTORS
DESCRIPTORS: STABBING
DESCRIPTORS: STABBING

## 2025-07-01 ASSESSMENT — PAIN DESCRIPTION - LOCATION
LOCATION: CHEST
LOCATION: CHEST

## 2025-07-01 ASSESSMENT — PAIN - FUNCTIONAL ASSESSMENT
PAIN_FUNCTIONAL_ASSESSMENT: 0-10
PAIN_FUNCTIONAL_ASSESSMENT: PREVENTS OR INTERFERES SOME ACTIVE ACTIVITIES AND ADLS
PAIN_FUNCTIONAL_ASSESSMENT: PREVENTS OR INTERFERES WITH MANY ACTIVE NOT PASSIVE ACTIVITIES

## 2025-07-01 NOTE — ED NOTES
TRANSFER - OUT REPORT:    Verbal report given to VICK Mcdonough on Michael Rockwell  being transferred to [facility] for routine progression of patient care       Report consisted of patient's Situation, Background, Assessment and   Recommendations(SBAR).     Information from the following report(s) Nurse Handoff Report, Index, ED Encounter Summary, ED SBAR, MAR, and Recent Results was reviewed with the receiving nurse.    [place] Fall Assessment:    Presents to emergency department  because of falls (Syncope, seizure, or loss of consciousness): No  Age > 70: No  Altered Mental Status, Intoxication with alcohol or substance confusion (Disorientation, impaired judgment, poor safety awaremess, or inability to follow instructions): No  Impaired Mobility: Ambulates or transfers with assistive devices or assistance; Unable to ambulate or transer.: No  Nursing Judgement: No          Lines:   Peripheral IV 07/01/25 Right Antecubital (Active)   Site Assessment Clean, dry & intact 07/01/25 0836   Line Status Blood return noted 07/01/25 0836   Line Care Cap changed 07/01/25 0836   Phlebitis Assessment No symptoms 07/01/25 0836   Infiltration Assessment 0 07/01/25 0836   Dressing Status Clean, dry & intact 07/01/25 0836   Dressing Type Transparent 07/01/25 0836   Dressing Intervention New 07/01/25 0836        Opportunity for questions and clarification was provided.      Patient transported with:  Monitor and Registered Nurse

## 2025-07-01 NOTE — H&P
History and Physical    Date of Service:  7/1/2025  Primary Care Provider: Orestes Thurman MD  Source of information: patient, electronic medical record    Chief Complaint: Chest Pain      History of Presenting Illness:   Michael Rockwell is a 59 y.o. male with a pmhx hypertension, asthma/COPD, GERD, HFpEF, CKD 5, past stroke with residual left-sided hemiparesis,  who presents with chest pain, and is being admitted for acute heart failure secondary to hypertensive emergency.  He denies cocaine use, but UDS is cocaine positive.  He endorses alcohol use of 40 ounces of beer daily.    He was last admitted from 5/22-5/23 for readmission after he previously left AMA for stroke followed by ILR implantation.  He was pending echo with agitated saline, but did not want IV placed, and left AMA after provider spoke with patient and his wife.  He was discharged on BP meds, ASA, and plavix, but has not been compliant with home medications.  He was supposed to f/u with nephrology after d/c, and reportedly has appt at the end of this month.    In the ED, he was tachycardic to 106, and hypertensive to 238/164.  Other vitals were stable.  Lab showed BUN 46, and creatinine 5.76.  Chest x-ray shows pulmonary vascular congestion.  VQ scan has been ordered, and is pending.  UDS was positive for cocaine.    In the ED, he received 324 mg aspirin, and was started on a Cardene drip       REVIEW OF SYSTEMS:  A comprehensive review of systems was negative except for that written in the History of Present Illness.     Past Medical History:   Diagnosis Date    Asthma     Chronic kidney disease     Chronic obstructive pulmonary disease (HCC)     Emphysema lung (HCC)     GERD (gastroesophageal reflux disease)     Heart failure (HCC) 10/2021    1 EPISODE DUE TO HIGH BP, NOW CLEARED UP PER PT AND WIFE    Hypertension     Stroke (HCC) 2022      Past Surgical History:   Procedure Laterality Date    APPENDECTOMY      EP DEVICE PROCEDURE N/A

## 2025-07-01 NOTE — ED TRIAGE NOTES
Patient arrives via wheelchair to triage with c/o chest pain that started this morning after he woke up. He is visibly SOB and diaphoretic on arrival

## 2025-07-02 ENCOUNTER — APPOINTMENT (OUTPATIENT)
Facility: HOSPITAL | Age: 60
DRG: 194 | End: 2025-07-02
Attending: HOSPITALIST
Payer: MEDICAID

## 2025-07-02 ENCOUNTER — APPOINTMENT (OUTPATIENT)
Facility: HOSPITAL | Age: 60
DRG: 194 | End: 2025-07-02
Attending: INTERNAL MEDICINE
Payer: MEDICAID

## 2025-07-02 LAB
ANION GAP SERPL CALC-SCNC: 11 MMOL/L (ref 2–12)
BASOPHILS # BLD: 0.03 K/UL (ref 0–0.1)
BASOPHILS NFR BLD: 0.4 % (ref 0–1)
BUN SERPL-MCNC: 43 MG/DL (ref 6–20)
BUN/CREAT SERPL: 8 (ref 12–20)
CALCIUM SERPL-MCNC: 8.3 MG/DL (ref 8.5–10.1)
CHLORIDE SERPL-SCNC: 109 MMOL/L (ref 97–108)
CO2 SERPL-SCNC: 19 MMOL/L (ref 21–32)
CREAT SERPL-MCNC: 5.48 MG/DL (ref 0.7–1.3)
DIFFERENTIAL METHOD BLD: ABNORMAL
EOSINOPHIL # BLD: 0.75 K/UL (ref 0–0.4)
EOSINOPHIL NFR BLD: 8.8 % (ref 0–7)
ERYTHROCYTE [DISTWIDTH] IN BLOOD BY AUTOMATED COUNT: 13.2 % (ref 11.5–14.5)
GLUCOSE SERPL-MCNC: 113 MG/DL (ref 65–100)
HCT VFR BLD AUTO: 24.8 % (ref 36.6–50.3)
HGB BLD-MCNC: 8.4 G/DL (ref 12.1–17)
IMM GRANULOCYTES # BLD AUTO: 0.04 K/UL (ref 0–0.04)
IMM GRANULOCYTES NFR BLD AUTO: 0.5 % (ref 0–0.5)
LYMPHOCYTES # BLD: 0.63 K/UL (ref 0.8–3.5)
LYMPHOCYTES NFR BLD: 7.4 % (ref 12–49)
MCH RBC QN AUTO: 30.9 PG (ref 26–34)
MCHC RBC AUTO-ENTMCNC: 33.9 G/DL (ref 30–36.5)
MCV RBC AUTO: 91.2 FL (ref 80–99)
MONOCYTES # BLD: 0.9 K/UL (ref 0–1)
MONOCYTES NFR BLD: 10.6 % (ref 5–13)
NEUTS SEG # BLD: 6.15 K/UL (ref 1.8–8)
NEUTS SEG NFR BLD: 72.3 % (ref 32–75)
NRBC # BLD: 0 K/UL (ref 0–0.01)
NRBC BLD-RTO: 0 PER 100 WBC
PLATELET # BLD AUTO: 191 K/UL (ref 150–400)
PMV BLD AUTO: 10.9 FL (ref 8.9–12.9)
POTASSIUM SERPL-SCNC: 3.3 MMOL/L (ref 3.5–5.1)
RBC # BLD AUTO: 2.72 M/UL (ref 4.1–5.7)
RBC MORPH BLD: ABNORMAL
SODIUM SERPL-SCNC: 139 MMOL/L (ref 136–145)
WBC # BLD AUTO: 8.5 K/UL (ref 4.1–11.1)

## 2025-07-02 PROCEDURE — 80048 BASIC METABOLIC PNL TOTAL CA: CPT

## 2025-07-02 PROCEDURE — 6370000000 HC RX 637 (ALT 250 FOR IP): Performed by: FAMILY MEDICINE

## 2025-07-02 PROCEDURE — 2060000000 HC ICU INTERMEDIATE R&B

## 2025-07-02 PROCEDURE — 93970 EXTREMITY STUDY: CPT

## 2025-07-02 PROCEDURE — 6370000000 HC RX 637 (ALT 250 FOR IP): Performed by: HOSPITALIST

## 2025-07-02 PROCEDURE — 6360000002 HC RX W HCPCS: Performed by: HOSPITALIST

## 2025-07-02 PROCEDURE — 6360000002 HC RX W HCPCS: Performed by: FAMILY MEDICINE

## 2025-07-02 PROCEDURE — 2500000003 HC RX 250 WO HCPCS: Performed by: FAMILY MEDICINE

## 2025-07-02 PROCEDURE — 85025 COMPLETE CBC W/AUTO DIFF WBC: CPT

## 2025-07-02 RX ORDER — POTASSIUM CHLORIDE 750 MG/1
20 TABLET, EXTENDED RELEASE ORAL ONCE
Status: COMPLETED | OUTPATIENT
Start: 2025-07-02 | End: 2025-07-02

## 2025-07-02 RX ORDER — HYDRALAZINE HYDROCHLORIDE 20 MG/ML
5 INJECTION INTRAMUSCULAR; INTRAVENOUS EVERY 6 HOURS PRN
Status: DISCONTINUED | OUTPATIENT
Start: 2025-07-02 | End: 2025-07-03 | Stop reason: HOSPADM

## 2025-07-02 RX ORDER — DIPHENHYDRAMINE HCL 25 MG
25 CAPSULE ORAL EVERY 6 HOURS PRN
Status: DISCONTINUED | OUTPATIENT
Start: 2025-07-02 | End: 2025-07-03 | Stop reason: HOSPADM

## 2025-07-02 RX ADMIN — SODIUM CHLORIDE, PRESERVATIVE FREE 10 ML: 5 INJECTION INTRAVENOUS at 20:53

## 2025-07-02 RX ADMIN — AMLODIPINE BESYLATE 10 MG: 5 TABLET ORAL at 08:42

## 2025-07-02 RX ADMIN — Medication 100 MG: at 08:42

## 2025-07-02 RX ADMIN — FUROSEMIDE 60 MG: 10 INJECTION, SOLUTION INTRAMUSCULAR; INTRAVENOUS at 17:03

## 2025-07-02 RX ADMIN — HYDRALAZINE HYDROCHLORIDE 5 MG: 20 INJECTION INTRAMUSCULAR; INTRAVENOUS at 17:08

## 2025-07-02 RX ADMIN — FUROSEMIDE 60 MG: 10 INJECTION, SOLUTION INTRAMUSCULAR; INTRAVENOUS at 08:42

## 2025-07-02 RX ADMIN — DIPHENHYDRAMINE HYDROCHLORIDE 25 MG: 25 CAPSULE ORAL at 21:16

## 2025-07-02 RX ADMIN — ATORVASTATIN CALCIUM 80 MG: 40 TABLET, FILM COATED ORAL at 08:41

## 2025-07-02 RX ADMIN — SODIUM CHLORIDE, PRESERVATIVE FREE 10 ML: 5 INJECTION INTRAVENOUS at 08:41

## 2025-07-02 RX ADMIN — CLONIDINE HYDROCHLORIDE 0.1 MG: 0.1 TABLET ORAL at 20:53

## 2025-07-02 RX ADMIN — DIPHENHYDRAMINE HYDROCHLORIDE 25 MG: 25 CAPSULE ORAL at 14:37

## 2025-07-02 RX ADMIN — CLOPIDOGREL BISULFATE 75 MG: 75 TABLET, FILM COATED ORAL at 08:41

## 2025-07-02 RX ADMIN — ASPIRIN 81 MG: 81 TABLET, CHEWABLE ORAL at 08:42

## 2025-07-02 RX ADMIN — POTASSIUM CHLORIDE 20 MEQ: 750 TABLET, FILM COATED, EXTENDED RELEASE ORAL at 20:53

## 2025-07-02 RX ADMIN — CLONIDINE HYDROCHLORIDE 0.1 MG: 0.1 TABLET ORAL at 08:42

## 2025-07-02 NOTE — NURSE NAVIGATOR
Heart Failure Nurse Navigator rounds completed.    HF NN provided introduction to self and nurse navigator role. AHA Discharge Packet for Patients Diagnosed with Heart Failure booklet given to patient, along with weight calendar, signs/symptoms magnet, and card with QR codes for HF video resources.       Self-care topics discussed:    Heart failure diagnosis and disease process - reviewed the diagnosis of heart failure.      Daily weights - Patient educated on weighing themselves daily and reporting 3 lbs. weight gain overnight or 5 lbs. in a week to their cardiologist. Patient does not have a cardiologist.  Care management is assisting him with getting a new PCP.  Perfect Serve message sent to attending asking about a cardiology consult.     What to do if HF symptoms worsen -Signs and symptoms of heart failure     Sodium restricted diet (less than 2 g sodium per day)    Medication compliance    Close follow up with PCP/Cardiologist    Advised patient that follow up appointment will be scheduled and placed on Discharge Instructions prior to discharge. Will continue to follow until discharge.         Time spent with patient discussing HF education: 20 minutes             
(thiasolidinediones, saxagliptin, alogliptin)  NSAIDs and GONZALEZ 2 inhibitors    Consider vaccinations for respiratory illnesses (flu, pneumonia, covid) [Class 2b]    Due to h/o recurrent hospitalizations this patient may benefit from referral to Advanced Heart Failure Program to assess suitability for advanced therapies, such as left-ventricular assist device, heart transplantation, palliative inotropes or palliation [Class 1]. To obtain in-patient consultation or refer to [facility] Failure [facility], call [phone]    Patient Heart Failure Education:     Heart failure education to be provided including information about medical therapy, lifestyle modifications, diet and fluid restrictions, physical activity.  Educational resources to be provided: “AHA Discharge packet for patients diagnosed with Heart Failure booklet; Signs/Symptoms magnet; Weight Calendar; [facility] flyer.  Information to be provided about HF support group.  Learning about self-care for Heart Failure on discharge instructions.   American Heart Association's Interactive Workbook \"Healthier Living with Heart Failure - Managing Symptoms and Reducing Risk\" (QR code for link to access added to Discharge Instructions.    Plan for Transitional Care:    Post discharge follow up phone call to be made within 48-72 hours of discharge.  Patient will follow-up with PCP.  Patient will follow-up with Primary Cardiologist.  Patient screened for obstructive sleep apnea and referred to Sleep Medicine.  Referral/follow-up with Cardiac Rehabilitation.  Referral/follow-up with Advanced Heart Failure Specialist.  Referral/follow-up with Palliative Care Specialist.        Heart Failure Nurse Navigator  Phone: [phone] / [phone]    *Recommendations listed above are based on the guidelines: 2022 AHA/ACC/HFSA Guideline for the Management of Heart Failure: A Report of the American College of Cardiology/American Heart Association Joint Committee

## 2025-07-02 NOTE — PLAN OF CARE
Problem: Chronic Conditions and Co-morbidities  Goal: Patient's chronic conditions and co-morbidity symptoms are monitored and maintained or improved  7/2/2025 0240 by Tova Anand RN  Outcome: Progressing     Problem: Discharge Planning  Goal: Discharge to home or other facility with appropriate resources  7/2/2025 1330 by Shailesh Low RN  Outcome: Progressing  7/2/2025 0240 by Tova Anand RN  Outcome: Progressing     Problem: Seizure Precautions  Goal: Remains free of injury related to seizures activity  7/2/2025 1330 by Shailesh Low RN  Outcome: Progressing  7/2/2025 0240 by Tova Anand RN  Outcome: Progressing     Problem: Pain  Goal: Verbalizes/displays adequate comfort level or baseline comfort level  7/2/2025 1330 by Shailesh Low RN  Outcome: Progressing  7/2/2025 0240 by Tova Anand RN  Outcome: Progressing     Problem: Safety - Adult  Goal: Free from fall injury  7/2/2025 1330 by Shailesh Low RN  Outcome: Progressing  7/2/2025 0240 by Tova Anand RN  Outcome: Progressing

## 2025-07-02 NOTE — DISCHARGE INSTRUCTIONS
Discharge Instructions       PATIENT ID: Michael Rockwell  MRN: [id number]   YOB: 1965    DATE OF ADMISSION: 7/1/2025   DATE OF DISCHARGE: 7/3/2025    PRIMARY CARE PROVIDER: Orestes Thurman     ATTENDING PHYSICIAN: Jan Fournier MD   DISCHARGING PROVIDER: Jan Fournier MD    To contact this individual call [phone] and ask the  to page.   If unavailable ask to be transferred the Adult Hospitalist Department.    DISCHARGE DIAGNOSES   Acute kidney injury superimposed on chronic kidney disease stage V  Volume overload  Uncontrolled hypertension/hypertensive emergency  COPD  Anemia of chronic disease      CONSULTATIONS:   Nephrology  Vascular surgery    PROCEDURES/SURGERIES: * No surgery found *    PENDING TEST RESULTS:   At the time of discharge the following test results are still pending: None    FOLLOW UP APPOINTMENTS:   PCP in 1 to 2 weeks for general medical follow-up and medications refills;  Nephrology in 2 to 3 weeks for repeat lab work, adjust medications as indicated;  [facility] in 1 to 2 weeks for urinary retention;      ADDITIONAL CARE RECOMMENDATIONS:   Please take all your medications as prescribed;    DIET: cardiac diet and renal diet    ACTIVITY: activity as tolerated    WOUND CARE: N/A    EQUIPMENT needed: None I just have a good week      DISCHARGE MEDICATIONS:   See Medication Reconciliation Form    It is important that you take the medication exactly as they are prescribed.   Keep your medication in the bottles provided by the pharmacist and keep a list of the medication names, dosages, and times to be taken in your wallet.   Do not take other medications without consulting your doctor.       NOTIFY YOUR PHYSICIAN FOR ANY OF THE FOLLOWING:   Fever over 101 degrees for 24 hours.   Chest pain, shortness of breath, fever, chills, nausea, vomiting, diarrhea, change in mentation, falling, weakness, bleeding. Severe pain or pain not relieved by medications.  Or,

## 2025-07-02 NOTE — CARE COORDINATION
Transition of Care:    CM met with patient at bedside, confirmed face sheet and discussed interest in resources for substance use rehabilitation and support. At this time, the patient politely declines resources or referrals stating that he has not been using drugs or alcohol for two months and that his wife is assisting him.     Patient reports that he is retired, 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, has no Hx of  nor other rehab. He does not ow any DME. The phone numbers listed on patient's chart both belong to his wife. He does not have a phone with him/that he is using at present.     Patient lives with wife in one story home. He uses the [facility] on [address] and has an AD on file. He confirms that Orestes Thurman is his assigned PCP but that it is hard to get an appointment to see him and he is frustrated with his current provider as a result. He states an interest in changing to [facility] for his primary care. CM messaged CM specialist to assist with establishing new PCP at location of patient's choice.      Wife transport; CM following.     Care Management Initial Assessment       RUR: 26%  Readmission? No  1st IM letter given? No  1st  letter given: No    07/02/25 0921   Service Assessment   Patient Orientation Alert and Oriented   Cognition Alert   History Provided By Patient   Primary Caregiver Spouse   Support Systems Spouse/Significant Other   Patient's Healthcare Decision Maker is: Named in Scanned ACP Document   PCP Verified by CM Yes  (but he does not like him; says it's hard to get in; would like to be set up with [facility])   Last Visit to PCP   (expressed frustration about not being able to make an appt)   Prior Functional Level Independent in ADLs/IADLs   Current Functional Level Independent in ADLs/IADLs   Can patient return to prior living arrangement Yes   Ability to make needs known: Good   Family able to assist with home care needs: Yes   Would you like for me to discuss the discharge plan with

## 2025-07-03 VITALS
HEIGHT: 69 IN | DIASTOLIC BLOOD PRESSURE: 98 MMHG | HEART RATE: 85 BPM | OXYGEN SATURATION: 97 % | BODY MASS INDEX: 22.6 KG/M2 | TEMPERATURE: 97.9 F | SYSTOLIC BLOOD PRESSURE: 158 MMHG | WEIGHT: 152.56 LBS | RESPIRATION RATE: 14 BRPM

## 2025-07-03 LAB
ALBUMIN SERPL-MCNC: 3 G/DL (ref 3.5–5)
ALBUMIN/GLOB SERPL: 1.1 (ref 1.1–2.2)
ALP SERPL-CCNC: 68 U/L (ref 45–117)
ALT SERPL-CCNC: 13 U/L (ref 12–78)
ANION GAP SERPL CALC-SCNC: 9 MMOL/L (ref 2–12)
AST SERPL-CCNC: <3 U/L (ref 15–37)
BASOPHILS # BLD: 0.03 K/UL (ref 0–0.1)
BASOPHILS NFR BLD: 0.5 % (ref 0–1)
BILIRUB SERPL-MCNC: 0.4 MG/DL (ref 0.2–1)
BUN SERPL-MCNC: 45 MG/DL (ref 6–20)
BUN/CREAT SERPL: 8 (ref 12–20)
CALCIUM SERPL-MCNC: 8.4 MG/DL (ref 8.5–10.1)
CHLORIDE SERPL-SCNC: 109 MMOL/L (ref 97–108)
CO2 SERPL-SCNC: 22 MMOL/L (ref 21–32)
CREAT SERPL-MCNC: 5.42 MG/DL (ref 0.7–1.3)
DIFFERENTIAL METHOD BLD: ABNORMAL
ECHO BSA: 1.84 M2
ECHO BSA: 1.84 M2
EOSINOPHIL # BLD: 0.96 K/UL (ref 0–0.4)
EOSINOPHIL NFR BLD: 16.2 % (ref 0–7)
ERYTHROCYTE [DISTWIDTH] IN BLOOD BY AUTOMATED COUNT: 13.4 % (ref 11.5–14.5)
GLOBULIN SER CALC-MCNC: 2.8 G/DL (ref 2–4)
GLUCOSE SERPL-MCNC: 105 MG/DL (ref 65–100)
HCT VFR BLD AUTO: 24.5 % (ref 36.6–50.3)
HGB BLD-MCNC: 8.3 G/DL (ref 12.1–17)
IMM GRANULOCYTES # BLD AUTO: 0.02 K/UL (ref 0–0.04)
IMM GRANULOCYTES NFR BLD AUTO: 0.3 % (ref 0–0.5)
LYMPHOCYTES # BLD: 0.55 K/UL (ref 0.8–3.5)
LYMPHOCYTES NFR BLD: 9.3 % (ref 12–49)
MCH RBC QN AUTO: 31.2 PG (ref 26–34)
MCHC RBC AUTO-ENTMCNC: 33.9 G/DL (ref 30–36.5)
MCV RBC AUTO: 92.1 FL (ref 80–99)
MONOCYTES # BLD: 0.5 K/UL (ref 0–1)
MONOCYTES NFR BLD: 8.5 % (ref 5–13)
NEUTS SEG # BLD: 3.84 K/UL (ref 1.8–8)
NEUTS SEG NFR BLD: 65.2 % (ref 32–75)
NRBC # BLD: 0 K/UL (ref 0–0.01)
NRBC BLD-RTO: 0 PER 100 WBC
PLATELET # BLD AUTO: 197 K/UL (ref 150–400)
PMV BLD AUTO: 10.6 FL (ref 8.9–12.9)
POTASSIUM SERPL-SCNC: 3.4 MMOL/L (ref 3.5–5.1)
PROT SERPL-MCNC: 5.8 G/DL (ref 6.4–8.2)
RBC # BLD AUTO: 2.66 M/UL (ref 4.1–5.7)
RBC MORPH BLD: ABNORMAL
SODIUM SERPL-SCNC: 140 MMOL/L (ref 136–145)
VAS LEFT ANTECUBITAL VEIN DIAM: 0.43 CM
VAS LEFT BASILIC VEIN LOWER ARM PROX DIAM: 0.32 CM
VAS LEFT BASILIC VEIN UPPER ARM DIST DIAM: 0.41 CM
VAS LEFT BASILIC VEIN UPPER ARM PROX DIAM: 0.37 CM
VAS LEFT BASILIC VEIN WRIST DIAM: 0.14 CM
VAS LEFT CEPHALIC VEIN LOWER ARM DIST DIAM: 0.23 CM
VAS LEFT CEPHALIC VEIN LOWER ARM PROX DIAM: 0.21 CM
VAS LEFT CEPHALIC VEIN UPPER ARM DIST DIAM: 0.34 CM
VAS LEFT CEPHALIC VEIN UPPER ARM PROX DIAM: 0.29 CM
VAS LEFT CEPHALIC VEIN WRIST DIAM: 0.26 CM
VAS RIGHT ANTECUBITAL VEIN DIAM: 0.43 CM
VAS RIGHT BASILIC VEIN LOWER ARM PROX DIAM: 0.14 CM
VAS RIGHT BASILIC VEIN UPPER ARM DIST DIAM: 0.47 CM
VAS RIGHT BASILIC VEIN UPPER ARM PROX DIAM: 0.45 CM
VAS RIGHT BASILIC VEIN WRIST DIAM: 0.17 CM
VAS RIGHT BASLIC VEIN LOWER ARM DIST DIAM: 0.19 CM
VAS RIGHT CEPHALIC VEIN LOWER ARM DIST DIAM: 0.31 CM
VAS RIGHT CEPHALIC VEIN LOWER ARM PROX DIAM: 0.35 CM
VAS RIGHT CEPHALIC VEIN UPPER ARM DIST DIAM: 0.33 CM
VAS RIGHT CEPHALIC VEIN UPPER ARM PROX DIAM: 0.34 CM
VAS RIGHT CEPHALIC VEIN WRIST DIAM: 0.28 CM
WBC # BLD AUTO: 5.9 K/UL (ref 4.1–11.1)

## 2025-07-03 PROCEDURE — 85025 COMPLETE CBC W/AUTO DIFF WBC: CPT

## 2025-07-03 PROCEDURE — 6370000000 HC RX 637 (ALT 250 FOR IP): Performed by: INTERNAL MEDICINE

## 2025-07-03 PROCEDURE — 6360000002 HC RX W HCPCS: Performed by: HOSPITALIST

## 2025-07-03 PROCEDURE — 2500000003 HC RX 250 WO HCPCS: Performed by: FAMILY MEDICINE

## 2025-07-03 PROCEDURE — 6370000000 HC RX 637 (ALT 250 FOR IP): Performed by: HOSPITALIST

## 2025-07-03 PROCEDURE — 51701 INSERT BLADDER CATHETER: CPT

## 2025-07-03 PROCEDURE — 51798 US URINE CAPACITY MEASURE: CPT

## 2025-07-03 PROCEDURE — 80053 COMPREHEN METABOLIC PANEL: CPT

## 2025-07-03 PROCEDURE — 6370000000 HC RX 637 (ALT 250 FOR IP): Performed by: FAMILY MEDICINE

## 2025-07-03 RX ORDER — HYDRALAZINE HYDROCHLORIDE 100 MG/1
100 TABLET, FILM COATED ORAL 3 TIMES DAILY
Qty: 90 TABLET | Refills: 0 | Status: SHIPPED | OUTPATIENT
Start: 2025-07-03 | End: 2025-08-02

## 2025-07-03 RX ORDER — TAMSULOSIN HYDROCHLORIDE 0.4 MG/1
0.4 CAPSULE ORAL EVERY EVENING
Qty: 30 CAPSULE | Refills: 0 | Status: SHIPPED | OUTPATIENT
Start: 2025-07-03

## 2025-07-03 RX ORDER — TAMSULOSIN HYDROCHLORIDE 0.4 MG/1
0.4 CAPSULE ORAL EVERY EVENING
Status: DISCONTINUED | OUTPATIENT
Start: 2025-07-03 | End: 2025-07-03 | Stop reason: HOSPADM

## 2025-07-03 RX ORDER — PRAZOSIN HYDROCHLORIDE 1 MG/1
2 CAPSULE ORAL 3 TIMES DAILY
Status: DISCONTINUED | OUTPATIENT
Start: 2025-07-03 | End: 2025-07-03 | Stop reason: HOSPADM

## 2025-07-03 RX ORDER — FUROSEMIDE 20 MG/1
60 TABLET ORAL DAILY
Qty: 90 TABLET | Refills: 0 | Status: SHIPPED | OUTPATIENT
Start: 2025-07-04 | End: 2025-08-03

## 2025-07-03 RX ORDER — PRAZOSIN HYDROCHLORIDE 2 MG/1
2 CAPSULE ORAL 3 TIMES DAILY
Qty: 90 CAPSULE | Refills: 0 | Status: SHIPPED | OUTPATIENT
Start: 2025-07-03 | End: 2025-08-02

## 2025-07-03 RX ORDER — LANOLIN ALCOHOL/MO/W.PET/CERES
100 CREAM (GRAM) TOPICAL DAILY
Status: SHIPPED | COMMUNITY
Start: 2025-07-04 | End: 2025-07-03

## 2025-07-03 RX ORDER — FUROSEMIDE 20 MG/1
60 TABLET ORAL DAILY
Qty: 90 TABLET | Refills: 0 | Status: SHIPPED | OUTPATIENT
Start: 2025-07-04 | End: 2025-07-03

## 2025-07-03 RX ORDER — TAMSULOSIN HYDROCHLORIDE 0.4 MG/1
0.4 CAPSULE ORAL EVERY EVENING
Qty: 30 CAPSULE | Refills: 0 | Status: SHIPPED | OUTPATIENT
Start: 2025-07-03 | End: 2025-07-03

## 2025-07-03 RX ORDER — LANOLIN ALCOHOL/MO/W.PET/CERES
100 CREAM (GRAM) TOPICAL DAILY
Status: SHIPPED | COMMUNITY
Start: 2025-07-04

## 2025-07-03 RX ORDER — PRAZOSIN HYDROCHLORIDE 2 MG/1
2 CAPSULE ORAL 3 TIMES DAILY
Qty: 90 CAPSULE | Refills: 0 | Status: SHIPPED | OUTPATIENT
Start: 2025-07-03 | End: 2025-07-03

## 2025-07-03 RX ORDER — HYDRALAZINE HYDROCHLORIDE 50 MG/1
100 TABLET, FILM COATED ORAL EVERY 6 HOURS SCHEDULED
Status: DISCONTINUED | OUTPATIENT
Start: 2025-07-03 | End: 2025-07-03 | Stop reason: HOSPADM

## 2025-07-03 RX ORDER — HYDRALAZINE HYDROCHLORIDE 100 MG/1
100 TABLET, FILM COATED ORAL 3 TIMES DAILY
Qty: 90 TABLET | Refills: 0 | Status: SHIPPED | OUTPATIENT
Start: 2025-07-03 | End: 2025-07-03

## 2025-07-03 RX ADMIN — PRAZOSIN HYDROCHLORIDE 2 MG: 1 CAPSULE ORAL at 13:23

## 2025-07-03 RX ADMIN — ASPIRIN 81 MG: 81 TABLET, CHEWABLE ORAL at 09:05

## 2025-07-03 RX ADMIN — FUROSEMIDE 60 MG: 40 TABLET ORAL at 09:03

## 2025-07-03 RX ADMIN — CLOPIDOGREL BISULFATE 75 MG: 75 TABLET, FILM COATED ORAL at 09:02

## 2025-07-03 RX ADMIN — Medication 100 MG: at 09:04

## 2025-07-03 RX ADMIN — IPRATROPIUM BROMIDE AND ALBUTEROL SULFATE 1 DOSE: .5; 3 SOLUTION RESPIRATORY (INHALATION) at 01:41

## 2025-07-03 RX ADMIN — HYDRALAZINE HYDROCHLORIDE 100 MG: 50 TABLET ORAL at 13:23

## 2025-07-03 RX ADMIN — PRAZOSIN HYDROCHLORIDE 2 MG: 1 CAPSULE ORAL at 09:04

## 2025-07-03 RX ADMIN — ATORVASTATIN CALCIUM 80 MG: 40 TABLET, FILM COATED ORAL at 09:02

## 2025-07-03 RX ADMIN — SODIUM CHLORIDE, PRESERVATIVE FREE 10 ML: 5 INJECTION INTRAVENOUS at 09:21

## 2025-07-03 RX ADMIN — HYDRALAZINE HYDROCHLORIDE 100 MG: 50 TABLET ORAL at 07:17

## 2025-07-03 RX ADMIN — HYDRALAZINE HYDROCHLORIDE 5 MG: 20 INJECTION INTRAMUSCULAR; INTRAVENOUS at 04:50

## 2025-07-03 RX ADMIN — DIPHENHYDRAMINE HYDROCHLORIDE 25 MG: 25 CAPSULE ORAL at 09:06

## 2025-07-03 RX ADMIN — AMLODIPINE BESYLATE 10 MG: 5 TABLET ORAL at 09:02

## 2025-07-03 RX ADMIN — CLONIDINE HYDROCHLORIDE 0.1 MG: 0.1 TABLET ORAL at 09:04

## 2025-07-03 NOTE — PLAN OF CARE
Problem: Chronic Conditions and Co-morbidities  Goal: Patient's chronic conditions and co-morbidity symptoms are monitored and maintained or improved  7/3/2025 0254 by Tova Anand RN  Outcome: Progressing

## 2025-07-03 NOTE — CARE COORDINATION
Transition of Care:    CM provided map with location of newly arranged PCP (proximal to patient's house per patient request); also placed in AVS.     Family transport; CM following - no outpatient dialysis chair referral needed/started yet at this time per care team.    Transition of Care Plan:    RUR: 27%  Prior Level of Functioning: home indep, with family  Disposition: home indep, with family  MCKAY: 7/3  If SNF or IPR: Date FOC offered: n/a  Follow up appointments: defer to AVS  DME needed: none  Transportation at discharge: family  IM/IMM Medicare/ letter given: n/a  Is patient a  and connected with [facility]?    If yes, was [place] transfer form completed and [facility] notified?   Caregiver Contact: spouse Nathalie Rockwell, [phone]   Discharge Caregiver contacted prior to discharge? Sister present  Care Conference needed?   Barriers to discharge:  medical readiness, retaining

## 2025-07-03 NOTE — CONSULTS
[address]  [address]                              CONSULTATION      PATIENT NAME: MECHELLE WILLOUGHBY             : 1965  MED REC NO: [id number]                       ROOM: 463  ACCOUNT NO: [id number]                       ADMIT DATE: 2025  PROVIDER: Modesto Estrada MD    DATE OF SERVICE:  2025    ATTENDING PHYSICIAN:  Randy Kay MD    REASON FOR CONSULTATION:  Chronic kidney disease, need for permanent access.    HISTORY OF PRESENT ILLNESS:  The patient is a 59-year-old gentleman in relatively poor health, who has been admitted for chest pain and chronic kidney disease and uncontrolled hypertension.  He is being managed by the hospitalists and Nephrology team.  He is an extremely poor historian and has had a history of stroke and has apparently unable to really provide any information whatsoever regarding his medical care.  We were asked for permanent access.  Per permanent dialysis access, he has right arm dominant.    PAST MEDICAL HISTORY:  Significant for COPD, GERD, heart failure, hypertension, history of CVA, he has a loop recorder placed.    SOCIAL HISTORY:  Positive for tobacco.  He also has significant alcohol use and apparently is positive for cocaine as well as marijuana use.  This is also in the records.  It is very difficult to obtain a proper history from.    REVIEW OF SYSTEMS:  Negative for 10-system review at this point.    ALLERGIES:  HE IS ALLERGIC TO ACE INHIBITORS.      PHYSICAL EXAMINATION:  VITAL SIGNS:  Temperature is 98, heart rate 67, blood pressure is 157/106, 96% on room air.  GENERAL:  Currently in no acute distress.  He is alert, but not knowledgeable about his medical issues.    CHEST:  Clear to auscultation bilaterally.  HEART:  Regular rate and rhythm.  ABDOMEN:  Soft, nontender.  The loop recorder is palpable in his chest.  He has palpable brachial radial pulses on the left.  There 
                      New Urology Consult Note    Patient: Michael Rockwell MRN: [id number]  SSN: [id number]    YOB: 1965  Age: 59 y.o.  Sex: male            Assessment:     Urinary retention  Substance abuse    Recommendations:     1. Urinary retention   -geiger x 1 week then op follow   - add Tamsulosin   2.  Substance abuse.  patient has a high weight of compliance we will schedule him outpatient for void trial    D/w Dr Pearl     Thank you for this consult. Please contact [facility] with any further questions/concerns.    I have personally reviewed the most recent labs, microbiology data, and radiology images. Agree with radiology report unless specified in the HPI.   Outside records from all consultants reviewed.   Greater than 20 minutes spent on total encounter including chart review, patient encounter/exam, and communication/coordination with treatment team     History of Present Illness:     Reason for Consult:  urinary retention     Michael Rockwell is seen in consultation for reasons noted above at the request of Jan Fournier MD.    This is a 59 y.o. male with a history of hypertension, asthma/COPD, GERD, HFpEF, CKD 5, past stroke with residual left-sided hemiparesis,  who presents with chest pain, and is being admitted for acute heart failure secondary to hypertensive emergency.  He denies cocaine use, but UDS is cocaine positive.  He endorses alcohol use of 40 ounces of beer daily.      Subjective     Past Medical History  Past Medical History:   Diagnosis Date    Asthma     Chronic kidney disease     Chronic obstructive pulmonary disease (HCC)     Emphysema lung (HCC)     GERD (gastroesophageal reflux disease)     Heart failure (HCC) 10/2021    1 EPISODE DUE TO HIGH BP, NOW CLEARED UP PER PT AND WIFE    Hypertension     Stroke (HCC) 2022       Past Surgical History:   Past Surgical History:   Procedure Laterality Date    APPENDECTOMY      EP DEVICE PROCEDURE N/A 5/22/2025    
Patient was seen previously by [facility]  The consult is diverted to their service  
Pt will be seen on rounds tomorrow   
Vascular Surgery  --full note dictated  --agree with outpatient left arm fistula placement      
Absolute 6.15 1.80 - 8.00 K/UL    Lymphocytes Absolute 0.63 (L) 0.80 - 3.50 K/UL    Monocytes Absolute 0.90 0.00 - 1.00 K/UL    Eosinophils Absolute 0.75 (H) 0.00 - 0.40 K/UL    Basophils Absolute 0.03 0.00 - 0.10 K/UL    Immature Granulocytes Absolute 0.04 0.00 - 0.04 K/UL    Differential Type SMEAR SCANNED      RBC Comment NORMOCYTIC, NORMOCHROMIC           Urine dipstick:   No results found for: \"COLOR\", \"CASTS\"    I have reviewed the following:   All pertinent labs, microbiology data, radiology imaging for my assessment     Medications list Personally Reviewed   [x]      Yes     []               No       Medications:  Prior to Admission medications    Medication Sig Start Date End Date Taking? Authorizing Provider   cloNIDine (CATAPRES) 0.1 MG tablet Take 1 tablet by mouth 2 times daily 5/23/25   Masood Abebe MD   hydroCHLOROthiazide 12.5 MG tablet Take 2 tablets by mouth daily 5/24/25   Masood Abebe MD   clopidogrel (PLAVIX) 75 MG tablet Take 1 tablet by mouth daily 5/24/25   Masood Abebe MD   albuterol sulfate HFA (PROVENTIL;VENTOLIN;PROAIR) 108 (90 Base) MCG/ACT inhaler Inhale 2 puffs into the lungs every 4 hours as needed    Automatic Reconciliation, Ar   amLODIPine (NORVASC) 10 MG tablet Take 1 tablet by mouth daily 12/22/22   Automatic Reconciliation, Ar   aspirin 81 MG chewable tablet Take 1 tablet by mouth daily 12/22/22   Automatic Reconciliation, Ar   atorvastatin (LIPITOR) 80 MG tablet Take 1 tablet by mouth 12/22/22   Automatic Reconciliation, Ar   labetalol (NORMODYNE) 200 MG tablet Take 2 tablets by mouth 3 times daily 12/22/22   Automatic Reconciliation, Ar        Thank you for allowing us to participate in the care of this patient.   We will follow patient. Please don’t hesitate to call with any questions    MD [facility]   [address] [place] [address], [facility] [place] [address]  Phone - [phone]   Fax - [phone]

## 2025-07-03 NOTE — PROGRESS NOTES
[address], [place] [address]  Phone: [phone]   [phone]    [email or web address]     Nephrology Progress Note    Patient Name : Michael Rockwell      : 1965     MRN : [id number]  Date of Admission : 2025  Date of Servive : 25    CC:  Follow up for CKD       Assessment and Plan   JEWELS on CKD  - Progressive hypertensive kidney disease  - Nearing ESRD/dialysis  - Has not completed dialysis education class but do not see him as a candidate for home therapies due to noncompliance and substance abuse  - VEIN MAPPING for AVF completed   - appreciate vascular consult for AVF. Agree w/ out pt plans   - No emergent need for RRT  - change lasix to PO  - He will follow-up with Dr. Barnes post discharge     Uncontrolled HTN  Chronic cocaine use  - added Hydralazine and Prazosin      Recent CVA  HLD   COPD     Interval History:  Seen and examined. BP remained high. Appreciare vascular consult   VEIN mapping noted   Cr stable     Review of Systems: Pertinent items are noted in HPI.    Current Medications:   Current Facility-Administered Medications   Medication Dose Route Frequency    hydrALAZINE (APRESOLINE) tablet 100 mg  100 mg Oral 4 times per day    prazosin (MINIPRESS) capsule 2 mg  2 mg Oral TID    diphenhydrAMINE (BENADRYL) capsule 25 mg  25 mg Oral Q6H PRN    hydrALAZINE (APRESOLINE) injection 5 mg  5 mg IntraVENous Q6H PRN    nitroGLYCERIN (NITROSTAT) SL tablet 0.4 mg  0.4 mg SubLINGual PRN    niCARdipine (CARDENE) 25 mg in sodium chloride 0.9 % 250 mL infusion ([id number])  0.5-15 mg/hr IntraVENous Continuous    sodium chloride flush 0.9 % injection 5-40 mL  5-40 mL IntraVENous 2 times per day    sodium chloride flush 0.9 % injection 5-40 mL  5-40 mL IntraVENous PRN    0.9 % sodium chloride infusion   IntraVENous PRN    heparin (porcine) injection 5,000 Units  5,000 Units SubCUTAneous 3 times per day    ondansetron 
  Problem: Chronic Conditions and Co-morbidities  Goal: Patient's chronic conditions and co-morbidity symptoms are monitored and maintained or improved  Outcome: Progressing  Flowsheets (Taken 7/1/2025 173)  Care Plan - Patient's Chronic Conditions and Co-Morbidity Symptoms are Monitored and Maintained or Improved: Collaborate with multidisciplinary team to address chronic and comorbid conditions and prevent exacerbation or deterioration     Problem: Discharge Planning  Goal: Discharge to home or other facility with appropriate resources  Outcome: Progressing  Flowsheets (Taken 7/1/2025 1738)  Discharge to home or other facility with appropriate resources: Identify barriers to discharge with patient and caregiver     Problem: Seizure Precautions  Goal: Remains free of injury related to seizures activity  Outcome: Progressing     Problem: Pain  Goal: Verbalizes/displays adequate comfort level or baseline comfort level  Outcome: Progressing     
Hospital follow-up NEW PCP transitional care appointment has been scheduled with Dr. Carly Christian for Monday, July 14th, 2025 at 12p.m.. Please plan to arrive 15 min early with Insurance card, ID, List of medication. You may also log onto iT[employer] and complete this information prior to visit.  PLEASE CONTACT PROVIDER if you are unable to keep this appointment. It is IMPORTANT that you are ON TIME for this appointment. Tardiness may result in appointment being rescheduled. Pending patient discharge.  Meghan Sanches, Care Management Assistant   
Pt discharge to home following review of discharge instructions, removal of PIV and tele. VSS, NAD. Pt dressed in street clothes and wheeled to lobby for pickup by wife in personal vehicle. Pt reminded to return to ED if he has difficulty voiding.   
Spiritual Care Partner Volunteer visited patient at [facility] in [facility] CV SERVICES UNIT on 7/3/2025   Documented by:  Delta Jason MDIV, BCC  
IntraVENous PRN    0.9 % sodium chloride infusion   IntraVENous PRN    heparin (porcine) injection 5,000 Units  5,000 Units SubCUTAneous 3 times per day    ondansetron (ZOFRAN-ODT) disintegrating tablet 4 mg  4 mg Oral Q8H PRN    Or    ondansetron (ZOFRAN) injection 4 mg  4 mg IntraVENous Q6H PRN    polyethylene glycol (GLYCOLAX) packet 17 g  17 g Oral Daily PRN    acetaminophen (TYLENOL) tablet 650 mg  650 mg Oral Q6H PRN    Or    acetaminophen (TYLENOL) suppository 650 mg  650 mg Rectal Q6H PRN    sodium chloride flush 0.9 % injection 5-40 mL  5-40 mL IntraVENous 2 times per day    sodium chloride flush 0.9 % injection 5-40 mL  5-40 mL IntraVENous PRN    0.9 % sodium chloride infusion   IntraVENous PRN    thiamine tablet 100 mg  100 mg Oral Daily    furosemide (LASIX) injection 60 mg  60 mg IntraVENous BID    diazePAM (VALIUM) injection 5 mg  5 mg IntraVENous Q1H PRN    diazePAM (VALIUM) injection 10 mg  10 mg IntraVENous Q1H PRN    diazePAM (VALIUM) injection 15 mg  15 mg IntraVENous Q1H PRN    diazePAM (VALIUM) injection 20 mg  20 mg IntraVENous Q1H PRN    atorvastatin (LIPITOR) tablet 80 mg  80 mg Oral Daily    cloNIDine (CATAPRES) tablet 0.1 mg  0.1 mg Oral BID    clopidogrel (PLAVIX) tablet 75 mg  75 mg Oral Daily    amLODIPine (NORVASC) tablet 10 mg  10 mg Oral Daily    aspirin chewable tablet 81 mg  81 mg Oral Daily    ipratropium 0.5 mg-albuterol 2.5 mg (DUONEB) nebulizer solution 1 Dose  1 Dose Inhalation Q4H PRN     ______________________________________________________________________  EXPECTED LENGTH OF STAY: 3  ACTUAL LENGTH OF STAY:          1                 Randy Kay MD

## 2025-07-06 ENCOUNTER — APPOINTMENT (OUTPATIENT)
Facility: HOSPITAL | Age: 60
DRG: 045 | End: 2025-07-06
Payer: MEDICAID

## 2025-07-06 ENCOUNTER — HOSPITAL ENCOUNTER (INPATIENT)
Facility: HOSPITAL | Age: 60
LOS: 1 days | Discharge: LEFT BEFORE DISCHARGE | DRG: 045 | End: 2025-07-06
Attending: EMERGENCY MEDICINE | Admitting: FAMILY MEDICINE
Payer: MEDICAID

## 2025-07-06 VITALS
HEIGHT: 69 IN | BODY MASS INDEX: 24.75 KG/M2 | DIASTOLIC BLOOD PRESSURE: 88 MMHG | RESPIRATION RATE: 19 BRPM | HEART RATE: 73 BPM | TEMPERATURE: 97.5 F | OXYGEN SATURATION: 96 % | WEIGHT: 167.11 LBS | SYSTOLIC BLOOD PRESSURE: 137 MMHG

## 2025-07-06 DIAGNOSIS — D64.9 ANEMIA, UNSPECIFIED TYPE: ICD-10-CM

## 2025-07-06 DIAGNOSIS — R53.1 LEFT-SIDED WEAKNESS: Primary | ICD-10-CM

## 2025-07-06 DIAGNOSIS — R79.89 ELEVATED TROPONIN: ICD-10-CM

## 2025-07-06 PROBLEM — I63.9 ACUTE CVA (CEREBROVASCULAR ACCIDENT) (HCC): Status: ACTIVE | Noted: 2025-07-06

## 2025-07-06 PROBLEM — R29.818 FOCAL NEUROLOGICAL DEFICIT: Status: ACTIVE | Noted: 2025-07-06

## 2025-07-06 PROBLEM — R47.81 SLURRED SPEECH: Status: ACTIVE | Noted: 2025-07-06

## 2025-07-06 LAB
ALBUMIN SERPL-MCNC: 2.8 G/DL (ref 3.5–5)
ALBUMIN/GLOB SERPL: 1 (ref 1.1–2.2)
ALP SERPL-CCNC: 54 U/L (ref 45–117)
ALT SERPL-CCNC: 12 U/L (ref 12–78)
AMPHET UR QL SCN: NEGATIVE
ANION GAP SERPL CALC-SCNC: 7 MMOL/L (ref 2–12)
APPEARANCE UR: CLEAR
AST SERPL-CCNC: 10 U/L (ref 15–37)
BACTERIA URNS QL MICRO: NEGATIVE /HPF
BARBITURATES UR QL SCN: NEGATIVE
BASOPHILS # BLD: 0.02 K/UL (ref 0–0.1)
BASOPHILS NFR BLD: 0.4 % (ref 0–1)
BENZODIAZ UR QL: NEGATIVE
BILIRUB SERPL-MCNC: 0.2 MG/DL (ref 0.2–1)
BILIRUB UR QL: NEGATIVE
BUN SERPL-MCNC: 47 MG/DL (ref 6–20)
BUN/CREAT SERPL: 8 (ref 12–20)
CALCIUM SERPL-MCNC: 7.5 MG/DL (ref 8.5–10.1)
CANNABINOIDS UR QL SCN: NEGATIVE
CHLORIDE SERPL-SCNC: 109 MMOL/L (ref 97–108)
CO2 SERPL-SCNC: 21 MMOL/L (ref 21–32)
COCAINE UR QL SCN: POSITIVE
COLOR UR: ABNORMAL
CREAT SERPL-MCNC: 5.92 MG/DL (ref 0.7–1.3)
DIFFERENTIAL METHOD BLD: ABNORMAL
EOSINOPHIL # BLD: 0.71 K/UL (ref 0–0.4)
EOSINOPHIL NFR BLD: 13.9 % (ref 0–7)
EPITH CASTS URNS QL MICRO: ABNORMAL /LPF
ERYTHROCYTE [DISTWIDTH] IN BLOOD BY AUTOMATED COUNT: 13.1 % (ref 11.5–14.5)
ETHANOL SERPL-MCNC: <10 MG/DL (ref 0–0.08)
GLOBULIN SER CALC-MCNC: 2.8 G/DL (ref 2–4)
GLUCOSE SERPL-MCNC: 101 MG/DL (ref 65–100)
GLUCOSE UR STRIP.AUTO-MCNC: NEGATIVE MG/DL
HCT VFR BLD AUTO: 23.5 % (ref 36.6–50.3)
HGB BLD-MCNC: 7.5 G/DL (ref 12.1–17)
HGB UR QL STRIP: NEGATIVE
HYALINE CASTS URNS QL MICRO: ABNORMAL /LPF (ref 0–5)
IMM GRANULOCYTES # BLD AUTO: 0.04 K/UL (ref 0–0.04)
IMM GRANULOCYTES NFR BLD AUTO: 0.8 % (ref 0–0.5)
INR PPP: 1 (ref 0.9–1.1)
KETONES UR QL STRIP.AUTO: NEGATIVE MG/DL
LEUKOCYTE ESTERASE UR QL STRIP.AUTO: NEGATIVE
LYMPHOCYTES # BLD: 0.48 K/UL (ref 0.8–3.5)
LYMPHOCYTES NFR BLD: 9.4 % (ref 12–49)
Lab: ABNORMAL
MCH RBC QN AUTO: 30.9 PG (ref 26–34)
MCHC RBC AUTO-ENTMCNC: 31.9 G/DL (ref 30–36.5)
MCV RBC AUTO: 96.7 FL (ref 80–99)
METHADONE UR QL: NEGATIVE
MONOCYTES # BLD: 0.5 K/UL (ref 0–1)
MONOCYTES NFR BLD: 9.8 % (ref 5–13)
NEUTS SEG # BLD: 3.35 K/UL (ref 1.8–8)
NEUTS SEG NFR BLD: 65.7 % (ref 32–75)
NITRITE UR QL STRIP.AUTO: NEGATIVE
NRBC # BLD: 0 K/UL (ref 0–0.01)
NRBC BLD-RTO: 0 PER 100 WBC
OPIATES UR QL: NEGATIVE
PCP UR QL: NEGATIVE
PH UR STRIP: 5.5 (ref 5–8)
PLATELET # BLD AUTO: 192 K/UL (ref 150–400)
PMV BLD AUTO: 11.2 FL (ref 8.9–12.9)
POTASSIUM SERPL-SCNC: 3.4 MMOL/L (ref 3.5–5.1)
PROCALCITONIN SERPL-MCNC: 0.19 NG/ML
PROT SERPL-MCNC: 5.6 G/DL (ref 6.4–8.2)
PROT UR STRIP-MCNC: 30 MG/DL
PROTHROMBIN TIME: 10.7 SEC (ref 9.2–11.2)
RBC # BLD AUTO: 2.43 M/UL (ref 4.1–5.7)
RBC #/AREA URNS HPF: ABNORMAL /HPF (ref 0–5)
RBC MORPH BLD: ABNORMAL
SODIUM SERPL-SCNC: 137 MMOL/L (ref 136–145)
SP GR UR REFRACTOMETRY: 1.02 (ref 1–1.03)
TROPONIN I SERPL HS-MCNC: 166 NG/L (ref 0–76)
TROPONIN I SERPL HS-MCNC: 184 NG/L (ref 0–76)
URINE CULTURE IF INDICATED: ABNORMAL
UROBILINOGEN UR QL STRIP.AUTO: 0.2 EU/DL (ref 0.2–1)
WBC # BLD AUTO: 5.1 K/UL (ref 4.1–11.1)
WBC URNS QL MICRO: ABNORMAL /HPF (ref 0–4)

## 2025-07-06 PROCEDURE — 80053 COMPREHEN METABOLIC PANEL: CPT

## 2025-07-06 PROCEDURE — 6360000004 HC RX CONTRAST MEDICATION: Performed by: EMERGENCY MEDICINE

## 2025-07-06 PROCEDURE — 85610 PROTHROMBIN TIME: CPT

## 2025-07-06 PROCEDURE — APPNB45 APP NON BILLABLE 31-45 MINUTES: Performed by: NURSE PRACTITIONER

## 2025-07-06 PROCEDURE — 0042T CT BRAIN PERFUSION: CPT

## 2025-07-06 PROCEDURE — 4A03X5D MEASUREMENT OF ARTERIAL FLOW, INTRACRANIAL, EXTERNAL APPROACH: ICD-10-PCS | Performed by: STUDENT IN AN ORGANIZED HEALTH CARE EDUCATION/TRAINING PROGRAM

## 2025-07-06 PROCEDURE — 2580000003 HC RX 258: Performed by: EMERGENCY MEDICINE

## 2025-07-06 PROCEDURE — 82077 ASSAY SPEC XCP UR&BREATH IA: CPT

## 2025-07-06 PROCEDURE — 99285 EMERGENCY DEPT VISIT HI MDM: CPT

## 2025-07-06 PROCEDURE — 93005 ELECTROCARDIOGRAM TRACING: CPT | Performed by: EMERGENCY MEDICINE

## 2025-07-06 PROCEDURE — 70450 CT HEAD/BRAIN W/O DYE: CPT

## 2025-07-06 PROCEDURE — 80307 DRUG TEST PRSMV CHEM ANLYZR: CPT

## 2025-07-06 PROCEDURE — 70496 CT ANGIOGRAPHY HEAD: CPT

## 2025-07-06 PROCEDURE — 84145 PROCALCITONIN (PCT): CPT

## 2025-07-06 PROCEDURE — 81001 URINALYSIS AUTO W/SCOPE: CPT

## 2025-07-06 PROCEDURE — 85025 COMPLETE CBC W/AUTO DIFF WBC: CPT

## 2025-07-06 PROCEDURE — 84484 ASSAY OF TROPONIN QUANT: CPT

## 2025-07-06 PROCEDURE — 1100000000 HC RM PRIVATE

## 2025-07-06 RX ORDER — HYDRALAZINE HYDROCHLORIDE 25 MG/1
100 TABLET, FILM COATED ORAL 3 TIMES DAILY
Status: DISCONTINUED | OUTPATIENT
Start: 2025-07-06 | End: 2025-07-07 | Stop reason: HOSPADM

## 2025-07-06 RX ORDER — SODIUM CHLORIDE 0.9 % (FLUSH) 0.9 %
5-40 SYRINGE (ML) INJECTION EVERY 12 HOURS SCHEDULED
Status: DISCONTINUED | OUTPATIENT
Start: 2025-07-06 | End: 2025-07-07 | Stop reason: HOSPADM

## 2025-07-06 RX ORDER — SODIUM CHLORIDE 9 MG/ML
INJECTION, SOLUTION INTRAVENOUS PRN
Status: DISCONTINUED | OUTPATIENT
Start: 2025-07-06 | End: 2025-07-07 | Stop reason: HOSPADM

## 2025-07-06 RX ORDER — AMLODIPINE BESYLATE 5 MG/1
10 TABLET ORAL DAILY
Status: DISCONTINUED | OUTPATIENT
Start: 2025-07-06 | End: 2025-07-07 | Stop reason: HOSPADM

## 2025-07-06 RX ORDER — FUROSEMIDE 40 MG/1
60 TABLET ORAL DAILY
Status: DISCONTINUED | OUTPATIENT
Start: 2025-07-06 | End: 2025-07-07 | Stop reason: HOSPADM

## 2025-07-06 RX ORDER — LANOLIN ALCOHOL/MO/W.PET/CERES
100 CREAM (GRAM) TOPICAL DAILY
Status: DISCONTINUED | OUTPATIENT
Start: 2025-07-06 | End: 2025-07-07 | Stop reason: HOSPADM

## 2025-07-06 RX ORDER — ASPIRIN 81 MG/1
81 TABLET ORAL DAILY
Status: DISCONTINUED | OUTPATIENT
Start: 2025-07-06 | End: 2025-07-07 | Stop reason: HOSPADM

## 2025-07-06 RX ORDER — CLOPIDOGREL BISULFATE 75 MG/1
75 TABLET ORAL DAILY
Status: DISCONTINUED | OUTPATIENT
Start: 2025-07-06 | End: 2025-07-07 | Stop reason: HOSPADM

## 2025-07-06 RX ORDER — ATORVASTATIN CALCIUM 40 MG/1
80 TABLET, FILM COATED ORAL NIGHTLY
Status: DISCONTINUED | OUTPATIENT
Start: 2025-07-06 | End: 2025-07-07 | Stop reason: HOSPADM

## 2025-07-06 RX ORDER — TAMSULOSIN HYDROCHLORIDE 0.4 MG/1
0.4 CAPSULE ORAL EVERY EVENING
Status: DISCONTINUED | OUTPATIENT
Start: 2025-07-06 | End: 2025-07-07 | Stop reason: HOSPADM

## 2025-07-06 RX ORDER — CLONIDINE HYDROCHLORIDE 0.1 MG/1
0.1 TABLET ORAL 2 TIMES DAILY
Status: DISCONTINUED | OUTPATIENT
Start: 2025-07-06 | End: 2025-07-07 | Stop reason: HOSPADM

## 2025-07-06 RX ORDER — IOPAMIDOL 755 MG/ML
100 INJECTION, SOLUTION INTRAVASCULAR
Status: COMPLETED | OUTPATIENT
Start: 2025-07-06 | End: 2025-07-06

## 2025-07-06 RX ORDER — POLYETHYLENE GLYCOL 3350 17 G/17G
17 POWDER, FOR SOLUTION ORAL DAILY PRN
Status: DISCONTINUED | OUTPATIENT
Start: 2025-07-06 | End: 2025-07-07 | Stop reason: HOSPADM

## 2025-07-06 RX ORDER — ONDANSETRON 4 MG/1
4 TABLET, ORALLY DISINTEGRATING ORAL EVERY 8 HOURS PRN
Status: DISCONTINUED | OUTPATIENT
Start: 2025-07-06 | End: 2025-07-07 | Stop reason: HOSPADM

## 2025-07-06 RX ORDER — SODIUM CHLORIDE 0.9 % (FLUSH) 0.9 %
5-40 SYRINGE (ML) INJECTION PRN
Status: DISCONTINUED | OUTPATIENT
Start: 2025-07-06 | End: 2025-07-07 | Stop reason: HOSPADM

## 2025-07-06 RX ORDER — ONDANSETRON 2 MG/ML
4 INJECTION INTRAMUSCULAR; INTRAVENOUS EVERY 6 HOURS PRN
Status: DISCONTINUED | OUTPATIENT
Start: 2025-07-06 | End: 2025-07-07 | Stop reason: HOSPADM

## 2025-07-06 RX ORDER — 0.9 % SODIUM CHLORIDE 0.9 %
1000 INTRAVENOUS SOLUTION INTRAVENOUS ONCE
Status: COMPLETED | OUTPATIENT
Start: 2025-07-06 | End: 2025-07-06

## 2025-07-06 RX ORDER — IPRATROPIUM BROMIDE AND ALBUTEROL SULFATE 2.5; .5 MG/3ML; MG/3ML
1 SOLUTION RESPIRATORY (INHALATION) EVERY 4 HOURS PRN
Status: DISCONTINUED | OUTPATIENT
Start: 2025-07-06 | End: 2025-07-07 | Stop reason: HOSPADM

## 2025-07-06 RX ORDER — HEPARIN SODIUM 5000 [USP'U]/ML
5000 INJECTION, SOLUTION INTRAVENOUS; SUBCUTANEOUS EVERY 8 HOURS SCHEDULED
Status: DISCONTINUED | OUTPATIENT
Start: 2025-07-06 | End: 2025-07-07 | Stop reason: HOSPADM

## 2025-07-06 RX ADMIN — SODIUM CHLORIDE 1000 ML: 9 INJECTION, SOLUTION INTRAVENOUS at 19:05

## 2025-07-06 RX ADMIN — IOPAMIDOL 100 ML: 755 INJECTION, SOLUTION INTRAVENOUS at 15:53

## 2025-07-06 RX ADMIN — IOPAMIDOL 100 ML: 755 INJECTION, SOLUTION INTRAVENOUS at 15:52

## 2025-07-06 ASSESSMENT — PAIN SCALES - GENERAL: PAINLEVEL_OUTOF10: 0

## 2025-07-06 NOTE — PROGRESS NOTES
Neurocritical Care Code Stroke Documentation      Symptoms:   Pt here via EMS for slurred speech. Pt has left-sided deficits at baseline from multiple strokes, but per report symptoms are worst today.   Wife reported that patient was accidentally given 1 tab of Labetalol today 200 mg that he wasn't suppose to get as medication was stopped.     Wife at bedside reports patient ate at 10:00 am and took his meds and was fine. He took a nap and woke up at 2 pm with symptoms. Wife reports patient has left-sided hemiparesis at baseline, but normally speech is normal. She also reports he has CKD stage 5 and is scheduled to have catheter placed soon for dialysis treatment. Wife also reports history of aneurysm, but she is not able to give me anymore information regarding this.   Last Known Well: 10:00 am today after further history from patient's wife.   Medical hx:   Pt was just recently at [facility] from 7/1/25-7/3/25 due to chest pain. He was diagnosed with hypertensive emergency, UDS +cocaine at that time, acute on chronic HFpEF.   Past Medical History:   Diagnosis Date    Asthma     Chronic kidney disease     Chronic obstructive pulmonary disease (HCC)     Emphysema lung (HCC)     GERD (gastroesophageal reflux disease)     Heart failure ([facility]) 10/2021    1 EPISODE DUE TO HIGH BP, NOW CLEARED UP PER PT AND WIFE    Hypertension     Stroke (HCC) 2022      Anticoagulation: Aspirin 81 mg listed in PTA meds    VAN:   Negative   NIHSS:   1a-LOC:0    1b-Month/Age:0    1c-Open/Close Hand:0    2-Best Gaze:0    3-Visual Fields:0    4-Facial Palsy:0    5a-Left Arm:2    5b-Right Arm:0    6a-Left Leg:3    6b-Right Leg:3    7-Limb Ataxia:0    8-Sensory:0    9-Best Language:0    10-Dysarthria:2    11-Extinction/Inattention:0  TOTAL SCORE:10   Imaging:   CT: No evidence of acute intracranial abnormality.  Nonspecific extensive brain parenchymal abnormality, may suggest microangiopathic white matter disease.       CTA/CTP: CT Brain Perfusion

## 2025-07-06 NOTE — ED TRIAGE NOTES
Pt arrived via EMS with CC aphasia/ garbled speech and generalized weakness. LKW: 1425. . Pt might've accidentally received double his BP medication (labetalol?) earlier today.     PMH: CVAs with left side deficits, HF

## 2025-07-06 NOTE — ED PROVIDER NOTES
[facility] EMERGENCY DEPARTMENT  EMERGENCY DEPARTMENT ENCOUNTER      Pt Name: Michael Rockwell  MRN: [id number]  Birthdate 1965  Date of evaluation: 7/6/2025  Provider: Richard Cavanaugh MD      HISTORY OF PRESENT ILLNESS      59-year-old male with history of asthma, COPD, GERD, hypertension, stroke presents to the emergency department by EMS for chief complaint of worsening speech, worsening of left-sided weakness.  Parents had \"15 strokes\" per EMS and about 1 hour ago, 2.5 p.m. his daughter noted sudden onset of increased difficulty with speech as well as with his left arm weakness.  He normally followed up walk around but EMS reports he was too weak to get up for anything.  EMS also reports that the patient may have unintentionally taken a double dose of his labetalol today.  They report no hypotension.  No fever.  No injury.  No blood thinners.    The history is provided by the patient, the EMS personnel and medical records.           Nursing Notes were reviewed.    REVIEW OF SYSTEMS         Review of Systems        PAST MEDICAL HISTORY     Past Medical History:   Diagnosis Date   • Asthma    • Chronic kidney disease    • Chronic obstructive pulmonary disease (HCC)    • Emphysema lung (HCC)    • GERD (gastroesophageal reflux disease)    • Heart failure (HCC) 10/2021    1 EPISODE DUE TO HIGH BP, NOW CLEARED UP PER PT AND WIFE   • Hypertension    • Stroke (HCC) 2022         SURGICAL HISTORY       Past Surgical History:   Procedure Laterality Date   • APPENDECTOMY     • EP DEVICE PROCEDURE N/A 5/22/2025    Loop recorder insert performed by Ti Cunha MD at [facility] CARDIAC CATH LAB   • HERNIA REPAIR Right 12/05/2022    Robotic-assisted laparoscopic right inguinal hernia repair with mesh by Dr. Tan   • ORTHOPEDIC SURGERY      collar bone (right)   • OTHER SURGICAL HISTORY      HEMORRHOIDECTOMY         CURRENT MEDICATIONS       Previous Medications    ALBUTEROL SULFATE HFA (PROVENTIL;VENTOLIN;PROAIR) 108

## 2025-07-07 ENCOUNTER — TELEPHONE (OUTPATIENT)
Facility: HOSPITAL | Age: 60
End: 2025-07-07

## 2025-07-07 NOTE — ED NOTES
Pt and pt wife screaming at this RN stating \"no one has told us any results we are ready to leave now\".    This RN explained how ED provider and teledoc has gone over the plan and results. This RN also educated pt on hospitalist admission and how they work. Pt asking to leave and states \"I refuse to stay here tonight\".    This RN removed pt IV. Pt signed AMA paperwork. Pt walked out of hospital.

## 2025-07-07 NOTE — H&P
History and Physical    Date of Service:  7/6/2025  Primary Care Provider: Orestes Thurman MD  Source of information: electronic medical record    Chief Complaint: Aphasia      History of Presenting Illness:   Michael Rockwell is a 59 y.o. male with a pmhx COPD, HF, CKD V, GERD, HTN, past stroke with residual right hemiparesis s/p ILR    In the ED, VSS.  Labs showed worsening anemia with hgb 7.5, BUN 47, creatinine 5.92 (b/l 5/4), and troponin 166>184.   EKG with inferolateral T wave inversions that are old. CT head, and CTA head/neck showed no acute findings.    In the ED, he received 1L normal saline bolus.     REVIEW OF SYSTEMS:  Review of systems not obtained due to patient factors. , pt left AMA before I could fo    Past Medical History:   Diagnosis Date    Asthma     Chronic kidney disease     Chronic obstructive pulmonary disease (HCC)     Emphysema lung (HCC)     GERD (gastroesophageal reflux disease)     Heart failure (HCC) 10/2021    1 EPISODE DUE TO HIGH BP, NOW CLEARED UP PER PT AND WIFE    Hypertension     Stroke (HCC) 2022      Past Surgical History:   Procedure Laterality Date    APPENDECTOMY      EP DEVICE PROCEDURE N/A 5/22/2025    Loop recorder insert performed by Ti Cunha MD at [facility] CARDIAC CATH LAB    HERNIA REPAIR Right 12/05/2022    Robotic-assisted laparoscopic right inguinal hernia repair with mesh by Dr. Tan    ORTHOPEDIC SURGERY      collar bone (right)    OTHER SURGICAL HISTORY      HEMORRHOIDECTOMY     Prior to Admission medications    Medication Sig Start Date End Date Taking? Authorizing Provider   hydrALAZINE (APRESOLINE) 100 MG tablet Take 1 tablet by mouth 3 times daily 7/3/25 8/2/25  Jan Fournier MD   prazosin (MINIPRESS) 2 MG capsule Take 1 capsule by mouth 3 times daily 7/3/25 8/2/25  Jan Fournier MD   furosemide (LASIX) 20 MG tablet Take 3 tablets by mouth daily 7/4/25 8/3/25  Jan Fournier MD   tamsulosin (FLOMAX) 0.4 MG capsule Take 1 capsule by mouth

## 2025-07-08 LAB
EKG ATRIAL RATE: 67 BPM
EKG DIAGNOSIS: NORMAL
EKG P AXIS: 72 DEGREES
EKG P-R INTERVAL: 184 MS
EKG Q-T INTERVAL: 536 MS
EKG QRS DURATION: 106 MS
EKG QTC CALCULATION (BAZETT): 566 MS
EKG R AXIS: 48 DEGREES
EKG T AXIS: 252 DEGREES
EKG VENTRICULAR RATE: 67 BPM

## 2025-07-08 PROCEDURE — 93010 ELECTROCARDIOGRAM REPORT: CPT | Performed by: INTERNAL MEDICINE

## 2025-07-09 ENCOUNTER — FOLLOWUP TELEPHONE ENCOUNTER (OUTPATIENT)
Facility: HOSPITAL | Age: 60
End: 2025-07-09

## 2025-07-09 NOTE — NURSE NAVIGATOR
Neuroscience Patient Post Discharge Follow Up Phone Call - 30-day Follow-up  The Nurse Navigator attempted to contact the patient by telephone to perform a post-hospital discharge follow-up call. The patient's name and date of birth were verified as identifiers.     Discharge Assessment   Nurse Navigator unable to review discharge information due to not being able to reach the patient.     Follow-Up and Outcomes   Nurse Navigator was unable to review follow-up care and outcomes as the patient could not be reached.     Patient Feedback   Nurse Navigator was unable to review questions or concerns as the patient could not be reached.     Plan   Nurse Navigator will attempt a callback within 24-48 hours.  Nurse Navigator will continue to provide ongoing support and follow-up as needed.

## 2025-07-14 ENCOUNTER — OFFICE VISIT (OUTPATIENT)
Age: 60
End: 2025-07-14
Payer: MEDICAID

## 2025-07-14 VITALS
OXYGEN SATURATION: 94 % | HEART RATE: 67 BPM | DIASTOLIC BLOOD PRESSURE: 104 MMHG | BODY MASS INDEX: 22.28 KG/M2 | SYSTOLIC BLOOD PRESSURE: 168 MMHG | TEMPERATURE: 98.3 F | HEIGHT: 70 IN | WEIGHT: 155.6 LBS

## 2025-07-14 DIAGNOSIS — R20.2 PARESTHESIA OF LEFT UPPER AND LOWER EXTREMITY: ICD-10-CM

## 2025-07-14 DIAGNOSIS — Z12.11 SCREENING FOR COLORECTAL CANCER: ICD-10-CM

## 2025-07-14 DIAGNOSIS — Z76.89 ESTABLISHING CARE WITH NEW DOCTOR, ENCOUNTER FOR: Primary | ICD-10-CM

## 2025-07-14 DIAGNOSIS — Z23 ENCOUNTER FOR IMMUNIZATION: ICD-10-CM

## 2025-07-14 DIAGNOSIS — J44.9 COPD MIXED TYPE (HCC): ICD-10-CM

## 2025-07-14 DIAGNOSIS — Z86.73 HISTORY OF STROKE: ICD-10-CM

## 2025-07-14 DIAGNOSIS — I10 BENIGN ESSENTIAL HYPERTENSION: ICD-10-CM

## 2025-07-14 DIAGNOSIS — D50.9 IRON DEFICIENCY ANEMIA, UNSPECIFIED IRON DEFICIENCY ANEMIA TYPE: ICD-10-CM

## 2025-07-14 DIAGNOSIS — Z12.12 SCREENING FOR COLORECTAL CANCER: ICD-10-CM

## 2025-07-14 DIAGNOSIS — N18.6 ESRD (END STAGE RENAL DISEASE) (HCC): ICD-10-CM

## 2025-07-14 DIAGNOSIS — I50.32 CHRONIC DIASTOLIC HEART FAILURE (HCC): ICD-10-CM

## 2025-07-14 DIAGNOSIS — F17.210 CIGARETTE NICOTINE DEPENDENCE WITHOUT COMPLICATION: ICD-10-CM

## 2025-07-14 PROBLEM — R29.90 STROKE-LIKE EPISODE: Status: RESOLVED | Noted: 2025-05-21 | Resolved: 2025-07-14

## 2025-07-14 PROBLEM — I63.9 CVA (CEREBRAL VASCULAR ACCIDENT) (HCC): Status: RESOLVED | Noted: 2022-12-21 | Resolved: 2025-07-14

## 2025-07-14 PROBLEM — I50.31 ACUTE DIASTOLIC CHF (CONGESTIVE HEART FAILURE) (HCC): Status: RESOLVED | Noted: 2022-10-26 | Resolved: 2025-07-14

## 2025-07-14 PROBLEM — R29.818 FOCAL NEUROLOGICAL DEFICIT: Status: RESOLVED | Noted: 2025-07-06 | Resolved: 2025-07-14

## 2025-07-14 PROBLEM — R47.81 SLURRED SPEECH: Status: RESOLVED | Noted: 2025-07-06 | Resolved: 2025-07-14

## 2025-07-14 PROBLEM — K40.90 RIGHT INGUINAL HERNIA: Status: RESOLVED | Noted: 2022-11-10 | Resolved: 2025-07-14

## 2025-07-14 PROBLEM — I63.9 ACUTE CVA (CEREBROVASCULAR ACCIDENT) (HCC): Status: RESOLVED | Noted: 2025-07-06 | Resolved: 2025-07-14

## 2025-07-14 PROBLEM — I63.9 ACUTE CEREBROVASCULAR ACCIDENT (HCC): Status: RESOLVED | Noted: 2025-05-22 | Resolved: 2025-07-14

## 2025-07-14 PROBLEM — G81.90 HEMIPLEGIA (HCC): Status: RESOLVED | Noted: 2025-05-20 | Resolved: 2025-07-14

## 2025-07-14 PROCEDURE — 3077F SYST BP >= 140 MM HG: CPT | Performed by: STUDENT IN AN ORGANIZED HEALTH CARE EDUCATION/TRAINING PROGRAM

## 2025-07-14 PROCEDURE — PBSHW PNEUMOCOCCAL, PCV20, PREVNAR 20, (AGE 6W+), IM, PF: Performed by: STUDENT IN AN ORGANIZED HEALTH CARE EDUCATION/TRAINING PROGRAM

## 2025-07-14 PROCEDURE — 3080F DIAST BP >= 90 MM HG: CPT | Performed by: STUDENT IN AN ORGANIZED HEALTH CARE EDUCATION/TRAINING PROGRAM

## 2025-07-14 PROCEDURE — 90677 PCV20 VACCINE IM: CPT | Performed by: STUDENT IN AN ORGANIZED HEALTH CARE EDUCATION/TRAINING PROGRAM

## 2025-07-14 PROCEDURE — 99204 OFFICE O/P NEW MOD 45 MIN: CPT | Performed by: STUDENT IN AN ORGANIZED HEALTH CARE EDUCATION/TRAINING PROGRAM

## 2025-07-14 RX ORDER — UMECLIDINIUM BROMIDE AND VILANTEROL TRIFENATATE 62.5; 25 UG/1; UG/1
1 POWDER RESPIRATORY (INHALATION) DAILY
Qty: 60 EACH | Refills: 5 | Status: SHIPPED | OUTPATIENT
Start: 2025-07-14

## 2025-07-14 SDOH — ECONOMIC STABILITY: FOOD INSECURITY: WITHIN THE PAST 12 MONTHS, THE FOOD YOU BOUGHT JUST DIDN'T LAST AND YOU DIDN'T HAVE MONEY TO GET MORE.: NEVER TRUE

## 2025-07-14 SDOH — ECONOMIC STABILITY: FOOD INSECURITY: WITHIN THE PAST 12 MONTHS, YOU WORRIED THAT YOUR FOOD WOULD RUN OUT BEFORE YOU GOT MONEY TO BUY MORE.: NEVER TRUE

## 2025-07-14 ASSESSMENT — PATIENT HEALTH QUESTIONNAIRE - PHQ9
SUM OF ALL RESPONSES TO PHQ QUESTIONS 1-9: 0
SUM OF ALL RESPONSES TO PHQ QUESTIONS 1-9: 0
2. FEELING DOWN, DEPRESSED OR HOPELESS: NOT AT ALL
SUM OF ALL RESPONSES TO PHQ QUESTIONS 1-9: 0
SUM OF ALL RESPONSES TO PHQ QUESTIONS 1-9: 0
1. LITTLE INTEREST OR PLEASURE IN DOING THINGS: NOT AT ALL

## 2025-07-14 NOTE — PROGRESS NOTES
Michael Rockwell (:  1965) is a 59 y.o. male,New patient, here for evaluation of the following chief complaint(s):  New Patient      Assessment & Plan   ASSESSMENT/PLAN:  Assessment & Plan  1. Establishing care: Recently hospitalized for kidney issues.  - Patient presents for establish care visit with me.  Acute concerns addressed.  Chronic problems reviewed.  Medications and history reviewed.  See below for additional information.      2. End-stage renal disease: Under nephrologist care.  - Scheduled for dialysis and fistula procedure on 2025.  - patient follows with nephrology   - Follow-up pRN    3. History of stroke: Multiple strokes with persistent left-sided numbness and weakness.  - Continue statin, ASA, plavix  - Follow-up at next visit    4. Paresthesias in left upper and lower extremities: Persistent numbness and weakness due to strokes.  - Continue statin, ASA, plavix  - Follow-up at next visit    5. Hypertension: On amlodipine 10 mg and clonidine twice daily.  - Hydralazine discontinued due to hypotension per patient  - Management deferred to nephrologist.  - Follow-up in 3 months    6. COPD: Uses albuterol inhaler and nebulizer. Chronic and uncontrolled  - Referral to pulmonologist.  - Start Anoro Ellipta daily  - Follow-up in 3 months    7. Diastolic heart failure: Chronic and stable today  - Referral to cardiologist.    8. Nicotine dependence: Continues smoking.  - Multiple unsuccessful quit attempts.  - Advised to quit due to health risks.    9. Iron deficiency anemia: Takes iron supplements twice daily.  - Continue iron supplementation. Follow-up at next visit and consider repeating lab work to monitor.     10. Discussed colon cancer screening with the patient as patient is due for this. Patient opted to have Cologuard done.  Order was placed today.    11. Patient is due for pneumonia vaccine which was administered today.     Follow-up in 3 months.    1. Establishing care with new

## 2025-07-14 NOTE — PROGRESS NOTES
ANXIETY: ordered 15mg mirtazapine, take nightly, f/u 2 weeks to let us know how you're doing with this--will likely cause drowsiness, referral to Psych services BRBPR: Hx of hemorrhoids, FOBT +, suspect hemorrhoids, check labs, GI referral , ER precautions if bleeding not just with Bowel Movement After obtaining consent, and per orders of Dr. Liu, injection of Prevnar 20 was given by Lory Vega LPN. Patient instructed to remain in clinic for 20 minutes afterwards, and to report any adverse reaction to me immediately.

## 2025-07-14 NOTE — PROGRESS NOTES
RM 13    Chief Complaint   Patient presents with    New Patient       Vitals:    07/14/25 1248 07/14/25 1250   BP: (!) 170/103 (!) 168/104   BP Site: Left Upper Arm Right Upper Arm   Patient Position: Sitting Sitting   Pulse: 67    Temp: 98.3 °F (36.8 °C)    TempSrc: Oral    SpO2: 94%    Weight: 70.6 kg (155 lb 9.6 oz)    Height: 1.778 m (5' 10\")         \"Have you been to the ER, urgent care clinic since your last visit?  Hospitalized since your last visit?\"    Yes,he has a low BP and chest pain ,last week.    “Have you seen or consulted any other health care providers outside of [facility] since your last visit?”    NO        “Have you had a colorectal cancer screening such as a colonoscopy/FIT/Cologuard?    NO    No colonoscopy on file  No cologuard on file  Date of last FIT: 10/18/2022   No flexible sigmoidoscopy on file         Click Here for Release of Records Request   AVS  education, follow up, and recommendations provided and addressed with patient.  services used to advise patient No

## 2025-07-16 NOTE — NURSE NAVIGATOR
Neuroscience Patient Post Discharge Follow Up Phone Call - 30-day Follow-up    The Nurse Navigator contacted the patient by telephone to perform a post-hospital discharge follow-up call. The patient’s name and date of birth were verified as identifiers, and an introduction to self and role was provided.    Discharge Assessment   Diagnosis Understanding  The patient fully understands their diagnosis, treatment plan, and warning signs. Actively engaged. No additional educational needs identified.    Discharge Medications  The patient consistently takes medications as prescribed. Demonstrates full adherence with no reported issues.    Discharge Symptom Monitoring and Management  The patient monitors for new or worsening symptoms (i.e., weakness, speech, vision, balance, coordination, and numbness).  The patient was recommended to continue current symptom monitoring and management strategies.  The patient reports he is doing well, has no residual symptoms, and is back to his neurologic baseline.    Follow-Up and Outcomes   Follow-Up Appointment  The patient has scheduled, but has not attended their follow-up appointment with Neurology on 7/23/2025.    Emergency Department Visits/Hospital Readmissions  The patient has not visited the emergency department or readmitted to the hospital recently.    Emotional Well-Being Status  Total score: 0; Severity: 0 (No Depression). The patient denies any symptoms of depression since discharge.    30-day mRS Score  0 - No symptoms. The patient reports no residual symptoms and states he has returned to his neurologic baseline. He manages all aspects of his care independently and denies any physical or cognitive limitations.    Patient Feedback   Questions and Concerns  The patient did not have any questions or concerns at this time.    Plan   The Nurse Navigator provided their contact information and will continue to provide ongoing support and follow-up as needed.

## 2025-07-30 ENCOUNTER — HOSPITAL ENCOUNTER (OUTPATIENT)
Facility: HOSPITAL | Age: 60
Discharge: HOME | End: 2025-08-02
Payer: MEDICAID

## 2025-07-30 VITALS
HEIGHT: 70 IN | HEART RATE: 70 BPM | BODY MASS INDEX: 21.33 KG/M2 | OXYGEN SATURATION: 97 % | WEIGHT: 149 LBS | SYSTOLIC BLOOD PRESSURE: 137 MMHG | DIASTOLIC BLOOD PRESSURE: 87 MMHG | TEMPERATURE: 98.3 F

## 2025-07-30 LAB
ABO + RH BLD: NORMAL
ALBUMIN SERPL-MCNC: 3.8 G/DL (ref 3.5–5.2)
ALBUMIN/GLOB SERPL: 1.5 (ref 1.1–2.2)
ALP SERPL-CCNC: 66 U/L (ref 40–129)
ALT SERPL-CCNC: 10 U/L (ref 10–35)
ANION GAP SERPL CALC-SCNC: 14 MMOL/L (ref 2–14)
APTT PPP: 27.4 SEC (ref 22.1–31)
AST SERPL-CCNC: 7 U/L (ref 10–50)
BASOPHILS # BLD: 0.04 K/UL (ref 0–0.1)
BASOPHILS NFR BLD: 0.8 % (ref 0–1)
BILIRUB SERPL-MCNC: <0.2 MG/DL (ref 0–1.2)
BLOOD GROUP ANTIBODIES SERPL: NORMAL
BUN SERPL-MCNC: 41 MG/DL (ref 6–20)
BUN/CREAT SERPL: 8 (ref 12–20)
CALCIUM SERPL-MCNC: 8.8 MG/DL (ref 8.6–10)
CHLORIDE SERPL-SCNC: 112 MMOL/L (ref 98–107)
CO2 SERPL-SCNC: 17 MMOL/L (ref 20–29)
CREAT SERPL-MCNC: 5.37 MG/DL (ref 0.7–1.2)
DIFFERENTIAL METHOD BLD: ABNORMAL
EKG ATRIAL RATE: 62 BPM
EKG DIAGNOSIS: NORMAL
EKG P AXIS: 58 DEGREES
EKG P-R INTERVAL: 180 MS
EKG Q-T INTERVAL: 448 MS
EKG QRS DURATION: 100 MS
EKG QTC CALCULATION (BAZETT): 454 MS
EKG R AXIS: 12 DEGREES
EKG T AXIS: 208 DEGREES
EKG VENTRICULAR RATE: 62 BPM
EOSINOPHIL # BLD: 0.98 K/UL (ref 0–0.4)
EOSINOPHIL NFR BLD: 18.4 % (ref 0–7)
ERYTHROCYTE [DISTWIDTH] IN BLOOD BY AUTOMATED COUNT: 13.3 % (ref 11.5–14.5)
GLOBULIN SER CALC-MCNC: 2.5 G/DL (ref 2–4)
GLUCOSE SERPL-MCNC: 94 MG/DL (ref 65–100)
HCT VFR BLD AUTO: 34.6 % (ref 36.6–50.3)
HGB BLD-MCNC: 10.6 G/DL (ref 12.1–17)
IMM GRANULOCYTES # BLD AUTO: 0.01 K/UL (ref 0–0.04)
IMM GRANULOCYTES NFR BLD AUTO: 0.2 % (ref 0–0.5)
INR PPP: 1 (ref 0.9–1.1)
LYMPHOCYTES # BLD: 0.96 K/UL (ref 0.8–3.5)
LYMPHOCYTES NFR BLD: 18.2 % (ref 12–49)
MCH RBC QN AUTO: 30.2 PG (ref 26–34)
MCHC RBC AUTO-ENTMCNC: 30.6 G/DL (ref 30–36.5)
MCV RBC AUTO: 98.6 FL (ref 80–99)
MONOCYTES # BLD: 0.47 K/UL (ref 0–1)
MONOCYTES NFR BLD: 8.9 % (ref 5–13)
NEUTS SEG # BLD: 2.84 K/UL (ref 1.8–8)
NEUTS SEG NFR BLD: 53.5 % (ref 32–75)
NRBC # BLD: 0 K/UL (ref 0–0.01)
NRBC BLD-RTO: 0 PER 100 WBC
PLATELET # BLD AUTO: 199 K/UL (ref 150–400)
PMV BLD AUTO: 11.3 FL (ref 8.9–12.9)
POTASSIUM SERPL-SCNC: 5.1 MMOL/L (ref 3.5–5.1)
PROT SERPL-MCNC: 6.4 G/DL (ref 6.4–8.3)
PROTHROMBIN TIME: 10.4 SEC (ref 9.2–11.2)
RBC # BLD AUTO: 3.51 M/UL (ref 4.1–5.7)
RBC MORPH BLD: ABNORMAL
SODIUM SERPL-SCNC: 142 MMOL/L (ref 136–145)
SPECIMEN EXP DATE BLD: NORMAL
THERAPEUTIC RANGE: NORMAL SECS (ref 58–77)
WBC # BLD AUTO: 5.3 K/UL (ref 4.1–11.1)

## 2025-07-30 PROCEDURE — 86900 BLOOD TYPING SEROLOGIC ABO: CPT

## 2025-07-30 PROCEDURE — 85025 COMPLETE CBC W/AUTO DIFF WBC: CPT

## 2025-07-30 PROCEDURE — 93005 ELECTROCARDIOGRAM TRACING: CPT | Performed by: SURGERY

## 2025-07-30 PROCEDURE — 86850 RBC ANTIBODY SCREEN: CPT

## 2025-07-30 PROCEDURE — 80053 COMPREHEN METABOLIC PANEL: CPT

## 2025-07-30 PROCEDURE — 85610 PROTHROMBIN TIME: CPT

## 2025-07-30 PROCEDURE — 85730 THROMBOPLASTIN TIME PARTIAL: CPT

## 2025-07-30 PROCEDURE — 86901 BLOOD TYPING SEROLOGIC RH(D): CPT

## 2025-07-30 RX ORDER — FERROUS SULFATE 325(65) MG
325 TABLET ORAL
COMMUNITY

## 2025-07-30 RX ORDER — LISINOPRIL 10 MG/1
10 TABLET ORAL
COMMUNITY

## 2025-07-30 NOTE — PERIOP NOTE
[address] [address]      MAIN PRE OP            [phone]                                                                                AMBULATORY PRE OP          [phone]    PRE-ADMISSION TESTING    [phone]     Surgery Date:  8/13/25        [facility] staff will call you between 4 and 7pm the day before your surgery with your arrival time.   (If your surgery is on a Monday, we will call you the Friday before.)    Call [phone] after 7pm Monday-Friday if you did not receive this call.    INSTRUCTIONS BEFORE YOUR SURGERY   When You  Arrive Arrive at [facility] Patient Access on 1st floor the day of your surgery.    Have your insurance card, photo ID,living will/advanced directive/POA (if applicable),  and any copayment (if needed)     Food   and   Drink NO solid food after midnight the night before surgery. You can drink clear liquids from midnight until ONE hour prior to your arrival at the hospital on the day of your surgery.     Clear liquids include:  Water  Apple juice (no sediment)  Carbonated beverages  Black coffee(no cream/milk)  Tea(no cream/milk)  Gatorade    No alcohol (beer, wine, liquor) or marijuana (smoking) 24 hours prior to surgery.   No marijuana edibles for 3 days prior to surgery.    Stop smoking cigarettes 14 days before surgery (helps w/healing and breathing).     Medications to   TAKE   Morning of Surgery MEDICATIONS TO TAKE THE MORNING OF SURGERY:     You may take these medications, IF NEEDED, the morning of surgery: INHALERS IF NEEDED     Ask your surgeon/prescribing doctor for instructions on taking or stopping these medications prior to surgery: ASPIRIN     Medications to STOP  before surgery Non-Steroidal anti-inflammatory Drugs (NSAID's): for example, Diclofenac (Voltaren), Ibuprofen (Advil, Motrin), Naproxen (Aleve) 3 days    STOP all herbal supplements and vitamins(unless prescribed by your doctor),

## 2025-08-12 ENCOUNTER — ANESTHESIA EVENT (OUTPATIENT)
Facility: HOSPITAL | Age: 60
End: 2025-08-12
Payer: MEDICAID

## 2025-08-12 ASSESSMENT — LIFESTYLE VARIABLES: SMOKING_STATUS: 1

## 2025-08-13 ENCOUNTER — ANESTHESIA (OUTPATIENT)
Facility: HOSPITAL | Age: 60
End: 2025-08-13
Payer: MEDICAID

## 2025-08-13 ENCOUNTER — HOSPITAL ENCOUNTER (OUTPATIENT)
Facility: HOSPITAL | Age: 60
Setting detail: OUTPATIENT SURGERY
Discharge: HOME | End: 2025-08-13
Attending: SURGERY | Admitting: SURGERY
Payer: MEDICAID

## 2025-08-13 VITALS
BODY MASS INDEX: 21.33 KG/M2 | HEIGHT: 70 IN | HEART RATE: 66 BPM | TEMPERATURE: 97.6 F | OXYGEN SATURATION: 98 % | SYSTOLIC BLOOD PRESSURE: 168 MMHG | RESPIRATION RATE: 17 BRPM | WEIGHT: 149 LBS | DIASTOLIC BLOOD PRESSURE: 99 MMHG

## 2025-08-13 DIAGNOSIS — N18.6 ESRD (END STAGE RENAL DISEASE) (HCC): Primary | ICD-10-CM

## 2025-08-13 LAB
ANION GAP BLD CALC-SCNC: 10.2 MMOL/L (ref 10–20)
CA-I BLD-MCNC: 1.2 MMOL/L (ref 1.15–1.33)
CHLORIDE BLD-SCNC: 115 MMOL/L (ref 98–107)
CO2 BLD-SCNC: 17.8 MMOL/L (ref 21–32)
CREAT BLD-MCNC: 5.61 MG/DL (ref 0.6–1.3)
GLUCOSE BLD-MCNC: 71 MG/DL (ref 74–99)
POTASSIUM BLD-SCNC: 4.5 MMOL/L (ref 3.5–5.1)
SERVICE CMNT-IMP: ABNORMAL
SODIUM BLD-SCNC: 143 MMOL/L (ref 136–145)

## 2025-08-13 PROCEDURE — 2720000010 HC SURG SUPPLY STERILE: Performed by: SURGERY

## 2025-08-13 PROCEDURE — 3700000000 HC ANESTHESIA ATTENDED CARE: Performed by: SURGERY

## 2025-08-13 PROCEDURE — 6360000002 HC RX W HCPCS: Performed by: SURGERY

## 2025-08-13 PROCEDURE — 6360000002 HC RX W HCPCS: Performed by: ANESTHESIOLOGY

## 2025-08-13 PROCEDURE — 7100000000 HC PACU RECOVERY - FIRST 15 MIN: Performed by: SURGERY

## 2025-08-13 PROCEDURE — 3600000012 HC SURGERY LEVEL 2 ADDTL 15MIN: Performed by: SURGERY

## 2025-08-13 PROCEDURE — 7100000011 HC PHASE II RECOVERY - ADDTL 15 MIN: Performed by: SURGERY

## 2025-08-13 PROCEDURE — 2500000003 HC RX 250 WO HCPCS: Performed by: SURGERY

## 2025-08-13 PROCEDURE — 64415 NJX AA&/STRD BRCH PLXS IMG: CPT | Performed by: ANESTHESIOLOGY

## 2025-08-13 PROCEDURE — 2580000003 HC RX 258: Performed by: ANESTHESIOLOGY

## 2025-08-13 PROCEDURE — 3700000001 HC ADD 15 MINUTES (ANESTHESIA): Performed by: SURGERY

## 2025-08-13 PROCEDURE — 6360000002 HC RX W HCPCS: Performed by: NURSE ANESTHETIST, CERTIFIED REGISTERED

## 2025-08-13 PROCEDURE — 7100000010 HC PHASE II RECOVERY - FIRST 15 MIN: Performed by: SURGERY

## 2025-08-13 PROCEDURE — 80047 BASIC METABLC PNL IONIZED CA: CPT

## 2025-08-13 PROCEDURE — 2709999900 HC NON-CHARGEABLE SUPPLY: Performed by: SURGERY

## 2025-08-13 PROCEDURE — 7100000001 HC PACU RECOVERY - ADDTL 15 MIN: Performed by: SURGERY

## 2025-08-13 PROCEDURE — 3600000002 HC SURGERY LEVEL 2 BASE: Performed by: SURGERY

## 2025-08-13 PROCEDURE — 2580000003 HC RX 258: Performed by: SURGERY

## 2025-08-13 RX ORDER — OXYCODONE HYDROCHLORIDE 5 MG/1
5 TABLET ORAL EVERY 6 HOURS PRN
Qty: 28 TABLET | Refills: 0 | Status: SHIPPED | OUTPATIENT
Start: 2025-08-13 | End: 2025-08-20

## 2025-08-13 RX ORDER — SODIUM CHLORIDE 0.9 % (FLUSH) 0.9 %
5-40 SYRINGE (ML) INJECTION PRN
Status: DISCONTINUED | OUTPATIENT
Start: 2025-08-13 | End: 2025-08-13 | Stop reason: HOSPADM

## 2025-08-13 RX ORDER — PROCHLORPERAZINE EDISYLATE 5 MG/ML
5 INJECTION INTRAMUSCULAR; INTRAVENOUS
Status: DISCONTINUED | OUTPATIENT
Start: 2025-08-13 | End: 2025-08-13 | Stop reason: HOSPADM

## 2025-08-13 RX ORDER — SODIUM CHLORIDE 0.9 % (FLUSH) 0.9 %
5-40 SYRINGE (ML) INJECTION EVERY 12 HOURS SCHEDULED
Status: DISCONTINUED | OUTPATIENT
Start: 2025-08-13 | End: 2025-08-13 | Stop reason: HOSPADM

## 2025-08-13 RX ORDER — LABETALOL HYDROCHLORIDE 5 MG/ML
10 INJECTION, SOLUTION INTRAVENOUS
Status: DISCONTINUED | OUTPATIENT
Start: 2025-08-13 | End: 2025-08-13 | Stop reason: HOSPADM

## 2025-08-13 RX ORDER — FENTANYL CITRATE 50 UG/ML
25 INJECTION, SOLUTION INTRAMUSCULAR; INTRAVENOUS EVERY 5 MIN PRN
Status: DISCONTINUED | OUTPATIENT
Start: 2025-08-13 | End: 2025-08-13 | Stop reason: HOSPADM

## 2025-08-13 RX ORDER — HEPARIN SODIUM 1000 [USP'U]/ML
INJECTION, SOLUTION INTRAVENOUS; SUBCUTANEOUS
Status: DISCONTINUED | OUTPATIENT
Start: 2025-08-13 | End: 2025-08-13 | Stop reason: SDUPTHER

## 2025-08-13 RX ORDER — HYDROMORPHONE HYDROCHLORIDE 1 MG/ML
0.5 INJECTION, SOLUTION INTRAMUSCULAR; INTRAVENOUS; SUBCUTANEOUS EVERY 5 MIN PRN
Status: DISCONTINUED | OUTPATIENT
Start: 2025-08-13 | End: 2025-08-13 | Stop reason: HOSPADM

## 2025-08-13 RX ORDER — SODIUM CHLORIDE 9 MG/ML
INJECTION, SOLUTION INTRAVENOUS CONTINUOUS
Status: DISCONTINUED | OUTPATIENT
Start: 2025-08-13 | End: 2025-08-13 | Stop reason: HOSPADM

## 2025-08-13 RX ORDER — LIDOCAINE HYDROCHLORIDE 20 MG/ML
INJECTION, SOLUTION EPIDURAL; INFILTRATION; INTRACAUDAL; PERINEURAL
Status: DISCONTINUED | OUTPATIENT
Start: 2025-08-13 | End: 2025-08-13 | Stop reason: SDUPTHER

## 2025-08-13 RX ORDER — ONDANSETRON 2 MG/ML
4 INJECTION INTRAMUSCULAR; INTRAVENOUS
Status: DISCONTINUED | OUTPATIENT
Start: 2025-08-13 | End: 2025-08-13 | Stop reason: HOSPADM

## 2025-08-13 RX ORDER — MIDAZOLAM HYDROCHLORIDE 2 MG/2ML
2 INJECTION, SOLUTION INTRAMUSCULAR; INTRAVENOUS ONCE
Status: COMPLETED | OUTPATIENT
Start: 2025-08-13 | End: 2025-08-13

## 2025-08-13 RX ORDER — SODIUM CHLORIDE 9 MG/ML
INJECTION, SOLUTION INTRAVENOUS PRN
Status: DISCONTINUED | OUTPATIENT
Start: 2025-08-13 | End: 2025-08-13 | Stop reason: HOSPADM

## 2025-08-13 RX ORDER — SODIUM CHLORIDE, SODIUM LACTATE, POTASSIUM CHLORIDE, CALCIUM CHLORIDE 600; 310; 30; 20 MG/100ML; MG/100ML; MG/100ML; MG/100ML
INJECTION, SOLUTION INTRAVENOUS CONTINUOUS
Status: DISCONTINUED | OUTPATIENT
Start: 2025-08-13 | End: 2025-08-13 | Stop reason: HOSPADM

## 2025-08-13 RX ORDER — MIDAZOLAM HYDROCHLORIDE 1 MG/ML
INJECTION, SOLUTION INTRAMUSCULAR; INTRAVENOUS
Status: DISCONTINUED | OUTPATIENT
Start: 2025-08-13 | End: 2025-08-13 | Stop reason: SDUPTHER

## 2025-08-13 RX ORDER — HYDRALAZINE HYDROCHLORIDE 20 MG/ML
INJECTION INTRAMUSCULAR; INTRAVENOUS
Status: DISCONTINUED | OUTPATIENT
Start: 2025-08-13 | End: 2025-08-13 | Stop reason: SDUPTHER

## 2025-08-13 RX ORDER — FENTANYL CITRATE 50 UG/ML
INJECTION, SOLUTION INTRAMUSCULAR; INTRAVENOUS
Status: DISCONTINUED | OUTPATIENT
Start: 2025-08-13 | End: 2025-08-13 | Stop reason: SDUPTHER

## 2025-08-13 RX ADMIN — FENTANYL CITRATE 50 MCG: 50 INJECTION INTRAMUSCULAR; INTRAVENOUS at 08:05

## 2025-08-13 RX ADMIN — MIDAZOLAM 1 MG: 1 INJECTION INTRAMUSCULAR; INTRAVENOUS at 07:51

## 2025-08-13 RX ADMIN — MIDAZOLAM 2 MG: 1 INJECTION INTRAMUSCULAR; INTRAVENOUS at 07:24

## 2025-08-13 RX ADMIN — PROPOFOL 50 MCG/KG/MIN: 10 INJECTION, EMULSION INTRAVENOUS at 08:06

## 2025-08-13 RX ADMIN — LIDOCAINE HYDROCHLORIDE 60 MG: 20 INJECTION, SOLUTION EPIDURAL; INFILTRATION; INTRACAUDAL; PERINEURAL at 08:05

## 2025-08-13 RX ADMIN — HYDRALAZINE HYDROCHLORIDE 2 MG: 20 INJECTION INTRAMUSCULAR; INTRAVENOUS at 08:55

## 2025-08-13 RX ADMIN — WATER 2000 MG: 1 INJECTION INTRAMUSCULAR; INTRAVENOUS; SUBCUTANEOUS at 08:12

## 2025-08-13 RX ADMIN — HYDRALAZINE HYDROCHLORIDE 2 MG: 20 INJECTION INTRAMUSCULAR; INTRAVENOUS at 08:56

## 2025-08-13 RX ADMIN — MEPIVACAINE HYDROCHLORIDE 30 ML: 15 INJECTION, SOLUTION EPIDURAL; INFILTRATION at 07:21

## 2025-08-13 RX ADMIN — HEPARIN SODIUM 5000 UNITS: 1000 INJECTION, SOLUTION INTRAVENOUS; SUBCUTANEOUS at 08:35

## 2025-08-13 RX ADMIN — SODIUM CHLORIDE: 9 INJECTION, SOLUTION INTRAVENOUS at 07:20

## 2025-08-13 ASSESSMENT — PAIN - FUNCTIONAL ASSESSMENT
PAIN_FUNCTIONAL_ASSESSMENT: 0-10

## (undated) DEVICE — SOLUTION IRRIG 1000ML 0.9% SOD CHL USP POUR PLAS BTL

## (undated) DEVICE — APPLICATOR MEDICATED 10.5 CC SOLUTION HI LT ORNG CHLORAPREP

## (undated) DEVICE — GLOVE SURG SZ 75 L1212IN FNGR THK138MIL BRN LTX FREE

## (undated) DEVICE — AGENT HEMSTAT W4XL4IN OXIDIZED REGENERATED CELOS ABSRB SFT

## (undated) DEVICE — COVER MPLR TIP CRV SCIS ACC DA VINCI

## (undated) DEVICE — PAD PT POS 36 IN SURGYPAD DISP

## (undated) DEVICE — ADHESIVE SKIN CLOSURE HI VISC MIC 0.5 CC PREMIERPRO EXOFIN

## (undated) DEVICE — PACK,BASIC,SIRUS,V: Brand: MEDLINE

## (undated) DEVICE — SUTURE PROL 5-0 L18IN NONABSORBABLE BLU RB-2 L13MM 1/2 CIR 8713H

## (undated) DEVICE — DERMABOND SKIN ADH 0.7ML -- DERMABOND ADVANCED 12/BX

## (undated) DEVICE — 3M™ TEGADERM™ TRANSPARENT FILM DRESSING FRAME STYLE, 1626W, 4 IN X 4-3/4 IN (10 CM X 12 CM), 50/CT 4CT/CASE: Brand: 3M™ TEGADERM™

## (undated) DEVICE — PENCIL ES CRD L10FT HND SWCHING ROCK SWCH W/ EDGE COAT BLDE

## (undated) DEVICE — VISUALIZATION SYSTEM: Brand: CLEARIFY

## (undated) DEVICE — GARMENT,MEDLINE,DVT,INT,CALF,MED, GEN2: Brand: MEDLINE

## (undated) DEVICE — SEAL UNIV 5-8MM DISP BX/10 -- DA VINCI XI - SNGL USE

## (undated) DEVICE — ELECTRO LUBE IS A SINGLE PATIENT USE DEVICE THAT IS INTENDED TO BE USED ON ELECTROSURGICAL ELECTRODES TO REDUCE STICKING.: Brand: KEY SURGICAL ELECTRO LUBE

## (undated) DEVICE — GLOVE SURG SZ 7 L12IN FNGR THK79MIL GRN LTX FREE

## (undated) DEVICE — SUTURE VICRYL + SZ 3-0 L27IN ABSRB UD L26MM SH 1/2 CIR VCP416H

## (undated) DEVICE — SUTURE PERMAHAND SZ 3-0 L30IN NONABSORBABLE BLK SILK BRAID A304H

## (undated) DEVICE — HYPODERMIC SAFETY NEEDLE: Brand: MAGELLAN

## (undated) DEVICE — SOLUTION IV 500 ML 0.9 NACL INJ EXCEL CONTAINER USP LF

## (undated) DEVICE — SUTURE DEV SZ 2-0 WND CLSR ABSRB GS-22 VLOC COVIDIEN VLOCM2145

## (undated) DEVICE — Device

## (undated) DEVICE — ARM DRAPE

## (undated) DEVICE — GLOVE ORANGE PI 7   MSG9070

## (undated) DEVICE — OBTRTR BLDELSS OPT 8MM DISP -- DA VINICI XI - SNGL USE

## (undated) DEVICE — SUT MCRYL 4-0 27IN PS2 UD --

## (undated) DEVICE — HANDLE LT SNAP ON ULT DURABLE LENS FOR TRUMPF ALC DISPOSABLE

## (undated) DEVICE — GENERAL LAPAROSCOPY - SMH: Brand: MEDLINE INDUSTRIES, INC.

## (undated) DEVICE — SUTURE VCRL SZ 3-0 L27IN ABSRB VLT L26MM SH 1/2 CIR J316H

## (undated) DEVICE — TOWEL SURG W16XL26IN WHT NONFENESTRATED ST 4 PER PK

## (undated) DEVICE — INSUFFLATION NEEDLE: Brand: SURGINEEDLE

## (undated) DEVICE — SUTURE PROL SZ 5-0 L30IN NONABSORBABLE BLU L13MM RB-2 1/2 8710H

## (undated) DEVICE — LAPAROSCOPIC TROCAR SLEEVE/SINGLE USE: Brand: KII® OPTICAL ACCESS SYSTEM

## (undated) DEVICE — GOWN,SIRUS,FABRNF,XL,20/CS: Brand: MEDLINE

## (undated) DEVICE — REM POLYHESIVE ADULT PATIENT RETURN ELECTRODE: Brand: VALLEYLAB

## (undated) DEVICE — PACK SURG CUST AV FISTULA LF SMH